# Patient Record
Sex: MALE | Race: WHITE | NOT HISPANIC OR LATINO | Employment: OTHER | URBAN - NONMETROPOLITAN AREA
[De-identification: names, ages, dates, MRNs, and addresses within clinical notes are randomized per-mention and may not be internally consistent; named-entity substitution may affect disease eponyms.]

---

## 2017-06-02 ENCOUNTER — TRANSCRIBE ORDERS (OUTPATIENT)
Dept: ADMINISTRATIVE | Facility: HOSPITAL | Age: 82
End: 2017-06-02

## 2017-06-02 DIAGNOSIS — R31.9 HEMATURIA: ICD-10-CM

## 2017-06-02 DIAGNOSIS — R10.84 GENERALIZED ABDOMINAL PAIN: Primary | ICD-10-CM

## 2017-06-05 ENCOUNTER — HOSPITAL ENCOUNTER (OUTPATIENT)
Dept: CT IMAGING | Facility: HOSPITAL | Age: 82
Discharge: HOME | End: 2017-06-05
Attending: INTERNAL MEDICINE | Admitting: INTERNAL MEDICINE

## 2017-06-05 DIAGNOSIS — R10.84 GENERALIZED ABDOMINAL PAIN: ICD-10-CM

## 2017-06-05 DIAGNOSIS — R31.9 HEMATURIA: ICD-10-CM

## 2017-06-05 PROCEDURE — 74176 CT ABD & PELVIS W/O CONTRAST: CPT | Performed by: RADIOLOGY

## 2017-06-05 PROCEDURE — 74176 CT ABD & PELVIS W/O CONTRAST: CPT

## 2017-06-15 ENCOUNTER — OFFICE VISIT (OUTPATIENT)
Dept: SURGERY | Facility: CLINIC | Age: 82
End: 2017-06-15

## 2017-06-15 VITALS — HEIGHT: 66 IN | WEIGHT: 120 LBS | BODY MASS INDEX: 19.29 KG/M2

## 2017-06-15 DIAGNOSIS — K80.20 GALLSTONES: Primary | ICD-10-CM

## 2017-06-15 PROCEDURE — 99203 OFFICE O/P NEW LOW 30 MIN: CPT | Performed by: SURGERY

## 2017-06-15 RX ORDER — ALBUTEROL SULFATE 90 UG/1
2 AEROSOL, METERED RESPIRATORY (INHALATION) EVERY 6 HOURS PRN
COMMUNITY
Start: 2017-05-09

## 2017-06-15 RX ORDER — TRAMADOL HYDROCHLORIDE 50 MG/1
TABLET ORAL
Status: ON HOLD | COMMUNITY
Start: 2017-06-09 | End: 2017-06-24

## 2017-06-15 RX ORDER — TAMSULOSIN HYDROCHLORIDE 0.4 MG/1
CAPSULE ORAL
Status: ON HOLD | COMMUNITY
Start: 2017-06-01 | End: 2017-06-24

## 2017-06-15 RX ORDER — OMEPRAZOLE 20 MG/1
20 CAPSULE, DELAYED RELEASE ORAL DAILY
COMMUNITY
Start: 2017-06-07 | End: 2018-08-29 | Stop reason: ALTCHOICE

## 2017-06-15 RX ORDER — LORAZEPAM 0.5 MG/1
0.5 TABLET ORAL DAILY
COMMUNITY
Start: 2017-06-09 | End: 2020-05-20

## 2017-06-15 RX ORDER — AZITHROMYCIN 250 MG/1
TABLET, FILM COATED ORAL
Status: ON HOLD | COMMUNITY
Start: 2017-06-06 | End: 2017-06-24

## 2017-06-15 NOTE — PROGRESS NOTES
Subjective   Sarbjit Martin is a 81 y.o. male.     History of Present Illness He has had some nausea and bloating after eating. This has been the last 2 weeks. His CT shows gallstones. He has had ulcers many years ago and had large gastric surgery. He also had been told he had kidney stones in the past but is not aware of any blockage from them. CT recently was negative for kidney stones.    The following portions of the patient's history were reviewed and updated as appropriate: allergies, current medications, past family history, past medical history, past social history, past surgical history and problem list.    Review of Systems   Constitutional: Negative for activity change, appetite change, chills, fever and unexpected weight change.   HENT: Negative for congestion, facial swelling and sore throat.    Eyes: Negative for photophobia and visual disturbance.   Respiratory: Negative for chest tightness, shortness of breath and wheezing.    Cardiovascular: Negative for chest pain, palpitations and leg swelling.   Gastrointestinal: Positive for abdominal pain and nausea. Negative for abdominal distention, anal bleeding, blood in stool, constipation, diarrhea, rectal pain and vomiting.   Endocrine: Negative for cold intolerance, heat intolerance, polydipsia and polyuria.   Genitourinary: Negative for difficulty urinating, dysuria, flank pain and urgency.   Musculoskeletal: Negative for back pain and myalgias.   Skin: Negative for rash and wound.   Allergic/Immunologic: Negative for immunocompromised state.   Neurological: Negative for dizziness, seizures, syncope, light-headedness, numbness and headaches.   Hematological: Negative for adenopathy. Does not bruise/bleed easily.   Psychiatric/Behavioral: Negative for behavioral problems and confusion. The patient is not nervous/anxious.        Objective   Physical Exam   Constitutional: He is oriented to person, place, and time. He appears well-developed and  well-nourished. He does not appear ill. No distress.       HENT:   Head: Normocephalic. Head is without laceration. Hair is normal.   Right Ear: Hearing and ear canal normal.   Left Ear: Hearing and ear canal normal.   Nose: Nose normal. No sinus tenderness. No epistaxis. Right sinus exhibits no maxillary sinus tenderness and no frontal sinus tenderness. Left sinus exhibits no maxillary sinus tenderness and no frontal sinus tenderness.   Eyes: Conjunctivae and lids are normal. Pupils are equal, round, and reactive to light.   Neck: Normal range of motion. No JVD present. No tracheal tenderness present. No tracheal deviation present. No thyroid mass and no thyromegaly present.   Cardiovascular: Normal rate and regular rhythm.  Exam reveals no gallop.    No murmur heard.  Pulmonary/Chest: Effort normal and breath sounds normal. No stridor. He has no wheezes. He exhibits no tenderness.   Abdominal: Soft. Bowel sounds are normal. He exhibits no distension, no ascites and no mass. There is no tenderness. There is no rebound and no guarding. No hernia.   Musculoskeletal: He exhibits no edema or deformity.   Lymphadenopathy:     He has no cervical adenopathy.     He has no axillary adenopathy.        Right: No inguinal and no supraclavicular adenopathy present.        Left: No inguinal and no supraclavicular adenopathy present.   Neurological: He is alert and oriented to person, place, and time. He exhibits normal muscle tone.   Skin: Skin is warm, dry and intact. No rash noted. No erythema. No pallor.   Psychiatric: He has a normal mood and affect. His behavior is normal. Thought content normal.   Vitals reviewed.      Assessment/Plan   Sarbjit was seen today for abdominal pain, nausea and back pain.    Diagnoses and all orders for this visit:    Gallstones    Try lap chan with possible open cholecystectomy.

## 2017-06-16 ENCOUNTER — ANESTHESIA EVENT (OUTPATIENT)
Dept: PERIOP | Facility: HOSPITAL | Age: 82
End: 2017-06-16

## 2017-06-16 ENCOUNTER — HOSPITAL ENCOUNTER (OUTPATIENT)
Facility: HOSPITAL | Age: 82
Setting detail: HOSPITAL OUTPATIENT SURGERY
Discharge: HOME | End: 2017-06-16
Attending: SURGERY | Admitting: SURGERY

## 2017-06-16 ENCOUNTER — ANESTHESIA (OUTPATIENT)
Dept: PERIOP | Facility: HOSPITAL | Age: 82
End: 2017-06-16

## 2017-06-16 VITALS
TEMPERATURE: 98.3 F | HEART RATE: 81 BPM | DIASTOLIC BLOOD PRESSURE: 69 MMHG | OXYGEN SATURATION: 98 % | RESPIRATION RATE: 18 BRPM | WEIGHT: 127 LBS | HEIGHT: 66 IN | BODY MASS INDEX: 20.41 KG/M2 | SYSTOLIC BLOOD PRESSURE: 146 MMHG

## 2017-06-16 DIAGNOSIS — K80.20 GALLSTONES: ICD-10-CM

## 2017-06-16 PROCEDURE — 94640 AIRWAY INHALATION TREATMENT: CPT

## 2017-06-16 PROCEDURE — 47562 LAPAROSCOPIC CHOLECYSTECTOMY: CPT | Performed by: SURGERY

## 2017-06-16 PROCEDURE — 88304 TISSUE EXAM BY PATHOLOGIST: CPT | Performed by: SURGERY

## 2017-06-16 PROCEDURE — 25010000002 DEXAMETHASONE PER 1 MG: Performed by: NURSE ANESTHETIST, CERTIFIED REGISTERED

## 2017-06-16 PROCEDURE — 25010000002 NEOSTIGMINE 10 MG/10ML SOLUTION: Performed by: NURSE ANESTHETIST, CERTIFIED REGISTERED

## 2017-06-16 PROCEDURE — 25010000002 ONDANSETRON PER 1 MG: Performed by: NURSE ANESTHETIST, CERTIFIED REGISTERED

## 2017-06-16 PROCEDURE — 25010000002 PROPOFOL 10 MG/ML EMULSION: Performed by: NURSE ANESTHETIST, CERTIFIED REGISTERED

## 2017-06-16 PROCEDURE — 25010000002 FENTANYL CITRATE (PF) 100 MCG/2ML SOLUTION: Performed by: NURSE ANESTHETIST, CERTIFIED REGISTERED

## 2017-06-16 RX ORDER — ONDANSETRON 2 MG/ML
4 INJECTION INTRAMUSCULAR; INTRAVENOUS ONCE AS NEEDED
Status: DISCONTINUED | OUTPATIENT
Start: 2017-06-16 | End: 2017-06-16 | Stop reason: HOSPADM

## 2017-06-16 RX ORDER — ROCURONIUM BROMIDE 10 MG/ML
INJECTION, SOLUTION INTRAVENOUS AS NEEDED
Status: DISCONTINUED | OUTPATIENT
Start: 2017-06-16 | End: 2017-06-16 | Stop reason: SURG

## 2017-06-16 RX ORDER — OXYCODONE HYDROCHLORIDE AND ACETAMINOPHEN 5; 325 MG/1; MG/1
1 TABLET ORAL ONCE AS NEEDED
Status: DISCONTINUED | OUTPATIENT
Start: 2017-06-16 | End: 2017-06-16 | Stop reason: HOSPADM

## 2017-06-16 RX ORDER — IPRATROPIUM BROMIDE AND ALBUTEROL SULFATE 2.5; .5 MG/3ML; MG/3ML
3 SOLUTION RESPIRATORY (INHALATION) ONCE AS NEEDED
Status: DISCONTINUED | OUTPATIENT
Start: 2017-06-16 | End: 2017-06-16 | Stop reason: HOSPADM

## 2017-06-16 RX ORDER — HYDROMORPHONE HYDROCHLORIDE 1 MG/ML
0.5 INJECTION, SOLUTION INTRAMUSCULAR; INTRAVENOUS; SUBCUTANEOUS
Status: DISCONTINUED | OUTPATIENT
Start: 2017-06-16 | End: 2017-06-16 | Stop reason: HOSPADM

## 2017-06-16 RX ORDER — IPRATROPIUM BROMIDE AND ALBUTEROL SULFATE 2.5; .5 MG/3ML; MG/3ML
3 SOLUTION RESPIRATORY (INHALATION) ONCE
Status: COMPLETED | OUTPATIENT
Start: 2017-06-16 | End: 2017-06-16

## 2017-06-16 RX ORDER — FENTANYL CITRATE 50 UG/ML
INJECTION, SOLUTION INTRAMUSCULAR; INTRAVENOUS AS NEEDED
Status: DISCONTINUED | OUTPATIENT
Start: 2017-06-16 | End: 2017-06-16 | Stop reason: SURG

## 2017-06-16 RX ORDER — MAGNESIUM HYDROXIDE 1200 MG/15ML
LIQUID ORAL AS NEEDED
Status: DISCONTINUED | OUTPATIENT
Start: 2017-06-16 | End: 2017-06-16 | Stop reason: HOSPADM

## 2017-06-16 RX ORDER — SODIUM CHLORIDE 9 MG/ML
INJECTION, SOLUTION INTRAVENOUS AS NEEDED
Status: DISCONTINUED | OUTPATIENT
Start: 2017-06-16 | End: 2017-06-16 | Stop reason: HOSPADM

## 2017-06-16 RX ORDER — ONDANSETRON 2 MG/ML
INJECTION INTRAMUSCULAR; INTRAVENOUS AS NEEDED
Status: DISCONTINUED | OUTPATIENT
Start: 2017-06-16 | End: 2017-06-16 | Stop reason: SURG

## 2017-06-16 RX ORDER — SODIUM CHLORIDE, SODIUM LACTATE, POTASSIUM CHLORIDE, CALCIUM CHLORIDE 600; 310; 30; 20 MG/100ML; MG/100ML; MG/100ML; MG/100ML
125 INJECTION, SOLUTION INTRAVENOUS CONTINUOUS
Status: DISCONTINUED | OUTPATIENT
Start: 2017-06-16 | End: 2017-06-16 | Stop reason: HOSPADM

## 2017-06-16 RX ORDER — PROPOFOL 10 MG/ML
VIAL (ML) INTRAVENOUS AS NEEDED
Status: DISCONTINUED | OUTPATIENT
Start: 2017-06-16 | End: 2017-06-16 | Stop reason: SURG

## 2017-06-16 RX ORDER — FENTANYL CITRATE 50 UG/ML
50 INJECTION, SOLUTION INTRAMUSCULAR; INTRAVENOUS
Status: DISCONTINUED | OUTPATIENT
Start: 2017-06-16 | End: 2017-06-16 | Stop reason: HOSPADM

## 2017-06-16 RX ORDER — HYDROCODONE BITARTRATE AND ACETAMINOPHEN 5; 325 MG/1; MG/1
1 TABLET ORAL EVERY 4 HOURS PRN
Status: DISCONTINUED | OUTPATIENT
Start: 2017-06-16 | End: 2017-06-16 | Stop reason: HOSPADM

## 2017-06-16 RX ORDER — GLYCOPYRROLATE 0.2 MG/ML
INJECTION INTRAMUSCULAR; INTRAVENOUS AS NEEDED
Status: DISCONTINUED | OUTPATIENT
Start: 2017-06-16 | End: 2017-06-16 | Stop reason: SURG

## 2017-06-16 RX ORDER — DEXAMETHASONE SODIUM PHOSPHATE 4 MG/ML
INJECTION, SOLUTION INTRA-ARTICULAR; INTRALESIONAL; INTRAMUSCULAR; INTRAVENOUS; SOFT TISSUE AS NEEDED
Status: DISCONTINUED | OUTPATIENT
Start: 2017-06-16 | End: 2017-06-16 | Stop reason: SURG

## 2017-06-16 RX ORDER — BUPIVACAINE HYDROCHLORIDE AND EPINEPHRINE 2.5; 5 MG/ML; UG/ML
INJECTION, SOLUTION EPIDURAL; INFILTRATION; INTRACAUDAL; PERINEURAL AS NEEDED
Status: DISCONTINUED | OUTPATIENT
Start: 2017-06-16 | End: 2017-06-16 | Stop reason: HOSPADM

## 2017-06-16 RX ORDER — HYDROCODONE BITARTRATE AND ACETAMINOPHEN 5; 325 MG/1; MG/1
1 TABLET ORAL EVERY 4 HOURS PRN
Qty: 30 TABLET | Refills: 0 | Status: SHIPPED | OUTPATIENT
Start: 2017-06-16 | End: 2017-06-28 | Stop reason: HOSPADM

## 2017-06-16 RX ORDER — SODIUM CHLORIDE 0.9 % (FLUSH) 0.9 %
1-10 SYRINGE (ML) INJECTION AS NEEDED
Status: DISCONTINUED | OUTPATIENT
Start: 2017-06-16 | End: 2017-06-16 | Stop reason: HOSPADM

## 2017-06-16 RX ORDER — LIDOCAINE HYDROCHLORIDE 20 MG/ML
INJECTION, SOLUTION INFILTRATION; PERINEURAL AS NEEDED
Status: DISCONTINUED | OUTPATIENT
Start: 2017-06-16 | End: 2017-06-16 | Stop reason: SURG

## 2017-06-16 RX ORDER — NEOSTIGMINE METHYLSULFATE 1 MG/ML
INJECTION, SOLUTION INTRAVENOUS AS NEEDED
Status: DISCONTINUED | OUTPATIENT
Start: 2017-06-16 | End: 2017-06-16 | Stop reason: SURG

## 2017-06-16 RX ORDER — MEPERIDINE HYDROCHLORIDE 25 MG/ML
12.5 INJECTION INTRAMUSCULAR; INTRAVENOUS; SUBCUTANEOUS
Status: DISCONTINUED | OUTPATIENT
Start: 2017-06-16 | End: 2017-06-16 | Stop reason: HOSPADM

## 2017-06-16 RX ORDER — ONDANSETRON 2 MG/ML
4 INJECTION INTRAMUSCULAR; INTRAVENOUS EVERY 6 HOURS PRN
Status: DISCONTINUED | OUTPATIENT
Start: 2017-06-16 | End: 2017-06-16 | Stop reason: HOSPADM

## 2017-06-16 RX ADMIN — FENTANYL CITRATE 50 MCG: 50 INJECTION INTRAMUSCULAR; INTRAVENOUS at 08:34

## 2017-06-16 RX ADMIN — FENTANYL CITRATE 100 MCG: 50 INJECTION INTRAMUSCULAR; INTRAVENOUS at 07:41

## 2017-06-16 RX ADMIN — GLYCOPYRROLATE 0.4 MG: 0.2 INJECTION, SOLUTION INTRAMUSCULAR; INTRAVENOUS at 08:09

## 2017-06-16 RX ADMIN — NEOSTIGMINE METHYLSULFATE 2 MG: 1 INJECTION, SOLUTION INTRAVENOUS at 08:10

## 2017-06-16 RX ADMIN — DEXAMETHASONE SODIUM PHOSPHATE 4 MG: 4 INJECTION, SOLUTION INTRAMUSCULAR; INTRAVENOUS at 07:41

## 2017-06-16 RX ADMIN — SODIUM CHLORIDE, POTASSIUM CHLORIDE, SODIUM LACTATE AND CALCIUM CHLORIDE: 600; 310; 30; 20 INJECTION, SOLUTION INTRAVENOUS at 07:36

## 2017-06-16 RX ADMIN — IPRATROPIUM BROMIDE AND ALBUTEROL SULFATE 3 ML: .5; 3 SOLUTION RESPIRATORY (INHALATION) at 07:10

## 2017-06-16 RX ADMIN — ROCURONIUM BROMIDE 25 MG: 10 INJECTION INTRAVENOUS at 07:42

## 2017-06-16 RX ADMIN — PROPOFOL 130 MG: 10 INJECTION, EMULSION INTRAVENOUS at 07:41

## 2017-06-16 RX ADMIN — FENTANYL CITRATE 100 MCG: 50 INJECTION INTRAMUSCULAR; INTRAVENOUS at 07:54

## 2017-06-16 RX ADMIN — EPHEDRINE SULFATE 12.5 MG: 50 INJECTION INTRAMUSCULAR; INTRAVENOUS; SUBCUTANEOUS at 07:48

## 2017-06-16 RX ADMIN — ONDANSETRON 4 MG: 2 INJECTION, SOLUTION INTRAMUSCULAR; INTRAVENOUS at 07:41

## 2017-06-16 RX ADMIN — GLYCOPYRROLATE 0.4 MG: 0.2 INJECTION, SOLUTION INTRAMUSCULAR; INTRAVENOUS at 08:10

## 2017-06-16 RX ADMIN — ROCURONIUM BROMIDE 5 MG: 10 INJECTION INTRAVENOUS at 07:41

## 2017-06-16 RX ADMIN — LIDOCAINE HYDROCHLORIDE 60 MG: 20 INJECTION, SOLUTION INFILTRATION; PERINEURAL at 07:41

## 2017-06-16 RX ADMIN — NEOSTIGMINE METHYLSULFATE 3 MG: 1 INJECTION, SOLUTION INTRAVENOUS at 08:09

## 2017-06-16 NOTE — ANESTHESIA PROCEDURE NOTES
Airway  Urgency: elective    Airway not difficult    General Information and Staff    Patient location during procedure: OR  CRNA: JESSICA LANDON    Indications and Patient Condition  Indications for airway management: airway protection    Preoxygenated: yes  MILS maintained throughout  Mask difficulty assessment: 1 - vent by mask    Final Airway Details  Final airway type: endotracheal airway      Successful airway: ETT  Cuffed: yes   Successful intubation technique: direct laryngoscopy  Facilitating devices/methods: intubating stylet  Endotracheal tube insertion site: oral  Blade: Lyle  Blade size: #4  ETT size: 7.5 mm  Cormack-Lehane Classification: grade I - full view of glottis  Placement verified by: chest auscultation and capnometry   Measured from: lips  ETT to lips (cm): 22  Number of attempts at approach: 1    Additional Comments  ETT inserted without difficulty.  Head and neck maintained midline.  Lips and teeth as per preop.

## 2017-06-16 NOTE — ANESTHESIA PREPROCEDURE EVALUATION
Anesthesia Evaluation     Patient summary reviewed and Nursing notes reviewed   no history of anesthetic complications:  NPO Solid Status: > 8 hours  NPO Liquid Status: > 8 hours     Airway   Mallampati: II  TM distance: >3 FB  Neck ROM: full  no difficulty expected  Dental    (+) edentulous, lower dentures and upper dentures    Pulmonary    (+) a smoker Former, COPD, decreased breath sounds,   (-) asthma  Cardiovascular - negative cardio ROS and normal exam  Exercise tolerance: good (4-7 METS)    NYHA Classification: II    (-) hypertension, angina, CHF, hyperlipidemia      Neuro/Psych  (+) psychiatric history Anxiety,    (-) seizures, CVA  GI/Hepatic/Renal/Endo    (+)  GERD,   (-) diabetes, hypothyroidism    Musculoskeletal     Abdominal  - normal exam    Bowel sounds: normal.   Substance History - negative use     OB/GYN negative ob/gyn ROS         Other   (+) arthritis                                   Anesthesia Plan    ASA 2     general     intravenous induction   Anesthetic plan and risks discussed with patient.  Use of blood products discussed with patient  Consented to blood products.

## 2017-06-16 NOTE — ANESTHESIA POSTPROCEDURE EVALUATION
Patient: Sarbjit Martin    Procedure Summary     Date Anesthesia Start Anesthesia Stop Room / Location    06/16/17 0736 0830  [facility] OR 01 / [facility]       Procedure Diagnosis Surgeon Provider    CHOLECYSTECTOMY LAPAROSCOPIC (N/A Abdomen) Gallstones  (Gallstones [K80.20]) MD Husam Bhagat MD          Anesthesia Type: general  Last vitals  /69 (06/16/17 0904)    Temp 98.3 °F (36.8 °C) (06/16/17 0904)    Pulse 81 (06/16/17 0904)   Resp 18 (06/16/17 0904)    SpO2 98 % (06/16/17 0904)      Post Anesthesia Care and Evaluation    Patient location during evaluation: PHASE II  Patient participation: complete - patient participated  Level of consciousness: awake and alert  Pain score: 1  Pain management: adequate  Airway patency: patent  Anesthetic complications: No anesthetic complications  PONV Status: controlled  Cardiovascular status: acceptable  Respiratory status: acceptable  Hydration status: acceptable

## 2017-06-19 LAB
LAB AP CASE REPORT: NORMAL
Lab: NORMAL
PATH REPORT.FINAL DX SPEC: NORMAL

## 2017-06-22 ENCOUNTER — OFFICE VISIT (OUTPATIENT)
Dept: SURGERY | Facility: CLINIC | Age: 82
End: 2017-06-22

## 2017-06-22 VITALS — HEIGHT: 66 IN | BODY MASS INDEX: 20.41 KG/M2 | WEIGHT: 127 LBS

## 2017-06-22 DIAGNOSIS — Z90.49 STATUS POST LAPAROSCOPIC CHOLECYSTECTOMY: Primary | ICD-10-CM

## 2017-06-22 PROCEDURE — 99024 POSTOP FOLLOW-UP VISIT: CPT | Performed by: SURGERY

## 2017-06-22 NOTE — PROGRESS NOTES
Subjective   Sarbjit Martin is a 81 y.o. male.     History of Present Illness He is eating ok and doing well. He was a little constipated.     The following portions of the patient's history were reviewed and updated as appropriate: allergies, current medications, past family history, past medical history, past social history, past surgical history and problem list.    Review of Systems    Objective   Physical Exam wounds ok    Assessment/Plan   Sarbjit was seen today for post-op follow-up.    Diagnoses and all orders for this visit:    Status post laparoscopic cholecystectomy    return prn

## 2017-06-24 ENCOUNTER — APPOINTMENT (OUTPATIENT)
Dept: GENERAL RADIOLOGY | Facility: HOSPITAL | Age: 82
End: 2017-06-24

## 2017-06-24 ENCOUNTER — APPOINTMENT (OUTPATIENT)
Dept: CT IMAGING | Facility: HOSPITAL | Age: 82
End: 2017-06-24

## 2017-06-24 ENCOUNTER — APPOINTMENT (OUTPATIENT)
Dept: ULTRASOUND IMAGING | Facility: HOSPITAL | Age: 82
End: 2017-06-24

## 2017-06-24 ENCOUNTER — HOSPITAL ENCOUNTER (INPATIENT)
Facility: HOSPITAL | Age: 82
LOS: 4 days | Discharge: HOME | End: 2017-06-28
Attending: EMERGENCY MEDICINE | Admitting: INTERNAL MEDICINE

## 2017-06-24 DIAGNOSIS — K52.9 COLITIS: Primary | ICD-10-CM

## 2017-06-24 DIAGNOSIS — Z90.49 STATUS POST LAPAROSCOPIC CHOLECYSTECTOMY: ICD-10-CM

## 2017-06-24 DIAGNOSIS — K80.50 CALCULUS OF BILE DUCT WITHOUT CHOLECYSTITIS AND WITHOUT OBSTRUCTION: ICD-10-CM

## 2017-06-24 LAB
ALBUMIN SERPL-MCNC: 4.2 G/DL (ref 3.4–4.8)
ALBUMIN/GLOB SERPL: 1.6 G/DL (ref 1.5–2.5)
ALP SERPL-CCNC: 294 U/L (ref 40–129)
ALT SERPL W P-5'-P-CCNC: 617 U/L (ref 10–44)
AMYLASE SERPL-CCNC: 33 U/L (ref 28–100)
ANION GAP SERPL CALCULATED.3IONS-SCNC: 7.8 MMOL/L (ref 3.6–11.2)
AST SERPL-CCNC: 832 U/L (ref 10–34)
BACTERIA UR QL AUTO: ABNORMAL /HPF
BASOPHILS # BLD AUTO: 0.01 10*3/MM3 (ref 0–0.3)
BASOPHILS NFR BLD AUTO: 0.1 % (ref 0–2)
BILIRUB SERPL-MCNC: 2.3 MG/DL (ref 0.2–1.8)
BILIRUB UR QL STRIP: ABNORMAL
BUN BLD-MCNC: 25 MG/DL (ref 7–21)
BUN/CREAT SERPL: 30.5 (ref 7–25)
CALCIUM SPEC-SCNC: 9.2 MG/DL (ref 7.7–10)
CHLORIDE SERPL-SCNC: 103 MMOL/L (ref 99–112)
CLARITY UR: CLEAR
CO2 SERPL-SCNC: 29.2 MMOL/L (ref 24.3–31.9)
COLOR UR: ABNORMAL
CREAT BLD-MCNC: 0.82 MG/DL (ref 0.43–1.29)
CRP SERPL-MCNC: 0.64 MG/DL (ref 0–0.99)
D-LACTATE SERPL-SCNC: 2 MMOL/L (ref 0.5–2)
DEPRECATED RDW RBC AUTO: 40.8 FL (ref 37–54)
EOSINOPHIL # BLD AUTO: 0.02 10*3/MM3 (ref 0–0.7)
EOSINOPHIL NFR BLD AUTO: 0.2 % (ref 0–7)
ERYTHROCYTE [DISTWIDTH] IN BLOOD BY AUTOMATED COUNT: 13.7 % (ref 11.5–14.5)
ERYTHROCYTE [SEDIMENTATION RATE] IN BLOOD: 8 MM/HR (ref 0–20)
GFR SERPL CREATININE-BSD FRML MDRD: 91 ML/MIN/1.73
GLOBULIN UR ELPH-MCNC: 2.7 GM/DL
GLUCOSE BLD-MCNC: 126 MG/DL (ref 70–110)
GLUCOSE UR STRIP-MCNC: NEGATIVE MG/DL
HCT VFR BLD AUTO: 44.1 % (ref 42–52)
HGB BLD-MCNC: 14.1 G/DL (ref 14–18)
HGB UR QL STRIP.AUTO: NEGATIVE
IMM GRANULOCYTES # BLD: 0.02 10*3/MM3 (ref 0–0.03)
IMM GRANULOCYTES NFR BLD: 0.2 % (ref 0–0.5)
KETONES UR QL STRIP: NEGATIVE
LEUKOCYTE ESTERASE UR QL STRIP.AUTO: ABNORMAL
LIPASE SERPL-CCNC: 29 U/L (ref 13–60)
LYMPHOCYTES # BLD AUTO: 0.23 10*3/MM3 (ref 1–3)
LYMPHOCYTES NFR BLD AUTO: 2.7 % (ref 16–46)
MCH RBC QN AUTO: 26.3 PG (ref 27–33)
MCHC RBC AUTO-ENTMCNC: 32 G/DL (ref 33–37)
MCV RBC AUTO: 82.1 FL (ref 80–94)
MONOCYTES # BLD AUTO: 0.54 10*3/MM3 (ref 0.1–0.9)
MONOCYTES NFR BLD AUTO: 6.3 % (ref 0–12)
NEUTROPHILS # BLD AUTO: 7.7 10*3/MM3 (ref 1.4–6.5)
NEUTROPHILS NFR BLD AUTO: 90.5 % (ref 40–75)
NITRITE UR QL STRIP: NEGATIVE
OSMOLALITY SERPL CALC.SUM OF ELEC: 285.3 MOSM/KG (ref 273–305)
PH UR STRIP.AUTO: 7.5 [PH] (ref 5–8)
PLATELET # BLD AUTO: 163 10*3/MM3 (ref 130–400)
PMV BLD AUTO: 11.8 FL (ref 6–10)
POTASSIUM BLD-SCNC: 3.7 MMOL/L (ref 3.5–5.3)
PROT SERPL-MCNC: 6.9 G/DL (ref 6–8)
PROT UR QL STRIP: NEGATIVE
RBC # BLD AUTO: 5.37 10*6/MM3 (ref 4.7–6.1)
RBC # UR: ABNORMAL /HPF
REF LAB TEST METHOD: ABNORMAL
SODIUM BLD-SCNC: 140 MMOL/L (ref 135–153)
SP GR UR STRIP: 1.02 (ref 1–1.03)
SQUAMOUS #/AREA URNS HPF: ABNORMAL /HPF
UROBILINOGEN UR QL STRIP: ABNORMAL
WBC NRBC COR # BLD: 8.52 10*3/MM3 (ref 4.5–12.5)
WBC UR QL AUTO: ABNORMAL /HPF

## 2017-06-24 PROCEDURE — 83690 ASSAY OF LIPASE: CPT | Performed by: EMERGENCY MEDICINE

## 2017-06-24 PROCEDURE — 81001 URINALYSIS AUTO W/SCOPE: CPT | Performed by: EMERGENCY MEDICINE

## 2017-06-24 PROCEDURE — 76705 ECHO EXAM OF ABDOMEN: CPT | Performed by: RADIOLOGY

## 2017-06-24 PROCEDURE — 36415 COLL VENOUS BLD VENIPUNCTURE: CPT

## 2017-06-24 PROCEDURE — 25010000002 CIPROFLOXACIN PER 200 MG: Performed by: EMERGENCY MEDICINE

## 2017-06-24 PROCEDURE — 86140 C-REACTIVE PROTEIN: CPT | Performed by: EMERGENCY MEDICINE

## 2017-06-24 PROCEDURE — 74177 CT ABD & PELVIS W/CONTRAST: CPT

## 2017-06-24 PROCEDURE — 25010000002 ONDANSETRON PER 1 MG: Performed by: EMERGENCY MEDICINE

## 2017-06-24 PROCEDURE — G0378 HOSPITAL OBSERVATION PER HR: HCPCS

## 2017-06-24 PROCEDURE — 76705 ECHO EXAM OF ABDOMEN: CPT

## 2017-06-24 PROCEDURE — 85652 RBC SED RATE AUTOMATED: CPT | Performed by: EMERGENCY MEDICINE

## 2017-06-24 PROCEDURE — 99284 EMERGENCY DEPT VISIT MOD MDM: CPT

## 2017-06-24 PROCEDURE — 96375 TX/PRO/DX INJ NEW DRUG ADDON: CPT

## 2017-06-24 PROCEDURE — 83605 ASSAY OF LACTIC ACID: CPT | Performed by: EMERGENCY MEDICINE

## 2017-06-24 PROCEDURE — 85025 COMPLETE CBC W/AUTO DIFF WBC: CPT | Performed by: EMERGENCY MEDICINE

## 2017-06-24 PROCEDURE — 87077 CULTURE AEROBIC IDENTIFY: CPT | Performed by: EMERGENCY MEDICINE

## 2017-06-24 PROCEDURE — 71010 XR CHEST 1 VW: CPT | Performed by: RADIOLOGY

## 2017-06-24 PROCEDURE — 71010 HC CHEST PA OR AP: CPT

## 2017-06-24 PROCEDURE — 93010 ELECTROCARDIOGRAM REPORT: CPT | Performed by: INTERNAL MEDICINE

## 2017-06-24 PROCEDURE — 0 IOPAMIDOL 61 % SOLUTION: Performed by: EMERGENCY MEDICINE

## 2017-06-24 PROCEDURE — 96361 HYDRATE IV INFUSION ADD-ON: CPT

## 2017-06-24 PROCEDURE — 74177 CT ABD & PELVIS W/CONTRAST: CPT | Performed by: RADIOLOGY

## 2017-06-24 PROCEDURE — 82150 ASSAY OF AMYLASE: CPT | Performed by: EMERGENCY MEDICINE

## 2017-06-24 PROCEDURE — 80053 COMPREHEN METABOLIC PANEL: CPT | Performed by: EMERGENCY MEDICINE

## 2017-06-24 PROCEDURE — 96365 THER/PROPH/DIAG IV INF INIT: CPT

## 2017-06-24 PROCEDURE — 93005 ELECTROCARDIOGRAM TRACING: CPT | Performed by: EMERGENCY MEDICINE

## 2017-06-24 PROCEDURE — 87186 SC STD MICRODIL/AGAR DIL: CPT | Performed by: EMERGENCY MEDICINE

## 2017-06-24 PROCEDURE — 87040 BLOOD CULTURE FOR BACTERIA: CPT | Performed by: EMERGENCY MEDICINE

## 2017-06-24 PROCEDURE — 94799 UNLISTED PULMONARY SVC/PX: CPT

## 2017-06-24 RX ORDER — ONDANSETRON 2 MG/ML
4 INJECTION INTRAMUSCULAR; INTRAVENOUS EVERY 6 HOURS PRN
Status: DISCONTINUED | OUTPATIENT
Start: 2017-06-24 | End: 2017-06-28 | Stop reason: HOSPADM

## 2017-06-24 RX ORDER — CIPROFLOXACIN 2 MG/ML
400 INJECTION, SOLUTION INTRAVENOUS EVERY 12 HOURS
Status: DISCONTINUED | OUTPATIENT
Start: 2017-06-25 | End: 2017-06-28

## 2017-06-24 RX ORDER — ALBUTEROL SULFATE 2.5 MG/3ML
2.5 SOLUTION RESPIRATORY (INHALATION) EVERY 6 HOURS PRN
Status: DISCONTINUED | OUTPATIENT
Start: 2017-06-24 | End: 2017-06-28 | Stop reason: HOSPADM

## 2017-06-24 RX ORDER — IPRATROPIUM BROMIDE AND ALBUTEROL SULFATE 2.5; .5 MG/3ML; MG/3ML
3 SOLUTION RESPIRATORY (INHALATION)
Status: DISCONTINUED | OUTPATIENT
Start: 2017-06-24 | End: 2017-06-24

## 2017-06-24 RX ORDER — CIPROFLOXACIN 2 MG/ML
400 INJECTION, SOLUTION INTRAVENOUS ONCE
Status: COMPLETED | OUTPATIENT
Start: 2017-06-24 | End: 2017-06-24

## 2017-06-24 RX ORDER — SODIUM CHLORIDE 0.9 % (FLUSH) 0.9 %
10 SYRINGE (ML) INJECTION AS NEEDED
Status: DISCONTINUED | OUTPATIENT
Start: 2017-06-24 | End: 2017-06-28 | Stop reason: HOSPADM

## 2017-06-24 RX ORDER — LORAZEPAM 0.5 MG/1
0.5 TABLET ORAL DAILY PRN
Status: DISCONTINUED | OUTPATIENT
Start: 2017-06-24 | End: 2017-06-28 | Stop reason: HOSPADM

## 2017-06-24 RX ORDER — ONDANSETRON 2 MG/ML
4 INJECTION INTRAMUSCULAR; INTRAVENOUS ONCE
Status: COMPLETED | OUTPATIENT
Start: 2017-06-24 | End: 2017-06-24

## 2017-06-24 RX ORDER — CIPROFLOXACIN 2 MG/ML
400 INJECTION, SOLUTION INTRAVENOUS EVERY 12 HOURS
Status: DISCONTINUED | OUTPATIENT
Start: 2017-06-24 | End: 2017-06-24

## 2017-06-24 RX ORDER — SODIUM CHLORIDE 9 MG/ML
75 INJECTION, SOLUTION INTRAVENOUS CONTINUOUS
Status: DISCONTINUED | OUTPATIENT
Start: 2017-06-24 | End: 2017-06-28

## 2017-06-24 RX ORDER — PANTOPRAZOLE SODIUM 40 MG/1
40 TABLET, DELAYED RELEASE ORAL EVERY MORNING
Status: DISCONTINUED | OUTPATIENT
Start: 2017-06-24 | End: 2017-06-28 | Stop reason: HOSPADM

## 2017-06-24 RX ORDER — IPRATROPIUM BROMIDE AND ALBUTEROL SULFATE 2.5; .5 MG/3ML; MG/3ML
3 SOLUTION RESPIRATORY (INHALATION) EVERY 6 HOURS PRN
Status: DISCONTINUED | OUTPATIENT
Start: 2017-06-24 | End: 2017-06-28 | Stop reason: HOSPADM

## 2017-06-24 RX ORDER — HYDROCODONE BITARTRATE AND ACETAMINOPHEN 5; 325 MG/1; MG/1
1 TABLET ORAL EVERY 4 HOURS PRN
Status: DISPENSED | OUTPATIENT
Start: 2017-06-24 | End: 2017-06-26

## 2017-06-24 RX ADMIN — PANTOPRAZOLE SODIUM 40 MG: 40 TABLET, DELAYED RELEASE ORAL at 17:12

## 2017-06-24 RX ADMIN — SODIUM CHLORIDE 500 ML: 9 INJECTION, SOLUTION INTRAVENOUS at 09:56

## 2017-06-24 RX ADMIN — METRONIDAZOLE 500 MG: 500 INJECTION, SOLUTION INTRAVENOUS at 23:42

## 2017-06-24 RX ADMIN — IOPAMIDOL 100 ML: 612 INJECTION, SOLUTION INTRAVENOUS at 12:05

## 2017-06-24 RX ADMIN — ONDANSETRON 4 MG: 2 INJECTION, SOLUTION INTRAMUSCULAR; INTRAVENOUS at 09:56

## 2017-06-24 RX ADMIN — METRONIDAZOLE 500 MG: 500 INJECTION, SOLUTION INTRAVENOUS at 16:37

## 2017-06-24 RX ADMIN — CIPROFLOXACIN 400 MG: 2 INJECTION, SOLUTION INTRAVENOUS at 13:13

## 2017-06-24 RX ADMIN — HYDROCODONE BITARTRATE AND ACETAMINOPHEN 1 TABLET: 5; 325 TABLET ORAL at 19:59

## 2017-06-24 RX ADMIN — SODIUM CHLORIDE 75 ML/HR: 900 INJECTION, SOLUTION INTRAVENOUS at 14:53

## 2017-06-25 PROBLEM — R10.9 PAIN IN THE ABDOMEN: Status: ACTIVE | Noted: 2017-06-25

## 2017-06-25 PROCEDURE — 25010000002 CIPROFLOXACIN PER 200 MG: Performed by: INTERNAL MEDICINE

## 2017-06-25 PROCEDURE — 94799 UNLISTED PULMONARY SVC/PX: CPT

## 2017-06-25 PROCEDURE — 25010000002 ENOXAPARIN PER 10 MG: Performed by: INTERNAL MEDICINE

## 2017-06-25 RX ADMIN — METRONIDAZOLE 500 MG: 500 INJECTION, SOLUTION INTRAVENOUS at 08:34

## 2017-06-25 RX ADMIN — ENOXAPARIN SODIUM 30 MG: 30 INJECTION SUBCUTANEOUS at 08:34

## 2017-06-25 RX ADMIN — HYDROCODONE BITARTRATE AND ACETAMINOPHEN 1 TABLET: 5; 325 TABLET ORAL at 12:14

## 2017-06-25 RX ADMIN — LORAZEPAM 0.5 MG: 0.5 TABLET ORAL at 20:39

## 2017-06-25 RX ADMIN — METRONIDAZOLE 500 MG: 500 INJECTION, SOLUTION INTRAVENOUS at 17:14

## 2017-06-25 RX ADMIN — SODIUM CHLORIDE 75 ML/HR: 900 INJECTION, SOLUTION INTRAVENOUS at 20:35

## 2017-06-25 RX ADMIN — HYDROCODONE BITARTRATE AND ACETAMINOPHEN 1 TABLET: 5; 325 TABLET ORAL at 23:12

## 2017-06-25 RX ADMIN — METRONIDAZOLE 500 MG: 500 INJECTION, SOLUTION INTRAVENOUS at 23:09

## 2017-06-25 RX ADMIN — PANTOPRAZOLE SODIUM 40 MG: 40 TABLET, DELAYED RELEASE ORAL at 05:31

## 2017-06-25 RX ADMIN — SODIUM CHLORIDE 75 ML/HR: 900 INJECTION, SOLUTION INTRAVENOUS at 04:00

## 2017-06-25 RX ADMIN — CIPROFLOXACIN 400 MG: 2 INJECTION, SOLUTION INTRAVENOUS at 12:15

## 2017-06-25 RX ADMIN — CIPROFLOXACIN 400 MG: 2 INJECTION, SOLUTION INTRAVENOUS at 00:43

## 2017-06-26 ENCOUNTER — APPOINTMENT (OUTPATIENT)
Dept: GENERAL RADIOLOGY | Facility: HOSPITAL | Age: 82
End: 2017-06-26

## 2017-06-26 ENCOUNTER — ANESTHESIA EVENT (OUTPATIENT)
Dept: PERIOP | Facility: HOSPITAL | Age: 82
End: 2017-06-26

## 2017-06-26 ENCOUNTER — ANESTHESIA (OUTPATIENT)
Dept: PERIOP | Facility: HOSPITAL | Age: 82
End: 2017-06-26

## 2017-06-26 LAB
ALBUMIN SERPL-MCNC: 3.1 G/DL (ref 3.4–4.8)
ALBUMIN/GLOB SERPL: 1.5 G/DL (ref 1.5–2.5)
ALP SERPL-CCNC: 206 U/L (ref 40–129)
ALT SERPL W P-5'-P-CCNC: 228 U/L (ref 10–44)
AMYLASE SERPL-CCNC: 33 U/L (ref 28–100)
ANION GAP SERPL CALCULATED.3IONS-SCNC: 3.4 MMOL/L (ref 3.6–11.2)
APTT PPP: 31.3 SECONDS (ref 24.4–31)
AST SERPL-CCNC: 66 U/L (ref 10–34)
BASOPHILS # BLD AUTO: 0.02 10*3/MM3 (ref 0–0.3)
BASOPHILS NFR BLD AUTO: 0.3 % (ref 0–2)
BILIRUB SERPL-MCNC: 0.7 MG/DL (ref 0.2–1.8)
BUN BLD-MCNC: 15 MG/DL (ref 7–21)
BUN/CREAT SERPL: 20.5 (ref 7–25)
CALCIUM SPEC-SCNC: 8.2 MG/DL (ref 7.7–10)
CHLORIDE SERPL-SCNC: 112 MMOL/L (ref 99–112)
CO2 SERPL-SCNC: 25.6 MMOL/L (ref 24.3–31.9)
CREAT BLD-MCNC: 0.73 MG/DL (ref 0.43–1.29)
DEPRECATED RDW RBC AUTO: 42.2 FL (ref 37–54)
EOSINOPHIL # BLD AUTO: 0.37 10*3/MM3 (ref 0–0.7)
EOSINOPHIL NFR BLD AUTO: 6.4 % (ref 0–7)
ERYTHROCYTE [DISTWIDTH] IN BLOOD BY AUTOMATED COUNT: 13.9 % (ref 11.5–14.5)
GFR SERPL CREATININE-BSD FRML MDRD: 104 ML/MIN/1.73
GLOBULIN UR ELPH-MCNC: 2.1 GM/DL
GLUCOSE BLD-MCNC: 88 MG/DL (ref 70–110)
HCT VFR BLD AUTO: 39 % (ref 42–52)
HGB BLD-MCNC: 12 G/DL (ref 14–18)
IMM GRANULOCYTES # BLD: 0.01 10*3/MM3 (ref 0–0.03)
IMM GRANULOCYTES NFR BLD: 0.2 % (ref 0–0.5)
INR PPP: 1.08 (ref 0.8–1.1)
LIPASE SERPL-CCNC: 18 U/L (ref 13–60)
LYMPHOCYTES # BLD AUTO: 1.14 10*3/MM3 (ref 1–3)
LYMPHOCYTES NFR BLD AUTO: 19.6 % (ref 16–46)
MCH RBC QN AUTO: 25.6 PG (ref 27–33)
MCHC RBC AUTO-ENTMCNC: 30.8 G/DL (ref 33–37)
MCV RBC AUTO: 83.3 FL (ref 80–94)
MONOCYTES # BLD AUTO: 0.74 10*3/MM3 (ref 0.1–0.9)
MONOCYTES NFR BLD AUTO: 12.7 % (ref 0–12)
NEUTROPHILS # BLD AUTO: 3.54 10*3/MM3 (ref 1.4–6.5)
NEUTROPHILS NFR BLD AUTO: 60.8 % (ref 40–75)
OSMOLALITY SERPL CALC.SUM OF ELEC: 281.5 MOSM/KG (ref 273–305)
PLATELET # BLD AUTO: 143 10*3/MM3 (ref 130–400)
PMV BLD AUTO: 12.3 FL (ref 6–10)
POTASSIUM BLD-SCNC: 3.6 MMOL/L (ref 3.5–5.3)
PROT SERPL-MCNC: 5.2 G/DL (ref 6–8)
PROTHROMBIN TIME: 12.1 SECONDS (ref 9.8–11.9)
RBC # BLD AUTO: 4.68 10*6/MM3 (ref 4.7–6.1)
SODIUM BLD-SCNC: 141 MMOL/L (ref 135–153)
WBC NRBC COR # BLD: 5.82 10*3/MM3 (ref 4.5–12.5)

## 2017-06-26 PROCEDURE — 0F798DZ DILATION OF COMMON BILE DUCT WITH INTRALUMINAL DEVICE, VIA NATURAL OR ARTIFICIAL OPENING ENDOSCOPIC: ICD-10-PCS | Performed by: INTERNAL MEDICINE

## 2017-06-26 PROCEDURE — 85610 PROTHROMBIN TIME: CPT | Performed by: INTERNAL MEDICINE

## 2017-06-26 PROCEDURE — 25010000002 ONDANSETRON PER 1 MG: Performed by: NURSE ANESTHETIST, CERTIFIED REGISTERED

## 2017-06-26 PROCEDURE — 25010000002 FENTANYL CITRATE (PF) 100 MCG/2ML SOLUTION: Performed by: NURSE ANESTHETIST, CERTIFIED REGISTERED

## 2017-06-26 PROCEDURE — C2625 STENT, NON-COR, TEM W/DEL SY: HCPCS | Performed by: INTERNAL MEDICINE

## 2017-06-26 PROCEDURE — C1769 GUIDE WIRE: HCPCS | Performed by: INTERNAL MEDICINE

## 2017-06-26 PROCEDURE — 85025 COMPLETE CBC W/AUTO DIFF WBC: CPT | Performed by: INTERNAL MEDICINE

## 2017-06-26 PROCEDURE — 0FC98ZZ EXTIRPATION OF MATTER FROM COMMON BILE DUCT, VIA NATURAL OR ARTIFICIAL OPENING ENDOSCOPIC: ICD-10-PCS | Performed by: INTERNAL MEDICINE

## 2017-06-26 PROCEDURE — 94799 UNLISTED PULMONARY SVC/PX: CPT

## 2017-06-26 PROCEDURE — 80053 COMPREHEN METABOLIC PANEL: CPT | Performed by: INTERNAL MEDICINE

## 2017-06-26 PROCEDURE — 82150 ASSAY OF AMYLASE: CPT | Performed by: INTERNAL MEDICINE

## 2017-06-26 PROCEDURE — 25010000002 CIPROFLOXACIN PER 200 MG: Performed by: INTERNAL MEDICINE

## 2017-06-26 PROCEDURE — 85730 THROMBOPLASTIN TIME PARTIAL: CPT | Performed by: INTERNAL MEDICINE

## 2017-06-26 PROCEDURE — 83690 ASSAY OF LIPASE: CPT | Performed by: INTERNAL MEDICINE

## 2017-06-26 PROCEDURE — 76000 FLUOROSCOPY <1 HR PHYS/QHP: CPT

## 2017-06-26 PROCEDURE — 74300 X-RAY BILE DUCTS/PANCREAS: CPT | Performed by: RADIOLOGY

## 2017-06-26 PROCEDURE — 25010000002 PROPOFOL 10 MG/ML EMULSION: Performed by: NURSE ANESTHETIST, CERTIFIED REGISTERED

## 2017-06-26 DEVICE — BILIARY STENT WITH NAVIFLEXTM RX DELIVERY SYSTEM
Type: IMPLANTABLE DEVICE | Status: FUNCTIONAL
Brand: ADVANIX™ BILIARY

## 2017-06-26 RX ORDER — LABETALOL HYDROCHLORIDE 5 MG/ML
5 INJECTION, SOLUTION INTRAVENOUS ONCE
Status: COMPLETED | OUTPATIENT
Start: 2017-06-26 | End: 2017-06-26

## 2017-06-26 RX ORDER — FAMOTIDINE 10 MG/ML
INJECTION, SOLUTION INTRAVENOUS AS NEEDED
Status: DISCONTINUED | OUTPATIENT
Start: 2017-06-26 | End: 2017-06-26 | Stop reason: SURG

## 2017-06-26 RX ORDER — URSODIOL 300 MG/1
300 CAPSULE ORAL 2 TIMES DAILY
Status: DISCONTINUED | OUTPATIENT
Start: 2017-06-26 | End: 2017-06-28 | Stop reason: HOSPADM

## 2017-06-26 RX ORDER — PROPOFOL 10 MG/ML
VIAL (ML) INTRAVENOUS AS NEEDED
Status: DISCONTINUED | OUTPATIENT
Start: 2017-06-26 | End: 2017-06-26 | Stop reason: SURG

## 2017-06-26 RX ORDER — FENTANYL CITRATE 50 UG/ML
INJECTION, SOLUTION INTRAMUSCULAR; INTRAVENOUS AS NEEDED
Status: DISCONTINUED | OUTPATIENT
Start: 2017-06-26 | End: 2017-06-26 | Stop reason: SURG

## 2017-06-26 RX ORDER — SODIUM CHLORIDE, SODIUM LACTATE, POTASSIUM CHLORIDE, CALCIUM CHLORIDE 600; 310; 30; 20 MG/100ML; MG/100ML; MG/100ML; MG/100ML
125 INJECTION, SOLUTION INTRAVENOUS CONTINUOUS
Status: DISCONTINUED | OUTPATIENT
Start: 2017-06-26 | End: 2017-06-27

## 2017-06-26 RX ORDER — ONDANSETRON 2 MG/ML
4 INJECTION INTRAMUSCULAR; INTRAVENOUS ONCE AS NEEDED
Status: DISCONTINUED | OUTPATIENT
Start: 2017-06-26 | End: 2017-06-26 | Stop reason: HOSPADM

## 2017-06-26 RX ORDER — LIDOCAINE HYDROCHLORIDE 20 MG/ML
INJECTION, SOLUTION INFILTRATION; PERINEURAL AS NEEDED
Status: DISCONTINUED | OUTPATIENT
Start: 2017-06-26 | End: 2017-06-26 | Stop reason: SURG

## 2017-06-26 RX ORDER — SODIUM CHLORIDE 9 MG/ML
INJECTION, SOLUTION INTRAVENOUS CONTINUOUS PRN
Status: DISCONTINUED | OUTPATIENT
Start: 2017-06-26 | End: 2017-06-26 | Stop reason: SURG

## 2017-06-26 RX ORDER — MEPERIDINE HYDROCHLORIDE 25 MG/ML
12.5 INJECTION INTRAMUSCULAR; INTRAVENOUS; SUBCUTANEOUS
Status: DISCONTINUED | OUTPATIENT
Start: 2017-06-26 | End: 2017-06-26 | Stop reason: HOSPADM

## 2017-06-26 RX ORDER — ROCURONIUM BROMIDE 10 MG/ML
INJECTION, SOLUTION INTRAVENOUS AS NEEDED
Status: DISCONTINUED | OUTPATIENT
Start: 2017-06-26 | End: 2017-06-26 | Stop reason: SURG

## 2017-06-26 RX ORDER — HYDROCODONE BITARTRATE AND ACETAMINOPHEN 5; 325 MG/1; MG/1
1 TABLET ORAL EVERY 4 HOURS PRN
Status: DISCONTINUED | OUTPATIENT
Start: 2017-06-26 | End: 2017-06-28 | Stop reason: HOSPADM

## 2017-06-26 RX ORDER — OXYCODONE HYDROCHLORIDE AND ACETAMINOPHEN 5; 325 MG/1; MG/1
1 TABLET ORAL ONCE AS NEEDED
Status: DISCONTINUED | OUTPATIENT
Start: 2017-06-26 | End: 2017-06-26 | Stop reason: HOSPADM

## 2017-06-26 RX ORDER — ONDANSETRON 2 MG/ML
INJECTION INTRAMUSCULAR; INTRAVENOUS AS NEEDED
Status: DISCONTINUED | OUTPATIENT
Start: 2017-06-26 | End: 2017-06-26 | Stop reason: SURG

## 2017-06-26 RX ORDER — SODIUM CHLORIDE 0.9 % (FLUSH) 0.9 %
1-10 SYRINGE (ML) INJECTION AS NEEDED
Status: DISCONTINUED | OUTPATIENT
Start: 2017-06-26 | End: 2017-06-26 | Stop reason: HOSPADM

## 2017-06-26 RX ORDER — FENTANYL CITRATE 50 UG/ML
50 INJECTION, SOLUTION INTRAMUSCULAR; INTRAVENOUS
Status: DISCONTINUED | OUTPATIENT
Start: 2017-06-26 | End: 2017-06-26 | Stop reason: HOSPADM

## 2017-06-26 RX ADMIN — LABETALOL HYDROCHLORIDE 5 MG: 5 INJECTION INTRAVENOUS at 15:31

## 2017-06-26 RX ADMIN — EPHEDRINE SULFATE 10 MG: 50 INJECTION INTRAMUSCULAR; INTRAVENOUS; SUBCUTANEOUS at 14:12

## 2017-06-26 RX ADMIN — ONDANSETRON 4 MG: 2 INJECTION, SOLUTION INTRAMUSCULAR; INTRAVENOUS at 13:38

## 2017-06-26 RX ADMIN — SODIUM CHLORIDE 75 ML/HR: 900 INJECTION, SOLUTION INTRAVENOUS at 17:21

## 2017-06-26 RX ADMIN — CIPROFLOXACIN 400 MG: 2 INJECTION, SOLUTION INTRAVENOUS at 13:35

## 2017-06-26 RX ADMIN — URSODIOL 300 MG: 300 CAPSULE ORAL at 18:26

## 2017-06-26 RX ADMIN — SODIUM CHLORIDE: 9 INJECTION, SOLUTION INTRAVENOUS at 13:35

## 2017-06-26 RX ADMIN — ROCURONIUM BROMIDE 30 MG: 10 INJECTION INTRAVENOUS at 13:38

## 2017-06-26 RX ADMIN — FAMOTIDINE 20 MG: 10 INJECTION, SOLUTION INTRAVENOUS at 13:36

## 2017-06-26 RX ADMIN — FENTANYL CITRATE 100 MCG: 50 INJECTION INTRAMUSCULAR; INTRAVENOUS at 13:36

## 2017-06-26 RX ADMIN — CIPROFLOXACIN 400 MG: 2 INJECTION, SOLUTION INTRAVENOUS at 00:29

## 2017-06-26 RX ADMIN — PROPOFOL 200 MG: 10 INJECTION, EMULSION INTRAVENOUS at 13:38

## 2017-06-26 RX ADMIN — EPHEDRINE SULFATE 10 MG: 50 INJECTION INTRAMUSCULAR; INTRAVENOUS; SUBCUTANEOUS at 14:15

## 2017-06-26 RX ADMIN — LIDOCAINE HYDROCHLORIDE 60 MG: 20 INJECTION, SOLUTION INFILTRATION; PERINEURAL at 13:38

## 2017-06-26 RX ADMIN — METRONIDAZOLE 500 MG: 500 INJECTION, SOLUTION INTRAVENOUS at 08:22

## 2017-06-26 RX ADMIN — SODIUM CHLORIDE 75 ML/HR: 900 INJECTION, SOLUTION INTRAVENOUS at 12:27

## 2017-06-26 RX ADMIN — EPHEDRINE SULFATE 10 MG: 50 INJECTION INTRAMUSCULAR; INTRAVENOUS; SUBCUTANEOUS at 13:44

## 2017-06-26 RX ADMIN — EPHEDRINE SULFATE 10 MG: 50 INJECTION INTRAMUSCULAR; INTRAVENOUS; SUBCUTANEOUS at 13:47

## 2017-06-26 RX ADMIN — EPHEDRINE SULFATE 10 MG: 50 INJECTION INTRAMUSCULAR; INTRAVENOUS; SUBCUTANEOUS at 13:50

## 2017-06-26 RX ADMIN — CIPROFLOXACIN 400 MG: 2 INJECTION, SOLUTION INTRAVENOUS at 12:24

## 2017-06-26 NOTE — ANESTHESIA PROCEDURE NOTES
Airway  Urgency: elective    Date/Time: 6/26/2017 1:39 PM  Airway not difficult    General Information and Staff    Patient location during procedure: OR  Anesthesiologist: GUNNER DAS  CRNA: CHADWICK HOGAN    Indications and Patient Condition  Indications for airway management: airway protection    Preoxygenated: yes  MILS maintained throughout  Mask difficulty assessment: 2 - vent by mask + OA or adjuvant +/- NMBA    Final Airway Details  Final airway type: endotracheal airway      Successful airway: ETT  Cuffed: yes   Successful intubation technique: direct laryngoscopy  Facilitating devices/methods: intubating stylet  Endotracheal tube insertion site: oral  Blade: Lyle  Blade size: #4  ETT size: 7.5 mm  Cormack-Lehane Classification: grade IIa - partial view of glottis  Placement verified by: chest auscultation, capnometry and palpation of cuff   Cuff volume (mL): 6  Measured from: lips  ETT to lips (cm): 22  Number of attempts at approach: 1    Additional Comments  Dentition as preop. No complications noted. Patient tolerated well.  ETT secured.

## 2017-06-26 NOTE — ANESTHESIA PREPROCEDURE EVALUATION
Anesthesia Evaluation     Patient summary reviewed and Nursing notes reviewed   no history of anesthetic complications:  NPO Solid Status: > 8 hours  NPO Liquid Status: > 8 hours     Airway   Mallampati: II  TM distance: >3 FB  Neck ROM: full  Dental    (+) edentulous, lower dentures and upper dentures    Pulmonary    (+) a smoker Former, COPD, decreased breath sounds,   (-) asthma  Cardiovascular - negative cardio ROS and normal exam  Exercise tolerance: good (4-7 METS)    NYHA Classification: II    (-) hypertension, past MI, dysrhythmias, angina, CHF, hyperlipidemia      Neuro/Psych- negative ROS  (-) seizures, CVA  GI/Hepatic/Renal/Endo    (+)  GERD well controlled,   (-) diabetes, hypothyroidism    Musculoskeletal     Abdominal  - normal exam    Bowel sounds: normal.   Substance History - negative use     OB/GYN negative ob/gyn ROS         Other   (+) arthritis                                   Anesthesia Plan    ASA 3     general     intravenous induction   Anesthetic plan and risks discussed with patient.  Use of blood products discussed with patient  Consented to blood products.

## 2017-06-26 NOTE — ANESTHESIA POSTPROCEDURE EVALUATION
Patient: Sarbjit Martin    Procedure Summary     Date Anesthesia Start Anesthesia Stop Room / Location    06/26/17 1335 1511  [facility] OR 07 / [facility]       Procedure Diagnosis Surgeon Provider    ENDOSCOPIC RETROGRADE CHOLANGIOPANCREATOGRAPHY (N/A ) Calculus of bile duct without cholecystitis and without obstruction  (Calculus of bile duct without cholecystitis and without obstruction [K80.50]) MD Husam Avelar MD          Anesthesia Type: general  Last vitals  /98 (06/26/17 1601)    Temp 98 °F (36.7 °C) (06/26/17 1531)    Pulse 66 (06/26/17 1601)   Resp 20 (06/26/17 1601)    SpO2 97 % (06/26/17 1601)      Post Anesthesia Care and Evaluation    Patient location during evaluation: PACU  Patient participation: complete - patient participated  Level of consciousness: awake and alert  Pain score: 1  Pain management: adequate  Airway patency: patent  Anesthetic complications: No anesthetic complications  PONV Status: controlled  Cardiovascular status: acceptable  Respiratory status: acceptable  Hydration status: acceptable

## 2017-06-27 LAB
ALBUMIN SERPL-MCNC: 3.1 G/DL (ref 3.4–4.8)
ALBUMIN/GLOB SERPL: 1.3 G/DL (ref 1.5–2.5)
ALP SERPL-CCNC: 177 U/L (ref 40–129)
ALT SERPL W P-5'-P-CCNC: 158 U/L (ref 10–44)
ANION GAP SERPL CALCULATED.3IONS-SCNC: 5.9 MMOL/L (ref 3.6–11.2)
AST SERPL-CCNC: 39 U/L (ref 10–34)
BACTERIA SPEC AEROBE CULT: ABNORMAL
BACTERIA SPEC AEROBE CULT: ABNORMAL
BILIRUB SERPL-MCNC: 0.6 MG/DL (ref 0.2–1.8)
BUN BLD-MCNC: 9 MG/DL (ref 7–21)
BUN/CREAT SERPL: 14.8 (ref 7–25)
CALCIUM SPEC-SCNC: 8.1 MG/DL (ref 7.7–10)
CHLORIDE SERPL-SCNC: 108 MMOL/L (ref 99–112)
CO2 SERPL-SCNC: 25.1 MMOL/L (ref 24.3–31.9)
CREAT BLD-MCNC: 0.61 MG/DL (ref 0.43–1.29)
CRP SERPL-MCNC: 2.42 MG/DL (ref 0–0.99)
DEPRECATED RDW RBC AUTO: 42 FL (ref 37–54)
ERYTHROCYTE [DISTWIDTH] IN BLOOD BY AUTOMATED COUNT: 13.9 % (ref 11.5–14.5)
GFR SERPL CREATININE-BSD FRML MDRD: 128 ML/MIN/1.73
GLOBULIN UR ELPH-MCNC: 2.3 GM/DL
GLUCOSE BLD-MCNC: 80 MG/DL (ref 70–110)
GRAM STN SPEC: ABNORMAL
GRAM STN SPEC: ABNORMAL
HCT VFR BLD AUTO: 40.2 % (ref 42–52)
HGB BLD-MCNC: 12.7 G/DL (ref 14–18)
INR PPP: 1.02 (ref 0.8–1.1)
ISOLATED FROM: ABNORMAL
ISOLATED FROM: ABNORMAL
MCH RBC QN AUTO: 26.5 PG (ref 27–33)
MCHC RBC AUTO-ENTMCNC: 31.6 G/DL (ref 33–37)
MCV RBC AUTO: 83.9 FL (ref 80–94)
OSMOLALITY SERPL CALC.SUM OF ELEC: 275.2 MOSM/KG (ref 273–305)
PLATELET # BLD AUTO: 154 10*3/MM3 (ref 130–400)
PMV BLD AUTO: 11.8 FL (ref 6–10)
POTASSIUM BLD-SCNC: 3.6 MMOL/L (ref 3.5–5.3)
PROT SERPL-MCNC: 5.4 G/DL (ref 6–8)
PROTHROMBIN TIME: 11.3 SECONDS (ref 9.8–11.9)
RBC # BLD AUTO: 4.79 10*6/MM3 (ref 4.7–6.1)
SODIUM BLD-SCNC: 139 MMOL/L (ref 135–153)
WBC NRBC COR # BLD: 8.27 10*3/MM3 (ref 4.5–12.5)

## 2017-06-27 PROCEDURE — 94799 UNLISTED PULMONARY SVC/PX: CPT

## 2017-06-27 PROCEDURE — 25010000002 ONDANSETRON PER 1 MG: Performed by: INTERNAL MEDICINE

## 2017-06-27 PROCEDURE — 85027 COMPLETE CBC AUTOMATED: CPT | Performed by: INTERNAL MEDICINE

## 2017-06-27 PROCEDURE — 25010000002 CIPROFLOXACIN PER 200 MG: Performed by: INTERNAL MEDICINE

## 2017-06-27 PROCEDURE — 86140 C-REACTIVE PROTEIN: CPT | Performed by: INTERNAL MEDICINE

## 2017-06-27 PROCEDURE — 25010000002 ENOXAPARIN PER 10 MG: Performed by: INTERNAL MEDICINE

## 2017-06-27 PROCEDURE — 85610 PROTHROMBIN TIME: CPT | Performed by: INTERNAL MEDICINE

## 2017-06-27 PROCEDURE — 80053 COMPREHEN METABOLIC PANEL: CPT | Performed by: INTERNAL MEDICINE

## 2017-06-27 RX ADMIN — SODIUM CHLORIDE 75 ML/HR: 900 INJECTION, SOLUTION INTRAVENOUS at 11:52

## 2017-06-27 RX ADMIN — ONDANSETRON 4 MG: 2 INJECTION, SOLUTION INTRAMUSCULAR; INTRAVENOUS at 12:45

## 2017-06-27 RX ADMIN — CIPROFLOXACIN 400 MG: 2 INJECTION, SOLUTION INTRAVENOUS at 12:21

## 2017-06-27 RX ADMIN — URSODIOL 300 MG: 300 CAPSULE ORAL at 08:59

## 2017-06-27 RX ADMIN — CIPROFLOXACIN 400 MG: 2 INJECTION, SOLUTION INTRAVENOUS at 01:23

## 2017-06-27 RX ADMIN — HYDROCODONE BITARTRATE AND ACETAMINOPHEN 1 TABLET: 5; 325 TABLET ORAL at 00:13

## 2017-06-27 RX ADMIN — METRONIDAZOLE 500 MG: 500 INJECTION, SOLUTION INTRAVENOUS at 23:48

## 2017-06-27 RX ADMIN — ONDANSETRON 4 MG: 2 INJECTION, SOLUTION INTRAMUSCULAR; INTRAVENOUS at 23:46

## 2017-06-27 RX ADMIN — URSODIOL 300 MG: 300 CAPSULE ORAL at 17:34

## 2017-06-27 RX ADMIN — HYDROCODONE BITARTRATE AND ACETAMINOPHEN 1 TABLET: 5; 325 TABLET ORAL at 15:39

## 2017-06-27 RX ADMIN — ENOXAPARIN SODIUM 30 MG: 30 INJECTION SUBCUTANEOUS at 08:59

## 2017-06-27 RX ADMIN — METRONIDAZOLE 500 MG: 500 INJECTION, SOLUTION INTRAVENOUS at 00:07

## 2017-06-27 RX ADMIN — HYDROCODONE BITARTRATE AND ACETAMINOPHEN 1 TABLET: 5; 325 TABLET ORAL at 23:46

## 2017-06-27 RX ADMIN — METRONIDAZOLE 500 MG: 500 INJECTION, SOLUTION INTRAVENOUS at 09:00

## 2017-06-27 RX ADMIN — METRONIDAZOLE 500 MG: 500 INJECTION, SOLUTION INTRAVENOUS at 15:39

## 2017-06-28 VITALS
HEIGHT: 66 IN | SYSTOLIC BLOOD PRESSURE: 132 MMHG | WEIGHT: 112 LBS | RESPIRATION RATE: 16 BRPM | HEART RATE: 59 BPM | OXYGEN SATURATION: 93 % | TEMPERATURE: 98.1 F | BODY MASS INDEX: 18 KG/M2 | DIASTOLIC BLOOD PRESSURE: 70 MMHG

## 2017-06-28 PROBLEM — R78.81 BACTEREMIA: Status: ACTIVE | Noted: 2017-06-28

## 2017-06-28 PROBLEM — J44.9 COPD (CHRONIC OBSTRUCTIVE PULMONARY DISEASE) (HCC): Status: ACTIVE | Noted: 2017-06-28

## 2017-06-28 LAB
ALBUMIN SERPL-MCNC: 2.6 G/DL (ref 3.4–4.8)
ALBUMIN/GLOB SERPL: 1 G/DL (ref 1.5–2.5)
ALP SERPL-CCNC: 138 U/L (ref 40–129)
ALT SERPL W P-5'-P-CCNC: 101 U/L (ref 10–44)
ANION GAP SERPL CALCULATED.3IONS-SCNC: 2.3 MMOL/L (ref 3.6–11.2)
AST SERPL-CCNC: 24 U/L (ref 10–34)
BILIRUB SERPL-MCNC: 0.3 MG/DL (ref 0.2–1.8)
BUN BLD-MCNC: 6 MG/DL (ref 7–21)
BUN/CREAT SERPL: 9 (ref 7–25)
CALCIUM SPEC-SCNC: 8.1 MG/DL (ref 7.7–10)
CHLORIDE SERPL-SCNC: 109 MMOL/L (ref 99–112)
CO2 SERPL-SCNC: 26.7 MMOL/L (ref 24.3–31.9)
CREAT BLD-MCNC: 0.67 MG/DL (ref 0.43–1.29)
DEPRECATED RDW RBC AUTO: 42.1 FL (ref 37–54)
ERYTHROCYTE [DISTWIDTH] IN BLOOD BY AUTOMATED COUNT: 14.1 % (ref 11.5–14.5)
GFR SERPL CREATININE-BSD FRML MDRD: 114 ML/MIN/1.73
GLOBULIN UR ELPH-MCNC: 2.5 GM/DL
GLUCOSE BLD-MCNC: 124 MG/DL (ref 70–110)
HCT VFR BLD AUTO: 38.6 % (ref 42–52)
HGB BLD-MCNC: 12 G/DL (ref 14–18)
INR PPP: 1.07 (ref 0.9–1.1)
MCH RBC QN AUTO: 26 PG (ref 27–33)
MCHC RBC AUTO-ENTMCNC: 31.1 G/DL (ref 33–37)
MCV RBC AUTO: 83.5 FL (ref 80–94)
OSMOLALITY SERPL CALC.SUM OF ELEC: 274.7 MOSM/KG (ref 273–305)
PLATELET # BLD AUTO: 183 10*3/MM3 (ref 130–400)
PMV BLD AUTO: 12 FL (ref 6–10)
POTASSIUM BLD-SCNC: 3.8 MMOL/L (ref 3.5–5.3)
PROT SERPL-MCNC: 5.1 G/DL (ref 6–8)
PROTHROMBIN TIME: 14.1 SECONDS (ref 11–15.4)
RBC # BLD AUTO: 4.62 10*6/MM3 (ref 4.7–6.1)
SODIUM BLD-SCNC: 138 MMOL/L (ref 135–153)
WBC NRBC COR # BLD: 6.68 10*3/MM3 (ref 4.5–12.5)

## 2017-06-28 PROCEDURE — 25010000002 CIPROFLOXACIN PER 200 MG: Performed by: INTERNAL MEDICINE

## 2017-06-28 PROCEDURE — 80053 COMPREHEN METABOLIC PANEL: CPT | Performed by: INTERNAL MEDICINE

## 2017-06-28 PROCEDURE — 85610 PROTHROMBIN TIME: CPT | Performed by: INTERNAL MEDICINE

## 2017-06-28 PROCEDURE — 94799 UNLISTED PULMONARY SVC/PX: CPT

## 2017-06-28 PROCEDURE — 85027 COMPLETE CBC AUTOMATED: CPT | Performed by: INTERNAL MEDICINE

## 2017-06-28 RX ORDER — URSODIOL 300 MG/1
300 CAPSULE ORAL 2 TIMES DAILY
Qty: 60 CAPSULE | Refills: 3 | Status: SHIPPED | OUTPATIENT
Start: 2017-06-28 | End: 2017-07-28

## 2017-06-28 RX ORDER — CIPROFLOXACIN 500 MG/1
500 TABLET, FILM COATED ORAL EVERY 12 HOURS SCHEDULED
Qty: 14 TABLET | Refills: 0 | Status: SHIPPED | OUTPATIENT
Start: 2017-06-28 | End: 2017-07-05

## 2017-06-28 RX ORDER — CIPROFLOXACIN 500 MG/1
500 TABLET, FILM COATED ORAL EVERY 12 HOURS SCHEDULED
Status: DISCONTINUED | OUTPATIENT
Start: 2017-06-28 | End: 2017-06-28 | Stop reason: HOSPADM

## 2017-06-28 RX ADMIN — CIPROFLOXACIN 500 MG: 500 TABLET, FILM COATED ORAL at 08:46

## 2017-06-28 RX ADMIN — CIPROFLOXACIN 400 MG: 2 INJECTION, SOLUTION INTRAVENOUS at 01:18

## 2017-07-05 NOTE — DISCHARGE SUMMARY
Date of Discharge: 6/28/2017    Discharge Diagnosis:   Active Problems:    Bile duct calculus    Pain in the abdomen    Bacteremia    COPD (chronic obstructive pulmonary disease)      Presenting Problem/History of Present Illness  See History and Physical for Presenting Problems / Illnesses.       Hospital Course  Patient is a 81 y.o. male presented with abdominal pain, nausea and vomiting following outpatient lap chan. CT scan suggested common duct stone. He was cultured and placed on IV antibiotics. Culture was + for E.Coli. He was seen by Dr. Francis for GI. He has ERCP with large impacted stone that could not be removed by papillotomy. Stent was placed and Actigall was started. LFTs returned to normal and patient was asymptomatic. He was discharged to have outpatient followup and repeat ERCP at later date.    Procedures Performed  Procedure(s):  ENDOSCOPIC RETROGRADE CHOLANGIOPANCREATOGRAPHY       Consults:   Consults     No orders found from 5/26/2017 to 6/25/2017.          Pertinent Test Results:    Imaging Results (last 72 hours)     Procedure Component Value Units Date/Time    FL Surgery Fluoro [[id number]] Collected:  06/26/17 1522     Updated:  06/26/17 1545    Narrative:       FL SURGERY FLUORO-      CLINICAL INDICATION: ERCP; K52.9-Noninfective gastroenteritis and  colitis, unspecified; Z90.49-Acquired absence of other specified parts  of digestive tract; K80.50-Calculus of bile duct without cholangitis or  cholecystitis without obstruction.       COMPARISON: None available.     Total fluoroscopy time 662 seconds.     Fluoroscopy was provided and 9 images were acquired as cholangiogram was  performed. Stent was placed.     Filling defect does appear to be present on the initial cholangiogram.     For a full procedural report, please see the report provided by the  performing physician.      This report was finalized on 6/26/2017 3:42 PM by Dr. Barrie Guzman MD.           Lab Results (last 72 hours)      Procedure Component Value Units Date/Time    CBC & Differential [[id number]] Collected:  06/26/17 0545    Specimen:  Blood Updated:  06/26/17 0622    Narrative:       The following orders were created for panel order CBC & Differential.  Procedure                               Abnormality         Status                     ---------                               -----------         ------                     CBC Auto Differential[[id number]]        Abnormal            Final result                 Please view results for these tests on the individual orders.    CBC Auto Differential [[id number]]  (Abnormal) Collected:  06/26/17 0545    Specimen:  Blood Updated:  06/26/17 0622     WBC 5.82 10*3/mm3      RBC 4.68 (L) 10*6/mm3      Hemoglobin 12.0 (L) g/dL      Hematocrit 39.0 (L) %      MCV 83.3 fL      MCH 25.6 (L) pg      MCHC 30.8 (L) g/dL      RDW 13.9 %      RDW-SD 42.2 fl      MPV 12.3 (H) fL      Platelets 143 10*3/mm3      Neutrophil % 60.8 %      Lymphocyte % 19.6 %      Monocyte % 12.7 (H) %      Eosinophil % 6.4 %      Basophil % 0.3 %      Immature Grans % 0.2 %      Neutrophils, Absolute 3.54 10*3/mm3      Lymphocytes, Absolute 1.14 10*3/mm3      Monocytes, Absolute 0.74 10*3/mm3      Eosinophils, Absolute 0.37 10*3/mm3      Basophils, Absolute 0.02 10*3/mm3      Immature Grans, Absolute 0.01 10*3/mm3     aPTT [[id number]]  (Abnormal) Collected:  06/26/17 0545    Specimen:  Blood Updated:  06/26/17 0631     PTT 31.3 (H) seconds     Narrative:       Heparin protocol:    Therapeutic range 56-80 seconds    Protime-INR [[id number]]  (Abnormal) Collected:  06/26/17 0545    Specimen:  Blood Updated:  06/26/17 0631     Protime 12.1 (H) Seconds      INR 1.08    Narrative:       Patients not on anticoagulant therapy:    INR 0.90-1.10     Suggested INR therapeutic range for stable oral anticoagulant therapy:             Routine therapy                      2.00-3.00           Recurrent MI                          2.50-3.50           Mechanical prosthetic valve          2.50-3.50    Lipase [[id number]]  (Normal) Collected:  06/26/17 0545    Specimen:  Blood Updated:  06/26/17 0708     Lipase 18 U/L     Amylase [[id number]]  (Normal) Collected:  06/26/17 0545    Specimen:  Blood Updated:  06/26/17 0708     Amylase 33 U/L     Comprehensive Metabolic Panel [[id number]]  (Abnormal) Collected:  06/26/17 0545    Specimen:  Blood Updated:  06/26/17 0711     Glucose 88 mg/dL      BUN 15 mg/dL      Creatinine 0.73 mg/dL      Sodium 141 mmol/L      Potassium 3.6 mmol/L      Chloride 112 mmol/L      CO2 25.6 mmol/L      Calcium 8.2 mg/dL      Total Protein 5.2 (L) g/dL      Albumin 3.10 (L) g/dL      ALT (SGPT) 228 (H) U/L      AST (SGOT) 66 (H) U/L      Alkaline Phosphatase 206 (H) U/L       Note New Reference Ranges        Total Bilirubin 0.7 mg/dL      eGFR Non African Amer 104 mL/min/1.73      Globulin 2.1 gm/dL      A/G Ratio 1.5 g/dL      BUN/Creatinine Ratio 20.5     Anion Gap 3.4 (L) mmol/L     Narrative:       The MDRD GFR formula is only valid for adults with stable renal function between ages 18 and 70.    Osmolality, Calculated [[id number]]  (Normal) Collected:  06/26/17 0545    Specimen:  Blood Updated:  06/26/17 0711     Osmolality Calc 281.5 mOsm/kg     CBC (No Diff) [[id number]]  (Abnormal) Collected:  06/27/17 0100    Specimen:  Blood Updated:  06/27/17 0214     WBC 8.27 10*3/mm3      RBC 4.79 10*6/mm3      Hemoglobin 12.7 (L) g/dL      Hematocrit 40.2 (L) %      MCV 83.9 fL      MCH 26.5 (L) pg      MCHC 31.6 (L) g/dL      RDW 13.9 %      RDW-SD 42.0 fl      MPV 11.8 (H) fL      Platelets 154 10*3/mm3     Protime-INR [[id number]]  (Normal) Collected:  06/27/17 0100    Specimen:  Blood Updated:  06/27/17 0215     Protime 11.3 Seconds      INR 1.02    Narrative:       Patients not on anticoagulant therapy:    INR 0.90-1.10     Suggested INR therapeutic range for stable oral anticoagulant therapy:             Routine therapy                       2.00-3.00           Recurrent MI                         2.50-3.50           Mechanical prosthetic valve          2.50-3.50    C-reactive Protein [[id number]]  (Abnormal) Collected:  06/27/17 0100    Specimen:  Blood Updated:  06/27/17 0227     C-Reactive Protein 2.42 (H) mg/dL     Comprehensive Metabolic Panel [[id number]]  (Abnormal) Collected:  06/27/17 0100    Specimen:  Blood Updated:  06/27/17 0228     Glucose 80 mg/dL      BUN 9 mg/dL      Creatinine 0.61 mg/dL      Sodium 139 mmol/L      Potassium 3.6 mmol/L      Chloride 108 mmol/L      CO2 25.1 mmol/L      Calcium 8.1 mg/dL      Total Protein 5.4 (L) g/dL      Albumin 3.10 (L) g/dL      ALT (SGPT) 158 (H) U/L      AST (SGOT) 39 (H) U/L      Alkaline Phosphatase 177 (H) U/L       Note New Reference Ranges        Total Bilirubin 0.6 mg/dL      eGFR Non African Amer 128 mL/min/1.73      Globulin 2.3 gm/dL      A/G Ratio 1.3 (L) g/dL      BUN/Creatinine Ratio 14.8     Anion Gap 5.9 mmol/L     Narrative:       The MDRD GFR formula is only valid for adults with stable renal function between ages 18 and 70.    Osmolality, Calculated [[id number]]  (Normal) Collected:  06/27/17 0100    Specimen:  Blood Updated:  06/27/17 0228     Osmolality Calc 275.2 mOsm/kg     Blood Culture [[id number]]  (Abnormal)  (Susceptibility) Collected:  06/24/17 0958    Specimen:  Blood from Arm, Right Updated:  06/27/17 0759     Blood Culture Escherichia coli (A)     Isolated from --     Gram Stain Result Gram negative bacilli    Susceptibility      Escherichia coli     REHANA     Amikacin <=16 ug/ml Susceptible     Amoxicillin + Clavulanate <=8/4 ug/ml Susceptible     Ampicillin <=8 ug/ml Susceptible     Ampicillin + Sulbactam <=8/4 ug/ml Susceptible     Aztreonam <=8 ug/ml Susceptible     Cefazolin <=8 ug/ml Susceptible     Ciprofloxacin <=1 ug/ml Susceptible     Doripenem <=0.5 ug/ml Susceptible     Ertapenem <=1 ug/ml Susceptible     Gentamicin <=4 ug/ml Susceptible      Imipenem <=1 ug/ml Susceptible     Levofloxacin <=2 ug/ml Susceptible     Piperacillin + Tazobactam <=16 ug/ml Susceptible     Tetracycline >8 ug/ml Resistant     Tobramycin <=4 ug/ml Susceptible     Trimethoprim + Sulfamethoxazole <=2/38 ug/ml Susceptible                    Blood Culture [[id number]]  (Abnormal)  (Susceptibility) Collected:  06/24/17 1034    Specimen:  Blood from Arm, Right Updated:  06/27/17 0714     Blood Culture Escherichia coli (A)     Isolated from --     Gram Stain Result Gram negative bacilli    Susceptibility      Escherichia coli     REHANA     Amikacin <=16 ug/ml Susceptible     Amoxicillin + Clavulanate <=8/4 ug/ml Susceptible     Ampicillin <=8 ug/ml Susceptible     Ampicillin + Sulbactam <=8/4 ug/ml Susceptible     Aztreonam <=8 ug/ml Susceptible     Cefazolin <=8 ug/ml Susceptible     Ciprofloxacin <=1 ug/ml Susceptible     Doripenem <=0.5 ug/ml Susceptible     Ertapenem <=1 ug/ml Susceptible     Gentamicin <=4 ug/ml Susceptible     Imipenem <=1 ug/ml Susceptible     Levofloxacin <=2 ug/ml Susceptible     Piperacillin + Tazobactam <=16 ug/ml Susceptible     Tetracycline >8 ug/ml Resistant     Tobramycin <=4 ug/ml Susceptible     Trimethoprim + Sulfamethoxazole <=2/38 ug/ml Susceptible                    CBC (No Diff) [[id number]]  (Abnormal) Collected:  06/28/17 0109    Specimen:  Blood Updated:  06/28/17 0221     WBC 6.68 10*3/mm3      RBC 4.62 (L) 10*6/mm3      Hemoglobin 12.0 (L) g/dL      Hematocrit 38.6 (L) %      MCV 83.5 fL      MCH 26.0 (L) pg      MCHC 31.1 (L) g/dL      RDW 14.1 %      RDW-SD 42.1 fl      MPV 12.0 (H) fL      Platelets 183 10*3/mm3     Protime-INR [[id number]]  (Normal) Collected:  06/28/17 0109    Specimen:  Blood Updated:  06/28/17 0225     Protime 14.1 Seconds      INR 1.07    Narrative:       Suggested INR therapeutic range for stable oral anticoagulant therapy:    Low Intensity therapy:   1.5-2.0  Moderate Intensity therapy:   2.0-3.0  High Intensity therapy:    2.5-4.0    Comprehensive Metabolic Panel [[id number]]  (Abnormal) Collected:  06/28/17 0109    Specimen:  Blood Updated:  06/28/17 0246     Glucose 124 (H) mg/dL      BUN 6 (L) mg/dL      Creatinine 0.67 mg/dL      Sodium 138 mmol/L      Potassium 3.8 mmol/L       1+ Hemolysis         Chloride 109 mmol/L      CO2 26.7 mmol/L      Calcium 8.1 mg/dL      Total Protein 5.1 (L) g/dL      Albumin 2.60 (L) g/dL      ALT (SGPT) 101 (H) U/L      AST (SGOT) 24 U/L      Alkaline Phosphatase 138 (H) U/L       Note New Reference Ranges        Total Bilirubin 0.3 mg/dL      eGFR Non African Amer 114 mL/min/1.73      Globulin 2.5 gm/dL      A/G Ratio 1.0 (L) g/dL      BUN/Creatinine Ratio 9.0     Anion Gap 2.3 (L) mmol/L     Narrative:       The MDRD GFR formula is only valid for adults with stable renal function between ages 18 and 70.    Osmolality, Calculated [[id number]]  (Normal) Collected:  06/28/17 0109    Specimen:  Blood Updated:  06/28/17 0246     Osmolality Calc 274.7 mOsm/kg           Condition on Discharge:   Fair  Vital Signs       Physical Exam:  GENERAL: Well-developed, well-nourished, thin, elderly white male, who is awake, alert and oriented and in moderate distress secondary to abdominal pain.  HEENT: Head, normocephalic and atraumatic. Eyes, PERRLA. Sclerae anicteric.  NECK: Supple. No carotid bruits, jugular venous distention, thyromegaly or lymphadenopathy.  CHEST: Clear to auscultation. No wheezes, rales or rhonchi.  HEART: Regular rate and rhythm. No murmurs, gallops or rubs. PMI is nondisplaced.  ABDOMEN: No organomegaly, masses. There is tenderness in the right upper quadrant. No rebound, guarding or rigidity. Bowel sounds are normal.  EXTREMITIES: No clubbing, cyanosis or edema. Pedal pulses are equal bilaterally       Discharge Disposition  Home or Self Care    Discharge Medications   Sarbjit Martin   Home Medication Instructions TRAY:[id number]    Printed on:07/05/17 0806   Medication Information                       ciprofloxacin (CIPRO) 500 MG tablet  Take 1 tablet by mouth Every 12 (Twelve) Hours for 7 days. For infection  Indications: Bacteria in the Blood             LORazepam (ATIVAN) 0.5 MG tablet  Take 0.5 mg by mouth Daily.             omeprazole (priLOSEC) 20 MG capsule  Take 20 mg by mouth Daily.             ursodiol (ACTIGALL) 300 MG capsule  Take 1 capsule by mouth 2 (Two) Times a Day for 60 doses.             VENTOLIN  (90 BASE) MCG/ACT inhaler  Inhale 2 puffs Every 6 (Six) Hours As Needed for Wheezing or Shortness of Air.                 Discharge Diet:   Diet Instructions     Diet: Regular; Thin Liquids, No Restrictions       Discharge Diet:  Regular   Fluid Consistency:  Thin Liquids, No Restrictions                 Activity at Discharge:   Activity Instructions     Activity as Tolerated                     Follow-up Appointments  No future appointments.  Additional Instructions for the Follow-ups that You Need to Schedule     Additional Follow-Up    As directed    Dr. Francis in 2 to 3 weeks       Follow-Up    As directed    CBC, CMP 1 day before office appt.   Follow Up Details:  office 1 week with Dr. Chan                 Test Results Pending at Discharge       Pillo Chan MD  07/05/17  8:06 AM    Time: Discharge > 30 min  Was spent in discharge planning,preparation, dictation,and instructions at the time of discharge.      Lab Results   Component Value Date    WBC 6.68 06/28/2017    HGB 12.0 (L) 06/28/2017    HCT 38.6 (L) 06/28/2017    MCV 83.5 06/28/2017     06/28/2017

## 2017-10-26 ENCOUNTER — APPOINTMENT (OUTPATIENT)
Dept: GENERAL RADIOLOGY | Facility: HOSPITAL | Age: 82
End: 2017-10-26

## 2017-10-26 ENCOUNTER — ANESTHESIA (OUTPATIENT)
Dept: PERIOP | Facility: HOSPITAL | Age: 82
End: 2017-10-26

## 2017-10-26 ENCOUNTER — ANESTHESIA EVENT (OUTPATIENT)
Dept: PERIOP | Facility: HOSPITAL | Age: 82
End: 2017-10-26

## 2017-10-26 ENCOUNTER — HOSPITAL ENCOUNTER (OUTPATIENT)
Facility: HOSPITAL | Age: 82
Setting detail: HOSPITAL OUTPATIENT SURGERY
Discharge: HOME | End: 2017-10-26
Attending: INTERNAL MEDICINE | Admitting: INTERNAL MEDICINE

## 2017-10-26 VITALS
DIASTOLIC BLOOD PRESSURE: 72 MMHG | HEART RATE: 63 BPM | HEIGHT: 66 IN | OXYGEN SATURATION: 94 % | TEMPERATURE: 97.1 F | RESPIRATION RATE: 20 BRPM | WEIGHT: 119 LBS | BODY MASS INDEX: 19.13 KG/M2 | SYSTOLIC BLOOD PRESSURE: 132 MMHG

## 2017-10-26 PROCEDURE — 74328 X-RAY BILE DUCT ENDOSCOPY: CPT | Performed by: RADIOLOGY

## 2017-10-26 PROCEDURE — 25010000002 PROPOFOL 10 MG/ML EMULSION: Performed by: NURSE ANESTHETIST, CERTIFIED REGISTERED

## 2017-10-26 PROCEDURE — 25010000002 GLUCAGON (HUMAN RECOMBINANT) 1 MG RECONSTITUTED SOLUTION: Performed by: NURSE ANESTHETIST, CERTIFIED REGISTERED

## 2017-10-26 PROCEDURE — 76000 FLUOROSCOPY <1 HR PHYS/QHP: CPT

## 2017-10-26 PROCEDURE — C1769 GUIDE WIRE: HCPCS | Performed by: INTERNAL MEDICINE

## 2017-10-26 PROCEDURE — 25010000002 PROPOFOL 1000 MG/ML EMULSION: Performed by: NURSE ANESTHETIST, CERTIFIED REGISTERED

## 2017-10-26 PROCEDURE — 25010000002 ONDANSETRON PER 1 MG: Performed by: NURSE ANESTHETIST, CERTIFIED REGISTERED

## 2017-10-26 PROCEDURE — 25010000002 FENTANYL CITRATE (PF) 100 MCG/2ML SOLUTION: Performed by: NURSE ANESTHETIST, CERTIFIED REGISTERED

## 2017-10-26 RX ORDER — PROPOFOL 10 MG/ML
VIAL (ML) INTRAVENOUS AS NEEDED
Status: DISCONTINUED | OUTPATIENT
Start: 2017-10-26 | End: 2017-10-26 | Stop reason: SURG

## 2017-10-26 RX ORDER — SODIUM CHLORIDE 0.9 % (FLUSH) 0.9 %
1-10 SYRINGE (ML) INJECTION AS NEEDED
Status: DISCONTINUED | OUTPATIENT
Start: 2017-10-26 | End: 2017-10-26 | Stop reason: HOSPADM

## 2017-10-26 RX ORDER — ONDANSETRON 2 MG/ML
INJECTION INTRAMUSCULAR; INTRAVENOUS AS NEEDED
Status: DISCONTINUED | OUTPATIENT
Start: 2017-10-26 | End: 2017-10-26 | Stop reason: SURG

## 2017-10-26 RX ORDER — FENTANYL CITRATE 50 UG/ML
INJECTION, SOLUTION INTRAMUSCULAR; INTRAVENOUS AS NEEDED
Status: DISCONTINUED | OUTPATIENT
Start: 2017-10-26 | End: 2017-10-26 | Stop reason: SURG

## 2017-10-26 RX ORDER — SODIUM CHLORIDE, SODIUM LACTATE, POTASSIUM CHLORIDE, CALCIUM CHLORIDE 600; 310; 30; 20 MG/100ML; MG/100ML; MG/100ML; MG/100ML
125 INJECTION, SOLUTION INTRAVENOUS CONTINUOUS
Status: DISCONTINUED | OUTPATIENT
Start: 2017-10-26 | End: 2017-10-26 | Stop reason: HOSPADM

## 2017-10-26 RX ORDER — FAMOTIDINE 10 MG/ML
INJECTION, SOLUTION INTRAVENOUS AS NEEDED
Status: DISCONTINUED | OUTPATIENT
Start: 2017-10-26 | End: 2017-10-26 | Stop reason: SURG

## 2017-10-26 RX ADMIN — SODIUM CHLORIDE, POTASSIUM CHLORIDE, SODIUM LACTATE AND CALCIUM CHLORIDE: 600; 310; 30; 20 INJECTION, SOLUTION INTRAVENOUS at 14:06

## 2017-10-26 RX ADMIN — ONDANSETRON 4 MG: 2 INJECTION, SOLUTION INTRAMUSCULAR; INTRAVENOUS at 14:32

## 2017-10-26 RX ADMIN — FENTANYL CITRATE 50 MCG: 50 INJECTION INTRAMUSCULAR; INTRAVENOUS at 14:06

## 2017-10-26 RX ADMIN — SODIUM CHLORIDE, POTASSIUM CHLORIDE, SODIUM LACTATE AND CALCIUM CHLORIDE: 600; 310; 30; 20 INJECTION, SOLUTION INTRAVENOUS at 15:08

## 2017-10-26 RX ADMIN — PROPOFOL 100 MCG/KG/MIN: 10 INJECTION, EMULSION INTRAVENOUS at 14:12

## 2017-10-26 RX ADMIN — PROPOFOL 80 MG: 10 INJECTION, EMULSION INTRAVENOUS at 14:12

## 2017-10-26 RX ADMIN — FENTANYL CITRATE 50 MCG: 50 INJECTION INTRAMUSCULAR; INTRAVENOUS at 14:12

## 2017-10-26 RX ADMIN — GLUCAGON HYDROCHLORIDE 1 MG: 1 INJECTION, POWDER, FOR SOLUTION INTRAMUSCULAR; INTRAVENOUS; SUBCUTANEOUS at 15:09

## 2017-10-26 RX ADMIN — FAMOTIDINE 20 MG: 10 INJECTION, SOLUTION INTRAVENOUS at 14:32

## 2017-10-26 NOTE — OP NOTE
"Endoscopic Retrograde Cholangiopancreatography Procedure Note    Procedure:  ERCP with stent removal, papillotomy, and balloon stone extraction    Pre-operative Diagnosis: Choledocholithiasis with biliary dilatation    Post-operative Diagnosis: Same    Indications: Choledocholithiasis with biliary dilatation    Sedation: General Endotracheal Anesthesia    Pre-Procedure Physical:  The patient's history was reviewed.    /79 (BP Location: Left arm, Patient Position: Sitting)  Pulse 66  Temp 97.9 °F (36.6 °C) (Tympanic)   Resp 20  Ht 66\" (167.6 cm)  Wt 119 lb (54 kg)  SpO2 94%  BMI 19.21 kg/m2    Heart:  normal S1 and S2  Lungs:  clear  Abdomen:  soft, nontender, normal bowel sounds  Mental Status:  awake and alert; oriented to person, place, and time        Procedure Details     Informed consent was obtained for the procedure, including sedation.  Risks of pancreatitis, infection, perforation, hemorrhage, adverse drug reaction and aspiration were discussed.  The patient was placed in the prone position.  Based on the pre-procedure assessment, including review of the patient's medical history, medications, allergies, and review of systems, he had been deemed to be an appropriate candidate for conscious sedation; he was therefore sedated with the medications listed below.   The patient was monitored continuously with ECG tracing, pulse oximetry, blood pressure monitoring, and direct observations.      The duodenoscope was inserted into the mouth and advanced to the third portion of the duodenum.  Advancement beyond the duodenal bulb was difficult due to anatomic distortion with a dilated bulb with narrowing and acute angulation at the junction of the bulb in the descending segment.  Eventually the scope was manipulated into position and brought en face with the ampulla and a protruding partially obstructed biliary stent.  There was a small periampullary diverticulum.  The stent was secured in a snare and " removed.  A short tapered cannula was used for assessing the biliary tree.  A guidewire was advanced into the intrahepatic ductal system.  There was marked biliary ductal dilatation with the common hepatic duct measuring 18 mm in greatest diameter.  The intrahepatic biliary radicals were dilated evenly into the secondary and tertiary branches.  There was a 1.0 cm stone which was freely mobile in the common bile duct and common hepatic duct.  There were also several smaller mobile intraductal filling defects.  There was some spontaneous drainage of sludge and other particulate material from the ampulla.  The papillotome was passed over the guidewire and the existing papillotomy was extended.  A 12-15 mm stone extraction balloon was passed over the guidewire and advanced to the hilum.  It was inflated and extrahepatic ductal system was swept.  There was not complete occlusion in the intrahepatic duct but the previously noted stone was trapped in the distal common bile duct and extracted.  There was a considerable amount of sludge and small stones extracted as well.  The balloon was swept a second and third time with minimal additional material obtained.  Was then withdrawn and the duodenum followed by the stomach was decompressed and the scope removed. The patient tolerated the procedure well, and there were no immediate complications. He was taken to the recovery area in stable condition.    Findings:   -Partially occluded existing biliary stent  -Actively dilated extrahepatic and modestly dilated intrahepatic biliary tree  -Choledocholithiasis with a 1.0 cm mobile stone and multiple smaller stones and sludge  -Dilated L bulb with narrowing at the junction of the bulb and descending segment    Specimens: None           Complications:  None; patient tolerated the procedure well.                   Condition: stable      Impression:   -Partially occluded existing biliary stent removed.   -Choledocholithiasis with a 1.0  cm mobile stone and multiple smaller stones and sludge status post papillotomy with balloon stone extraction.  -Dilated intra-and extra hepatic biliary tree with a common hepatic duct measuring 18 mm.  -Small periampullary diverticulum.  -Distorted duodenal anatomy with a dilated bulb and sharp angulation and narrowing at the junction of the bulb and the descending segment.    Recommendations:  -Discharge home following recovery.  -Office follow-up in 4 weeks with repeat liver enzymes at that time.  -Office follow-up will be made as necessary if any new issues arise between now and the schedule as follow-up appointment time.

## 2017-10-26 NOTE — ANESTHESIA PREPROCEDURE EVALUATION
Anesthesia Evaluation     Patient summary reviewed and Nursing notes reviewed   no history of anesthetic complications:  NPO Solid Status: > 8 hours  NPO Liquid Status: > 8 hours     Airway   Mallampati: II  TM distance: >3 FB  Neck ROM: full  Dental    (+) edentulous, lower dentures and upper dentures    Pulmonary    (+) a smoker Former, COPD, decreased breath sounds,   (-) asthma  Cardiovascular - negative cardio ROS and normal exam  Exercise tolerance: good (4-7 METS)    NYHA Classification: II    (-) hypertension, past MI, dysrhythmias, angina, CHF, hyperlipidemia      Neuro/Psych- negative ROS  (-) seizures, CVA  GI/Hepatic/Renal/Endo    (+)  GERD well controlled,   (-) diabetes, hypothyroidism    Musculoskeletal     Abdominal  - normal exam    Bowel sounds: normal.   Substance History - negative use     OB/GYN negative ob/gyn ROS         Other   (+) arthritis                                       Anesthesia Plan    ASA 3     general     intravenous induction   Anesthetic plan and risks discussed with patient.  Use of blood products discussed with patient  Consented to blood products.   Plan discussed with CRNA.

## 2017-10-26 NOTE — PLAN OF CARE
Problem: GI Endoscopy (Adult)  Goal: Signs and Symptoms of Listed Potential Problems Will be Absent or Manageable (GI Endoscopy)  Outcome: Ongoing (interventions implemented as appropriate)    10/26/17 1418   GI Endoscopy   Problems Assessed (GI Endoscopy) all   Problems Present (GI Endoscopy) none

## 2017-10-26 NOTE — ANESTHESIA POSTPROCEDURE EVALUATION
Patient: Sarbjit Martin    Procedure Summary     Date Anesthesia Start Anesthesia Stop Room / Location    10/26/17 1406 1528 [facility] / [facility]       Procedure Diagnosis Surgeon Provider    ENDOSCOPIC RETROGRADE CHOLANGIOPANCREATOGRAPHY (N/A ) (K80.51) MD Joshua Avelar MD          Anesthesia Type: general  Last vitals  BP   136/69 (10/26/17 1545)   Temp   97.1 °F (36.2 °C) (10/26/17 1530)   Pulse   62 (10/26/17 1545)   Resp   20 (10/26/17 1545)     SpO2   94 % (10/26/17 1545)     Post Anesthesia Care and Evaluation    Patient location during evaluation: PHASE II  Patient participation: complete - patient participated  Level of consciousness: awake and alert  Pain score: 1  Pain management: adequate  Airway patency: patent  Anesthetic complications: No anesthetic complications  PONV Status: controlled  Cardiovascular status: acceptable  Respiratory status: acceptable  Hydration status: acceptable

## 2017-10-26 NOTE — PLAN OF CARE
Problem: GI Endoscopy (Adult)  Goal: Signs and Symptoms of Listed Potential Problems Will be Absent or Manageable (GI Endoscopy)  Outcome: Ongoing (interventions implemented as appropriate)    10/26/17 1256   GI Endoscopy   Problems Assessed (GI Endoscopy) all   Problems Present (GI Endoscopy) none

## 2018-07-25 ENCOUNTER — OFFICE VISIT (OUTPATIENT)
Dept: PULMONOLOGY | Facility: CLINIC | Age: 83
End: 2018-07-25

## 2018-07-25 VITALS
HEIGHT: 66 IN | OXYGEN SATURATION: 92 % | WEIGHT: 120 LBS | SYSTOLIC BLOOD PRESSURE: 149 MMHG | DIASTOLIC BLOOD PRESSURE: 82 MMHG | BODY MASS INDEX: 19.29 KG/M2 | HEART RATE: 58 BPM

## 2018-07-25 DIAGNOSIS — R06.02 SOB (SHORTNESS OF BREATH): Primary | ICD-10-CM

## 2018-07-25 DIAGNOSIS — B99.9 RECURRENT INFECTIONS: ICD-10-CM

## 2018-07-25 DIAGNOSIS — J41.1 MUCOPURULENT CHRONIC BRONCHITIS (HCC): ICD-10-CM

## 2018-07-25 PROCEDURE — 94664 DEMO&/EVAL PT USE INHALER: CPT | Performed by: INTERNAL MEDICINE

## 2018-07-25 PROCEDURE — 99204 OFFICE O/P NEW MOD 45 MIN: CPT | Performed by: INTERNAL MEDICINE

## 2018-07-25 RX ORDER — ASPIRIN 325 MG
325 TABLET ORAL DAILY
COMMUNITY
End: 2018-12-13 | Stop reason: DRUGHIGH

## 2018-07-25 RX ORDER — IPRATROPIUM BROMIDE AND ALBUTEROL SULFATE 2.5; .5 MG/3ML; MG/3ML
3 SOLUTION RESPIRATORY (INHALATION)
Status: SHIPPED | OUTPATIENT
Start: 2018-07-25 | End: 2019-07-25

## 2018-07-25 NOTE — PROGRESS NOTES
Have you had the Influenza Vaccine? yes    Would you like to receive this Vaccine today? no    Have you had the Pneumonia Vaccine?  yes   Would you like to receive this Vaccine today? no      Subjective    Sarbjit Martin presents for the following Shortness of Breath and Cough      History of Present Illness   Patient is here  for a new patient visit for evaluation of his shortness of breath.  Patient shortness of breath is chronic and has been progressively getting worse.  Patient has smoked for 30+ years more then one pack a day.  Denies any recent changes of weight, denies any cough and appetite changes.  No night sweats.  Patient was not born premature never had any childhood infections or any breathing issues.  Denies any allergies.  Started smoking when he was 18 years old.  Had a chest x-ray 1 year back which showed hyperinflation-changes related to her COPD.  Did not show any masses.      Initial Questionnaire which was reviewed by me in great detail-    Were you born premature?  no    Any Childhood infections? no      Breathing problems when you were a child? no    Any childhood allergies?    no             At what age did you begin smoking? 18    Smoking marijuana? no    Any IV drugs? no    How many packs per day? Former smoker    Lung Function Test? yes  Chest X-Ray? yes    CT Chest? no Allergy Test? no    Family hx of Lung disease or Lung Cancer?no    If FHx is posivitive for lung cancer, what is the relationship of the family member? None    Any hospitalization in the last year? no    How far can you walk without getting short of breath? 200 feet    Any coughing? no    Any wheezing? yes    Acid Reflux? yes    Do you snore? Unknown    Daytime Fatigue? yes    Any pets? yes   Any pet allergies? no    Occupation? Retired, [employer]    Have you been exposed to any chemicals at your job? yes    What inhalers are you currently using? Ventolin    Review of Systems   Constitutional: Negative for activity  change, appetite change, chills, fatigue and unexpected weight change.   HENT: Negative for congestion, postnasal drip and rhinorrhea.    Respiratory: Positive for cough, shortness of breath and wheezing (Occasional when winded.). Negative for apnea and chest tightness.    Cardiovascular: Negative for chest pain, palpitations and leg swelling.   Gastrointestinal: Negative for nausea.   Musculoskeletal: Negative for gait problem.   Skin: Negative for pallor.   Allergic/Immunologic: Negative for environmental allergies.   Neurological: Negative for syncope.   Psychiatric/Behavioral: Negative for confusion. The patient is not nervous/anxious.        Active Problems:  Problem List Items Addressed This Visit        Respiratory    COPD (chronic obstructive pulmonary disease) (CMS/[facility])    SOB (shortness of breath) - Primary    Relevant Medications    ipratropium-albuterol (DUO-NEB) nebulizer solution 3 mL (Start on 7/25/2018  4:30 PM)    Other Relevant Orders    Pulmonary Function Test    XR Chest PA & Lateral    Miscellaneous DME       Other    Recurrent infections    Relevant Orders    IgG    IgM    IgA          Past Medical History:  Past Medical History:   Diagnosis Date   • Arthritis    • COPD (chronic obstructive pulmonary disease) (CMS/[facility])        Family History:  History reviewed. No pertinent family history.    Social History:  Social History   Substance Use Topics   • Smoking status: Former Smoker     Years: 10.00     Quit date: 1992   • Smokeless tobacco: Former User     Quit date: 6/16/1995   • Alcohol use No       Current Medications:  Current Outpatient Prescriptions   Medication Sig Dispense Refill   • aspirin 325 MG tablet Take 325 mg by mouth Daily.     • VENTOLIN  (90 BASE) MCG/ACT inhaler Inhale 2 puffs Every 6 (Six) Hours As Needed for Wheezing or Shortness of Air.     • LORazepam (ATIVAN) 0.5 MG tablet Take 0.5 mg by mouth Daily.     • omeprazole (priLOSEC) 20 MG capsule Take 20 mg by mouth  "Daily.     • ursodiol (ACTIGALL) 500 MG tablet Take 1 tablet by mouth 2 (Two) Times a Day. 60 tablet 3     Current Facility-Administered Medications   Medication Dose Route Frequency Provider Last Rate Last Dose   • ipratropium-albuterol (DUO-NEB) nebulizer solution 3 mL  3 mL Nebulization 4x Daily - RT Saravanan Méndez MD           Allergies:  Allergies   Allergen Reactions   • Penicillins Anaphylaxis       Vitals:  /82   Pulse 58   Ht 167.6 cm (66\")   Wt 54.4 kg (120 lb)   SpO2 92% Comment: on room air at rest  BMI 19.37 kg/m²     Imaging:    Imaging Results (most recent)     None          Pulmonary Functions Testing Results:    No results found for: FEV1, FVC, HTM7ACK, TLC, DLCO    Results for orders placed or performed during the hospital encounter of 06/24/17   Blood Culture   Result Value Ref Range    Blood Culture Escherichia coli (A)     Isolated from      Gram Stain Result Gram negative bacilli        Susceptibility    Escherichia coli - REHANA     Amikacin <=16 Susceptible ug/ml     Amoxicillin + Clavulanate <=8/4 Susceptible ug/ml     Ampicillin <=8 Susceptible ug/ml     Ampicillin + Sulbactam <=8/4 Susceptible ug/ml     Aztreonam <=8 Susceptible ug/ml     Cefazolin <=8 Susceptible ug/ml     Ciprofloxacin <=1 Susceptible ug/ml     Doripenem <=0.5 Susceptible ug/ml     Ertapenem <=1 Susceptible ug/ml     Gentamicin <=4 Susceptible ug/ml     Imipenem <=1 Susceptible ug/ml     Levofloxacin <=2 Susceptible ug/ml     Piperacillin + Tazobactam <=16 Susceptible ug/ml     Tetracycline >8 Resistant ug/ml     Tobramycin <=4 Susceptible ug/ml     Trimethoprim + Sulfamethoxazole <=2/38 Susceptible ug/ml   Blood Culture   Result Value Ref Range    Blood Culture Escherichia coli (A)     Isolated from      Gram Stain Result Gram negative bacilli        Susceptibility    Escherichia coli - REHANA     Amikacin <=16 Susceptible ug/ml     Amoxicillin + Clavulanate <=8/4 Susceptible ug/ml     Ampicillin <=8 Susceptible " ug/ml     Ampicillin + Sulbactam <=8/4 Susceptible ug/ml     Aztreonam <=8 Susceptible ug/ml     Cefazolin <=8 Susceptible ug/ml     Ciprofloxacin <=1 Susceptible ug/ml     Doripenem <=0.5 Susceptible ug/ml     Ertapenem <=1 Susceptible ug/ml     Gentamicin <=4 Susceptible ug/ml     Imipenem <=1 Susceptible ug/ml     Levofloxacin <=2 Susceptible ug/ml     Piperacillin + Tazobactam <=16 Susceptible ug/ml     Tetracycline >8 Resistant ug/ml     Tobramycin <=4 Susceptible ug/ml     Trimethoprim + Sulfamethoxazole <=2/38 Susceptible ug/ml   Comprehensive Metabolic Panel   Result Value Ref Range    Glucose 126 (H) 70 - 110 mg/dL    BUN 25 (H) 7 - 21 mg/dL    Creatinine 0.82 0.43 - 1.29 mg/dL    Sodium 140 135 - 153 mmol/L    Potassium 3.7 3.5 - 5.3 mmol/L    Chloride 103 99 - 112 mmol/L    CO2 29.2 24.3 - 31.9 mmol/L    Calcium 9.2 7.7 - 10.0 mg/dL    Total Protein 6.9 6.0 - 8.0 g/dL    Albumin 4.20 3.40 - 4.80 g/dL    ALT (SGPT) 617 (H) 10 - 44 U/L    AST (SGOT) 832 (H) 10 - 34 U/L    Alkaline Phosphatase 294 (H) 40 - 129 U/L    Total Bilirubin 2.3 (H) 0.2 - 1.8 mg/dL    eGFR Non African Amer 91 >60 mL/min/1.73    Globulin 2.7 gm/dL    A/G Ratio 1.6 1.5 - 2.5 g/dL    BUN/Creatinine Ratio 30.5 (H) 7.0 - 25.0    Anion Gap 7.8 3.6 - 11.2 mmol/L   Amylase   Result Value Ref Range    Amylase 33 28 - 100 U/L   Lipase   Result Value Ref Range    Lipase 29 13 - 60 U/L   Urinalysis With / Culture If Indicated   Result Value Ref Range    Color, UA Dark Yellow (A) Yellow, Straw    Appearance, UA Clear Clear    pH, UA 7.5 5.0 - 8.0    Specific Gravity, UA 1.021 1.005 - 1.030    Glucose, UA Negative Negative    Ketones, UA Negative Negative    Bilirubin, UA Moderate (2+) (A) Negative    Blood, UA Negative Negative    Protein, UA Negative Negative    Leuk Esterase, UA Trace (A) Negative    Nitrite, UA Negative Negative    Urobilinogen, UA 4.0 E.U./dL (A) 0.2 - 1.0 E.U./dL   Lactic Acid, Plasma   Result Value Ref Range    Lactate 2.0  0.5 - 2.0 mmol/L   Sedimentation Rate   Result Value Ref Range    Sed Rate 8 0 - 20 mm/hr   C-reactive Protein   Result Value Ref Range    C-Reactive Protein 0.64 0.00 - 0.99 mg/dL   CBC Auto Differential   Result Value Ref Range    WBC 8.52 4.50 - 12.50 10*3/mm3    RBC 5.37 4.70 - 6.10 10*6/mm3    Hemoglobin 14.1 14.0 - 18.0 g/dL    Hematocrit 44.1 42.0 - 52.0 %    MCV 82.1 80.0 - 94.0 fL    MCH 26.3 (L) 27.0 - 33.0 pg    MCHC 32.0 (L) 33.0 - 37.0 g/dL    RDW 13.7 11.5 - 14.5 %    RDW-SD 40.8 37.0 - 54.0 fl    MPV 11.8 (H) 6.0 - 10.0 fL    Platelets 163 130 - 400 10*3/mm3    Neutrophil % 90.5 (H) 40.0 - 75.0 %    Lymphocyte % 2.7 (L) 16.0 - 46.0 %    Monocyte % 6.3 0.0 - 12.0 %    Eosinophil % 0.2 0.0 - 7.0 %    Basophil % 0.1 0.0 - 2.0 %    Immature Grans % 0.2 0.0 - 0.5 %    Neutrophils, Absolute 7.70 (H) 1.40 - 6.50 10*3/mm3    Lymphocytes, Absolute 0.23 (L) 1.00 - 3.00 10*3/mm3    Monocytes, Absolute 0.54 0.10 - 0.90 10*3/mm3    Eosinophils, Absolute 0.02 0.00 - 0.70 10*3/mm3    Basophils, Absolute 0.01 0.00 - 0.30 10*3/mm3    Immature Grans, Absolute 0.02 0.00 - 0.03 10*3/mm3   Osmolality, Calculated   Result Value Ref Range    Osmolality Calc 285.3 273.0 - 305.0 mOsm/kg   Urinalysis, Microscopic Only   Result Value Ref Range    RBC, UA 0-2 (A) None Seen /HPF    WBC, UA 0-2 (A) None Seen /HPF    Bacteria, UA Trace (A) None Seen /HPF    Squamous Epithelial Cells, UA 0-2 None Seen, 0-2 /HPF    Methodology Automated Microscopy    Lipase   Result Value Ref Range    Lipase 18 13 - 60 U/L   Comprehensive Metabolic Panel   Result Value Ref Range    Glucose 88 70 - 110 mg/dL    BUN 15 7 - 21 mg/dL    Creatinine 0.73 0.43 - 1.29 mg/dL    Sodium 141 135 - 153 mmol/L    Potassium 3.6 3.5 - 5.3 mmol/L    Chloride 112 99 - 112 mmol/L    CO2 25.6 24.3 - 31.9 mmol/L    Calcium 8.2 7.7 - 10.0 mg/dL    Total Protein 5.2 (L) 6.0 - 8.0 g/dL    Albumin 3.10 (L) 3.40 - 4.80 g/dL    ALT (SGPT) 228 (H) 10 - 44 U/L    AST (SGOT) 66  (H) 10 - 34 U/L    Alkaline Phosphatase 206 (H) 40 - 129 U/L    Total Bilirubin 0.7 0.2 - 1.8 mg/dL    eGFR Non African Amer 104 >60 mL/min/1.73    Globulin 2.1 gm/dL    A/G Ratio 1.5 1.5 - 2.5 g/dL    BUN/Creatinine Ratio 20.5 7.0 - 25.0    Anion Gap 3.4 (L) 3.6 - 11.2 mmol/L   Amylase   Result Value Ref Range    Amylase 33 28 - 100 U/L   aPTT   Result Value Ref Range    PTT 31.3 (H) 24.4 - 31.0 seconds   Protime-INR   Result Value Ref Range    Protime 12.1 (H) 9.8 - 11.9 Seconds    INR 1.08 0.80 - 1.10   CBC Auto Differential   Result Value Ref Range    WBC 5.82 4.50 - 12.50 10*3/mm3    RBC 4.68 (L) 4.70 - 6.10 10*6/mm3    Hemoglobin 12.0 (L) 14.0 - 18.0 g/dL    Hematocrit 39.0 (L) 42.0 - 52.0 %    MCV 83.3 80.0 - 94.0 fL    MCH 25.6 (L) 27.0 - 33.0 pg    MCHC 30.8 (L) 33.0 - 37.0 g/dL    RDW 13.9 11.5 - 14.5 %    RDW-SD 42.2 37.0 - 54.0 fl    MPV 12.3 (H) 6.0 - 10.0 fL    Platelets 143 130 - 400 10*3/mm3    Neutrophil % 60.8 40.0 - 75.0 %    Lymphocyte % 19.6 16.0 - 46.0 %    Monocyte % 12.7 (H) 0.0 - 12.0 %    Eosinophil % 6.4 0.0 - 7.0 %    Basophil % 0.3 0.0 - 2.0 %    Immature Grans % 0.2 0.0 - 0.5 %    Neutrophils, Absolute 3.54 1.40 - 6.50 10*3/mm3    Lymphocytes, Absolute 1.14 1.00 - 3.00 10*3/mm3    Monocytes, Absolute 0.74 0.10 - 0.90 10*3/mm3    Eosinophils, Absolute 0.37 0.00 - 0.70 10*3/mm3    Basophils, Absolute 0.02 0.00 - 0.30 10*3/mm3    Immature Grans, Absolute 0.01 0.00 - 0.03 10*3/mm3   Osmolality, Calculated   Result Value Ref Range    Osmolality Calc 281.5 273.0 - 305.0 mOsm/kg   Protime-INR   Result Value Ref Range    Protime 11.3 9.8 - 11.9 Seconds    INR 1.02 0.80 - 1.10   CBC (No Diff)   Result Value Ref Range    WBC 8.27 4.50 - 12.50 10*3/mm3    RBC 4.79 4.70 - 6.10 10*6/mm3    Hemoglobin 12.7 (L) 14.0 - 18.0 g/dL    Hematocrit 40.2 (L) 42.0 - 52.0 %    MCV 83.9 80.0 - 94.0 fL    MCH 26.5 (L) 27.0 - 33.0 pg    MCHC 31.6 (L) 33.0 - 37.0 g/dL    RDW 13.9 11.5 - 14.5 %    RDW-SD 42.0 37.0 -  54.0 fl    MPV 11.8 (H) 6.0 - 10.0 fL    Platelets 154 130 - 400 10*3/mm3   Comprehensive Metabolic Panel   Result Value Ref Range    Glucose 80 70 - 110 mg/dL    BUN 9 7 - 21 mg/dL    Creatinine 0.61 0.43 - 1.29 mg/dL    Sodium 139 135 - 153 mmol/L    Potassium 3.6 3.5 - 5.3 mmol/L    Chloride 108 99 - 112 mmol/L    CO2 25.1 24.3 - 31.9 mmol/L    Calcium 8.1 7.7 - 10.0 mg/dL    Total Protein 5.4 (L) 6.0 - 8.0 g/dL    Albumin 3.10 (L) 3.40 - 4.80 g/dL    ALT (SGPT) 158 (H) 10 - 44 U/L    AST (SGOT) 39 (H) 10 - 34 U/L    Alkaline Phosphatase 177 (H) 40 - 129 U/L    Total Bilirubin 0.6 0.2 - 1.8 mg/dL    eGFR Non African Amer 128 >60 mL/min/1.73    Globulin 2.3 gm/dL    A/G Ratio 1.3 (L) 1.5 - 2.5 g/dL    BUN/Creatinine Ratio 14.8 7.0 - 25.0    Anion Gap 5.9 3.6 - 11.2 mmol/L   C-reactive Protein   Result Value Ref Range    C-Reactive Protein 2.42 (H) 0.00 - 0.99 mg/dL   Osmolality, Calculated   Result Value Ref Range    Osmolality Calc 275.2 273.0 - 305.0 mOsm/kg   Comprehensive Metabolic Panel   Result Value Ref Range    Glucose 124 (H) 70 - 110 mg/dL    BUN 6 (L) 7 - 21 mg/dL    Creatinine 0.67 0.43 - 1.29 mg/dL    Sodium 138 135 - 153 mmol/L    Potassium 3.8 3.5 - 5.3 mmol/L    Chloride 109 99 - 112 mmol/L    CO2 26.7 24.3 - 31.9 mmol/L    Calcium 8.1 7.7 - 10.0 mg/dL    Total Protein 5.1 (L) 6.0 - 8.0 g/dL    Albumin 2.60 (L) 3.40 - 4.80 g/dL    ALT (SGPT) 101 (H) 10 - 44 U/L    AST (SGOT) 24 10 - 34 U/L    Alkaline Phosphatase 138 (H) 40 - 129 U/L    Total Bilirubin 0.3 0.2 - 1.8 mg/dL    eGFR Non African Amer 114 >60 mL/min/1.73    Globulin 2.5 gm/dL    A/G Ratio 1.0 (L) 1.5 - 2.5 g/dL    BUN/Creatinine Ratio 9.0 7.0 - 25.0    Anion Gap 2.3 (L) 3.6 - 11.2 mmol/L   Protime-INR   Result Value Ref Range    Protime 14.1 11.0 - 15.4 Seconds    INR 1.07 0.90 - 1.10   CBC (No Diff)   Result Value Ref Range    WBC 6.68 4.50 - 12.50 10*3/mm3    RBC 4.62 (L) 4.70 - 6.10 10*6/mm3    Hemoglobin 12.0 (L) 14.0 - 18.0 g/dL     Hematocrit 38.6 (L) 42.0 - 52.0 %    MCV 83.5 80.0 - 94.0 fL    MCH 26.0 (L) 27.0 - 33.0 pg    MCHC 31.1 (L) 33.0 - 37.0 g/dL    RDW 14.1 11.5 - 14.5 %    RDW-SD 42.1 37.0 - 54.0 fl    MPV 12.0 (H) 6.0 - 10.0 fL    Platelets 183 130 - 400 10*3/mm3   Osmolality, Calculated   Result Value Ref Range    Osmolality Calc 274.7 273.0 - 305.0 mOsm/kg       Objective   Physical Exam  General- normal in appearance, not in any acute distress    HEENT- pupils equally reactive to light, normal in size, no scleral icterus    Neck-supple    Respiratory-respirations normal-on auscultation no wheezing no crackles,     Cardiovascular-  Normal S1 and S2. No S3, S4 or murmurs. No JVD, no carotid bruit and no edema, pulses normal bilaterally     GI-nontender nondistended bowel sounds positive    CNS-nonfocal    Musculoskeletal -no edema    Psychiatric-mood good, good eye contact, alert awake oriented      Assessment/Plan    Shortness of breath-likely related to patient's underlying lung disease and physical deconditioning.  We will get complete pulmonary function tests and chest x-ray.  Pulmonary function test to look for any obstructive lung disease and accordingly restart inhalers.  We will start the patient on bevespi inhaler.  Inhalational technique was checked and reviewed in the office today and he was educated about correct inhalational technique.    COPD-based upon patient's history sign and symptoms patient likely has COPD.  Will get complete pulmonary function test and treat accordingly    The patients shortness of breath continues will get echo to look into the cardiac etiology.    Deconditioning-patient was educated about physical deconditioning and I motivated him to start some exercise.    Lung cancer screening-patient has passed the age of lung cancers screening and has no smoked in last 20 years.    Up to date on vaccination.    Recurrent infections-will check immunoglobulin levels to look for any immunodeficiency state  and will treat accordingly.    Patient's Body mass index is 19.37 kg/m². BMI is within normal parameters. No follow-up required.      ICD-10-CM ICD-9-CM   1. SOB (shortness of breath) R06.02 786.05   2. Recurrent infections B99.9 136.9   3. Mucopurulent chronic bronchitis (CMS/HCC) J41.1 491.1       Return in about 6 months (around 1/25/2019).

## 2018-08-01 RX ORDER — IPRATROPIUM BROMIDE AND ALBUTEROL SULFATE 2.5; .5 MG/3ML; MG/3ML
3 SOLUTION RESPIRATORY (INHALATION) 4 TIMES DAILY PRN
Qty: 120 ML | Refills: 11 | Status: SHIPPED | OUTPATIENT
Start: 2018-08-01 | End: 2018-08-03 | Stop reason: SDUPTHER

## 2018-08-01 RX ORDER — IPRATROPIUM BROMIDE AND ALBUTEROL SULFATE 2.5; .5 MG/3ML; MG/3ML
3 SOLUTION RESPIRATORY (INHALATION) 4 TIMES DAILY PRN
Qty: 120 ML | Refills: 11 | Status: SHIPPED | OUTPATIENT
Start: 2018-08-01 | End: 2018-08-01 | Stop reason: SDUPTHER

## 2018-08-01 NOTE — TELEPHONE ENCOUNTER
Pharmacy called. They did not receive the prescription for the neb solution. I have re-ordered and sending for approval.

## 2018-08-03 RX ORDER — IPRATROPIUM BROMIDE AND ALBUTEROL SULFATE 2.5; .5 MG/3ML; MG/3ML
3 SOLUTION RESPIRATORY (INHALATION) 4 TIMES DAILY PRN
Qty: 360 ML | Refills: 3 | Status: SHIPPED | OUTPATIENT
Start: 2018-08-03 | End: 2019-11-10 | Stop reason: SDUPTHER

## 2018-08-14 ENCOUNTER — HOSPITAL ENCOUNTER (OUTPATIENT)
Dept: GENERAL RADIOLOGY | Facility: HOSPITAL | Age: 83
Discharge: HOME | End: 2018-08-14
Attending: INTERNAL MEDICINE

## 2018-08-14 ENCOUNTER — LAB (OUTPATIENT)
Dept: LAB | Facility: HOSPITAL | Age: 83
End: 2018-08-14
Attending: INTERNAL MEDICINE

## 2018-08-14 ENCOUNTER — HOSPITAL ENCOUNTER (OUTPATIENT)
Dept: RESPIRATORY THERAPY | Facility: HOSPITAL | Age: 83
Discharge: HOME | End: 2018-08-14
Attending: INTERNAL MEDICINE | Admitting: INTERNAL MEDICINE

## 2018-08-14 DIAGNOSIS — B99.9 RECURRENT INFECTIONS: ICD-10-CM

## 2018-08-14 DIAGNOSIS — R93.89 ABNORMAL CHEST X-RAY: Primary | ICD-10-CM

## 2018-08-14 DIAGNOSIS — R06.02 SOB (SHORTNESS OF BREATH): ICD-10-CM

## 2018-08-14 PROCEDURE — 71046 X-RAY EXAM CHEST 2 VIEWS: CPT

## 2018-08-14 PROCEDURE — 71046 X-RAY EXAM CHEST 2 VIEWS: CPT | Performed by: RADIOLOGY

## 2018-08-14 PROCEDURE — 94729 DIFFUSING CAPACITY: CPT

## 2018-08-14 PROCEDURE — 82784 ASSAY IGA/IGD/IGG/IGM EACH: CPT

## 2018-08-14 PROCEDURE — 94727 GAS DIL/WSHOT DETER LNG VOL: CPT

## 2018-08-14 PROCEDURE — 94729 DIFFUSING CAPACITY: CPT | Performed by: INTERNAL MEDICINE

## 2018-08-14 PROCEDURE — 94727 GAS DIL/WSHOT DETER LNG VOL: CPT | Performed by: INTERNAL MEDICINE

## 2018-08-14 PROCEDURE — 94010 BREATHING CAPACITY TEST: CPT | Performed by: INTERNAL MEDICINE

## 2018-08-14 PROCEDURE — 94010 BREATHING CAPACITY TEST: CPT

## 2018-08-14 PROCEDURE — 36415 COLL VENOUS BLD VENIPUNCTURE: CPT

## 2018-08-15 LAB — IGG SERPL-MCNC: 1003 MG/DL (ref 700–1600)

## 2018-08-16 ENCOUNTER — HOSPITAL ENCOUNTER (OUTPATIENT)
Dept: CT IMAGING | Facility: HOSPITAL | Age: 83
Discharge: HOME | End: 2018-08-16
Attending: INTERNAL MEDICINE | Admitting: INTERNAL MEDICINE

## 2018-08-16 DIAGNOSIS — R93.89 ABNORMAL CHEST X-RAY: ICD-10-CM

## 2018-08-16 PROCEDURE — 71250 CT THORAX DX C-: CPT | Performed by: RADIOLOGY

## 2018-08-16 PROCEDURE — 71250 CT THORAX DX C-: CPT

## 2018-08-20 DIAGNOSIS — R93.89 ABNORMAL CT OF THE CHEST: Primary | ICD-10-CM

## 2018-08-22 ENCOUNTER — APPOINTMENT (OUTPATIENT)
Dept: CT IMAGING | Facility: HOSPITAL | Age: 83
End: 2018-08-22
Attending: INTERNAL MEDICINE

## 2018-08-23 ENCOUNTER — HOSPITAL ENCOUNTER (OUTPATIENT)
Facility: HOSPITAL | Age: 83
Setting detail: HOSPITAL OUTPATIENT SURGERY
Discharge: HOME | End: 2018-08-23
Attending: INTERNAL MEDICINE | Admitting: INTERNAL MEDICINE

## 2018-08-23 ENCOUNTER — ANESTHESIA (OUTPATIENT)
Dept: PERIOP | Facility: HOSPITAL | Age: 83
End: 2018-08-23

## 2018-08-23 ENCOUNTER — ANESTHESIA EVENT (OUTPATIENT)
Dept: PERIOP | Facility: HOSPITAL | Age: 83
End: 2018-08-23

## 2018-08-23 VITALS
RESPIRATION RATE: 20 BRPM | HEART RATE: 57 BPM | SYSTOLIC BLOOD PRESSURE: 105 MMHG | TEMPERATURE: 97 F | DIASTOLIC BLOOD PRESSURE: 78 MMHG | OXYGEN SATURATION: 98 %

## 2018-08-23 DIAGNOSIS — R93.89 ABNORMAL CT OF THE CHEST: ICD-10-CM

## 2018-08-23 LAB
BASOPHILS # BLD AUTO: 0.02 10*3/MM3 (ref 0–0.3)
BASOPHILS NFR BLD AUTO: 0.3 % (ref 0–2)
DEPRECATED RDW RBC AUTO: 40.8 FL (ref 37–54)
EOSINOPHIL # BLD AUTO: 0.28 10*3/MM3 (ref 0–0.7)
EOSINOPHIL NFR BLD AUTO: 4.2 % (ref 0–7)
ERYTHROCYTE [DISTWIDTH] IN BLOOD BY AUTOMATED COUNT: 13.4 % (ref 11.5–14.5)
HCT VFR BLD AUTO: 47.6 % (ref 42–52)
HGB BLD-MCNC: 15.3 G/DL (ref 14–18)
IMM GRANULOCYTES # BLD: 0.01 10*3/MM3 (ref 0–0.03)
IMM GRANULOCYTES NFR BLD: 0.2 % (ref 0–0.5)
INR PPP: 1.04 (ref 0.9–1.1)
LYMPHOCYTES # BLD AUTO: 1.72 10*3/MM3 (ref 1–3)
LYMPHOCYTES NFR BLD AUTO: 26 % (ref 16–46)
MCH RBC QN AUTO: 26.9 PG (ref 27–33)
MCHC RBC AUTO-ENTMCNC: 32.1 G/DL (ref 33–37)
MCV RBC AUTO: 83.7 FL (ref 80–94)
MONOCYTES # BLD AUTO: 0.87 10*3/MM3 (ref 0.1–0.9)
MONOCYTES NFR BLD AUTO: 13.1 % (ref 0–12)
NEUTROPHILS # BLD AUTO: 3.72 10*3/MM3 (ref 1.4–6.5)
NEUTROPHILS NFR BLD AUTO: 56.2 % (ref 40–75)
PLATELET # BLD AUTO: 168 10*3/MM3 (ref 130–400)
PMV BLD AUTO: 11.5 FL (ref 6–10)
PROTHROMBIN TIME: 13.8 SECONDS (ref 11–15.4)
RBC # BLD AUTO: 5.69 10*6/MM3 (ref 4.7–6.1)
WBC NRBC COR # BLD: 6.62 10*3/MM3 (ref 4.5–12.5)

## 2018-08-23 PROCEDURE — 94640 AIRWAY INHALATION TREATMENT: CPT

## 2018-08-23 PROCEDURE — 87205 SMEAR GRAM STAIN: CPT | Performed by: INTERNAL MEDICINE

## 2018-08-23 PROCEDURE — 85025 COMPLETE CBC W/AUTO DIFF WBC: CPT | Performed by: INTERNAL MEDICINE

## 2018-08-23 PROCEDURE — S0260 H&P FOR SURGERY: HCPCS | Performed by: INTERNAL MEDICINE

## 2018-08-23 PROCEDURE — 31652 BRONCH EBUS SAMPLNG 1/2 NODE: CPT | Performed by: INTERNAL MEDICINE

## 2018-08-23 PROCEDURE — 87077 CULTURE AEROBIC IDENTIFY: CPT | Performed by: INTERNAL MEDICINE

## 2018-08-23 PROCEDURE — 88173 CYTOPATH EVAL FNA REPORT: CPT | Performed by: INTERNAL MEDICINE

## 2018-08-23 PROCEDURE — 31623 DX BRONCHOSCOPE/BRUSH: CPT | Performed by: INTERNAL MEDICINE

## 2018-08-23 PROCEDURE — 87186 SC STD MICRODIL/AGAR DIL: CPT | Performed by: INTERNAL MEDICINE

## 2018-08-23 PROCEDURE — 25010000002 ONDANSETRON PER 1 MG: Performed by: NURSE ANESTHETIST, CERTIFIED REGISTERED

## 2018-08-23 PROCEDURE — 87116 MYCOBACTERIA CULTURE: CPT | Performed by: INTERNAL MEDICINE

## 2018-08-23 PROCEDURE — 85610 PROTHROMBIN TIME: CPT | Performed by: INTERNAL MEDICINE

## 2018-08-23 PROCEDURE — 88305 TISSUE EXAM BY PATHOLOGIST: CPT | Performed by: INTERNAL MEDICINE

## 2018-08-23 PROCEDURE — 31624 DX BRONCHOSCOPE/LAVAGE: CPT | Performed by: INTERNAL MEDICINE

## 2018-08-23 PROCEDURE — 94799 UNLISTED PULMONARY SVC/PX: CPT

## 2018-08-23 PROCEDURE — 87070 CULTURE OTHR SPECIMN AEROBIC: CPT | Performed by: INTERNAL MEDICINE

## 2018-08-23 PROCEDURE — 87102 FUNGUS ISOLATION CULTURE: CPT | Performed by: INTERNAL MEDICINE

## 2018-08-23 PROCEDURE — 88112 CYTOPATH CELL ENHANCE TECH: CPT | Performed by: INTERNAL MEDICINE

## 2018-08-23 PROCEDURE — 25010000002 PROPOFOL 10 MG/ML EMULSION: Performed by: NURSE ANESTHETIST, CERTIFIED REGISTERED

## 2018-08-23 PROCEDURE — 87206 SMEAR FLUORESCENT/ACID STAI: CPT | Performed by: INTERNAL MEDICINE

## 2018-08-23 RX ORDER — ONDANSETRON 2 MG/ML
INJECTION INTRAMUSCULAR; INTRAVENOUS AS NEEDED
Status: DISCONTINUED | OUTPATIENT
Start: 2018-08-23 | End: 2018-08-23 | Stop reason: SURG

## 2018-08-23 RX ORDER — IPRATROPIUM BROMIDE AND ALBUTEROL SULFATE 2.5; .5 MG/3ML; MG/3ML
3 SOLUTION RESPIRATORY (INHALATION) ONCE AS NEEDED
Status: DISCONTINUED | OUTPATIENT
Start: 2018-08-23 | End: 2018-08-23 | Stop reason: HOSPADM

## 2018-08-23 RX ORDER — MEPERIDINE HYDROCHLORIDE 50 MG/ML
12.5 INJECTION INTRAMUSCULAR; INTRAVENOUS; SUBCUTANEOUS
Status: DISCONTINUED | OUTPATIENT
Start: 2018-08-23 | End: 2018-08-23 | Stop reason: HOSPADM

## 2018-08-23 RX ORDER — LIDOCAINE HYDROCHLORIDE 10 MG/ML
INJECTION, SOLUTION EPIDURAL; INFILTRATION; INTRACAUDAL; PERINEURAL AS NEEDED
Status: DISCONTINUED | OUTPATIENT
Start: 2018-08-23 | End: 2018-08-23 | Stop reason: HOSPADM

## 2018-08-23 RX ORDER — SODIUM CHLORIDE, SODIUM LACTATE, POTASSIUM CHLORIDE, CALCIUM CHLORIDE 600; 310; 30; 20 MG/100ML; MG/100ML; MG/100ML; MG/100ML
125 INJECTION, SOLUTION INTRAVENOUS CONTINUOUS
Status: DISCONTINUED | OUTPATIENT
Start: 2018-08-23 | End: 2018-08-23 | Stop reason: HOSPADM

## 2018-08-23 RX ORDER — SODIUM CHLORIDE 0.9 % (FLUSH) 0.9 %
1-10 SYRINGE (ML) INJECTION AS NEEDED
Status: DISCONTINUED | OUTPATIENT
Start: 2018-08-23 | End: 2018-08-23 | Stop reason: HOSPADM

## 2018-08-23 RX ORDER — PROPOFOL 10 MG/ML
VIAL (ML) INTRAVENOUS AS NEEDED
Status: DISCONTINUED | OUTPATIENT
Start: 2018-08-23 | End: 2018-08-23 | Stop reason: SURG

## 2018-08-23 RX ORDER — ALBUTEROL SULFATE 2.5 MG/3ML
2.5 SOLUTION RESPIRATORY (INHALATION) ONCE
Status: COMPLETED | OUTPATIENT
Start: 2018-08-23 | End: 2018-08-23

## 2018-08-23 RX ORDER — FAMOTIDINE 10 MG/ML
INJECTION, SOLUTION INTRAVENOUS AS NEEDED
Status: DISCONTINUED | OUTPATIENT
Start: 2018-08-23 | End: 2018-08-23 | Stop reason: SURG

## 2018-08-23 RX ORDER — LIDOCAINE HYDROCHLORIDE AND EPINEPHRINE 10; 10 MG/ML; UG/ML
INJECTION, SOLUTION INFILTRATION; PERINEURAL AS NEEDED
Status: DISCONTINUED | OUTPATIENT
Start: 2018-08-23 | End: 2018-08-23 | Stop reason: HOSPADM

## 2018-08-23 RX ORDER — ONDANSETRON 2 MG/ML
4 INJECTION INTRAMUSCULAR; INTRAVENOUS ONCE AS NEEDED
Status: DISCONTINUED | OUTPATIENT
Start: 2018-08-23 | End: 2018-08-23 | Stop reason: HOSPADM

## 2018-08-23 RX ORDER — LIDOCAINE HYDROCHLORIDE 20 MG/ML
INJECTION, SOLUTION INFILTRATION; PERINEURAL AS NEEDED
Status: DISCONTINUED | OUTPATIENT
Start: 2018-08-23 | End: 2018-08-23 | Stop reason: SURG

## 2018-08-23 RX ORDER — LIDOCAINE HYDROCHLORIDE 40 MG/ML
3 INJECTION, SOLUTION RETROBULBAR; TOPICAL ONCE
Status: COMPLETED | OUTPATIENT
Start: 2018-08-23 | End: 2018-08-23

## 2018-08-23 RX ORDER — OXYCODONE HYDROCHLORIDE AND ACETAMINOPHEN 5; 325 MG/1; MG/1
1 TABLET ORAL ONCE AS NEEDED
Status: DISCONTINUED | OUTPATIENT
Start: 2018-08-23 | End: 2018-08-23 | Stop reason: HOSPADM

## 2018-08-23 RX ORDER — FENTANYL CITRATE 50 UG/ML
50 INJECTION, SOLUTION INTRAMUSCULAR; INTRAVENOUS
Status: DISCONTINUED | OUTPATIENT
Start: 2018-08-23 | End: 2018-08-23 | Stop reason: HOSPADM

## 2018-08-23 RX ADMIN — LIDOCAINE HYDROCHLORIDE 3 ML: 40 INJECTION, SOLUTION RETROBULBAR; TOPICAL at 09:29

## 2018-08-23 RX ADMIN — PROPOFOL 120 MG: 10 INJECTION, EMULSION INTRAVENOUS at 10:25

## 2018-08-23 RX ADMIN — LIDOCAINE HYDROCHLORIDE 40 MG: 20 INJECTION, SOLUTION INFILTRATION; PERINEURAL at 10:25

## 2018-08-23 RX ADMIN — ONDANSETRON 4 MG: 2 INJECTION, SOLUTION INTRAMUSCULAR; INTRAVENOUS at 10:26

## 2018-08-23 RX ADMIN — SODIUM CHLORIDE, POTASSIUM CHLORIDE, SODIUM LACTATE AND CALCIUM CHLORIDE: 600; 310; 30; 20 INJECTION, SOLUTION INTRAVENOUS at 10:20

## 2018-08-23 RX ADMIN — ALBUTEROL SULFATE 2.5 MG: 2.5 SOLUTION RESPIRATORY (INHALATION) at 09:23

## 2018-08-23 RX ADMIN — FAMOTIDINE 20 MG: 10 INJECTION, SOLUTION INTRAVENOUS at 10:26

## 2018-08-23 NOTE — ANESTHESIA POSTPROCEDURE EVALUATION
Patient: Sarbjit Martin    Procedure Summary     Date:  08/23/18 Room / Location:   [facility] 09 /  [facility]    Anesthesia Start:  1020 Anesthesia Stop:  1050    Procedure:  BRONCHOSCOPY WITH ENDOBRONCHIAL ULTRASOUND (N/A Bronchus) Diagnosis:       Abnormal CT of the chest      (Abnormal CT of the chest [R93.8])    Surgeon:  Saravanan Méndez MD Provider:  Glenn Seals MD    Anesthesia Type:  general ASA Status:  3          Anesthesia Type: general  Last vitals  BP   105/78 (08/23/18 1120)   Temp   97 °F (36.1 °C) (08/23/18 1050)   Pulse   57 (08/23/18 1120)   Resp   20 (08/23/18 1120)     SpO2   98 % (08/23/18 1120)     Post Anesthesia Care and Evaluation    Patient location during evaluation: PHASE II  Patient participation: complete - patient participated  Level of consciousness: awake and alert  Pain score: 1  Pain management: adequate  Airway patency: patent  Anesthetic complications: No anesthetic complications  PONV Status: controlled  Cardiovascular status: acceptable  Respiratory status: acceptable  Hydration status: acceptable

## 2018-08-23 NOTE — ANESTHESIA PROCEDURE NOTES
Airway  Urgency: elective    Airway not difficult    General Information and Staff    Patient location during procedure: OR  CRNA: JESSICA LANDON    Indications and Patient Condition  Indications for airway management: airway protection    Preoxygenated: yes  MILS maintained throughout  Mask difficulty assessment: 1 - vent by mask    Final Airway Details  Final airway type: supraglottic airway      Successful airway: unique  Size 4    Number of attempts at approach: 1

## 2018-08-23 NOTE — H&P
Chief Complaint:  Here for a bronchoscopy for abnormal imaging - ct chest    Ct chest reviewed     Subjective     Interval History: no changes since the last time since was seen in office        Review of Systems:    Review of system system is negative for shortness of breath chest pain lightheadedness dizziness any numbness in any area of the body belly pain nausea vomiting diarrhea shortness of breath cough          Vital Signs  Temp:  [97.5 °F (36.4 °C)] 97.5 °F (36.4 °C)  Heart Rate:  [50-56] 51  Resp:  [20] 20  BP: (152)/(70) 152/70  There is no height or weight on file to calculate BMI.  No intake or output data in the 24 hours ending 08/23/18 1011  No intake/output data recorded.    Physical Exam:   General- normal in appearance, not in any acute distress    HEENT- pupils equally reactive to light, normal in size, no scleral icterus    Neck-supple    Chest-respirations normal-on auscultation no wheezing no crackles    S1 and S2 normal    Abdomen-nontender nondistended bowel sounds positive    CNS-nonfocal    Extremities-no edema    Psychiatric-mood good, good eye contact, alert awake oriented     Results Review:     I reviewed the patient's new clinical results.    Results from last 7 days  Lab Units 08/23/18  0821   WBC 10*3/mm3 6.62   HEMOGLOBIN g/dL 15.3   PLATELETS 10*3/mm3 168         Lab Results   Component Value Date    INR 1.04 08/23/2018    INR 1.07 06/28/2017    INR 1.02 06/27/2017    PROTIME 13.8 08/23/2018    PROTIME 14.1 06/28/2017    PROTIME 11.3 06/27/2017           Invalid input(s): PROT, LABALBU      Imaging Results (last 24 hours)     ** No results found for the last 24 hours. **                    lactated ringers 125 mL/hr       Medication Review:     Assessment/Plan     Abnormal ct chest- all the medications history physical labs are reviewed.  We will do a bronchoscopy with bronchoalveolar lavage his bronchial brushings.  We'll send it for cytology and microbiology.    Informed  consent taken.    Patient Active Problem List   Diagnosis Code   • Status post laparoscopic cholecystectomy Z90.49   • Bile duct calculus K80.50   • Pain in the abdomen R10.9   • Bacteremia R78.81   • COPD (chronic obstructive pulmonary disease) (CMS/HCC) J44.9   • Recurrent infections B99.9   • SOB (shortness of breath) R06.02   • Abnormal CT of the chest R93.8               Saravanan Méndez MD  08/23/18  10:11 AM

## 2018-08-24 LAB
LAB AP CASE REPORT: NORMAL
LAB AP CASE REPORT: NORMAL
PATH REPORT.FINAL DX SPEC: NORMAL

## 2018-08-25 LAB
BACTERIA SPEC RESP CULT: NORMAL
BACTERIA SPEC RESP CULT: NORMAL
GRAM STN SPEC: NORMAL

## 2018-08-27 ENCOUNTER — TRANSCRIBE ORDERS (OUTPATIENT)
Dept: PULMONOLOGY | Facility: CLINIC | Age: 83
End: 2018-08-27

## 2018-08-27 DIAGNOSIS — R93.89 ABNORMAL CT OF THE CHEST: Primary | ICD-10-CM

## 2018-08-29 ENCOUNTER — OFFICE VISIT (OUTPATIENT)
Dept: PULMONOLOGY | Facility: CLINIC | Age: 83
End: 2018-08-29

## 2018-08-29 VITALS
OXYGEN SATURATION: 94 % | HEART RATE: 65 BPM | TEMPERATURE: 98 F | BODY MASS INDEX: 19.61 KG/M2 | WEIGHT: 122 LBS | DIASTOLIC BLOOD PRESSURE: 74 MMHG | HEIGHT: 66 IN | SYSTOLIC BLOOD PRESSURE: 156 MMHG

## 2018-08-29 DIAGNOSIS — R06.02 SOB (SHORTNESS OF BREATH): ICD-10-CM

## 2018-08-29 DIAGNOSIS — R93.89 ABNORMAL CT OF THE CHEST: ICD-10-CM

## 2018-08-29 DIAGNOSIS — J44.9 CHRONIC OBSTRUCTIVE PULMONARY DISEASE, UNSPECIFIED COPD TYPE (HCC): Primary | ICD-10-CM

## 2018-08-29 LAB
BACTERIA SPEC RESP CULT: ABNORMAL
GRAM STN SPEC: ABNORMAL

## 2018-08-29 PROCEDURE — 99214 OFFICE O/P EST MOD 30 MIN: CPT | Performed by: INTERNAL MEDICINE

## 2018-08-29 RX ORDER — AZITHROMYCIN 250 MG/1
TABLET, FILM COATED ORAL
Qty: 6 TABLET | Refills: 0 | Status: SHIPPED | OUTPATIENT
Start: 2018-08-29 | End: 2018-08-29 | Stop reason: SDUPTHER

## 2018-08-29 RX ORDER — AZITHROMYCIN 250 MG/1
TABLET, FILM COATED ORAL
Qty: 6 TABLET | Refills: 0 | OUTPATIENT
Start: 2018-08-29 | End: 2019-02-02

## 2018-08-29 RX ORDER — PANTOPRAZOLE SODIUM 40 MG/1
40 TABLET, DELAYED RELEASE ORAL DAILY
Status: ON HOLD | COMMUNITY
Start: 2018-08-14 | End: 2022-11-01

## 2018-08-29 NOTE — PROGRESS NOTES
Subjective    Sarbjit Martin presents for the following Shortness of Breath and Mass (Patient is here to go over test results and treatment plan.)  .  Interval history since last visit:Patient stated that he has been feeling okay.     Recent hospitalizations: None  Investigations (imaging, PFT's, labs, sleep study, record requests, etc.) Bronch    History of Present Illness     Patient appears well and denies shortness of breath or any complaints at this time. He wants to discuss the need for surgery consult.     Review of Systems   Constitutional: Negative for activity change, fatigue and unexpected weight change.   HENT: Negative for congestion, postnasal drip and rhinorrhea.    Respiratory: Positive for cough, shortness of breath and wheezing. Negative for apnea and chest tightness.    Cardiovascular: Negative for chest pain and palpitations.   Gastrointestinal: Negative for nausea.   Allergic/Immunologic: Negative for environmental allergies.   Psychiatric/Behavioral: Negative for agitation and confusion.       Active Problems:  Problem List Items Addressed This Visit     COPD (chronic obstructive pulmonary disease) (CMS/[facility]) - Primary    SOB (shortness of breath)    Abnormal CT of the chest    Overview     Added automatically from request for surgery [id number]               Past Medical History:  Past Medical History:   Diagnosis Date   • Arthritis    • COPD (chronic obstructive pulmonary disease) (CMS/HCC)        Family History:  Family History   Problem Relation Age of Onset   • No Known Problems Mother    • Diabetes Father    • Hypertension Father    • Stroke Father    • Diabetes Sister        Social History:  Social History   Substance Use Topics   • Smoking status: Former Smoker     Years: 10.00     Quit date: 1992   • Smokeless tobacco: Former User     Quit date: 6/16/1995   • Alcohol use No       Current Medications:  Current Outpatient Prescriptions   Medication Sig Dispense Refill   • aspirin 325 MG tablet  "Take 325 mg by mouth Daily.     • ipratropium-albuterol (DUO-NEB) 0.5-2.5 mg/3 ml nebulizer Take 3 mL by nebulization 4 (Four) Times a Day As Needed for Wheezing. Dx: R06.02, J41.1Resending to include IC-D 10 360 mL 3   • LORazepam (ATIVAN) 0.5 MG tablet Take 0.5 mg by mouth Daily.     • VENTOLIN  (90 BASE) MCG/ACT inhaler Inhale 2 puffs Every 6 (Six) Hours As Needed for Wheezing or Shortness of Air.     • azithromycin (ZITHROMAX) 250 MG tablet Take 2 by mouth today then 1 daily for 4 days 6 tablet 0   • pantoprazole (PROTONIX) 40 MG EC tablet Take 40 mg by mouth Daily.       Current Facility-Administered Medications   Medication Dose Route Frequency Provider Last Rate Last Dose   • ipratropium-albuterol (DUO-NEB) nebulizer solution 3 mL  3 mL Nebulization 4x Daily - RT Saravanan Méndez MD           Allergies:  Allergies   Allergen Reactions   • Penicillins Anaphylaxis       Vitals:  /74 (BP Location: Left arm, Patient Position: Sitting, Cuff Size: Adult)   Pulse 65   Temp 98 °F (36.7 °C) (Oral)   Ht 167.6 cm (66\")   Wt 55.3 kg (122 lb)   SpO2 94%   BMI 19.69 kg/m²     Imaging:    Imaging Results (most recent)     None          Pulmonary Functions Testing Results:    No results found for: FEV1, FVC, YGP6LOM, TLC, DLCO    Results for orders placed or performed during the hospital encounter of 08/23/18   Fungus Culture - Lavage, Lung, Left Upper Lobe   Result Value Ref Range    Fungus Stain Final report     KOH/Calcofluor preparation Comment    AFB Culture - Lavage, Lung, Left Upper Lobe   Result Value Ref Range    AFB Specimen Processing Concentration     Acid Fast Smear Negative    Respiratory Culture - Lavage, Lung, Left Upper Lobe   Result Value Ref Range    Respiratory Culture Scant growth (1+) Normal Respiratory Vibha     Gram Stain Result No WBCs or organisms seen    Fungus Culture - Lavage, Lung, Left Lower Lobe   Result Value Ref Range    Fungus Stain Final report     KOH/Calcofluor " preparation Comment    AFB Culture - Lavage, Lung, Left Lower Lobe   Result Value Ref Range    AFB Specimen Processing Concentration     Acid Fast Smear Negative    Respiratory Culture - Lavage, Lung, Left Lower Lobe   Result Value Ref Range    Respiratory Culture Light growth (2+) Normal Respiratory Vibha     Gram Stain Result Moderate (3+) WBCs seen     Gram Stain Result       Rare (1+) Gram positive cocci in pairs and clusters   Fungus Culture - Wash, Bronchus   Result Value Ref Range    Fungus Stain Final report     KOH/Calcofluor preparation Comment    AFB Culture - Wash, Bronchus   Result Value Ref Range    AFB Specimen Processing Concentration     Acid Fast Smear Negative    Respiratory Culture - Wash, Bronchus   Result Value Ref Range    Respiratory Culture Light growth (2+) Pseudomonas mendocina (A)     Gram Stain Result Moderate (3+) WBCs seen     Gram Stain Result Rare (1+) Gram positive bacilli     Gram Stain Result Rare (1+) Gram positive cocci in pairs and chains        Susceptibility    Pseudomonas mendocina - REHANA     Amikacin <=16 Susceptible ug/ml     Aztreonam 16 Intermediate ug/ml     Cefepime <=8 Susceptible ug/ml     Cefotaxime <=2 Susceptible ug/ml     Ceftazidime <=1 Susceptible ug/ml     Ceftriaxone <=8 Susceptible ug/ml     Ciprofloxacin <=1 Susceptible ug/ml     Gentamicin <=4 Susceptible ug/ml     Imipenem <=1 Susceptible ug/ml     Levofloxacin <=2 Susceptible ug/ml     Piperacillin + Tazobactam <=16 Susceptible ug/ml     Tetracycline <=4 Susceptible ug/ml     Tobramycin <=4 Susceptible ug/ml     Trimethoprim + Sulfamethoxazole <=2/38 Susceptible ug/ml   Protime-INR   Result Value Ref Range    Protime 13.8 11.0 - 15.4 Seconds    INR 1.04 0.90 - 1.10   CBC Auto Differential   Result Value Ref Range    WBC 6.62 4.50 - 12.50 10*3/mm3    RBC 5.69 4.70 - 6.10 10*6/mm3    Hemoglobin 15.3 14.0 - 18.0 g/dL    Hematocrit 47.6 42.0 - 52.0 %    MCV 83.7 80.0 - 94.0 fL    MCH 26.9 (L) 27.0 - 33.0 pg     MCHC 32.1 (L) 33.0 - 37.0 g/dL    RDW 13.4 11.5 - 14.5 %    RDW-SD 40.8 37.0 - 54.0 fl    MPV 11.5 (H) 6.0 - 10.0 fL    Platelets 168 130 - 400 10*3/mm3    Neutrophil % 56.2 40.0 - 75.0 %    Lymphocyte % 26.0 16.0 - 46.0 %    Monocyte % 13.1 (H) 0.0 - 12.0 %    Eosinophil % 4.2 0.0 - 7.0 %    Basophil % 0.3 0.0 - 2.0 %    Immature Grans % 0.2 0.0 - 0.5 %    Neutrophils, Absolute 3.72 1.40 - 6.50 10*3/mm3    Lymphocytes, Absolute 1.72 1.00 - 3.00 10*3/mm3    Monocytes, Absolute 0.87 0.10 - 0.90 10*3/mm3    Eosinophils, Absolute 0.28 0.00 - 0.70 10*3/mm3    Basophils, Absolute 0.02 0.00 - 0.30 10*3/mm3    Immature Grans, Absolute 0.01 0.00 - 0.03 10*3/mm3   Non-gynecologic Cytology   Result Value Ref Range    Case Report       [facility] Non-Gyn Cytology                           Case: [id number]                                  Authorizing Provider:  Saravanan Méndez MD        Collected:           08/23/2018 10:17 AM          Ordering Location:     [facility]      Received:            08/23/2018 12:48 PM                                 OPERATING ROOM DEPARTMENT                                                    Pathologist:           Luis A Khan MD                                                     Specimens:   1) - Lung, Left Upper Lobe, HOWIE BRUSHINGS                                                           2) - Lung, Left Lower Lobe, LLL BAL                                                                 3) - Lung, Left Upper Lobe, HOWIE BAL                                                                 4) - Bronchus, BRONCH WASHINGS                                                            Fine Needle Aspiration   Result Value Ref Range    Case Report       [facility] Fine Needle Aspirate                       Case: [id number]                                 Authorizing Provider:  Saravanan Méndez MD        Collected:           08/23/2018 10:40 AM          Ordering Location:     [facility]  [place]      Received:            08/23/2018 12:51 PM                                 OPERATING ROOM DEPARTMENT                                                    Pathologist:           Luis A Khan MD                                                     Specimen:    Endobronchial,  left lower paratracheal                                                    Final Diagnosis       See Scanned Report           Objective   Physical Exam     General - Thin limbed but otherwise normal appearance. No signs of acute distress.    HEENT - pupils equally reactive to light, normal in size, no scleral icterus    Neck-supple    Respiratory - Audible wheezing. Wheezing bilaterally on auscultation.     Cardiovascular - Normal S1 and S2. No S3, S4 or murmurs. No JVD, no carotid bruit and no edema, pulses normal bilaterally.     GI - nontender nondistended. Active bowel sounds.    CNS - nonfocal    Musculoskeletal - no edema.    Psychiatric - mood good, good eye contact. Alert, awake, and oriented.        Slight audible wheezing.     Assessment/Plan      Shortness of breath-likely related to patient's underlying lung disease and physical deconditioning.      COPD - PFT was reviewed.  Patient denies shortness of breath at this time.  Will continue to monitor symptoms..   Patient does not request inhaler refills at this time.     Recurrent infections - IgG level was within normal limits.   Respiratory culture during bronchoscopy revealed light growth of pseudomonas.   Explained to patient that if his respiratory symptoms progress, as he states he commonly gets pneumonia during the winter, to go to PCP or ED if severe.    Azithromycin was sent in for patient incase exacerbation occurs, as this will treat the most likely pathogen from his recent respiratory culture results.     Discussed options of getting a scan and possibly having surgery, or waiting and monitoring symptoms. Patient would prefer to wait 6 months to follow up and  not get a CT scan at this time.   He also does not prefer to have surgery at his time.  And also refuses CT chest.  Patient is not interested in following up this pulmonary nodule.  He was warned that it could be a cancer and he potentially die from it.  Will follow up with the patient 6 months or as needed if symptoms worsen.       Up to date on vaccination.      Patient's Body mass index is 19.69 kg/m². BMI is within normal parameters. No follow-up required            Return in about 6 months (around 2/28/2019), or if symptoms worsen or fail to improve.          Scribed for Dr. Méndez by MARQUIS Jones, Saravanan Méndez M.D. attest that the above note accurately reflects the work and decisions made  by me.  Patient was seen and evaluated by Dr. Méndez, including history of present illness, physical exam, assessment, and treatment plan.  The above note was reviewed and edited by Dr. Méndez.

## 2018-09-22 LAB
BACTERIA SPEC AEROBE CULT: NORMAL
FUNGUS SPEC CULT: NORMAL
FUNGUS SPEC CULT: NORMAL
FUNGUS SPEC FUNGUS STN: NORMAL

## 2018-09-30 LAB
BACTERIA SPEC AEROBE CULT: NORMAL
FUNGUS SPEC CULT ORG #2: NORMAL
FUNGUS SPEC CULT: NORMAL
FUNGUS SPEC CULT: NORMAL
FUNGUS SPEC FUNGUS STN: NORMAL

## 2018-10-04 LAB
ACID FAST STN SPEC: NEGATIVE
BACTERIA SPEC AEROBE CULT: NEGATIVE
SPECIMEN PREPARATION: NORMAL

## 2018-11-05 NOTE — OP NOTE
DATE OF SURGERY: 06/16/2017    PREOPERATIVE DIAGNOSES:   1.  Cholelithiasis.  2.  Cholecystitis.     POSTOPERATIVE DIAGNOSES:  1.  Cholelithiasis.   2.  Cholecystitis.     PROCEDURE PERFORMED: Laparoscopic cholecystectomy.     SURGEON:  Jose Velazquez MD    ANESTHESIA: General.     ESTIMATED BLOOD LOSS:  Less than 30 mL.    DRAINS: None.     DESCRIPTION OF PROCEDURE: The patient was placed in supine position under general anesthesia, prepped with ChloraPrep, and draped in sterile fashion.   A small incision was made in the umbilicus. A 5 mm port was placed. No bleeding or injury noted.   I made a large chevron incision in the upper abdomen. There were extensive adhesions on possibly both sides. I put in a 5 mm port lateral to this in the right upper quadrant and used the cautery to take down the adhesions to the midline and put in a 11 port there in a subxiphoid position. The gallbladder adhesions were then peeled away fairly easily. The cystic duct and artery were cleared off and identified. They were then clipped and divided with scissors. The gallbladder was then dissected off the liver bed with cautery. Once freed, it was brought up to the subxiphoid port.  It contained multiple stones and to be removed before the gallbladder was finally able to be removed.  Trocar reinserted. The area was irrigated with more cautery used on liver bed. Once things were dried up and suctioned out, the gas was allowed to escape and the trocars were removed. Fascia at the subxiphoid port was closed with 2-0 Vicryl followed by 4-0 Vicryl subcuticular stitches on the skin. Marcaine injected around the wounds. The patient awakened and sent to recovery.       D: DL 06/16/2017 08:16:31 ET  T: dwight / 06/16/2017 09:02:53 ET  Revision Count: 0  Job ID: [id number]  [id number]  cc:        Dictator Signature:___________________________     Jose Velazquez MD                         yes

## 2018-11-09 ENCOUNTER — APPOINTMENT (OUTPATIENT)
Dept: GENERAL RADIOLOGY | Facility: HOSPITAL | Age: 83
End: 2018-11-09

## 2018-11-09 ENCOUNTER — HOSPITAL ENCOUNTER (EMERGENCY)
Facility: HOSPITAL | Age: 83
Discharge: HOME | End: 2018-11-09
Attending: EMERGENCY MEDICINE | Admitting: EMERGENCY MEDICINE

## 2018-11-09 VITALS
BODY MASS INDEX: 19.29 KG/M2 | TEMPERATURE: 97.8 F | HEIGHT: 66 IN | SYSTOLIC BLOOD PRESSURE: 131 MMHG | DIASTOLIC BLOOD PRESSURE: 77 MMHG | RESPIRATION RATE: 20 BRPM | HEART RATE: 60 BPM | WEIGHT: 120 LBS | OXYGEN SATURATION: 96 %

## 2018-11-09 DIAGNOSIS — R07.9 CHEST PAIN, UNSPECIFIED TYPE: Primary | ICD-10-CM

## 2018-11-09 LAB
ALBUMIN SERPL-MCNC: 4.2 G/DL (ref 3.4–4.8)
ALBUMIN/GLOB SERPL: 1.7 G/DL (ref 1.5–2.5)
ALP SERPL-CCNC: 58 U/L (ref 40–129)
ALT SERPL W P-5'-P-CCNC: 10 U/L (ref 10–44)
AMYLASE SERPL-CCNC: 47 U/L (ref 28–100)
ANION GAP SERPL CALCULATED.3IONS-SCNC: 4.7 MMOL/L (ref 3.6–11.2)
APTT PPP: 31.9 SECONDS (ref 23.8–36.1)
AST SERPL-CCNC: 16 U/L (ref 10–34)
BACTERIA UR QL AUTO: NORMAL /HPF
BASOPHILS # BLD AUTO: 0.04 10*3/MM3 (ref 0–0.3)
BASOPHILS NFR BLD AUTO: 0.6 % (ref 0–2)
BILIRUB SERPL-MCNC: 0.5 MG/DL (ref 0.2–1.8)
BILIRUB UR QL STRIP: NEGATIVE
BNP SERPL-MCNC: 18 PG/ML (ref 0–100)
BUN BLD-MCNC: 19 MG/DL (ref 7–21)
BUN/CREAT SERPL: 19.6 (ref 7–25)
CALCIUM SPEC-SCNC: 9 MG/DL (ref 7.7–10)
CHLORIDE SERPL-SCNC: 103 MMOL/L (ref 99–112)
CLARITY UR: ABNORMAL
CO2 SERPL-SCNC: 29.3 MMOL/L (ref 24.3–31.9)
COLOR UR: YELLOW
CREAT BLD-MCNC: 0.97 MG/DL (ref 0.43–1.29)
D DIMER PPP FEU-MCNC: <0.27 MCGFEU/ML (ref 0–0.5)
DEPRECATED RDW RBC AUTO: 41.2 FL (ref 37–54)
EOSINOPHIL # BLD AUTO: 0.18 10*3/MM3 (ref 0–0.7)
EOSINOPHIL NFR BLD AUTO: 2.9 % (ref 0–7)
ERYTHROCYTE [DISTWIDTH] IN BLOOD BY AUTOMATED COUNT: 13.6 % (ref 11.5–14.5)
GFR SERPL CREATININE-BSD FRML MDRD: 74 ML/MIN/1.73
GLOBULIN UR ELPH-MCNC: 2.5 GM/DL
GLUCOSE BLD-MCNC: 93 MG/DL (ref 70–110)
GLUCOSE UR STRIP-MCNC: NEGATIVE MG/DL
HCT VFR BLD AUTO: 46.5 % (ref 42–52)
HGB BLD-MCNC: 14.8 G/DL (ref 14–18)
HGB UR QL STRIP.AUTO: NEGATIVE
HYALINE CASTS UR QL AUTO: NORMAL /LPF
IMM GRANULOCYTES # BLD: 0.01 10*3/MM3 (ref 0–0.03)
IMM GRANULOCYTES NFR BLD: 0.2 % (ref 0–0.5)
INR PPP: 1.07 (ref 0.9–1.1)
KETONES UR QL STRIP: NEGATIVE
LEUKOCYTE ESTERASE UR QL STRIP.AUTO: ABNORMAL
LIPASE SERPL-CCNC: 23 U/L (ref 13–60)
LYMPHOCYTES # BLD AUTO: 1.9 10*3/MM3 (ref 1–3)
LYMPHOCYTES NFR BLD AUTO: 30.6 % (ref 16–46)
MCH RBC QN AUTO: 26.7 PG (ref 27–33)
MCHC RBC AUTO-ENTMCNC: 31.8 G/DL (ref 33–37)
MCV RBC AUTO: 83.8 FL (ref 80–94)
MONOCYTES # BLD AUTO: 0.49 10*3/MM3 (ref 0.1–0.9)
MONOCYTES NFR BLD AUTO: 7.9 % (ref 0–12)
NEUTROPHILS # BLD AUTO: 3.59 10*3/MM3 (ref 1.4–6.5)
NEUTROPHILS NFR BLD AUTO: 57.8 % (ref 40–75)
NITRITE UR QL STRIP: NEGATIVE
OSMOLALITY SERPL CALC.SUM OF ELEC: 275.8 MOSM/KG (ref 273–305)
PH UR STRIP.AUTO: 7.5 [PH] (ref 5–8)
PLATELET # BLD AUTO: 195 10*3/MM3 (ref 130–400)
PMV BLD AUTO: 11.9 FL (ref 6–10)
POTASSIUM BLD-SCNC: 3.9 MMOL/L (ref 3.5–5.3)
PROT SERPL-MCNC: 6.7 G/DL (ref 6–8)
PROT UR QL STRIP: NEGATIVE
PROTHROMBIN TIME: 14.2 SECONDS (ref 11–15.4)
RBC # BLD AUTO: 5.55 10*6/MM3 (ref 4.7–6.1)
RBC # UR: NORMAL /HPF
REF LAB TEST METHOD: NORMAL
SODIUM BLD-SCNC: 137 MMOL/L (ref 135–153)
SP GR UR STRIP: 1.01 (ref 1–1.03)
SQUAMOUS #/AREA URNS HPF: NORMAL /HPF
TROPONIN I SERPL-MCNC: <0.006 NG/ML
TROPONIN I SERPL-MCNC: <0.006 NG/ML
UROBILINOGEN UR QL STRIP: ABNORMAL
WBC NRBC COR # BLD: 6.21 10*3/MM3 (ref 4.5–12.5)
WBC UR QL AUTO: NORMAL /HPF

## 2018-11-09 PROCEDURE — 85730 THROMBOPLASTIN TIME PARTIAL: CPT | Performed by: PHYSICIAN ASSISTANT

## 2018-11-09 PROCEDURE — 83880 ASSAY OF NATRIURETIC PEPTIDE: CPT | Performed by: PHYSICIAN ASSISTANT

## 2018-11-09 PROCEDURE — 93005 ELECTROCARDIOGRAM TRACING: CPT | Performed by: PHYSICIAN ASSISTANT

## 2018-11-09 PROCEDURE — 71045 X-RAY EXAM CHEST 1 VIEW: CPT

## 2018-11-09 PROCEDURE — 84484 ASSAY OF TROPONIN QUANT: CPT | Performed by: PHYSICIAN ASSISTANT

## 2018-11-09 PROCEDURE — 99284 EMERGENCY DEPT VISIT MOD MDM: CPT

## 2018-11-09 PROCEDURE — 71045 X-RAY EXAM CHEST 1 VIEW: CPT | Performed by: RADIOLOGY

## 2018-11-09 PROCEDURE — 80053 COMPREHEN METABOLIC PANEL: CPT | Performed by: PHYSICIAN ASSISTANT

## 2018-11-09 PROCEDURE — 96360 HYDRATION IV INFUSION INIT: CPT

## 2018-11-09 PROCEDURE — 81001 URINALYSIS AUTO W/SCOPE: CPT | Performed by: PHYSICIAN ASSISTANT

## 2018-11-09 PROCEDURE — 85025 COMPLETE CBC W/AUTO DIFF WBC: CPT | Performed by: PHYSICIAN ASSISTANT

## 2018-11-09 PROCEDURE — 36415 COLL VENOUS BLD VENIPUNCTURE: CPT

## 2018-11-09 PROCEDURE — 85610 PROTHROMBIN TIME: CPT | Performed by: PHYSICIAN ASSISTANT

## 2018-11-09 PROCEDURE — 93010 ELECTROCARDIOGRAM REPORT: CPT | Performed by: INTERNAL MEDICINE

## 2018-11-09 PROCEDURE — 82150 ASSAY OF AMYLASE: CPT | Performed by: PHYSICIAN ASSISTANT

## 2018-11-09 PROCEDURE — 83690 ASSAY OF LIPASE: CPT | Performed by: PHYSICIAN ASSISTANT

## 2018-11-09 PROCEDURE — 85379 FIBRIN DEGRADATION QUANT: CPT | Performed by: PHYSICIAN ASSISTANT

## 2018-11-09 RX ORDER — SODIUM CHLORIDE 9 MG/ML
75 INJECTION, SOLUTION INTRAVENOUS CONTINUOUS
Status: DISCONTINUED | OUTPATIENT
Start: 2018-11-09 | End: 2018-11-09 | Stop reason: HOSPADM

## 2018-11-09 RX ORDER — SODIUM CHLORIDE 0.9 % (FLUSH) 0.9 %
10 SYRINGE (ML) INJECTION AS NEEDED
Status: DISCONTINUED | OUTPATIENT
Start: 2018-11-09 | End: 2018-11-09 | Stop reason: HOSPADM

## 2018-11-09 RX ORDER — ASPIRIN 81 MG/1
324 TABLET, CHEWABLE ORAL ONCE
Status: DISCONTINUED | OUTPATIENT
Start: 2018-11-09 | End: 2018-11-09

## 2018-11-09 RX ADMIN — NITROGLYCERIN 1 INCH: 20 OINTMENT TOPICAL at 13:37

## 2018-11-09 RX ADMIN — SODIUM CHLORIDE 75 ML/HR: 9 INJECTION, SOLUTION INTRAVENOUS at 12:41

## 2018-11-09 NOTE — ED PROVIDER NOTES
Subjective   83-year-old male presents the ED today for chest pain.  He states he started to have left sided chest pain around 4 AM this morning.  He states it woke him up from sleep.  He states it has been intermittent.  He describes as a sharp pain.  He states it does not radiate.  He points to his left lower anterior chest wall.  He states he feels short of breath but he states he always feels short of breath due to COPD.  He states it is no different than normal.  States he has had nausea but no vomiting.  He denies any diaphoresis.  He denies any lower extremity edema.  He states he took an Sarita-[place] prior to arrival and it did help some.  He states he also took 325 mg of aspirin this morning.  He states he has had a stress test in the past but it has been a long time ago.  He denies any past history of a heart catheterization.        History provided by:  Patient  Chest Pain   Pain location:  L chest  Pain quality: sharp    Pain radiates to:  Does not radiate  Pain severity:  Moderate  Onset quality:  Sudden  Duration:  8 hours  Timing:  Intermittent  Progression:  Unchanged  Chronicity:  New  Relieved by:  Antacids  Worsened by:  Nothing  Ineffective treatments:  None tried  Associated symptoms: nausea and shortness of breath (chronic)    Associated symptoms: no abdominal pain, no altered mental status, no anorexia, no anxiety, no back pain, no claudication, no cough, no diaphoresis, no dizziness, no dysphagia, no fatigue, no fever, no headache, no heartburn, no lower extremity edema, no near-syncope, no numbness, no orthopnea, no palpitations, no PND, no syncope, no vomiting and no weakness    Risk factors: male sex    Risk factors: no coronary artery disease, no diabetes mellitus, no high cholesterol, no hypertension, not obese and no smoking        Review of Systems   Constitutional: Negative for diaphoresis, fatigue and fever.   HENT: Negative for trouble swallowing.    Eyes: Negative.    Respiratory:  Positive for shortness of breath (chronic). Negative for cough, chest tightness and wheezing.    Cardiovascular: Positive for chest pain. Negative for palpitations, orthopnea, claudication, syncope, PND and near-syncope.   Gastrointestinal: Positive for nausea. Negative for abdominal pain, anorexia, heartburn and vomiting.   Genitourinary: Negative.    Musculoskeletal: Negative for back pain.   Skin: Negative.    Neurological: Negative for dizziness, weakness, numbness and headaches.   Psychiatric/Behavioral: Negative.    All other systems reviewed and are negative.      Past Medical History:   Diagnosis Date   • Arthritis    • COPD (chronic obstructive pulmonary disease) (CMS/[facility])        Allergies   Allergen Reactions   • Penicillins Anaphylaxis       Past Surgical History:   Procedure Laterality Date   • BRONCHOSCOPY N/A 8/23/2018    Procedure: BRONCHOSCOPY WITH ENDOBRONCHIAL ULTRASOUND;  Surgeon: Saravanan Méndez MD;  Location: [facility];  Service: Pulmonary   • CHOLECYSTECTOMY N/A 6/16/2017    Procedure: CHOLECYSTECTOMY LAPAROSCOPIC;  Surgeon: Jose Velazquez MD;  Location: [facility];  Service:    • EYE SURGERY Bilateral 2013   • INGUINAL HERNIA REPAIR Left 2013    left   • AR ERCP DX COLLECTION SPECIMEN BRUSHING/WASHING N/A 6/26/2017    Procedure: ENDOSCOPIC RETROGRADE CHOLANGIOPANCREATOGRAPHY;  Surgeon: Rod Francis MD;  Location: [facility];  Service: Gastroenterology   • AR ERCP DX COLLECTION SPECIMEN BRUSHING/WASHING N/A 10/26/2017    Procedure: ENDOSCOPIC RETROGRADE CHOLANGIOPANCREATOGRAPHY;  Surgeon: Rod Francis MD;  Location: [facility];  Service: Gastroenterology       Family History   Problem Relation Age of Onset   • No Known Problems Mother    • Diabetes Father    • Hypertension Father    • Stroke Father    • Diabetes Sister        Social History     Social History   • Marital status:      Social History Main Topics   • Smoking status: Former Smoker     Years: 10.00     Quit  date: 1992   • Smokeless tobacco: Former User     Quit date: 6/16/1995   • Alcohol use No   • Drug use: No   • Sexual activity: Defer     Other Topics Concern   • Not on file           Objective   Physical Exam   Constitutional: He is oriented to person, place, and time. He appears well-developed and well-nourished. No distress.   HENT:   Head: Normocephalic and atraumatic.   Right Ear: External ear normal.   Left Ear: External ear normal.   Nose: Nose normal.   Mouth/Throat: Oropharynx is clear and moist.   Eyes: Pupils are equal, round, and reactive to light. Conjunctivae and EOM are normal.   Neck: Normal range of motion. Neck supple.   Cardiovascular: Normal rate, regular rhythm, normal heart sounds and intact distal pulses.    Pulmonary/Chest: Effort normal and breath sounds normal. No respiratory distress. He has no wheezes. He exhibits no tenderness.   Abdominal: Soft. Bowel sounds are normal. There is no tenderness.   Musculoskeletal: Normal range of motion. He exhibits no edema.   Neurological: He is alert and oriented to person, place, and time.   Skin: Skin is warm and dry. Capillary refill takes less than 2 seconds.   Psychiatric: He has a normal mood and affect. His behavior is normal. Judgment and thought content normal.   Nursing note and vitals reviewed.      Procedures           ED Course  ED Course as of Nov 09 1547 Fri Nov 09, 2018   1217 12:06 - sinus rhythm, rate of 64, no acute ischemia, reviewed by Dr. San ECG 12 Lead [AH]   1400 I discussed with patient about being admitted for observation for his chest pain.  He states he is feeling much better and prefers to be discharged.  Will get second troponin when due and then set him up for an outpatient stress test.  Patient is agreeable with this plan.  [AH]      ED Course User Index  [AH] Vicki Watson, PA                HEART Score (for prediction of 6-week risk of major adverse cardiac event) reviewed and/or performed as part of the  patient evaluation and treatment planning process.  The result associated with this review/performance is: 4       MDM  Number of Diagnoses or Management Options  Chest pain, unspecified type:      Amount and/or Complexity of Data Reviewed  Clinical lab tests: reviewed  Tests in the radiology section of CPT®: reviewed  Tests in the medicine section of CPT®: reviewed    Patient Progress  Patient progress: improved        Final diagnoses:   Chest pain, unspecified type            Vicki Watson PA  11/09/18 8501

## 2018-11-13 ENCOUNTER — EPISODE CHANGES (OUTPATIENT)
Dept: SOCIAL WORK | Facility: HOSPITAL | Age: 83
End: 2018-11-13

## 2018-11-13 ENCOUNTER — OFFICE VISIT (OUTPATIENT)
Dept: CARDIOLOGY | Facility: CLINIC | Age: 83
End: 2018-11-13

## 2018-11-13 ENCOUNTER — TRANSCRIBE ORDERS (OUTPATIENT)
Dept: ADMINISTRATIVE | Facility: HOSPITAL | Age: 83
End: 2018-11-13

## 2018-11-13 VITALS
HEIGHT: 66 IN | BODY MASS INDEX: 19.73 KG/M2 | HEART RATE: 61 BPM | OXYGEN SATURATION: 97 % | WEIGHT: 122.8 LBS | SYSTOLIC BLOOD PRESSURE: 148 MMHG | DIASTOLIC BLOOD PRESSURE: 81 MMHG

## 2018-11-13 DIAGNOSIS — R07.9 CHEST PAIN, UNSPECIFIED TYPE: Primary | ICD-10-CM

## 2018-11-13 PROCEDURE — 93000 ELECTROCARDIOGRAM COMPLETE: CPT | Performed by: NURSE PRACTITIONER

## 2018-11-13 PROCEDURE — 99203 OFFICE O/P NEW LOW 30 MIN: CPT | Performed by: NURSE PRACTITIONER

## 2018-11-13 NOTE — PROGRESS NOTES
"Subjective     Chief Complaint: Chest Pain    History of Present Illness     Sarbjit Martin is a 83 y.o. male who presents to clinic to significant for COPD and history of tobacco use.  He was seen in the ED on 11/9/2018 with complaints of chest pain.  Workup was essentially negative and he was discharged with instructions to follow-up with the cardiologist office.  He is here today for that follow-up.    Mr. Maritn tells me that for the last several years he has had intermittent chest pain.  Chest pain is now increasing in frequency and duration.  He describes the pain as a sharp pain at times and a dull squeeze at other times.  He further tells me that he is now having this discomfort just about every day.  The pain can last for several hours or just for a few moments.  He denies any associated increase in shortness of air.  He states that on the day that he went to the ED he had radiation of pain to his left shoulder and some nausea as well.  Pain occurs while he is doing his regular activities.  Sometimes the pain is relieved with Sarita-[place] or Gas-X.    He states that he had a cardiac workup a \"long time ago\" but denies any heart attack history.  He denies any congenital cardiac anomaly, denies rheumatic fever.    Risk factors for cardiovascular disease include age, sex, previous tobacco use.        Current Outpatient Medications:   •  aspirin 325 MG tablet, Take 325 mg by mouth Daily., Disp: , Rfl:   •  ipratropium-albuterol (DUO-NEB) 0.5-2.5 mg/3 ml nebulizer, Take 3 mL by nebulization 4 (Four) Times a Day As Needed for Wheezing. Dx: R06.02, J41.1Resending to include IC-D 10, Disp: 360 mL, Rfl: 3  •  LORazepam (ATIVAN) 0.5 MG tablet, Take 0.5 mg by mouth Daily., Disp: , Rfl:   •  pantoprazole (PROTONIX) 40 MG EC tablet, Take 40 mg by mouth Daily., Disp: , Rfl:   •  VENTOLIN  (90 BASE) MCG/ACT inhaler, Inhale 2 puffs Every 6 (Six) Hours As Needed for Wheezing or Shortness of Air., Disp: , Rfl:   •  " "azithromycin (ZITHROMAX) 250 MG tablet, Take 2 by mouth today then 1 daily for 4 days, Disp: 6 tablet, Rfl: 0    Current Facility-Administered Medications:   •  ipratropium-albuterol (DUO-NEB) nebulizer solution 3 mL, 3 mL, Nebulization, 4x Daily - RT, Saravanan Méndez MD     The following portions of the patient's history were reviewed and updated as appropriate: allergies, current medications, past family history, past medical history, past social history, past surgical history and problem list.    Review of Systems   Constitution: Negative for weakness.   Cardiovascular: Positive for chest pain, dyspnea on exertion, palpitations and paroxysmal nocturnal dyspnea. Negative for irregular heartbeat, near-syncope and syncope.   Respiratory: Positive for shortness of breath.          Objective     /81 (BP Location: Left arm, Patient Position: Sitting)   Pulse 61   Ht 167.6 cm (66\")   Wt 55.7 kg (122 lb 12.8 oz)   SpO2 97%   BMI 19.82 kg/m²     Physical Exam   Constitutional: He appears well-developed and well-nourished.   HENT:   Head: Normocephalic and atraumatic.   Eyes: Pupils are equal, round, and reactive to light.   Neck: No JVD present.   Cardiovascular: Regular rhythm and intact distal pulses. Bradycardia present. Exam reveals no gallop and no friction rub.   No murmur heard.  No lower extremity edema   Pulmonary/Chest: Effort normal and breath sounds normal. No respiratory distress. He has no wheezes. He has no rales.   Abdominal: Soft. He exhibits no mass. There is no tenderness. No hernia.   Skin: Skin is warm and dry.   Psychiatric: He has a normal mood and affect.   Vitals reviewed.        ECG 12 Lead  Date/Time: 11/13/2018 8:50 AM  Performed by: Rosina Reed APRN  Authorized by: Rosina Reed APRN   Rhythm: sinus bradycardia  Rate: bradycardic  BPM: 56  Comments: QTc 427              Assessment/Plan       Sarbjit was seen today for chest pain.    Diagnoses and all orders for this " visit:    Chest pain, unspecified type  -     ECG 12 Lead  -     Adult Transthoracic Echo Complete W/ Cont if Necessary Per Protocol; Future  -     Stress Test With Myocardial Perfusion One Day; Future          Assessment:  1. Chest pain, typical and atypical features.  Normal EKG today.  2. COPD      Plan:  1. Nuclear stress test and echocardiogram have been ordered to evaluate patient's report of chest pain.    2. Return to clinic in 2-3 weeks, sooner for any worsening or concerning symptoms.      Return in about 2 weeks (around 11/27/2018).    Rosina Reed, APRN

## 2018-11-14 ENCOUNTER — EPISODE CHANGES (OUTPATIENT)
Dept: CASE MANAGEMENT | Facility: OTHER | Age: 83
End: 2018-11-14

## 2018-11-14 ENCOUNTER — PATIENT OUTREACH (OUTPATIENT)
Dept: CASE MANAGEMENT | Facility: OTHER | Age: 83
End: 2018-11-14

## 2018-11-14 NOTE — OUTREACH NOTE
Care Management Plan 11/14/2018   Lifestyle Goals Avoid respiratory irritants;Eat a healthy diet;Fewer ER/urgent care visits;Fewer exacerbations;Less shortness of air;Medication management;Routine follow-up with doctor(s)   Barriers Disease education;Financial   Self Management Breathing techniques;Medication Adherence;Use oxygen   Annual Wellness Visit:  Care Coordinator Will Schedule   Care Gaps Addressed Other (See Comment)   Care Gaps Addressed shingles vac   Specific Disease Process Teaching COPD   Does patient have depression diagnosis? No   Advanced Directives: Send Information   Ed Visits past 12 months: 1   Hospitalizations past 12 months None   Medication Adherence Medications understood   Health Literacy Moderate     The main concerns and/or symptoms the patient would like to address are: COPD and CP.    Education/instruction provided by Care Coordinator: Patient had a recent ED visit for chest pain and has since followed up with his cardiologist's office and had a normal EKG. Patient states he has had ongoing chest pain for over a week that comes and goes. CC asked patient about using an OTC gas relief medication, pt states he has been using them and they help sometimes but that he has some Prilosec now and he plans to take it today to see if it helps. Patient also states that he feels like his lungs might be making his chest hurt; pt states when he gets up to do dishes it seems like his chest hurts some and he has to go sit down for it to ease off. Patient states he uses supplemental oxygen as needed. CC advised patient to try performing deep breathing exercises while wearing his oxygen to see if that helps, pt v/u. CC advised pt that sometimes it is easy to forget to breathe through your nose when wearing the nasal cannula and if you don't you aren't getting the benefit of the oxygen, pt v/u.   Patient expressed concern with the cost of his inhalers after the first of the year. Patient states that he  will have to pay around $400 before his supplemental insurance will start covering his inhalers. Patient states that he has thought about changing insurance companies but is afraid he won't be able to find anything because he has COPD. CC advised pt that since the initiation of the Affordable Care Act, insurance companies are not supposed to be able to turn down a patient for coverage based on a pre-existing condition. Pt stated that he is pretty sure [employer] turned him down since the MINDY, but that he is going to call them and ask about it. CC instructed pt to let her know if he is unable to get alternate coverage as there may be options available for assistance with  Pharmacy. CC asked pt if he would be willing to switch his meds to  pharmacy if they were able to help him and he stated yes.  CC addressed care gaps, pt states he got his flu shot at [facility] and was going to get the shingles vaccination but the cost was prohibitive.     Follow Up Outreach Due: 2-3 weeks    Tanya He RN

## 2018-12-05 ENCOUNTER — EPISODE CHANGES (OUTPATIENT)
Dept: CASE MANAGEMENT | Facility: OTHER | Age: 83
End: 2018-12-05

## 2018-12-12 ENCOUNTER — HOSPITAL ENCOUNTER (OUTPATIENT)
Dept: NUCLEAR MEDICINE | Facility: HOSPITAL | Age: 83
Discharge: HOME | End: 2018-12-12

## 2018-12-12 ENCOUNTER — HOSPITAL ENCOUNTER (OUTPATIENT)
Dept: CARDIOLOGY | Facility: HOSPITAL | Age: 83
Discharge: HOME | End: 2018-12-12

## 2018-12-12 DIAGNOSIS — R07.9 CHEST PAIN, UNSPECIFIED TYPE: ICD-10-CM

## 2018-12-12 LAB
BH CV ECHO MEAS - ACS: 2.1 CM
BH CV ECHO MEAS - AO ROOT AREA (BSA CORRECTED): 2.5
BH CV ECHO MEAS - AO ROOT AREA: 13.3 CM^2
BH CV ECHO MEAS - AO ROOT DIAM: 4.1 CM
BH CV ECHO MEAS - BSA(HAYCOCK): 1.6 M^2
BH CV ECHO MEAS - BSA: 1.6 M^2
BH CV ECHO MEAS - BZI_BMI: 19.7 KILOGRAMS/M^2
BH CV ECHO MEAS - BZI_METRIC_HEIGHT: 167.6 CM
BH CV ECHO MEAS - BZI_METRIC_WEIGHT: 55.3 KG
BH CV ECHO MEAS - EDV(CUBED): 30.8 ML
BH CV ECHO MEAS - EDV(MOD-SP4): 34 ML
BH CV ECHO MEAS - EDV(TEICH): 39 ML
BH CV ECHO MEAS - EF(CUBED): 77.6 %
BH CV ECHO MEAS - EF(MOD-SP4): 67.6 %
BH CV ECHO MEAS - EF(TEICH): 71.1 %
BH CV ECHO MEAS - ESV(CUBED): 6.9 ML
BH CV ECHO MEAS - ESV(MOD-SP4): 11 ML
BH CV ECHO MEAS - ESV(TEICH): 11.2 ML
BH CV ECHO MEAS - FS: 39.2 %
BH CV ECHO MEAS - IVS/LVPW: 1.3
BH CV ECHO MEAS - IVSD: 1.4 CM
BH CV ECHO MEAS - LA DIMENSION: 2.7 CM
BH CV ECHO MEAS - LA/AO: 0.66
BH CV ECHO MEAS - LV DIASTOLIC VOL/BSA (35-75): 21 ML/M^2
BH CV ECHO MEAS - LV MASS(C)D: 119.7 GRAMS
BH CV ECHO MEAS - LV MASS(C)DI: 73.9 GRAMS/M^2
BH CV ECHO MEAS - LV SYSTOLIC VOL/BSA (12-30): 6.8 ML/M^2
BH CV ECHO MEAS - LVIDD: 3.1 CM
BH CV ECHO MEAS - LVIDS: 1.9 CM
BH CV ECHO MEAS - LVLD AP4: 7.8 CM
BH CV ECHO MEAS - LVLS AP4: 5.9 CM
BH CV ECHO MEAS - LVOT AREA (M): 3.8 CM^2
BH CV ECHO MEAS - LVOT AREA: 3.8 CM^2
BH CV ECHO MEAS - LVOT DIAM: 2.2 CM
BH CV ECHO MEAS - LVPWD: 1.1 CM
BH CV ECHO MEAS - MV A MAX VEL: 99.2 CM/SEC
BH CV ECHO MEAS - MV E MAX VEL: 78 CM/SEC
BH CV ECHO MEAS - MV E/A: 0.79
BH CV ECHO MEAS - PA ACC SLOPE: 702.2 CM/SEC^2
BH CV ECHO MEAS - PA ACC TIME: 0.09 SEC
BH CV ECHO MEAS - PA PR(ACCEL): 37.8 MMHG
BH CV ECHO MEAS - RAP SYSTOLE: 10 MMHG
BH CV ECHO MEAS - RVDD: 2.9 CM
BH CV ECHO MEAS - RVSP: 52.8 MMHG
BH CV ECHO MEAS - SI(CUBED): 14.7 ML/M^2
BH CV ECHO MEAS - SI(MOD-SP4): 14.2 ML/M^2
BH CV ECHO MEAS - SI(TEICH): 17.1 ML/M^2
BH CV ECHO MEAS - SV(CUBED): 23.9 ML
BH CV ECHO MEAS - SV(MOD-SP4): 23 ML
BH CV ECHO MEAS - SV(TEICH): 27.7 ML
BH CV ECHO MEAS - TR MAX VEL: 327.1 CM/SEC
BH CV NUCLEAR PRIOR STUDY: 3
BH CV STRESS BP STAGE 1: NORMAL
BH CV STRESS BP STAGE 2: NORMAL
BH CV STRESS COMMENTS STAGE 1: NORMAL
BH CV STRESS COMMENTS STAGE 2: NORMAL
BH CV STRESS DOSE REGADENOSON STAGE 1: 0.4
BH CV STRESS DURATION MIN STAGE 1: 0
BH CV STRESS DURATION MIN STAGE 2: 4
BH CV STRESS DURATION SEC STAGE 1: 10
BH CV STRESS DURATION SEC STAGE 2: 0
BH CV STRESS HR STAGE 1: 83
BH CV STRESS HR STAGE 2: 67
BH CV STRESS PROTOCOL 1: NORMAL
BH CV STRESS RECOVERY BP: NORMAL MMHG
BH CV STRESS RECOVERY HR: 67 BPM
BH CV STRESS STAGE 1: 1
BH CV STRESS STAGE 2: 2
MAXIMAL PREDICTED HEART RATE: 139 BPM
MAXIMAL PREDICTED HEART RATE: 139 BPM
PERCENT MAX PREDICTED HR: 59.71 %
STRESS BASELINE BP: NORMAL MMHG
STRESS BASELINE HR: 61 BPM
STRESS PERCENT HR: 70 %
STRESS POST PEAK BP: NORMAL MMHG
STRESS POST PEAK HR: 83 BPM
STRESS TARGET HR: 118 BPM
STRESS TARGET HR: 118 BPM

## 2018-12-12 PROCEDURE — 93306 TTE W/DOPPLER COMPLETE: CPT

## 2018-12-12 PROCEDURE — 93018 CV STRESS TEST I&R ONLY: CPT | Performed by: INTERNAL MEDICINE

## 2018-12-12 PROCEDURE — 93306 TTE W/DOPPLER COMPLETE: CPT | Performed by: INTERNAL MEDICINE

## 2018-12-12 PROCEDURE — 0 TECHNETIUM SESTAMIBI: Performed by: INTERNAL MEDICINE

## 2018-12-12 PROCEDURE — 25010000002 REGADENOSON 0.4 MG/5ML SOLUTION: Performed by: NURSE PRACTITIONER

## 2018-12-12 PROCEDURE — A9500 TC99M SESTAMIBI: HCPCS | Performed by: INTERNAL MEDICINE

## 2018-12-12 PROCEDURE — 78452 HT MUSCLE IMAGE SPECT MULT: CPT

## 2018-12-12 PROCEDURE — 0 TECHNETIUM SESTAMIBI: Performed by: NURSE PRACTITIONER

## 2018-12-12 PROCEDURE — 78452 HT MUSCLE IMAGE SPECT MULT: CPT | Performed by: INTERNAL MEDICINE

## 2018-12-12 PROCEDURE — 93017 CV STRESS TEST TRACING ONLY: CPT

## 2018-12-12 PROCEDURE — A9500 TC99M SESTAMIBI: HCPCS | Performed by: NURSE PRACTITIONER

## 2018-12-12 RX ADMIN — TECHNETIUM TC 99M SESTAMIBI 1 DOSE: 1 INJECTION INTRAVENOUS at 09:38

## 2018-12-12 RX ADMIN — REGADENOSON 0.4 MG: 0.08 INJECTION, SOLUTION INTRAVENOUS at 09:38

## 2018-12-12 RX ADMIN — TECHNETIUM TC 99M SESTAMIBI 1 DOSE: 1 INJECTION INTRAVENOUS at 08:00

## 2018-12-13 ENCOUNTER — OFFICE VISIT (OUTPATIENT)
Dept: CARDIOLOGY | Facility: CLINIC | Age: 83
End: 2018-12-13

## 2018-12-13 VITALS
OXYGEN SATURATION: 95 % | HEART RATE: 64 BPM | SYSTOLIC BLOOD PRESSURE: 145 MMHG | BODY MASS INDEX: 19.73 KG/M2 | HEIGHT: 66 IN | DIASTOLIC BLOOD PRESSURE: 74 MMHG | WEIGHT: 122.8 LBS

## 2018-12-13 DIAGNOSIS — R07.89 OTHER CHEST PAIN: Primary | ICD-10-CM

## 2018-12-13 PROCEDURE — 99213 OFFICE O/P EST LOW 20 MIN: CPT | Performed by: NURSE PRACTITIONER

## 2018-12-13 RX ORDER — ASPIRIN 325 MG
325 TABLET ORAL DAILY
COMMUNITY
End: 2022-12-13

## 2018-12-13 NOTE — PROGRESS NOTES
Subjective     Chief Complaint: Follow-up (Stress/Echo )    History of Present Illness   Sarbjit Martin is a 83 y.o. male who presents with a past medical history knee for 4 COPD.  He initially presented to our office as a follow-up for an emergency room visit for chest pain.  Nuclear stress test and echocardiogram were ordered.  He is here today for those results.    He denies chest pain since his ER visit.  He states that he took some Gas-X and after experiencing nearly 2 weeks of chest discomfort the pain resolved.  He has not had any problems with chest pain since that time.      Current Outpatient Medications:   •  aspirin 325 MG tablet, Take 325 mg by mouth Daily., Disp: , Rfl:   •  ipratropium-albuterol (DUO-NEB) 0.5-2.5 mg/3 ml nebulizer, Take 3 mL by nebulization 4 (Four) Times a Day As Needed for Wheezing. Dx: R06.02, J41.1Resending to include IC-D 10, Disp: 360 mL, Rfl: 3  •  LORazepam (ATIVAN) 0.5 MG tablet, Take 0.5 mg by mouth Daily., Disp: , Rfl:   •  pantoprazole (PROTONIX) 40 MG EC tablet, Take 40 mg by mouth Daily., Disp: , Rfl:   •  VENTOLIN  (90 BASE) MCG/ACT inhaler, Inhale 2 puffs Every 6 (Six) Hours As Needed for Wheezing or Shortness of Air., Disp: , Rfl:   •  azithromycin (ZITHROMAX) 250 MG tablet, Take 2 by mouth today then 1 daily for 4 days, Disp: 6 tablet, Rfl: 0    Current Facility-Administered Medications:   •  ipratropium-albuterol (DUO-NEB) nebulizer solution 3 mL, 3 mL, Nebulization, 4x Daily - RT, Saravanan Méndez MD     The following portions of the patient's history were reviewed and updated as appropriate: allergies, current medications, past family history, past medical history, past social history, past surgical history and problem list.    Review of Systems   Constitution: Negative for weakness and malaise/fatigue.   Cardiovascular: Negative for chest pain, dyspnea on exertion, irregular heartbeat, leg swelling, near-syncope, orthopnea, palpitations, paroxysmal nocturnal  "dyspnea and syncope.   Respiratory: Positive for shortness of breath (COPD ).    Hematologic/Lymphatic: Does not bruise/bleed easily.   Neurological: Negative for dizziness and light-headedness.         Objective     /74 (BP Location: Left arm, Patient Position: Sitting)   Pulse 64   Ht 167.6 cm (66\")   Wt 55.7 kg (122 lb 12.8 oz)   SpO2 95%   BMI 19.82 kg/m²     Physical Exam   Constitutional: He is oriented to person, place, and time. He appears well-developed and well-nourished.   HENT:   Head: Normocephalic and atraumatic.   Eyes: Pupils are equal, round, and reactive to light.   Neck: No JVD present.   Cardiovascular: Normal rate, regular rhythm and intact distal pulses. Exam reveals no gallop and no friction rub.   No murmur heard.  Pulmonary/Chest: Effort normal and breath sounds normal. No respiratory distress. He has no wheezes. He has no rales.   Neurological: He is alert and oriented to person, place, and time.   Skin: Skin is warm and dry.   Psychiatric: He has a normal mood and affect.       Procedures    12/12/2018  Nuclear Stress Test    Interpretation Summary     · Myocardial perfusion imaging indicates a normal myocardial perfusion study with no evidence of ischemia.  · Left ventricular ejection fraction is hyperdynamic (Calculated EF > 70%).  · Diaphragmatic attenuation artifact is present.  · Findings consistent with a normal ECG stress test.     12/12/2018  Transthoracic Echocardiogram    Interpretation Summary     · Left ventricular systolic function is normal. Estimated EF appears to be in the range of 61 - 65%  · Mild tricuspid valve regurgitation is present.  · Left ventricular diastolic dysfunction (grade I) consistent with impaired relaxation.  · Estimated right ventricular systolic pressure from tricuspid regurgitation is moderately elevated (45-55 mmHg). Moderate pulmonary hypertension is present.  · Mild dilation of the aortic root is present. Mild dilation of the sinuses of " "Valsalva is present both measuring about 4.1 cm  · There is no evidence of pericardial effusion         Assessment/Plan       Sarbjit was seen today for follow-up.    Diagnoses and all orders for this visit:    Assessment:  1. Chest pain, likely GI source.  2. COPD      Plan:  1. Both the nuclear stress test and the transthoracic echocardiogram were reviewed with the patient today.  I did  him that the dilatation of the aortic root would require monitoring on a yearly basis.  2. I did suggest to him that he could decrease his aspirin to 81 mg daily.  He told me that he takes 325 mg for neck pain and what he describes as \"blockages\" in the back of his neck.  81 mg did not help with the neck pain in the past.  3. Mr. Martin can return to the clinic and an as-needed basis.    Return if symptoms worsen or fail to improve.      Rosina Reed, DAVID  "

## 2019-01-11 ENCOUNTER — PATIENT OUTREACH (OUTPATIENT)
Dept: CASE MANAGEMENT | Facility: OTHER | Age: 84
End: 2019-01-11

## 2019-01-29 ENCOUNTER — OFFICE VISIT (OUTPATIENT)
Dept: PULMONOLOGY | Facility: CLINIC | Age: 84
End: 2019-01-29

## 2019-01-29 VITALS
OXYGEN SATURATION: 94 % | WEIGHT: 122.8 LBS | HEART RATE: 66 BPM | DIASTOLIC BLOOD PRESSURE: 73 MMHG | HEIGHT: 66 IN | BODY MASS INDEX: 19.73 KG/M2 | SYSTOLIC BLOOD PRESSURE: 161 MMHG

## 2019-01-29 DIAGNOSIS — J43.2 CENTRILOBULAR EMPHYSEMA (HCC): Primary | ICD-10-CM

## 2019-01-29 DIAGNOSIS — R91.1 PULMONARY NODULE: ICD-10-CM

## 2019-01-29 PROCEDURE — 99214 OFFICE O/P EST MOD 30 MIN: CPT | Performed by: NURSE PRACTITIONER

## 2019-01-29 PROCEDURE — 94664 DEMO&/EVAL PT USE INHALER: CPT | Performed by: NURSE PRACTITIONER

## 2019-01-29 NOTE — PROGRESS NOTES
Interval history since last visit: None    Recent hospitalizations: None    Investigations (imaging, PFT's, labs, sleep study, record requests, etc.) None    Have you had the Influenza Vaccine? yes    Would you like to receive this Vaccine today? no    Have you had the Pneumonia Vaccine?  yes   Would you like to receive this Vaccine today? no    Subjective    Sarbjit Martin presents for the following COPD; Shortness of Breath; and Back Pain      History of Present Illness     Mr. Martin is here today for follow up on COPD.  he states that his breathing has been at baseline since his last visit.  He is currently only using an albuterol inhaler on an as-needed basis.  He tells me that he uses this inhaler several times a day sometimes up to 6-8 times in a day.  PFT was reviewed with patient in detail.  He states his influenza and pneumonia vaccines are up-to-date.  He is currently not using any oxygen.  Last CT scan also revealed a 15 mm left upper lobe spiculated nodule.  Patient does not wish to follow-up on this nodule at this time.      Review of Systems   Constitutional: Negative for activity change, fatigue and unexpected weight change.   HENT: Negative for congestion, postnasal drip and rhinorrhea.    Respiratory: Positive for cough, shortness of breath and wheezing. Negative for apnea and chest tightness.    Cardiovascular: Negative for chest pain and palpitations.   Gastrointestinal: Negative for nausea.   Allergic/Immunologic: Negative for environmental allergies.   Psychiatric/Behavioral: Negative for agitation and confusion.       Active Problems:  Problem List Items Addressed This Visit        Respiratory    COPD (chronic obstructive pulmonary disease) (CMS/[facility]) - Primary    Relevant Medications    tiotropium bromide monohydrate (SPIRIVA RESPIMAT) 2.5 MCG/ACT aerosol solution inhaler    Other Relevant Orders    Overnight Sleep Oximetry Study    Pulmonary nodule          Past Medical History:  Past Medical  "History:   Diagnosis Date   • Arthritis    • COPD (chronic obstructive pulmonary disease) (CMS/[facility])        Family History:  Family History   Problem Relation Age of Onset   • No Known Problems Mother    • Diabetes Father    • Hypertension Father    • Stroke Father    • Diabetes Sister        Social History:  Social History     Tobacco Use   • Smoking status: Former Smoker     Years: 10.00     Last attempt to quit:      Years since quittin.0   • Smokeless tobacco: Former User     Quit date: 1995   Substance Use Topics   • Alcohol use: No       Current Medications:  Current Outpatient Medications   Medication Sig Dispense Refill   • aspirin 325 MG tablet Take 325 mg by mouth Daily.     • azithromycin (ZITHROMAX) 250 MG tablet Take 2 by mouth today then 1 daily for 4 days 6 tablet 0   • ipratropium-albuterol (DUO-NEB) 0.5-2.5 mg/3 ml nebulizer Take 3 mL by nebulization 4 (Four) Times a Day As Needed for Wheezing. Dx: R06.02, J41.1Resending to include IC-D 10 360 mL 3   • LORazepam (ATIVAN) 0.5 MG tablet Take 0.5 mg by mouth Daily.     • pantoprazole (PROTONIX) 40 MG EC tablet Take 40 mg by mouth Daily.     • VENTOLIN  (90 BASE) MCG/ACT inhaler Inhale 2 puffs Every 6 (Six) Hours As Needed for Wheezing or Shortness of Air.     • tiotropium bromide monohydrate (SPIRIVA RESPIMAT) 2.5 MCG/ACT aerosol solution inhaler Inhale 2 puffs Daily. 4 g 6     Current Facility-Administered Medications   Medication Dose Route Frequency Provider Last Rate Last Dose   • ipratropium-albuterol (DUO-NEB) nebulizer solution 3 mL  3 mL Nebulization 4x Daily - RT Saravanan Méndez MD           Allergies:  Allergies   Allergen Reactions   • Penicillins Anaphylaxis       Vitals:  /73   Pulse 66   Ht 167.6 cm (66\")   Wt 55.7 kg (122 lb 12.8 oz)   SpO2 94%   BMI 19.82 kg/m²     Imaging:    Imaging Results (most recent)     None          Pulmonary Functions Testing Results:    No results found for: FEV1, FVC, YBD4MZA, " TLC, DLCO    Results for orders placed or performed during the hospital encounter of 12/12/18   Stress Test With Myocardial Perfusion One Day   Result Value Ref Range    Nuclear Prior Study 3     Target HR (85%) 118 bpm    Max. Pred. HR (100%) 139 bpm    BH CV STRESS PROTOCOL 1 Pharmacologic     Stage 1 1     HR Stage 1 83     BP Stage 1 162/80     Duration Min Stage 1 0     Duration Sec Stage 1 10     Stress Dose Regadenoson Stage 1 0.4     Stress Comments Stage 1 10 sec bolus injection     Stage 2 2     HR Stage 2 67     BP Stage 2 137/67     Duration Min Stage 2 4     Duration Sec Stage 2 0     Stress Comments Stage 2 recovery     Baseline HR 61 bpm    Baseline /76 mmHg    Peak HR 83 bpm    Percent Max Pred HR 59.71 %    Percent Target HR 70 %    Peak /67 mmHg    Recovery HR 67 bpm    Recovery /67 mmHg       Objective   Physical Exam     GENERAL APPEARANCE: Well developed, well nourished, alert and cooperative, and appears to be in no acute distress.    HEAD: normocephalic.    EYES: PERRL, EOMI. Fundi normal, vision is grossly intact.    THROAT: Oral cavity and pharynx normal. No inflammation, swelling, exudate, or lesions.     NECK: Neck supple.     CARDIAC: Normal S1 and S2. No S3, S4 or murmurs. Rhythm is regular. There is no peripheral edema, cyanosis or pallor. Extremities are warm and well perfused. Capillary refill is less than 2 seconds. No carotid bruits.    RESPIRATORY: diminished breath sounds bilaterally.  No wheezing, crackles or rhonchi noted.    GI: Positive bowel sounds. Soft, nondistended, nontender.     MUSCULOSKELETAL: No significant deformity or joint abnormality. No edema. Peripheral pulses intact. No varicosities.    NEUROLOGICAL: Strength and sensation symmetric and intact throughout.     PSYCHIATRIC: The mental examination revealed the patient was oriented to person, place, and time.       Assessment/Plan      COPD:  Social History     Tobacco Use   Smoking Status Former  Smoker   • Years: 10.00   • Last attempt to quit:    • Years since quittin.0   Smokeless Tobacco Former User   • Quit date: 1995       Lab Results   Component Value Date    PHART 7.445 05/10/2015    LYE0CFP 50.7 (C) 05/10/2015    DJG7LKQ 34.1 (C) 05/10/2015    F4NBGIOJ 94.5 05/10/2015     I have personally reviewed the chest x-ray image.  As per my read, chest x-ray showed flat diaphragms, hyperinflation with interstitial markings.  Results for orders placed during the hospital encounter of 18   Adult Transthoracic Echo Complete W/ Cont if Necessary Per Protocol    Narrative · Left ventricular systolic function is normal. Estimated EF appears to be   in the range of 61 - 65%  · Mild tricuspid valve regurgitation is present.  · Left ventricular diastolic dysfunction (grade I) consistent with   impaired relaxation.  · Estimated right ventricular systolic pressure from tricuspid   regurgitation is moderately elevated (45-55 mmHg). Moderate pulmonary   hypertension is present.  · Mild dilation of the aortic root is present. Mild dilation of the   sinuses of Valsalva is present both measuring about 4.1 cm  · There is no evidence of pericardial effusion            Plan:  - Continue albuterol inhaler on an as needed basis.  Instructed him to only use this inhaler every 4 hours as needed.  -PFT shows severe obstructive lung disease.  - Started patient on Spiriva inhaler 1 puff daily  - Echo of the heart: Reviewed  - Will complete a 6 minute walk test at next visit.  - Flu and pneumonia vaccination status: Up to date  - Patient's Body mass index is 19.82 kg/m². BMI is below normal parameters. Recommendations include: treating the underlying disease process and encouraged dietry supplements.  -ordered overnight oximetry study.      - Inhaler technique demonstration/discussion:  I have extensively discussed the steps.  In summary, the steps were discussed in the following order.Patient was advised to rinse  the mouth after steroid inhalation to prevent fungal mucositis.  · Remove the cap from the inhaler and shake well.    · Breathe out all the way.    · Place the mouthpiece of the inhaler between your teeth and seal your lips tightly around it.    · As you start to blow in slowly, press down on the canister one time.   · Keep breathing in as slowly and deeply as you can.    · It should take about 5 seconds for you to completely breathe in.    · Hold your breath for 10 seconds(count to 10 slowly) to allow the medication to reach the airways of the lung.    · Repeat the above steps for each puff.    · Wait about 1 minute between the puffs.    · Replaced the cap on the inhaler when finished.          Pulmonary nodule on CT scan: 15mm left upper lobe spiculated nodule.  Patient still does not want to follow-up on pulmonary nodule.  He requests not to have a CT scan done at this time.  Educated the patient that this could be cancer, he verbalized understanding.        ICD-10-CM ICD-9-CM   1. Centrilobular emphysema (CMS/HCC) J43.2 492.8   2. Pulmonary nodule R91.1 793.11       Return in about 6 months (around 7/29/2019).

## 2019-01-30 PROBLEM — R91.1 PULMONARY NODULE: Status: ACTIVE | Noted: 2019-01-30

## 2019-02-02 ENCOUNTER — APPOINTMENT (OUTPATIENT)
Dept: CT IMAGING | Facility: HOSPITAL | Age: 84
End: 2019-02-02

## 2019-02-02 ENCOUNTER — HOSPITAL ENCOUNTER (EMERGENCY)
Facility: HOSPITAL | Age: 84
Discharge: HOME | End: 2019-02-02
Attending: FAMILY MEDICINE | Admitting: FAMILY MEDICINE

## 2019-02-02 VITALS
RESPIRATION RATE: 17 BRPM | HEIGHT: 66 IN | OXYGEN SATURATION: 97 % | WEIGHT: 122 LBS | TEMPERATURE: 98 F | BODY MASS INDEX: 19.61 KG/M2 | DIASTOLIC BLOOD PRESSURE: 59 MMHG | HEART RATE: 68 BPM | SYSTOLIC BLOOD PRESSURE: 148 MMHG

## 2019-02-02 DIAGNOSIS — M54.41 ACUTE RIGHT-SIDED LOW BACK PAIN WITH RIGHT-SIDED SCIATICA: Primary | ICD-10-CM

## 2019-02-02 DIAGNOSIS — D49.4 BLADDER TUMOR: ICD-10-CM

## 2019-02-02 LAB
ALBUMIN SERPL-MCNC: 4.1 G/DL (ref 3.4–4.8)
ALBUMIN/GLOB SERPL: 1.5 G/DL (ref 1.5–2.5)
ALP SERPL-CCNC: 57 U/L (ref 40–129)
ALT SERPL W P-5'-P-CCNC: 14 U/L (ref 10–44)
ANION GAP SERPL CALCULATED.3IONS-SCNC: 3.5 MMOL/L (ref 3.6–11.2)
AST SERPL-CCNC: 18 U/L (ref 10–34)
BASOPHILS # BLD AUTO: 0.03 10*3/MM3 (ref 0–0.3)
BASOPHILS NFR BLD AUTO: 0.4 % (ref 0–2)
BILIRUB SERPL-MCNC: 0.5 MG/DL (ref 0.2–1.8)
BILIRUB UR QL STRIP: NEGATIVE
BUN BLD-MCNC: 26 MG/DL (ref 7–21)
BUN/CREAT SERPL: 25.5 (ref 7–25)
CALCIUM SPEC-SCNC: 9.2 MG/DL (ref 7.7–10)
CHLORIDE SERPL-SCNC: 104 MMOL/L (ref 99–112)
CLARITY UR: CLEAR
CO2 SERPL-SCNC: 30.5 MMOL/L (ref 24.3–31.9)
COLOR UR: YELLOW
CREAT BLD-MCNC: 1.02 MG/DL (ref 0.43–1.29)
DEPRECATED RDW RBC AUTO: 42 FL (ref 37–54)
EOSINOPHIL # BLD AUTO: 0.32 10*3/MM3 (ref 0–0.7)
EOSINOPHIL NFR BLD AUTO: 4.1 % (ref 0–7)
ERYTHROCYTE [DISTWIDTH] IN BLOOD BY AUTOMATED COUNT: 13.6 % (ref 11.5–14.5)
GFR SERPL CREATININE-BSD FRML MDRD: 70 ML/MIN/1.73
GLOBULIN UR ELPH-MCNC: 2.7 GM/DL
GLUCOSE BLD-MCNC: 99 MG/DL (ref 70–110)
GLUCOSE UR STRIP-MCNC: NEGATIVE MG/DL
HCT VFR BLD AUTO: 50.3 % (ref 42–52)
HGB BLD-MCNC: 16.4 G/DL (ref 14–18)
HGB UR QL STRIP.AUTO: NEGATIVE
IMM GRANULOCYTES # BLD AUTO: 0.01 10*3/MM3 (ref 0–0.03)
IMM GRANULOCYTES NFR BLD AUTO: 0.1 % (ref 0–0.5)
KETONES UR QL STRIP: NEGATIVE
LEUKOCYTE ESTERASE UR QL STRIP.AUTO: NEGATIVE
LYMPHOCYTES # BLD AUTO: 1.91 10*3/MM3 (ref 1–3)
LYMPHOCYTES NFR BLD AUTO: 24.5 % (ref 16–46)
MCH RBC QN AUTO: 27.7 PG (ref 27–33)
MCHC RBC AUTO-ENTMCNC: 32.6 G/DL (ref 33–37)
MCV RBC AUTO: 84.8 FL (ref 80–94)
MONOCYTES # BLD AUTO: 0.59 10*3/MM3 (ref 0.1–0.9)
MONOCYTES NFR BLD AUTO: 7.6 % (ref 0–12)
NEUTROPHILS # BLD AUTO: 4.95 10*3/MM3 (ref 1.4–6.5)
NEUTROPHILS NFR BLD AUTO: 63.3 % (ref 40–75)
NITRITE UR QL STRIP: NEGATIVE
OSMOLALITY SERPL CALC.SUM OF ELEC: 280.5 MOSM/KG (ref 273–305)
PH UR STRIP.AUTO: 5.5 [PH] (ref 5–8)
PLATELET # BLD AUTO: 208 10*3/MM3 (ref 130–400)
PMV BLD AUTO: 11.1 FL (ref 6–10)
POTASSIUM BLD-SCNC: 4.7 MMOL/L (ref 3.5–5.3)
PROT SERPL-MCNC: 6.8 G/DL (ref 6–8)
PROT UR QL STRIP: NEGATIVE
RBC # BLD AUTO: 5.93 10*6/MM3 (ref 4.7–6.1)
SODIUM BLD-SCNC: 138 MMOL/L (ref 135–153)
SP GR UR STRIP: 1.01 (ref 1–1.03)
UROBILINOGEN UR QL STRIP: NORMAL
WBC NRBC COR # BLD: 7.81 10*3/MM3 (ref 4.5–12.5)

## 2019-02-02 PROCEDURE — 81003 URINALYSIS AUTO W/O SCOPE: CPT | Performed by: FAMILY MEDICINE

## 2019-02-02 PROCEDURE — 80053 COMPREHEN METABOLIC PANEL: CPT | Performed by: FAMILY MEDICINE

## 2019-02-02 PROCEDURE — 74177 CT ABD & PELVIS W/CONTRAST: CPT

## 2019-02-02 PROCEDURE — 74177 CT ABD & PELVIS W/CONTRAST: CPT | Performed by: RADIOLOGY

## 2019-02-02 PROCEDURE — 99283 EMERGENCY DEPT VISIT LOW MDM: CPT

## 2019-02-02 PROCEDURE — 51798 US URINE CAPACITY MEASURE: CPT

## 2019-02-02 PROCEDURE — 85025 COMPLETE CBC W/AUTO DIFF WBC: CPT | Performed by: FAMILY MEDICINE

## 2019-02-02 PROCEDURE — 72131 CT LUMBAR SPINE W/O DYE: CPT | Performed by: RADIOLOGY

## 2019-02-02 PROCEDURE — 25010000002 IOPAMIDOL 61 % SOLUTION: Performed by: FAMILY MEDICINE

## 2019-02-02 PROCEDURE — 72131 CT LUMBAR SPINE W/O DYE: CPT

## 2019-02-02 RX ORDER — ACETAMINOPHEN AND CODEINE PHOSPHATE 300; 30 MG/1; MG/1
1 TABLET ORAL ONCE
Status: COMPLETED | OUTPATIENT
Start: 2019-02-02 | End: 2019-02-02

## 2019-02-02 RX ORDER — ACETAMINOPHEN AND CODEINE PHOSPHATE 300; 30 MG/1; MG/1
1 TABLET ORAL EVERY 6 HOURS PRN
Qty: 10 TABLET | Refills: 0 | Status: ON HOLD | OUTPATIENT
Start: 2019-02-02 | End: 2022-11-01

## 2019-02-02 RX ORDER — SODIUM CHLORIDE 0.9 % (FLUSH) 0.9 %
10 SYRINGE (ML) INJECTION AS NEEDED
Status: DISCONTINUED | OUTPATIENT
Start: 2019-02-02 | End: 2019-02-02 | Stop reason: HOSPADM

## 2019-02-02 RX ORDER — TAMSULOSIN HYDROCHLORIDE 0.4 MG/1
1 CAPSULE ORAL DAILY
Qty: 30 CAPSULE | Refills: 0 | Status: SHIPPED | OUTPATIENT
Start: 2019-02-02 | End: 2020-05-20 | Stop reason: SDUPTHER

## 2019-02-02 RX ADMIN — IOPAMIDOL 80 ML: 612 INJECTION, SOLUTION INTRAVENOUS at 11:44

## 2019-02-02 RX ADMIN — ACETAMINOPHEN AND CODEINE PHOSPHATE 1 TABLET: 300; 30 TABLET ORAL at 09:36

## 2019-02-02 NOTE — ED PROVIDER NOTES
Subjective   83-year-old white male presents secondary to right lower back pain.  He states this radiates into his right anterior thigh.  He denies abdominal pain though states that he's had decreased urinary output.  He's been drinking fluids and eating like normal.  He denies any increased shortness of breath above his baseline of chest pain.  No burning with urination.  Bowel movements are normal.  No injury.            Review of Systems   Constitutional: Negative.  Negative for fever.   HENT: Negative.    Respiratory: Negative.    Cardiovascular: Negative.  Negative for chest pain.   Gastrointestinal: Negative.  Negative for abdominal pain.   Endocrine: Negative.    Genitourinary: Negative.  Negative for dysuria.   Musculoskeletal: Positive for back pain.   Skin: Negative.    Neurological: Negative.    Psychiatric/Behavioral: Negative.    All other systems reviewed and are negative.      Past Medical History:   Diagnosis Date   • Arthritis    • COPD (chronic obstructive pulmonary disease) (CMS/[facility])        Allergies   Allergen Reactions   • Penicillins Anaphylaxis       Past Surgical History:   Procedure Laterality Date   • BRONCHOSCOPY N/A 8/23/2018    Procedure: BRONCHOSCOPY WITH ENDOBRONCHIAL ULTRASOUND;  Surgeon: Saravanan Méndez MD;  Location: [facility] OR;  Service: Pulmonary   • CHOLECYSTECTOMY N/A 6/16/2017    Procedure: CHOLECYSTECTOMY LAPAROSCOPIC;  Surgeon: Jose Velazquez MD;  Location: [facility];  Service:    • EYE SURGERY Bilateral 2013   • INGUINAL HERNIA REPAIR Left 2013    left   • [place] ERCP DX COLLECTION SPECIMEN BRUSHING/WASHING N/A 6/26/2017    Procedure: ENDOSCOPIC RETROGRADE CHOLANGIOPANCREATOGRAPHY;  Surgeon: Rod Francis MD;  Location: [facility] OR;  Service: Gastroenterology   • [place] ERCP DX COLLECTION SPECIMEN BRUSHING/WASHING N/A 10/26/2017    Procedure: ENDOSCOPIC RETROGRADE CHOLANGIOPANCREATOGRAPHY;  Surgeon: Rod Francis MD;  Location: [facility] OR;  Service: Gastroenterology        Family History   Problem Relation Age of Onset   • No Known Problems Mother    • Diabetes Father    • Hypertension Father    • Stroke Father    • Diabetes Sister        Social History     Socioeconomic History   • Marital status:      Spouse name: Not on file   • Number of children: Not on file   • Years of education: Not on file   • Highest education level: Not on file   Tobacco Use   • Smoking status: Former Smoker     Years: 10.00     Last attempt to quit:      Years since quittin.1   • Smokeless tobacco: Former User     Quit date: 1995   Substance and Sexual Activity   • Alcohol use: No   • Drug use: No   • Sexual activity: Defer           Objective   Physical Exam   Constitutional: He is oriented to person, place, and time. He appears well-developed and well-nourished. No distress.   HENT:   Head: Normocephalic and atraumatic.   Right Ear: External ear normal.   Left Ear: External ear normal.   Nose: Nose normal.   Eyes: Conjunctivae and EOM are normal. Pupils are equal, round, and reactive to light.   Neck: Normal range of motion. Neck supple. No JVD present. No tracheal deviation present.   Cardiovascular: Normal rate, regular rhythm and normal heart sounds.   No murmur heard.  Pulmonary/Chest: Effort normal and breath sounds normal. No respiratory distress. He has no wheezes.   Abdominal: Soft. Bowel sounds are normal. There is no tenderness.   Musculoskeletal: Normal range of motion. He exhibits tenderness (right lower back.  No rash.  He has normal pedal pulses and is neurovascularly intact.). He exhibits no edema or deformity.   Neurological: He is alert and oriented to person, place, and time. No cranial nerve deficit.   Skin: Skin is warm and dry. No rash noted. He is not diaphoretic. No erythema. No pallor.   Psychiatric: He has a normal mood and affect. His behavior is normal. Thought content normal.   Nursing note and vitals reviewed.      Procedures           ED Course                   MDM  Number of Diagnoses or Management Options  Acute right-sided low back pain with right-sided sciatica: new and requires workup  Bladder tumor: new and requires workup     Amount and/or Complexity of Data Reviewed  Clinical lab tests: ordered and reviewed  Tests in the radiology section of CPT®: ordered and reviewed  Discuss the patient with other providers: yes    Risk of Complications, Morbidity, and/or Mortality  Presenting problems: moderate          Final diagnoses:   Acute right-sided low back pain with right-sided sciatica   Bladder tumor            Cesar Bran PA  02/02/19 [id number]

## 2019-02-02 NOTE — ED NOTES
RIGHT SIDE LOWER BACK PAIN AND TENDERNESS RADIATING DOWN RIGHT LEG     Mohsen Barr RN  02/02/19 [id number]

## 2019-02-04 ENCOUNTER — PATIENT OUTREACH (OUTPATIENT)
Dept: CASE MANAGEMENT | Facility: OTHER | Age: 84
End: 2019-02-04

## 2019-02-04 NOTE — OUTREACH NOTE
RN CC called patient back and let him know that she'd scheduled an appt for him to see DAVID Sauceda, at his PCP's office tomorrow morning at 9 am, pt voiced understanding and stated that would be fine. CC will follow up with patient next week; patient has CC contact information for questions/concerns prior to next outreach.

## 2019-02-04 NOTE — OUTREACH NOTE
Care Management Plan 2/4/2019   Lifestyle Goals Avoid respiratory irritants;Eat a healthy diet;Fewer ER/urgent care visits;Fewer exacerbations;Less pain;Routine follow-up with doctor(s)   Barriers Disease education;Pain   Self Management Breathing techniques;Use oxygen;Medication Adherence   Annual Wellness Visit:  Care Coordinator Will Schedule   Care Gaps Addressed Other (See Comment)   Care Gaps Addressed AWV   Specific Disease Process Teaching COPD   Does patient have depression diagnosis? No   Advanced Directives: Send Information   Ed Visits past 12 months: 2 or 3   Hospitalizations past 12 months None   Medication Adherence Medications understood   Health Literacy Moderate     The main concerns and/or symptoms the patient would like to address are: back pain.    Education/instruction provided by Care Coordinator: Patient was seen at the ED 2/2/19 for right sided back pain and decreased urinary output. Patient states he is still having a great deal of back pain and that he isn't scheduled to see his PCP until March. CC advised pt that he may need to see his PCP prior to March if his pain level is intolerable. Pt stated he agreed but that he seems to always have problems reaching someone to make an appointment. CC offered to schedule an appt for the pt, pt agreed, stating that Wednesday is the only day he cannot go as he has an appointment to see Dr. Rolon.     Follow Up Outreach Due: after scheduling an appt with [employer] for patient    Tanya He RN

## 2019-02-04 NOTE — OUTREACH NOTE
RN ANA PAULA contacted the  at [facility]ie Steward's office. The earliest appt available was for tomorrow at 9am with DAVID Sauceda at 9am. CC accepted this appt and will notify the patient.

## 2019-02-06 ENCOUNTER — OFFICE VISIT (OUTPATIENT)
Dept: UROLOGY | Facility: CLINIC | Age: 84
End: 2019-02-06

## 2019-02-06 VITALS — BODY MASS INDEX: 19.61 KG/M2 | HEIGHT: 66 IN | WEIGHT: 122 LBS

## 2019-02-06 DIAGNOSIS — N40.1 BENIGN PROSTATIC HYPERPLASIA WITH URINARY FREQUENCY: ICD-10-CM

## 2019-02-06 DIAGNOSIS — R35.0 BENIGN PROSTATIC HYPERPLASIA WITH URINARY FREQUENCY: ICD-10-CM

## 2019-02-06 DIAGNOSIS — N32.89 MASS OF BLADDER: Primary | ICD-10-CM

## 2019-02-06 LAB — PSA SERPL-MCNC: 0.5 NG/ML (ref 0–4)

## 2019-02-06 PROCEDURE — 84153 ASSAY OF PSA TOTAL: CPT | Performed by: UROLOGY

## 2019-02-06 PROCEDURE — 99203 OFFICE O/P NEW LOW 30 MIN: CPT | Performed by: UROLOGY

## 2019-02-06 RX ORDER — FINASTERIDE 5 MG/1
5 TABLET, FILM COATED ORAL DAILY
Qty: 90 TABLET | Refills: 3 | Status: SHIPPED | OUTPATIENT
Start: 2019-02-06 | End: 2020-05-20 | Stop reason: SDUPTHER

## 2019-02-06 RX ORDER — SILDENAFIL 100 MG/1
1 TABLET, FILM COATED ORAL
COMMUNITY
Start: 2019-02-05 | End: 2019-02-06

## 2019-02-06 RX ORDER — AMITRIPTYLINE HYDROCHLORIDE 10 MG/1
TABLET, FILM COATED ORAL
COMMUNITY
Start: 2019-02-05 | End: 2019-02-06

## 2019-02-06 RX ORDER — TAMSULOSIN HYDROCHLORIDE 0.4 MG/1
1 CAPSULE ORAL NIGHTLY
Qty: 90 CAPSULE | Refills: 3 | Status: SHIPPED | OUTPATIENT
Start: 2019-02-06 | End: 2019-12-02 | Stop reason: SDUPTHER

## 2019-02-06 RX ORDER — CYCLOBENZAPRINE HCL 5 MG
TABLET ORAL
COMMUNITY
Start: 2019-02-05 | End: 2019-02-06

## 2019-02-06 NOTE — PROGRESS NOTES
Chief Complaint:          BPH and median lobe of prostate vs bladder tumor on ct scan.    HPI:   83 y.o. male.  Pt went to ER with back pain.with radiation down the back and at times down the front.  Pt had a ct scan that showed a midline bladder mass consistent with a medium lobe of his prostate or bladder tumor.  Pt states he has difficulty voiding that has improved with flomax.  HPI      Past Medical History:        Past Medical History:   Diagnosis Date   • Arthritis    • COPD (chronic obstructive pulmonary disease) (CMS/[facility])          Current Meds:     Current Outpatient Medications   Medication Sig Dispense Refill   • acetaminophen-codeine (TYLENOL #3) 300-30 MG per tablet Take 1 tablet by mouth Every 6 (Six) Hours As Needed for Moderate Pain . 10 tablet 0   • aspirin 325 MG tablet Take 325 mg by mouth Daily.     • ipratropium-albuterol (DUO-NEB) 0.5-2.5 mg/3 ml nebulizer Take 3 mL by nebulization 4 (Four) Times a Day As Needed for Wheezing. Dx: R06.02, J41.1Resending to include IC-D 10 360 mL 3   • LORazepam (ATIVAN) 0.5 MG tablet Take 0.5 mg by mouth Daily.     • pantoprazole (PROTONIX) 40 MG EC tablet Take 40 mg by mouth Daily.     • tamsulosin (FLOMAX) 0.4 MG capsule 24 hr capsule Take 1 capsule by mouth Daily. 30 capsule 0   • tiotropium bromide monohydrate (SPIRIVA RESPIMAT) 2.5 MCG/ACT aerosol solution inhaler Inhale 2 puffs Daily. 4 g 6   • VENTOLIN  (90 BASE) MCG/ACT inhaler Inhale 2 puffs Every 6 (Six) Hours As Needed for Wheezing or Shortness of Air.     • finasteride (PROSCAR) 5 MG tablet Take 1 tablet by mouth Daily. 90 tablet 3   • tamsulosin (FLOMAX) 0.4 MG capsule 24 hr capsule Take 1 capsule by mouth Every Night. 90 capsule 3     Current Facility-Administered Medications   Medication Dose Route Frequency Provider Last Rate Last Dose   • ipratropium-albuterol (DUO-NEB) nebulizer solution 3 mL  3 mL Nebulization 4x Daily - RT Saravanan Méndez MD            Allergies:      Allergies    Allergen Reactions   • Penicillins Anaphylaxis        Past Surgical History:     Past Surgical History:   Procedure Laterality Date   • BRONCHOSCOPY N/A 2018    Procedure: BRONCHOSCOPY WITH ENDOBRONCHIAL ULTRASOUND;  Surgeon: Saravanan Méndez MD;  Location: [facility];  Service: Pulmonary   • CHOLECYSTECTOMY N/A 2017    Procedure: CHOLECYSTECTOMY LAPAROSCOPIC;  Surgeon: Jose Velazquez MD;  Location: [facility];  Service:    • EYE SURGERY Bilateral 2013   • INGUINAL HERNIA REPAIR Left     left   • [place] ERCP DX COLLECTION SPECIMEN BRUSHING/WASHING N/A 2017    Procedure: ENDOSCOPIC RETROGRADE CHOLANGIOPANCREATOGRAPHY;  Surgeon: Rod Francis MD;  Location: [facility];  Service: Gastroenterology   • [place] ERCP DX COLLECTION SPECIMEN BRUSHING/WASHING N/A 10/26/2017    Procedure: ENDOSCOPIC RETROGRADE CHOLANGIOPANCREATOGRAPHY;  Surgeon: Rod Francis MD;  Location: [facility];  Service: Gastroenterology         Social History:     Social History     Socioeconomic History   • Marital status:      Spouse name: Not on file   • Number of children: Not on file   • Years of education: Not on file   • Highest education level: Not on file   Social Needs   • Financial resource strain: Not on file   • Food insecurity - worry: Not on file   • Food insecurity - inability: Not on file   • Transportation needs - medical: Not on file   • Transportation needs - non-medical: Not on file   Occupational History   • Not on file   Tobacco Use   • Smoking status: Former Smoker     Years: 10.     Last attempt to quit:      Years since quittin.1   • Smokeless tobacco: Former User     Quit date: 1995   Substance and Sexual Activity   • Alcohol use: No   • Drug use: No   • Sexual activity: Defer   Other Topics Concern   • Not on file   Social History Narrative   • Not on file       Family History:     Family History   Problem Relation Age of Onset   • No Known Problems Mother    • Diabetes Father     • Hypertension Father    • Stroke Father    • Diabetes Sister        Review of Systems:     Review of Systems   Constitutional: Negative for chills, fatigue and fever.   HENT: Negative for congestion and sore throat.    Eyes: Negative for pain.   Respiratory: Negative for cough, chest tightness and shortness of breath.    Cardiovascular: Negative for chest pain and palpitations.   Gastrointestinal: Negative for abdominal pain.   Genitourinary: Positive for flank pain. Negative for decreased urine volume, difficulty urinating, frequency, hematuria and urgency.   Musculoskeletal: Positive for back pain.   Neurological: Negative for dizziness and headaches.   Hematological: Does not bruise/bleed easily.         IPSS Questionnaire (AUA-7):  Over the past month…    1)  How often have you had a sensation of not emptying your bladder completely after you finish urinating?  5 - Almost always   2)  How often have you had to urinate again less than two hours after you finished urinating? 3 - About half the time   3)  How often have you found you stopped and started again several times when you urinated?  0 - Not at all   4) How difficult have you found it to postpone urination?  0 - Not at all   5) How often have you had a weak urinary stream?  0 - Not at all   6) How often have you had to push or strain to begin urination?  0 - Not at all   7) How many times did you most typically get up to urinate from the time you went to bed until the time you got up in the morning?  1 - 1 time   Total score:  0-7 mildly symptomatic   9 8-19 moderately symptomatic     20-35 severely symptomatic         I have reviewed the follow portions of the patient's history and confirmed they are accurate today:  allergies, current medications, past family history, past medical history, past social history, past surgical history, problem list and ROS  Physical Exam:     Physical Exam   Constitutional: He is oriented to person, place, and time.  "  HENT:   Head: Normocephalic and atraumatic.   Right Ear: External ear normal.   Left Ear: External ear normal.   Nose: Nose normal.   Mouth/Throat: Oropharynx is clear and moist.   Eyes: Conjunctivae and EOM are normal. Pupils are equal, round, and reactive to light.   Neck: Normal range of motion. Neck supple. No thyromegaly present.   Cardiovascular: Normal rate, regular rhythm, normal heart sounds and intact distal pulses.   No murmur heard.  Pulmonary/Chest: Effort normal and breath sounds normal. No respiratory distress. He has no wheezes. He has no rales. He exhibits no tenderness.   Abdominal: Soft. Bowel sounds are normal. He exhibits no distension and no mass. There is no tenderness. No hernia.   Genitourinary: Rectum normal, prostate normal and penis normal.   Genitourinary Comments: No nodules   Musculoskeletal: Normal range of motion. He exhibits no edema or tenderness.   Lymphadenopathy:     He has no cervical adenopathy.   Neurological: He is alert and oriented to person, place, and time. No cranial nerve deficit. He exhibits normal muscle tone. Coordination normal.   Skin: Skin is warm. No rash noted.   Psychiatric: He has a normal mood and affect. His behavior is normal. Judgment and thought content normal.   Nursing note and vitals reviewed.      Ht 167.6 cm (65.98\")   Wt 55.3 kg (122 lb)   BMI 19.70 kg/m²    Procedure:         Assessment:     Encounter Diagnoses   Name Primary?   • Mass of bladder Yes   • Benign prostatic hyperplasia with urinary frequency      Orders Placed This Encounter   Procedures   • PSA DIAGNOSTIC     Standing Status:   Future     Standing Expiration Date:   2/6/2022       Plan:   Will check a psa today.  Will start on maximal medical therapy.  Will schedule cystoscopy    Patient's Body mass index is 19.7 kg/m². BMI is within normal parameters. No follow-up required..      Counseling was given to patient for the following topics impressions as follows: BPH and bladder " mass. The interim medical history and current results were reviewed.  A treatment plan with follow-up was made for Mass of bladder [N32.89].  This document has been electronically signed by Glenn Rolon MD February 6, 2019 9:37 AM

## 2019-03-05 ENCOUNTER — EPISODE CHANGES (OUTPATIENT)
Dept: CASE MANAGEMENT | Facility: OTHER | Age: 84
End: 2019-03-05

## 2019-03-05 ENCOUNTER — PATIENT OUTREACH (OUTPATIENT)
Dept: CASE MANAGEMENT | Facility: OTHER | Age: 84
End: 2019-03-05

## 2019-03-05 NOTE — OUTREACH NOTE
"Care Management Plan 3/5/2019   Lifestyle Goals Eat a healthy diet;Avoid respiratory irritants;Routine follow-up with doctor(s)   Barriers Pain   Self Management Breathing techniques;Use oxygen;Medication Adherence   Annual Wellness Visit:  Patient Refuses at This Time   Care Gaps Addressed Other (See Comment)   Care Gaps Addressed zoster   Specific Disease Process Teaching COPD   Does patient have depression diagnosis? No   Advanced Directives: Send Information   Ed Visits past 12 months: -   Hospitalizations past 12 months -   Medication Adherence Medications understood   Health Literacy Good     The main concerns and/or symptoms the patient would like to address are: none.    Education/instruction provided by Care Coordinator: Patient states he has been doing well. Per patient, his back had not been hurting in quite a while until this morning. Pt states he woke up to back pain but thinks its just \"old age.\" Pt states he has been seeing Dr. Rolon in urology and that the doctor thinks it may have been a shadow that was seen on the CT rather than a tumor. Pt states he had a PSA drawn and it was normal. Patient also reports he saw his PCP yesterday and that she told him his bloodwork that they new there was very good and that his cholesterol levels were excellent. Pt denies any s/s disease exacerbation at this time; denies any difficulty voiding. Per patient, he can still complete his own ADLs/IADLs. CC encouraged pt to call as needed.    Follow Up Outreach Due: as needed    Tanya He RN    "

## 2019-03-08 ENCOUNTER — PROCEDURE VISIT (OUTPATIENT)
Dept: UROLOGY | Facility: CLINIC | Age: 84
End: 2019-03-08

## 2019-03-08 VITALS — HEIGHT: 65 IN | WEIGHT: 122 LBS | BODY MASS INDEX: 20.33 KG/M2

## 2019-03-08 DIAGNOSIS — R35.0 FREQUENCY OF URINATION: Primary | ICD-10-CM

## 2019-03-08 DIAGNOSIS — R39.11 BENIGN PROSTATIC HYPERPLASIA WITH URINARY HESITANCY: ICD-10-CM

## 2019-03-08 DIAGNOSIS — N40.1 BENIGN PROSTATIC HYPERPLASIA WITH URINARY HESITANCY: ICD-10-CM

## 2019-03-08 DIAGNOSIS — N32.89 BLADDER MASS: ICD-10-CM

## 2019-03-08 LAB
BILIRUB BLD-MCNC: NEGATIVE MG/DL
CLARITY, POC: CLEAR
COLOR UR: YELLOW
GLUCOSE UR STRIP-MCNC: NEGATIVE MG/DL
KETONES UR QL: NEGATIVE
LEUKOCYTE EST, POC: NEGATIVE
NITRITE UR-MCNC: NEGATIVE MG/ML
PH UR: 5.5 [PH] (ref 5–8)
PROT UR STRIP-MCNC: NEGATIVE MG/DL
RBC # UR STRIP: NEGATIVE /UL
SP GR UR: 1.01 (ref 1–1.03)
UROBILINOGEN UR QL: NORMAL

## 2019-03-08 PROCEDURE — 52000 CYSTOURETHROSCOPY: CPT | Performed by: UROLOGY

## 2019-03-08 PROCEDURE — 81003 URINALYSIS AUTO W/O SCOPE: CPT | Performed by: UROLOGY

## 2019-03-08 RX ORDER — FINASTERIDE 5 MG/1
5 TABLET, FILM COATED ORAL DAILY
Qty: 30 TABLET | Refills: 11 | Status: SHIPPED | OUTPATIENT
Start: 2019-03-08 | End: 2019-12-02 | Stop reason: SDUPTHER

## 2019-03-08 NOTE — PROGRESS NOTES
Chief Complaint:          CT showing median lobe of the state versus mass    HPI:   83 y.o. male.  Patient is here for cystoscopy  HPI      Past Medical History:        Past Medical History:   Diagnosis Date   • Arthritis    • COPD (chronic obstructive pulmonary disease) (CMS/[facility])          Current Meds:     Current Outpatient Medications   Medication Sig Dispense Refill   • acetaminophen-codeine (TYLENOL #3) 300-30 MG per tablet Take 1 tablet by mouth Every 6 (Six) Hours As Needed for Moderate Pain . 10 tablet 0   • aspirin 325 MG tablet Take 325 mg by mouth Daily.     • finasteride (PROSCAR) 5 MG tablet Take 1 tablet by mouth Daily. 90 tablet 3   • ipratropium-albuterol (DUO-NEB) 0.5-2.5 mg/3 ml nebulizer Take 3 mL by nebulization 4 (Four) Times a Day As Needed for Wheezing. Dx: R06.02, J41.1Resending to include IC-D 10 360 mL 3   • LORazepam (ATIVAN) 0.5 MG tablet Take 0.5 mg by mouth Daily.     • pantoprazole (PROTONIX) 40 MG EC tablet Take 40 mg by mouth Daily.     • tamsulosin (FLOMAX) 0.4 MG capsule 24 hr capsule Take 1 capsule by mouth Daily. 30 capsule 0   • tamsulosin (FLOMAX) 0.4 MG capsule 24 hr capsule Take 1 capsule by mouth Every Night. 90 capsule 3   • tiotropium bromide monohydrate (SPIRIVA RESPIMAT) 2.5 MCG/ACT aerosol solution inhaler Inhale 2 puffs Daily. 4 g 6   • VENTOLIN  (90 BASE) MCG/ACT inhaler Inhale 2 puffs Every 6 (Six) Hours As Needed for Wheezing or Shortness of Air.       Current Facility-Administered Medications   Medication Dose Route Frequency Provider Last Rate Last Dose   • ipratropium-albuterol (DUO-NEB) nebulizer solution 3 mL  3 mL Nebulization 4x Daily - RT Saravanan Méndez MD            Allergies:      Allergies   Allergen Reactions   • Penicillins Anaphylaxis        Past Surgical History:     Past Surgical History:   Procedure Laterality Date   • BRONCHOSCOPY N/A 8/23/2018    Procedure: BRONCHOSCOPY WITH ENDOBRONCHIAL ULTRASOUND;  Surgeon: Saravanan Méndez MD;   Location: [facility] OR;  Service: Pulmonary   • CHOLECYSTECTOMY N/A 2017    Procedure: CHOLECYSTECTOMY LAPAROSCOPIC;  Surgeon: Jose Velazquez MD;  Location: [facility] OR;  Service:    • EYE SURGERY Bilateral    • INGUINAL HERNIA REPAIR Left     left   • CT ERCP DX COLLECTION SPECIMEN BRUSHING/WASHING N/A 2017    Procedure: ENDOSCOPIC RETROGRADE CHOLANGIOPANCREATOGRAPHY;  Surgeon: Rod Francis MD;  Location: [facility] OR;  Service: Gastroenterology   • CT ERCP DX COLLECTION SPECIMEN BRUSHING/WASHING N/A 10/26/2017    Procedure: ENDOSCOPIC RETROGRADE CHOLANGIOPANCREATOGRAPHY;  Surgeon: Rod Francis MD;  Location: [facility] OR;  Service: Gastroenterology         Social History:     Social History     Socioeconomic History   • Marital status:      Spouse name: Not on file   • Number of children: Not on file   • Years of education: Not on file   • Highest education level: Not on file   Social Needs   • Financial resource strain: Not on file   • Food insecurity - worry: Not on file   • Food insecurity - inability: Not on file   • Transportation needs - medical: Not on file   • Transportation needs - non-medical: Not on file   Occupational History   • Not on file   Tobacco Use   • Smoking status: Former Smoker     Years: 10.00     Last attempt to quit:      Years since quittin.2   • Smokeless tobacco: Former User     Quit date: 1995   Substance and Sexual Activity   • Alcohol use: No   • Drug use: No   • Sexual activity: Defer   Other Topics Concern   • Not on file   Social History Narrative   • Not on file       Family History:     Family History   Problem Relation Age of Onset   • No Known Problems Mother    • Diabetes Father    • Hypertension Father    • Stroke Father    • Diabetes Sister        Review of Systems:     Review of Systems   Constitutional: Negative for fatigue.   HENT: Negative for sinus pressure and sinus pain.    Eyes: Negative for pain and redness.  "  Respiratory: Negative for chest tightness.    Cardiovascular: Negative for chest pain.   Gastrointestinal: Negative for abdominal pain, constipation and diarrhea.   Endocrine: Negative for heat intolerance.   Genitourinary: Negative for frequency.   Musculoskeletal: Negative for back pain.   Skin: Negative for rash.   Allergic/Immunologic: Negative for food allergies.   Neurological: Negative for headaches.   Hematological: Does not bruise/bleed easily.   Psychiatric/Behavioral: The patient is not nervous/anxious.              Physical Exam:     Physical Exam    Ht 165.1 cm (65\")   Wt 55.3 kg (122 lb)   BMI 20.30 kg/m²    Procedure:     Procedure: Cystoscopy Male    Indication: Mass versus median lobe of the prostate on CT scan    Urinalysis Performed Today:  Negative for Infection    Informed Consent Obtained    Sterile prep performed in usual fashion    11 cc of topical lidocaine inserted urethrally for 10 minutes    Flexible cystoscope inserted and bladder examined    Findings: The patient's urethra was normal the posterior urethra showed trilobar enlargement of the prostate and viewing the bladder showed no tumors but he did have a prominent median lobe of the prostate which is not causing him any obstructive symptoms.    Additional Procedure with Cystoscopy: none      .    Assessment:     Encounter Diagnoses   Name Primary?   • Frequency of urination Yes   • Bladder mass    • Benign prostatic hyperplasia with urinary hesitancy      Orders Placed This Encounter   Procedures   • POC Urinalysis Dipstick, Automated       Plan:   The patient cystoscopy shows an enlarged median lobe and I would encourage him to stay on finasteride    This document has been electronically signed by Glenn Rolon MD March 8, 2019 3:03 PM  "

## 2019-08-06 ENCOUNTER — OFFICE VISIT (OUTPATIENT)
Dept: PULMONOLOGY | Facility: CLINIC | Age: 84
End: 2019-08-06

## 2019-08-06 VITALS
OXYGEN SATURATION: 92 % | TEMPERATURE: 97.7 F | BODY MASS INDEX: 19.29 KG/M2 | HEIGHT: 66 IN | HEART RATE: 87 BPM | WEIGHT: 120 LBS | SYSTOLIC BLOOD PRESSURE: 118 MMHG | DIASTOLIC BLOOD PRESSURE: 56 MMHG

## 2019-08-06 DIAGNOSIS — J43.2 CENTRILOBULAR EMPHYSEMA (HCC): ICD-10-CM

## 2019-08-06 DIAGNOSIS — R91.1 LEFT UPPER LOBE PULMONARY NODULE: Primary | ICD-10-CM

## 2019-08-06 PROCEDURE — 99213 OFFICE O/P EST LOW 20 MIN: CPT | Performed by: INTERNAL MEDICINE

## 2019-08-06 RX ORDER — BUDESONIDE AND FORMOTEROL FUMARATE DIHYDRATE 160; 4.5 UG/1; UG/1
2 AEROSOL RESPIRATORY (INHALATION) 2 TIMES DAILY
Qty: 1 INHALER | Refills: 11 | Status: ON HOLD | OUTPATIENT
Start: 2019-08-06 | End: 2022-11-01

## 2019-10-02 ENCOUNTER — TELEPHONE (OUTPATIENT)
Dept: UROLOGY | Facility: CLINIC | Age: 84
End: 2019-10-02

## 2019-11-11 RX ORDER — IPRATROPIUM BROMIDE AND ALBUTEROL SULFATE 2.5; .5 MG/3ML; MG/3ML
SOLUTION RESPIRATORY (INHALATION)
Qty: 1 VIAL | Refills: 3 | Status: ON HOLD | OUTPATIENT
Start: 2019-11-11 | End: 2022-11-01

## 2019-11-25 ENCOUNTER — TELEPHONE (OUTPATIENT)
Dept: UROLOGY | Facility: CLINIC | Age: 84
End: 2019-11-25

## 2019-11-25 NOTE — TELEPHONE ENCOUNTER
[facility] called stating the patient was requesting refill on his finasteride and flomax. I have refilled that for the patient.     Thanks,  Marcia Jaimes, CMA

## 2019-12-02 DIAGNOSIS — R39.11 BENIGN PROSTATIC HYPERPLASIA WITH URINARY HESITANCY: ICD-10-CM

## 2019-12-02 DIAGNOSIS — N40.1 BENIGN PROSTATIC HYPERPLASIA WITH URINARY HESITANCY: ICD-10-CM

## 2019-12-02 DIAGNOSIS — R35.0 BENIGN PROSTATIC HYPERPLASIA WITH URINARY FREQUENCY: ICD-10-CM

## 2019-12-02 DIAGNOSIS — N40.1 BENIGN PROSTATIC HYPERPLASIA WITH URINARY FREQUENCY: ICD-10-CM

## 2019-12-02 RX ORDER — FINASTERIDE 5 MG/1
5 TABLET, FILM COATED ORAL DAILY
Qty: 90 TABLET | Refills: 3 | OUTPATIENT
Start: 2019-12-02 | End: 2020-05-20 | Stop reason: SDUPTHER

## 2019-12-02 RX ORDER — TAMSULOSIN HYDROCHLORIDE 0.4 MG/1
1 CAPSULE ORAL NIGHTLY
Qty: 90 CAPSULE | Refills: 3 | OUTPATIENT
Start: 2019-12-02 | End: 2020-05-20 | Stop reason: SDUPTHER

## 2019-12-02 NOTE — TELEPHONE ENCOUNTER
RECEIVED FAX TO REFILLS ON FINESTRIDE AND TAMSULOSIN SENT OVER VIA FAX COULDN'T PULL THE MAIL ORDER UP IN THE FORMULARY.

## 2020-01-29 RX ORDER — AZITHROMYCIN 250 MG/1
TABLET, FILM COATED ORAL
Qty: 6 TABLET | Refills: 0 | OUTPATIENT
Start: 2020-01-29

## 2020-05-20 ENCOUNTER — OFFICE VISIT (OUTPATIENT)
Dept: UROLOGY | Facility: CLINIC | Age: 85
End: 2020-05-20

## 2020-05-20 VITALS — BODY MASS INDEX: 19.44 KG/M2 | TEMPERATURE: 97.2 F | HEIGHT: 66 IN | WEIGHT: 121 LBS

## 2020-05-20 DIAGNOSIS — R35.0 FREQUENCY OF URINATION: Primary | ICD-10-CM

## 2020-05-20 DIAGNOSIS — R39.11 BENIGN PROSTATIC HYPERPLASIA WITH URINARY HESITANCY: ICD-10-CM

## 2020-05-20 DIAGNOSIS — N40.1 BENIGN PROSTATIC HYPERPLASIA WITH URINARY FREQUENCY: ICD-10-CM

## 2020-05-20 DIAGNOSIS — N40.1 BENIGN PROSTATIC HYPERPLASIA WITH URINARY HESITANCY: ICD-10-CM

## 2020-05-20 DIAGNOSIS — R35.0 BENIGN PROSTATIC HYPERPLASIA WITH URINARY FREQUENCY: ICD-10-CM

## 2020-05-20 LAB
BILIRUB BLD-MCNC: NEGATIVE MG/DL
CLARITY, POC: CLEAR
COLOR UR: YELLOW
GLUCOSE UR STRIP-MCNC: NEGATIVE MG/DL
KETONES UR QL: NEGATIVE
LEUKOCYTE EST, POC: NEGATIVE
NITRITE UR-MCNC: NEGATIVE MG/ML
PH UR: 7 [PH] (ref 5–8)
PROT UR STRIP-MCNC: NEGATIVE MG/DL
RBC # UR STRIP: NEGATIVE /UL
SP GR UR: 1.01 (ref 1–1.03)
UROBILINOGEN UR QL: NORMAL

## 2020-05-20 PROCEDURE — 99213 OFFICE O/P EST LOW 20 MIN: CPT | Performed by: UROLOGY

## 2020-05-20 PROCEDURE — 81003 URINALYSIS AUTO W/O SCOPE: CPT | Performed by: UROLOGY

## 2020-05-20 RX ORDER — FINASTERIDE 5 MG/1
5 TABLET, FILM COATED ORAL DAILY
Qty: 90 TABLET | Refills: 3 | OUTPATIENT
Start: 2020-05-20

## 2020-05-20 RX ORDER — AMITRIPTYLINE HYDROCHLORIDE 25 MG/1
25 TABLET, FILM COATED ORAL NIGHTLY
COMMUNITY
Start: 2020-05-12

## 2020-05-20 RX ORDER — TAMSULOSIN HYDROCHLORIDE 0.4 MG/1
1 CAPSULE ORAL NIGHTLY
Qty: 90 CAPSULE | Refills: 3 | OUTPATIENT
Start: 2020-05-20

## 2020-05-20 NOTE — PROGRESS NOTES
Chief Complaint:        BPH      HPI:   84 y.o. male.  Pt is on finasteride and he has noticed an improvement in his stream and is not having side effects from the medication.  HPI      Past Medical History:        Past Medical History:   Diagnosis Date   • Arthritis    • COPD (chronic obstructive pulmonary disease) (CMS/[facility])          Current Meds:     Current Outpatient Medications   Medication Sig Dispense Refill   • acetaminophen-codeine (TYLENOL #3) 300-30 MG per tablet Take 1 tablet by mouth Every 6 (Six) Hours As Needed for Moderate Pain . 10 tablet 0   • amitriptyline (ELAVIL) 25 MG tablet Take 25 mg by mouth At Night As Needed.     • aspirin 325 MG tablet Take 325 mg by mouth Daily.     • budesonide-formoterol (SYMBICORT) 160-4.5 MCG/ACT inhaler Inhale 2 puffs 2 (Two) Times a Day. 1 inhaler 11   • finasteride (PROSCAR) 5 MG tablet Take 1 tablet by mouth Daily. 90 tablet 3   • ipratropium-albuterol (DUO-NEB) 0.5-2.5 mg/3 ml nebulizer USE 1 VIAL IN NEBULIZER 4 TIMES DAILY AS NEEDED FOR WHEEZING 1 vial 3   • pantoprazole (PROTONIX) 40 MG EC tablet Take 40 mg by mouth Daily.     • tamsulosin (FLOMAX) 0.4 MG capsule 24 hr capsule Take 1 capsule by mouth Every Night. 90 capsule 3   • VENTOLIN  (90 BASE) MCG/ACT inhaler Inhale 2 puffs Every 6 (Six) Hours As Needed for Wheezing or Shortness of Air.       No current facility-administered medications for this visit.         Allergies:      Allergies   Allergen Reactions   • Penicillins Anaphylaxis        Past Surgical History:     Past Surgical History:   Procedure Laterality Date   • BRONCHOSCOPY N/A 8/23/2018    Procedure: BRONCHOSCOPY WITH ENDOBRONCHIAL ULTRASOUND;  Surgeon: Saravanan Méndez MD;  Location:  [facility] OR;  Service: Pulmonary   • CHOLECYSTECTOMY N/A 6/16/2017    Procedure: CHOLECYSTECTOMY LAPAROSCOPIC;  Surgeon: Jose Velazquez MD;  Location:  [facility] OR;  Service:    • EYE SURGERY Bilateral 2013   • INGUINAL HERNIA REPAIR Left 2013    left   • [place]  ERCP DX COLLECTION SPECIMEN BRUSHING/WASHING N/A 2017    Procedure: ENDOSCOPIC RETROGRADE CHOLANGIOPANCREATOGRAPHY;  Surgeon: Rod Francis MD;  Location: [facility] OR;  Service: Gastroenterology   • [place] ERCP DX COLLECTION SPECIMEN BRUSHING/WASHING N/A 10/26/2017    Procedure: ENDOSCOPIC RETROGRADE CHOLANGIOPANCREATOGRAPHY;  Surgeon: Rod Francis MD;  Location: [facility];  Service: Gastroenterology         Social History:     Social History     Socioeconomic History   • Marital status:      Spouse name: Not on file   • Number of children: Not on file   • Years of education: Not on file   • Highest education level: Not on file   Tobacco Use   • Smoking status: Former Smoker     Years: 10.     Last attempt to quit:      Years since quittin.4   • Smokeless tobacco: Former User     Quit date: 1995   Substance and Sexual Activity   • Alcohol use: No   • Drug use: No   • Sexual activity: Defer       Family History:     Family History   Problem Relation Age of Onset   • No Known Problems Mother    • Diabetes Father    • Hypertension Father    • Stroke Father    • Diabetes Sister        Review of Systems:     Review of Systems   Constitutional: Negative for chills and fever.   HENT: Negative for sinus pressure.    Respiratory: Negative for cough.    Cardiovascular: Negative for leg swelling.   Gastrointestinal: Negative for abdominal pain.   Genitourinary: Positive for dysuria and urgency. Negative for frequency.   Musculoskeletal: Negative for back pain.   Neurological: Negative for headaches.   Psychiatric/Behavioral: The patient is not nervous/anxious.        IPSS Questionnaire (AUA-7):  Over the past month…    1)  Incomplete Emptying  How often have you had a sensation of not emptying your bladder?  0 - Not at all   2)  Frequency  How often have you had to urinate less than every two hours? 0 - Not at all   3)  Intermittency  How often have you found you stopped and started again  several times when you urinated?  3 - About half the time   4) Urgency  How often have you found it difficult to postpone urination?  4 - More than half the time   5) Weak Stream  How often have you had a weak urinary stream?  4 - More than half the time   6) Straining  How often have you had to push or strain to begin urination?  0 - Not at all   7) Nocturia  How many times did you typically get up at night to urinate?  1 - 1 time   Total Score:  12       Quality of life due to urinary symptoms:  If you were to spend the rest of your life with your urinary condition the way it is now, how would you feel about that? 1-Pleased   Urine Leakage (Incontinence) 0-No Leakage       I have reviewed the follow portions of the patient's history and confirmed they are accurate today:  allergies, current medications, past family history, past medical history, past social history, past surgical history, problem list and ROS  Physical Exam:     Physical Exam   Constitutional: He is oriented to person, place, and time.   HENT:   Head: Normocephalic and atraumatic.   Right Ear: External ear normal.   Left Ear: External ear normal.   Nose: Nose normal.   Mouth/Throat: Oropharynx is clear and moist.   Eyes: Pupils are equal, round, and reactive to light. Conjunctivae and EOM are normal.   Neck: Normal range of motion. Neck supple. No thyromegaly present.   Cardiovascular: Normal rate, regular rhythm, normal heart sounds and intact distal pulses.   No murmur heard.  Pulmonary/Chest: Effort normal and breath sounds normal. No respiratory distress. He has no wheezes. He has no rales. He exhibits no tenderness.   Abdominal: Soft. Bowel sounds are normal. He exhibits no distension and no mass. There is no tenderness. No hernia.   Genitourinary: Rectum normal, prostate normal and penis normal.   Musculoskeletal: Normal range of motion. He exhibits no edema or tenderness.   Lymphadenopathy:     He has no cervical adenopathy.   Neurological:  "He is alert and oriented to person, place, and time. No cranial nerve deficit. He exhibits normal muscle tone. Coordination normal.   Skin: Skin is warm. No rash noted.   Psychiatric: He has a normal mood and affect. His behavior is normal. Judgment and thought content normal.   Nursing note and vitals reviewed.      Temp 97.2 °F (36.2 °C)   Ht 167.6 cm (66\")   Wt 54.9 kg (121 lb)   BMI 19.53 kg/m²    Procedure:         Assessment:   No diagnosis found.    No orders of the defined types were placed in this encounter.      Patient reports that he is not currently experiencing any symptoms of urinary incontinence.      Plan:   Will see pt in a year.    Patient's Body mass index is 19.53 kg/m². BMI is within normal parameters. No follow-up required..      Smoking Cessation Counseling:  Former smoker.  Patient does not currently use any tobacco products.     Counseling was given to patient for the following topics instructions for management as follows: Bph. The interim medical history and current results were reviewed.  A treatment plan with follow-up was made for No primary diagnosis found.. I spent 16 minutes face to face with Sarbjit Martin and 80 percentage was spent in counseling.       This document has been electronically signed by Glenn Rolon MD May 20, 2020 08:15      "

## 2021-02-09 ENCOUNTER — IMMUNIZATION (OUTPATIENT)
Dept: VACCINE CLINIC | Facility: HOSPITAL | Age: 86
End: 2021-02-09

## 2021-02-09 PROCEDURE — 0001A: CPT | Performed by: INTERNAL MEDICINE

## 2021-02-09 PROCEDURE — 91300 HC SARSCOV02 VAC 30MCG/0.3ML IM: CPT | Performed by: INTERNAL MEDICINE

## 2021-03-02 ENCOUNTER — IMMUNIZATION (OUTPATIENT)
Dept: VACCINE CLINIC | Facility: HOSPITAL | Age: 86
End: 2021-03-02

## 2021-03-02 PROCEDURE — 91300 HC SARSCOV02 VAC 30MCG/0.3ML IM: CPT | Performed by: INTERNAL MEDICINE

## 2021-03-02 PROCEDURE — 0002A: CPT | Performed by: INTERNAL MEDICINE

## 2021-06-29 ENCOUNTER — OFFICE VISIT (OUTPATIENT)
Dept: UROLOGY | Facility: CLINIC | Age: 86
End: 2021-06-29

## 2021-06-29 VITALS — BODY MASS INDEX: 18.48 KG/M2 | WEIGHT: 115 LBS | HEIGHT: 66 IN

## 2021-06-29 DIAGNOSIS — N40.0 BENIGN PROSTATIC HYPERPLASIA WITHOUT LOWER URINARY TRACT SYMPTOMS: Primary | ICD-10-CM

## 2021-06-29 PROCEDURE — 99213 OFFICE O/P EST LOW 20 MIN: CPT | Performed by: UROLOGY

## 2021-06-29 RX ORDER — ERGOCALCIFEROL 1.25 MG/1
CAPSULE ORAL
Status: ON HOLD | COMMUNITY
Start: 2021-04-28 | End: 2022-11-01

## 2021-06-29 RX ORDER — LEVOTHYROXINE SODIUM 0.05 MG/1
50 TABLET ORAL DAILY
COMMUNITY
Start: 2021-06-07

## 2021-06-29 NOTE — PROGRESS NOTES
Chief Complaint:          Chief Complaint   Patient presents with   • enlarged lobe       HPI:   85 y.o. male returns today with a history of an enlarged prostate and median bar status post cystoscopy by Dr. Glenn Rolon in 2019 he reports no lower urinary tract symptomatology particularly irritative symptoms such as frequency urgency dysuria and obstructive symptomatology particularly dribbling, hesitancy, intermittency.  PSA 0.5 he does not need refills I will see him back on an as-needed basis      Past Medical History:        Past Medical History:   Diagnosis Date   • Arthritis    • COPD (chronic obstructive pulmonary disease) (CMS/[facility])          Current Meds:     Current Outpatient Medications   Medication Sig Dispense Refill   • acetaminophen-codeine (TYLENOL #3) 300-30 MG per tablet Take 1 tablet by mouth Every 6 (Six) Hours As Needed for Moderate Pain . 10 tablet 0   • amitriptyline (ELAVIL) 25 MG tablet Take 25 mg by mouth At Night As Needed.     • aspirin 325 MG tablet Take 325 mg by mouth Daily.     • budesonide-formoterol (SYMBICORT) 160-4.5 MCG/ACT inhaler Inhale 2 puffs 2 (Two) Times a Day. 1 inhaler 11   • finasteride (Proscar) 5 MG tablet Take 1 tablet by mouth Daily. 90 tablet 3   • ipratropium-albuterol (DUO-NEB) 0.5-2.5 mg/3 ml nebulizer USE 1 VIAL IN NEBULIZER 4 TIMES DAILY AS NEEDED FOR WHEEZING 1 vial 3   • levothyroxine (SYNTHROID, LEVOTHROID) 50 MCG tablet      • pantoprazole (PROTONIX) 40 MG EC tablet Take 40 mg by mouth Daily.     • tamsulosin (Flomax) 0.4 MG capsule 24 hr capsule Take 1 capsule by mouth Every Night. 90 capsule 3   • VENTOLIN  (90 BASE) MCG/ACT inhaler Inhale 2 puffs Every 6 (Six) Hours As Needed for Wheezing or Shortness of Air.     • vitamin D (ERGOCALCIFEROL) 1.25 MG (73170 UT) capsule capsule        No current facility-administered medications for this visit.        Allergies:      Allergies   Allergen Reactions   • Penicillins Anaphylaxis        Past Surgical  History:     Past Surgical History:   Procedure Laterality Date   • BRONCHOSCOPY N/A 2018    Procedure: BRONCHOSCOPY WITH ENDOBRONCHIAL ULTRASOUND;  Surgeon: Saravanan Méndez MD;  Location: [facility];  Service: Pulmonary   • CHOLECYSTECTOMY N/A 2017    Procedure: CHOLECYSTECTOMY LAPAROSCOPIC;  Surgeon: Jose Velazquez MD;  Location: [facility];  Service:    • EYE SURGERY Bilateral    • INGUINAL HERNIA REPAIR Left     left   • CT ERCP DX COLLECTION SPECIMEN BRUSHING/WASHING N/A 2017    Procedure: ENDOSCOPIC RETROGRADE CHOLANGIOPANCREATOGRAPHY;  Surgeon: Rod Francis MD;  Location: [facility] OR;  Service: Gastroenterology   • CT ERCP DX COLLECTION SPECIMEN BRUSHING/WASHING N/A 10/26/2017    Procedure: ENDOSCOPIC RETROGRADE CHOLANGIOPANCREATOGRAPHY;  Surgeon: Rod Francis MD;  Location: [facility];  Service: Gastroenterology         Social History:     Social History     Socioeconomic History   • Marital status:      Spouse name: Not on file   • Number of children: Not on file   • Years of education: Not on file   • Highest education level: Not on file   Tobacco Use   • Smoking status: Former Smoker     Years: 10.00     Quit date:      Years since quittin.5   • Smokeless tobacco: Former User     Quit date: 1995   Substance and Sexual Activity   • Alcohol use: No   • Drug use: No   • Sexual activity: Defer       Family History:     Family History   Problem Relation Age of Onset   • No Known Problems Mother    • Diabetes Father    • Hypertension Father    • Stroke Father    • Diabetes Sister        Review of Systems:     Review of Systems   Constitutional: Negative.    HENT: Negative.    Eyes: Negative.    Respiratory: Negative.    Cardiovascular: Negative.    Gastrointestinal: Negative.    Endocrine: Negative.    Musculoskeletal: Negative.    Allergic/Immunologic: Negative.    Neurological: Negative.    Hematological: Negative.    Psychiatric/Behavioral: Negative.         Physical Exam:     Physical Exam  Vitals and nursing note reviewed.   Constitutional:       Appearance: He is well-developed.   HENT:      Head: Normocephalic and atraumatic.   Eyes:      Conjunctiva/sclera: Conjunctivae normal.      Pupils: Pupils are equal, round, and reactive to light.   Cardiovascular:      Rate and Rhythm: Normal rate and regular rhythm.      Heart sounds: Normal heart sounds.   Pulmonary:      Effort: Pulmonary effort is normal.      Breath sounds: Normal breath sounds.   Abdominal:      General: Bowel sounds are normal.      Palpations: Abdomen is soft.   Genitourinary:     Penis: Normal.       Testes: Normal.      Prostate: Normal.      Rectum: Normal.   Musculoskeletal:         General: Normal range of motion.      Cervical back: Normal range of motion.   Skin:     General: Skin is warm and dry.   Neurological:      Mental Status: He is alert and oriented to person, place, and time.      Deep Tendon Reflexes: Reflexes are normal and symmetric.   Psychiatric:         Behavior: Behavior normal.         Thought Content: Thought content normal.         Judgment: Judgment normal.         I have reviewed the following portions of the patient's history: allergies, current medications, past family history, past medical history, past social history, past surgical history, problem list and ROS and confirm it's accurate.      Procedure:       Assessment/Plan:   BPH: Discussed the pathophysiology of BPH and obstruction.  We discussed the static and dynamic effect effects of BPH as well as using 5 alpha reductase inhibitors versus alpha blockade.  We discussed the indications for transurethral surgery as well.  And/ or other therapeutic options available including all of the newer techniques.  Currently asymptomatic  BPH: Discussed the pathophysiology of BPH and obstruction.  We discussed the static and dynamic effect effects of BPH as well as using 5 alpha reductase inhibitors versus alpha  blockade.  We discussed the indications for transurethral surgery as well.  And/ or other therapeutic options available including all of the newer techniques.            Patient's There is no height or weight on file to calculate BMI. indicating that he is within normal range (BMI 18.5-24.9). No BMI management plan needed..              This document has been electronically signed by FABIANA VASQUEZ MD June 29, 2021 08:12 EDT

## 2021-06-30 PROBLEM — N40.0 BENIGN PROSTATIC HYPERPLASIA WITHOUT LOWER URINARY TRACT SYMPTOMS: Status: ACTIVE | Noted: 2021-06-30

## 2022-02-10 ENCOUNTER — TRANSCRIBE ORDERS (OUTPATIENT)
Dept: ADMINISTRATIVE | Facility: HOSPITAL | Age: 87
End: 2022-02-10

## 2022-02-10 ENCOUNTER — HOSPITAL ENCOUNTER (OUTPATIENT)
Dept: GENERAL RADIOLOGY | Facility: HOSPITAL | Age: 87
Discharge: HOME | End: 2022-02-10
Admitting: NURSE PRACTITIONER

## 2022-02-10 DIAGNOSIS — R06.02 SHORTNESS OF BREATH: ICD-10-CM

## 2022-02-10 DIAGNOSIS — R06.02 SHORTNESS OF BREATH: Primary | ICD-10-CM

## 2022-02-10 PROCEDURE — 71046 X-RAY EXAM CHEST 2 VIEWS: CPT

## 2022-02-10 PROCEDURE — 71046 X-RAY EXAM CHEST 2 VIEWS: CPT | Performed by: RADIOLOGY

## 2022-10-31 ENCOUNTER — APPOINTMENT (OUTPATIENT)
Dept: GENERAL RADIOLOGY | Facility: HOSPITAL | Age: 87
End: 2022-10-31

## 2022-10-31 ENCOUNTER — HOSPITAL ENCOUNTER (INPATIENT)
Facility: HOSPITAL | Age: 87
LOS: 1 days | Discharge: HOME | End: 2022-11-01
Attending: STUDENT IN AN ORGANIZED HEALTH CARE EDUCATION/TRAINING PROGRAM | Admitting: INTERNAL MEDICINE

## 2022-10-31 DIAGNOSIS — I21.4 NSTEMI (NON-ST ELEVATED MYOCARDIAL INFARCTION): Primary | ICD-10-CM

## 2022-10-31 DIAGNOSIS — J44.1 COPD EXACERBATION: ICD-10-CM

## 2022-10-31 LAB
A-A DO2: 42.8 MMHG (ref 0–300)
ARTERIAL PATENCY WRIST A: ABNORMAL
ATMOSPHERIC PRESS: 727 MMHG
BASE EXCESS BLDA CALC-SCNC: -1.6 MMOL/L (ref 0–2)
BDY SITE: ABNORMAL
BODY TEMPERATURE: 0 C
CO2 BLDA-SCNC: 24.3 MMOL/L (ref 22–33)
COHGB MFR BLD: 1.1 % (ref 0–5)
DEPRECATED RDW RBC AUTO: 45.5 FL (ref 37–54)
ERYTHROCYTE [DISTWIDTH] IN BLOOD BY AUTOMATED COUNT: 14.9 % (ref 12.3–15.4)
FLUAV RNA RESP QL NAA+PROBE: NOT DETECTED
FLUBV RNA RESP QL NAA+PROBE: NOT DETECTED
HCO3 BLDA-SCNC: 23.1 MMOL/L (ref 20–26)
HCT VFR BLD AUTO: 46.8 % (ref 37.5–51)
HCT VFR BLD CALC: 46.7 % (ref 38–51)
HGB BLD-MCNC: 15.1 G/DL (ref 13–17.7)
HGB BLDA-MCNC: 15.2 G/DL (ref 14–18)
INHALED O2 CONCENTRATION: 21 %
Lab: ABNORMAL
MCH RBC QN AUTO: 27 PG (ref 26.6–33)
MCHC RBC AUTO-ENTMCNC: 32.3 G/DL (ref 31.5–35.7)
MCV RBC AUTO: 83.6 FL (ref 79–97)
METHGB BLD QL: 0.4 % (ref 0–3)
MODALITY: ABNORMAL
NOTE: ABNORMAL
NOTIFIED BY: ABNORMAL
NOTIFIED WHO: ABNORMAL
OXYHGB MFR BLDV: 87.4 % (ref 94–99)
PCO2 BLDA: 38.3 MM HG (ref 35–45)
PCO2 TEMP ADJ BLD: ABNORMAL MM[HG]
PH BLDA: 7.39 PH UNITS (ref 7.35–7.45)
PH, TEMP CORRECTED: ABNORMAL
PLATELET # BLD AUTO: 215 10*3/MM3 (ref 140–450)
PMV BLD AUTO: 12.1 FL (ref 6–12)
PO2 BLDA: 56.8 MM HG (ref 83–108)
PO2 TEMP ADJ BLD: ABNORMAL MM[HG]
RBC # BLD AUTO: 5.6 10*6/MM3 (ref 4.14–5.8)
SAO2 % BLDCOA: 88.7 % (ref 94–99)
SARS-COV-2 RNA RESP QL NAA+PROBE: NOT DETECTED
VENTILATOR MODE: ABNORMAL
WBC NRBC COR # BLD: 9.37 10*3/MM3 (ref 3.4–10.8)

## 2022-10-31 PROCEDURE — 85007 BL SMEAR W/DIFF WBC COUNT: CPT | Performed by: STUDENT IN AN ORGANIZED HEALTH CARE EDUCATION/TRAINING PROGRAM

## 2022-10-31 PROCEDURE — 82805 BLOOD GASES W/O2 SATURATION: CPT

## 2022-10-31 PROCEDURE — 73090 X-RAY EXAM OF FOREARM: CPT

## 2022-10-31 PROCEDURE — 83036 HEMOGLOBIN GLYCOSYLATED A1C: CPT | Performed by: INTERNAL MEDICINE

## 2022-10-31 PROCEDURE — 80053 COMPREHEN METABOLIC PANEL: CPT | Performed by: STUDENT IN AN ORGANIZED HEALTH CARE EDUCATION/TRAINING PROGRAM

## 2022-10-31 PROCEDURE — 25010000002 DEXAMETHASONE SODIUM PHOSPHATE 10 MG/ML SOLUTION: Performed by: STUDENT IN AN ORGANIZED HEALTH CARE EDUCATION/TRAINING PROGRAM

## 2022-10-31 PROCEDURE — 82375 ASSAY CARBOXYHB QUANT: CPT

## 2022-10-31 PROCEDURE — 93010 ELECTROCARDIOGRAM REPORT: CPT | Performed by: INTERNAL MEDICINE

## 2022-10-31 PROCEDURE — 93005 ELECTROCARDIOGRAM TRACING: CPT | Performed by: STUDENT IN AN ORGANIZED HEALTH CARE EDUCATION/TRAINING PROGRAM

## 2022-10-31 PROCEDURE — 99223 1ST HOSP IP/OBS HIGH 75: CPT | Performed by: INTERNAL MEDICINE

## 2022-10-31 PROCEDURE — 85025 COMPLETE CBC W/AUTO DIFF WBC: CPT | Performed by: STUDENT IN AN ORGANIZED HEALTH CARE EDUCATION/TRAINING PROGRAM

## 2022-10-31 PROCEDURE — 36600 WITHDRAWAL OF ARTERIAL BLOOD: CPT

## 2022-10-31 PROCEDURE — 36415 COLL VENOUS BLD VENIPUNCTURE: CPT

## 2022-10-31 PROCEDURE — 87636 SARSCOV2 & INF A&B AMP PRB: CPT | Performed by: STUDENT IN AN ORGANIZED HEALTH CARE EDUCATION/TRAINING PROGRAM

## 2022-10-31 PROCEDURE — 83050 HGB METHEMOGLOBIN QUAN: CPT

## 2022-10-31 PROCEDURE — 94640 AIRWAY INHALATION TREATMENT: CPT

## 2022-10-31 PROCEDURE — 94799 UNLISTED PULMONARY SVC/PX: CPT

## 2022-10-31 PROCEDURE — 84484 ASSAY OF TROPONIN QUANT: CPT | Performed by: STUDENT IN AN ORGANIZED HEALTH CARE EDUCATION/TRAINING PROGRAM

## 2022-10-31 PROCEDURE — 71045 X-RAY EXAM CHEST 1 VIEW: CPT

## 2022-10-31 PROCEDURE — 99284 EMERGENCY DEPT VISIT MOD MDM: CPT

## 2022-10-31 RX ORDER — DEXAMETHASONE SODIUM PHOSPHATE 10 MG/ML
10 INJECTION, SOLUTION INTRAMUSCULAR; INTRAVENOUS ONCE
Status: COMPLETED | OUTPATIENT
Start: 2022-10-31 | End: 2022-10-31

## 2022-10-31 RX ORDER — IPRATROPIUM BROMIDE AND ALBUTEROL SULFATE 2.5; .5 MG/3ML; MG/3ML
3 SOLUTION RESPIRATORY (INHALATION) ONCE
Status: COMPLETED | OUTPATIENT
Start: 2022-10-31 | End: 2022-10-31

## 2022-10-31 RX ADMIN — DEXAMETHASONE SODIUM PHOSPHATE 10 MG: 10 INJECTION INTRAMUSCULAR; INTRAVENOUS at 23:23

## 2022-10-31 RX ADMIN — IPRATROPIUM BROMIDE AND ALBUTEROL SULFATE 3 ML: .5; 2.5 SOLUTION RESPIRATORY (INHALATION) at 23:46

## 2022-11-01 ENCOUNTER — READMISSION MANAGEMENT (OUTPATIENT)
Dept: CALL CENTER | Facility: HOSPITAL | Age: 87
End: 2022-11-01

## 2022-11-01 ENCOUNTER — APPOINTMENT (OUTPATIENT)
Dept: GENERAL RADIOLOGY | Facility: HOSPITAL | Age: 87
End: 2022-11-01

## 2022-11-01 ENCOUNTER — APPOINTMENT (OUTPATIENT)
Dept: CT IMAGING | Facility: HOSPITAL | Age: 87
End: 2022-11-01

## 2022-11-01 ENCOUNTER — APPOINTMENT (OUTPATIENT)
Dept: CARDIOLOGY | Facility: HOSPITAL | Age: 87
End: 2022-11-01

## 2022-11-01 VITALS
HEIGHT: 66 IN | DIASTOLIC BLOOD PRESSURE: 58 MMHG | OXYGEN SATURATION: 93 % | WEIGHT: 101.3 LBS | RESPIRATION RATE: 18 BRPM | HEART RATE: 78 BPM | TEMPERATURE: 98 F | BODY MASS INDEX: 16.28 KG/M2 | SYSTOLIC BLOOD PRESSURE: 113 MMHG

## 2022-11-01 PROBLEM — J44.1 COPD EXACERBATION (HCC): Status: ACTIVE | Noted: 2022-11-01

## 2022-11-01 PROBLEM — R78.81 BACTEREMIA: Status: RESOLVED | Noted: 2017-06-28 | Resolved: 2022-11-01

## 2022-11-01 PROBLEM — I21.4 NSTEMI (NON-ST ELEVATED MYOCARDIAL INFARCTION) (HCC): Status: ACTIVE | Noted: 2022-11-01

## 2022-11-01 PROBLEM — R07.9 CHEST PAIN: Status: RESOLVED | Noted: 2018-11-13 | Resolved: 2022-11-01

## 2022-11-01 PROBLEM — B99.9 RECURRENT INFECTIONS: Status: RESOLVED | Noted: 2018-07-25 | Resolved: 2022-11-01

## 2022-11-01 PROBLEM — R63.6 UNDERWEIGHT: Status: ACTIVE | Noted: 2022-11-01

## 2022-11-01 PROBLEM — E43 SEVERE MALNUTRITION (HCC): Status: ACTIVE | Noted: 2022-11-01

## 2022-11-01 PROBLEM — R06.02 SOB (SHORTNESS OF BREATH): Status: RESOLVED | Noted: 2018-07-25 | Resolved: 2022-01-01

## 2022-11-01 PROBLEM — R10.9 PAIN IN THE ABDOMEN: Status: RESOLVED | Noted: 2017-06-25 | Resolved: 2022-11-01

## 2022-11-01 PROBLEM — K80.50 BILE DUCT CALCULUS: Status: RESOLVED | Noted: 2017-06-24 | Resolved: 2022-11-01

## 2022-11-01 LAB
ALBUMIN SERPL-MCNC: 2.99 G/DL (ref 3.5–5.2)
ALBUMIN/GLOB SERPL: 0.7 G/DL
ALP SERPL-CCNC: 77 U/L (ref 39–117)
ALT SERPL W P-5'-P-CCNC: 24 U/L (ref 1–41)
ANION GAP SERPL CALCULATED.3IONS-SCNC: 17.4 MMOL/L (ref 5–15)
APTT PPP: 24.9 SECONDS (ref 26.5–34.5)
APTT PPP: 32.1 SECONDS (ref 26.5–34.5)
APTT PPP: 32.7 SECONDS (ref 26.5–34.5)
AST SERPL-CCNC: 27 U/L (ref 1–40)
BH CV ECHO MEAS - ACS: 2.5 CM
BH CV ECHO MEAS - AO MAX PG: 6 MMHG
BH CV ECHO MEAS - AO MEAN PG: 3 MMHG
BH CV ECHO MEAS - AO ROOT DIAM: 4.3 CM
BH CV ECHO MEAS - AO V2 MAX: 122 CM/SEC
BH CV ECHO MEAS - AO V2 VTI: 21.8 CM
BH CV ECHO MEAS - EDV(CUBED): 44.9 ML
BH CV ECHO MEAS - ESV(CUBED): 5.5 ML
BH CV ECHO MEAS - FS: 50.2 %
BH CV ECHO MEAS - IVS/LVPW: 0.94 CM
BH CV ECHO MEAS - IVSD: 0.91 CM
BH CV ECHO MEAS - LA DIMENSION: 2.6 CM
BH CV ECHO MEAS - LV MASS(C)D: 97.4 GRAMS
BH CV ECHO MEAS - LVIDD: 3.6 CM
BH CV ECHO MEAS - LVIDS: 1.77 CM
BH CV ECHO MEAS - LVOT AREA: 3.1 CM2
BH CV ECHO MEAS - LVOT DIAM: 2 CM
BH CV ECHO MEAS - LVPWD: 0.98 CM
BH CV ECHO MEAS - MV A MAX VEL: 91.9 CM/SEC
BH CV ECHO MEAS - MV E MAX VEL: 56.3 CM/SEC
BH CV ECHO MEAS - MV E/A: 0.61
BH CV ECHO MEAS - PA ACC TIME: 0.1 SEC
BH CV ECHO MEAS - PA PR(ACCEL): 33.1 MMHG
BH CV ECHO MEAS - RAP SYSTOLE: 10 MMHG
BH CV ECHO MEAS - RVSP: 35.2 MMHG
BH CV ECHO MEAS - TAPSE (>1.6): 1.67 CM
BH CV ECHO MEAS - TR MAX PG: 25.2 MMHG
BH CV ECHO MEAS - TR MAX VEL: 251 CM/SEC
BILIRUB SERPL-MCNC: 0.6 MG/DL (ref 0–1.2)
BUN SERPL-MCNC: 71 MG/DL (ref 8–23)
BUN/CREAT SERPL: 51.1 (ref 7–25)
CALCIUM SPEC-SCNC: 9.3 MG/DL (ref 8.6–10.5)
CHLORIDE SERPL-SCNC: 95 MMOL/L (ref 98–107)
CO2 SERPL-SCNC: 22.6 MMOL/L (ref 22–29)
CREAT SERPL-MCNC: 1.39 MG/DL (ref 0.76–1.27)
DEPRECATED RDW RBC AUTO: 45.7 FL (ref 37–54)
DOHLE BODIES: PRESENT
EGFRCR SERPLBLD CKD-EPI 2021: 49.7 ML/MIN/1.73
ERYTHROCYTE [DISTWIDTH] IN BLOOD BY AUTOMATED COUNT: 15.2 % (ref 12.3–15.4)
GLOBULIN UR ELPH-MCNC: 4 GM/DL
GLUCOSE SERPL-MCNC: 131 MG/DL (ref 65–99)
HBA1C MFR BLD: 6 % (ref 4.8–5.6)
HCT VFR BLD AUTO: 47.9 % (ref 37.5–51)
HGB BLD-MCNC: 15.5 G/DL (ref 13–17.7)
INR PPP: 0.98 (ref 0.9–1.1)
LARGE PLATELETS: ABNORMAL
LYMPHOCYTES # BLD MANUAL: 0.94 10*3/MM3 (ref 0.7–3.1)
LYMPHOCYTES # BLD MANUAL: 1.62 10*3/MM3 (ref 0.7–3.1)
LYMPHOCYTES NFR BLD MANUAL: 13 % (ref 5–12)
LYMPHOCYTES NFR BLD MANUAL: 4 % (ref 5–12)
MAGNESIUM SERPL-MCNC: 2.8 MG/DL (ref 1.6–2.4)
MAXIMAL PREDICTED HEART RATE: 135 BPM
MCH RBC QN AUTO: 27.1 PG (ref 26.6–33)
MCHC RBC AUTO-ENTMCNC: 32.4 G/DL (ref 31.5–35.7)
MCV RBC AUTO: 83.7 FL (ref 79–97)
METAMYELOCYTES NFR BLD MANUAL: 1 % (ref 0–0)
METAMYELOCYTES NFR BLD MANUAL: 2 % (ref 0–0)
MONOCYTES # BLD: 0.43 10*3/MM3 (ref 0.1–0.9)
MONOCYTES # BLD: 1.22 10*3/MM3 (ref 0.1–0.9)
MYELOCYTES NFR BLD MANUAL: 1 % (ref 0–0)
NEUTROPHILS # BLD AUTO: 7.12 10*3/MM3 (ref 1.7–7)
NEUTROPHILS # BLD AUTO: 8.42 10*3/MM3 (ref 1.7–7)
NEUTROPHILS NFR BLD MANUAL: 69 % (ref 42.7–76)
NEUTROPHILS NFR BLD MANUAL: 71 % (ref 42.7–76)
NEUTS BAND NFR BLD MANUAL: 5 % (ref 0–5)
NEUTS BAND NFR BLD MANUAL: 9 % (ref 0–5)
NEUTS HYPERSEG # BLD: ABNORMAL 10*3/UL
NEUTS VAC BLD QL SMEAR: ABNORMAL
PLAT MORPH BLD: NORMAL
PLATELET # BLD AUTO: 181 10*3/MM3 (ref 140–450)
PMV BLD AUTO: 12.6 FL (ref 6–12)
POTASSIUM SERPL-SCNC: 4.1 MMOL/L (ref 3.5–5.2)
PROT SERPL-MCNC: 7 G/DL (ref 6–8.5)
PROTHROMBIN TIME: 13.2 SECONDS (ref 12.1–14.7)
QT INTERVAL: 306 MS
QT INTERVAL: 326 MS
QT INTERVAL: 346 MS
QT INTERVAL: 410 MS
QTC INTERVAL: 441 MS
QTC INTERVAL: 472 MS
QTC INTERVAL: 490 MS
QTC INTERVAL: 495 MS
RBC # BLD AUTO: 5.72 10*6/MM3 (ref 4.14–5.8)
RBC MORPH BLD: NORMAL
RBC MORPH BLD: NORMAL
SODIUM SERPL-SCNC: 135 MMOL/L (ref 136–145)
STRESS TARGET HR: 115 BPM
TOXIC GRANULATION: ABNORMAL
TOXIC GRANULATION: ABNORMAL
TROPONIN T SERPL-MCNC: 0.04 NG/ML (ref 0–0.03)
TROPONIN T SERPL-MCNC: 0.05 NG/ML (ref 0–0.03)
TROPONIN T SERPL-MCNC: 0.07 NG/ML (ref 0–0.03)
TROPONIN T SERPL-MCNC: 0.07 NG/ML (ref 0–0.03)
TSH SERPL DL<=0.05 MIU/L-ACNC: 0.66 UIU/ML (ref 0.27–4.2)
VARIANT LYMPHS NFR BLD MANUAL: 10 % (ref 19.6–45.3)
VARIANT LYMPHS NFR BLD MANUAL: 15 % (ref 19.6–45.3)
WBC NRBC COR # BLD: 10.8 10*3/MM3 (ref 3.4–10.8)

## 2022-11-01 PROCEDURE — 93306 TTE W/DOPPLER COMPLETE: CPT | Performed by: INTERNAL MEDICINE

## 2022-11-01 PROCEDURE — 97162 PT EVAL MOD COMPLEX 30 MIN: CPT

## 2022-11-01 PROCEDURE — 93010 ELECTROCARDIOGRAM REPORT: CPT | Performed by: INTERNAL MEDICINE

## 2022-11-01 PROCEDURE — 99222 1ST HOSP IP/OBS MODERATE 55: CPT | Performed by: SPECIALIST

## 2022-11-01 PROCEDURE — 93005 ELECTROCARDIOGRAM TRACING: CPT | Performed by: INTERNAL MEDICINE

## 2022-11-01 PROCEDURE — 85730 THROMBOPLASTIN TIME PARTIAL: CPT | Performed by: INTERNAL MEDICINE

## 2022-11-01 PROCEDURE — 84484 ASSAY OF TROPONIN QUANT: CPT | Performed by: INTERNAL MEDICINE

## 2022-11-01 PROCEDURE — 93306 TTE W/DOPPLER COMPLETE: CPT

## 2022-11-01 PROCEDURE — 25010000002 HEPARIN (PORCINE) 25000-0.45 UT/250ML-% SOLUTION: Performed by: STUDENT IN AN ORGANIZED HEALTH CARE EDUCATION/TRAINING PROGRAM

## 2022-11-01 PROCEDURE — 94799 UNLISTED PULMONARY SVC/PX: CPT

## 2022-11-01 PROCEDURE — 74230 X-RAY XM SWLNG FUNCJ C+: CPT | Performed by: RADIOLOGY

## 2022-11-01 PROCEDURE — 84443 ASSAY THYROID STIM HORMONE: CPT | Performed by: INTERNAL MEDICINE

## 2022-11-01 PROCEDURE — 71260 CT THORAX DX C+: CPT

## 2022-11-01 PROCEDURE — 84484 ASSAY OF TROPONIN QUANT: CPT | Performed by: STUDENT IN AN ORGANIZED HEALTH CARE EDUCATION/TRAINING PROGRAM

## 2022-11-01 PROCEDURE — 85610 PROTHROMBIN TIME: CPT | Performed by: INTERNAL MEDICINE

## 2022-11-01 PROCEDURE — 25010000002 METHYLPREDNISOLONE PER 125 MG: Performed by: INTERNAL MEDICINE

## 2022-11-01 PROCEDURE — 92611 MOTION FLUOROSCOPY/SWALLOW: CPT

## 2022-11-01 PROCEDURE — 93005 ELECTROCARDIOGRAM TRACING: CPT | Performed by: STUDENT IN AN ORGANIZED HEALTH CARE EDUCATION/TRAINING PROGRAM

## 2022-11-01 PROCEDURE — 94761 N-INVAS EAR/PLS OXIMETRY MLT: CPT

## 2022-11-01 PROCEDURE — 74230 X-RAY XM SWLNG FUNCJ C+: CPT

## 2022-11-01 PROCEDURE — 85007 BL SMEAR W/DIFF WBC COUNT: CPT | Performed by: STUDENT IN AN ORGANIZED HEALTH CARE EDUCATION/TRAINING PROGRAM

## 2022-11-01 PROCEDURE — 25010000002 IOPAMIDOL 61 % SOLUTION: Performed by: STUDENT IN AN ORGANIZED HEALTH CARE EDUCATION/TRAINING PROGRAM

## 2022-11-01 PROCEDURE — 25010000002 HEPARIN (PORCINE) PER 1000 UNITS: Performed by: STUDENT IN AN ORGANIZED HEALTH CARE EDUCATION/TRAINING PROGRAM

## 2022-11-01 PROCEDURE — 85025 COMPLETE CBC W/AUTO DIFF WBC: CPT | Performed by: STUDENT IN AN ORGANIZED HEALTH CARE EDUCATION/TRAINING PROGRAM

## 2022-11-01 PROCEDURE — 99239 HOSP IP/OBS DSCHRG MGMT >30: CPT | Performed by: INTERNAL MEDICINE

## 2022-11-01 PROCEDURE — 25010000002 HEPARIN (PORCINE) PER 1000 UNITS: Performed by: INTERNAL MEDICINE

## 2022-11-01 PROCEDURE — 83735 ASSAY OF MAGNESIUM: CPT | Performed by: INTERNAL MEDICINE

## 2022-11-01 RX ORDER — MELOXICAM 7.5 MG/1
15 TABLET ORAL DAILY
Status: DISCONTINUED | OUTPATIENT
Start: 2022-11-01 | End: 2022-11-01 | Stop reason: HOSPADM

## 2022-11-01 RX ORDER — BUDESONIDE AND FORMOTEROL FUMARATE DIHYDRATE 160; 4.5 UG/1; UG/1
2 AEROSOL RESPIRATORY (INHALATION)
Qty: 10.2 G | Refills: 1 | Status: SHIPPED | OUTPATIENT
Start: 2022-11-01 | End: 2022-12-13

## 2022-11-01 RX ORDER — SODIUM CHLORIDE 0.9 % (FLUSH) 0.9 %
3-10 SYRINGE (ML) INJECTION AS NEEDED
Status: DISCONTINUED | OUTPATIENT
Start: 2022-11-01 | End: 2022-11-01 | Stop reason: HOSPADM

## 2022-11-01 RX ORDER — ATORVASTATIN CALCIUM 40 MG/1
40 TABLET, FILM COATED ORAL NIGHTLY
Qty: 90 TABLET | Refills: 1 | Status: SHIPPED | OUTPATIENT
Start: 2022-11-01 | End: 2022-12-13

## 2022-11-01 RX ORDER — GUAIFENESIN 600 MG/1
600 TABLET, EXTENDED RELEASE ORAL EVERY 12 HOURS SCHEDULED
Status: DISCONTINUED | OUTPATIENT
Start: 2022-11-01 | End: 2022-11-01 | Stop reason: HOSPADM

## 2022-11-01 RX ORDER — ALBUTEROL SULFATE 2.5 MG/3ML
2.5 SOLUTION RESPIRATORY (INHALATION) EVERY 6 HOURS PRN
Status: DISCONTINUED | OUTPATIENT
Start: 2022-11-01 | End: 2022-11-01 | Stop reason: HOSPADM

## 2022-11-01 RX ORDER — LEVOTHYROXINE SODIUM 0.05 MG/1
50 TABLET ORAL DAILY
Status: DISCONTINUED | OUTPATIENT
Start: 2022-11-01 | End: 2022-11-01 | Stop reason: HOSPADM

## 2022-11-01 RX ORDER — ASPIRIN 81 MG/1
81 TABLET ORAL DAILY
Status: DISCONTINUED | OUTPATIENT
Start: 2022-11-02 | End: 2022-11-01

## 2022-11-01 RX ORDER — IPRATROPIUM BROMIDE AND ALBUTEROL SULFATE 2.5; .5 MG/3ML; MG/3ML
3 SOLUTION RESPIRATORY (INHALATION)
Status: DISCONTINUED | OUTPATIENT
Start: 2022-11-01 | End: 2022-11-01 | Stop reason: HOSPADM

## 2022-11-01 RX ORDER — FINASTERIDE 5 MG/1
5 TABLET, FILM COATED ORAL DAILY
Status: DISCONTINUED | OUTPATIENT
Start: 2022-11-01 | End: 2022-11-01 | Stop reason: HOSPADM

## 2022-11-01 RX ORDER — IPRATROPIUM BROMIDE AND ALBUTEROL SULFATE 2.5; .5 MG/3ML; MG/3ML
3 SOLUTION RESPIRATORY (INHALATION) EVERY 4 HOURS PRN
Status: DISCONTINUED | OUTPATIENT
Start: 2022-11-01 | End: 2022-11-01 | Stop reason: HOSPADM

## 2022-11-01 RX ORDER — ASPIRIN 325 MG
325 TABLET ORAL DAILY
Status: DISCONTINUED | OUTPATIENT
Start: 2022-11-01 | End: 2022-11-01 | Stop reason: HOSPADM

## 2022-11-01 RX ORDER — HEPARIN SODIUM 5000 [USP'U]/ML
60 INJECTION, SOLUTION INTRAVENOUS; SUBCUTANEOUS ONCE
Status: COMPLETED | OUTPATIENT
Start: 2022-11-01 | End: 2022-11-01

## 2022-11-01 RX ORDER — HEPARIN SODIUM 5000 [USP'U]/ML
30 INJECTION, SOLUTION INTRAVENOUS; SUBCUTANEOUS AS NEEDED
Status: DISCONTINUED | OUTPATIENT
Start: 2022-11-01 | End: 2022-11-01 | Stop reason: HOSPADM

## 2022-11-01 RX ORDER — IPRATROPIUM BROMIDE AND ALBUTEROL SULFATE 2.5; .5 MG/3ML; MG/3ML
3 SOLUTION RESPIRATORY (INHALATION) 4 TIMES DAILY PRN
COMMUNITY
End: 2022-12-13

## 2022-11-01 RX ORDER — LORAZEPAM 0.5 MG/1
0.5 TABLET ORAL DAILY PRN
COMMUNITY

## 2022-11-01 RX ORDER — METHYLPREDNISOLONE SODIUM SUCCINATE 125 MG/2ML
62.5 INJECTION, POWDER, LYOPHILIZED, FOR SOLUTION INTRAMUSCULAR; INTRAVENOUS DAILY
Status: DISCONTINUED | OUTPATIENT
Start: 2022-11-01 | End: 2022-11-01 | Stop reason: HOSPADM

## 2022-11-01 RX ORDER — METOPROLOL TARTRATE 5 MG/5ML
2.5 INJECTION INTRAVENOUS ONCE
Status: COMPLETED | OUTPATIENT
Start: 2022-11-01 | End: 2022-11-01

## 2022-11-01 RX ORDER — OMEPRAZOLE 40 MG/1
40 CAPSULE, DELAYED RELEASE ORAL DAILY
COMMUNITY
End: 2022-12-13

## 2022-11-01 RX ORDER — PANTOPRAZOLE SODIUM 40 MG/1
40 TABLET, DELAYED RELEASE ORAL EVERY MORNING
Status: DISCONTINUED | OUTPATIENT
Start: 2022-11-02 | End: 2022-11-01 | Stop reason: HOSPADM

## 2022-11-01 RX ORDER — PREDNISONE 10 MG/1
TABLET ORAL
Qty: 21 TABLET | Refills: 0 | Status: SHIPPED | OUTPATIENT
Start: 2022-11-01 | End: 2022-11-10

## 2022-11-01 RX ORDER — LORAZEPAM 0.5 MG/1
0.5 TABLET ORAL DAILY PRN
Status: DISCONTINUED | OUTPATIENT
Start: 2022-11-01 | End: 2022-11-01 | Stop reason: HOSPADM

## 2022-11-01 RX ORDER — DIPHENOXYLATE HYDROCHLORIDE AND ATROPINE SULFATE 2.5; .025 MG/1; MG/1
1 TABLET ORAL DAILY
Status: DISCONTINUED | OUTPATIENT
Start: 2022-11-01 | End: 2022-11-01 | Stop reason: HOSPADM

## 2022-11-01 RX ORDER — ASPIRIN 81 MG/1
324 TABLET, CHEWABLE ORAL ONCE
Status: COMPLETED | OUTPATIENT
Start: 2022-11-01 | End: 2022-11-01

## 2022-11-01 RX ORDER — HEPARIN SODIUM 5000 [USP'U]/ML
60 INJECTION, SOLUTION INTRAVENOUS; SUBCUTANEOUS AS NEEDED
Status: DISCONTINUED | OUTPATIENT
Start: 2022-11-01 | End: 2022-11-01 | Stop reason: HOSPADM

## 2022-11-01 RX ORDER — DICYCLOMINE HYDROCHLORIDE 10 MG/1
10 CAPSULE ORAL
COMMUNITY
End: 2022-12-13

## 2022-11-01 RX ORDER — METHION/INOS/CHOL BT/B COM/LIV 110MG-86MG
100 CAPSULE ORAL DAILY
Status: DISCONTINUED | OUTPATIENT
Start: 2022-11-01 | End: 2022-11-01 | Stop reason: HOSPADM

## 2022-11-01 RX ORDER — MELOXICAM 15 MG/1
15 TABLET ORAL DAILY
COMMUNITY
End: 2022-12-13

## 2022-11-01 RX ORDER — AMITRIPTYLINE HYDROCHLORIDE 25 MG/1
25 TABLET, FILM COATED ORAL NIGHTLY PRN
Status: DISCONTINUED | OUTPATIENT
Start: 2022-11-01 | End: 2022-11-01 | Stop reason: HOSPADM

## 2022-11-01 RX ORDER — ATORVASTATIN CALCIUM 20 MG/1
20 TABLET, FILM COATED ORAL NIGHTLY
Status: DISCONTINUED | OUTPATIENT
Start: 2022-11-01 | End: 2022-11-01

## 2022-11-01 RX ORDER — SODIUM CHLORIDE 0.9 % (FLUSH) 0.9 %
3 SYRINGE (ML) INJECTION EVERY 12 HOURS SCHEDULED
Status: DISCONTINUED | OUTPATIENT
Start: 2022-11-01 | End: 2022-11-01 | Stop reason: HOSPADM

## 2022-11-01 RX ORDER — ATORVASTATIN CALCIUM 40 MG/1
40 TABLET, FILM COATED ORAL NIGHTLY
Status: DISCONTINUED | OUTPATIENT
Start: 2022-11-01 | End: 2022-11-01 | Stop reason: HOSPADM

## 2022-11-01 RX ORDER — NITROGLYCERIN 0.4 MG/1
0.4 TABLET SUBLINGUAL
Status: DISCONTINUED | OUTPATIENT
Start: 2022-11-01 | End: 2022-11-01 | Stop reason: HOSPADM

## 2022-11-01 RX ORDER — IPRATROPIUM BROMIDE AND ALBUTEROL SULFATE 2.5; .5 MG/3ML; MG/3ML
3 SOLUTION RESPIRATORY (INHALATION) 4 TIMES DAILY PRN
Status: DISCONTINUED | OUTPATIENT
Start: 2022-11-01 | End: 2022-11-01 | Stop reason: HOSPADM

## 2022-11-01 RX ORDER — DICYCLOMINE HYDROCHLORIDE 10 MG/1
10 CAPSULE ORAL
Status: DISCONTINUED | OUTPATIENT
Start: 2022-11-01 | End: 2022-11-01 | Stop reason: HOSPADM

## 2022-11-01 RX ORDER — TAMSULOSIN HYDROCHLORIDE 0.4 MG/1
0.4 CAPSULE ORAL NIGHTLY
Status: DISCONTINUED | OUTPATIENT
Start: 2022-11-01 | End: 2022-11-01 | Stop reason: HOSPADM

## 2022-11-01 RX ORDER — HEPARIN SODIUM 10000 [USP'U]/100ML
12 INJECTION, SOLUTION INTRAVENOUS
Status: DISCONTINUED | OUTPATIENT
Start: 2022-11-01 | End: 2022-11-01 | Stop reason: HOSPADM

## 2022-11-01 RX ADMIN — HEPARIN SODIUM 12 UNITS/KG/HR: 10000 INJECTION, SOLUTION INTRAVENOUS at 00:56

## 2022-11-01 RX ADMIN — METHYLPREDNISOLONE SODIUM SUCCINATE 62.5 MG: 125 INJECTION, POWDER, FOR SOLUTION INTRAMUSCULAR; INTRAVENOUS at 08:38

## 2022-11-01 RX ADMIN — Medication 100 MG: at 08:39

## 2022-11-01 RX ADMIN — IPRATROPIUM BROMIDE AND ALBUTEROL SULFATE 3 ML: .5; 3 SOLUTION RESPIRATORY (INHALATION) at 07:06

## 2022-11-01 RX ADMIN — ATORVASTATIN CALCIUM 20 MG: 20 TABLET, FILM COATED ORAL at 00:54

## 2022-11-01 RX ADMIN — GUAIFENESIN 600 MG: 600 TABLET, EXTENDED RELEASE ORAL at 08:39

## 2022-11-01 RX ADMIN — METOPROLOL TARTRATE 2.5 MG: 1 INJECTION, SOLUTION INTRAVENOUS at 08:37

## 2022-11-01 RX ADMIN — ASPIRIN 324 MG: 81 TABLET, CHEWABLE ORAL at 00:44

## 2022-11-01 RX ADMIN — Medication 1 TABLET: at 08:39

## 2022-11-01 RX ADMIN — IOPAMIDOL 70 ML: 612 INJECTION, SOLUTION INTRAVENOUS at 04:19

## 2022-11-01 RX ADMIN — ASPIRIN 324 MG: 81 TABLET, CHEWABLE ORAL at 08:39

## 2022-11-01 RX ADMIN — HEPARIN SODIUM 3000 UNITS: 5000 INJECTION INTRAVENOUS; SUBCUTANEOUS at 00:54

## 2022-11-01 RX ADMIN — HEPARIN SODIUM 3000 UNITS: 5000 INJECTION INTRAVENOUS; SUBCUTANEOUS at 07:20

## 2022-11-01 NOTE — CASE MANAGEMENT/SOCIAL WORK
Discharge Planning Assessment  [facility]     Patient Name: Sarbjit Martin  MRN: [id number]  Today's Date: 11/1/2022    Admit Date: 10/31/2022    Plan: SS spoke with Cony with [facility] who stated that pt's would have to be admitted to Palliative Care program before program will set up DME.  SS advised that pt would be discharged home this pm per Physician.  Per Cony [facility] had not received referral at this time.  SS refaxed pt information through epic and notified Cony to contact [facility] SS if referral was not received.  Pt continues to state he will go home this pm.  SS spoke with pt who stated that he had home 02 at home prior to admit to [facility].  SS spoke with Anshul Hall who stated that pt's home 02 was picked up a few months ago.  SS made referral to [facility] for home 02 per Isabel at [phone] and faxed Physician order and documentation to [phone].  Isabel stated that [facility] will provide a portable tank to [facility] prior to pt's discharge this pm.  Pt stated nephew to provide transportation home via private auto.     Discharge Plan     Row Name 11/01/22 1717       Plan    Plan SS spoke with Cony with [facility] who stated that pt's would have to be admitted to Palliative Care program before program will set up DME.  SS advised that pt would be discharged home this pm per Physician.  Per Cony [facility] had not received referral at this time.  SS refaxed pt information through epic and notified Cony to contact [facility] SS if referral was not received.  Pt continues to state he will go home this pm.  SS spoke with pt who stated that he had home 02 at home prior to admit to [facility].  SS spoke with Anshul per Isabel who stated that pt's home 02 was picked up a few months ago.  SS made referral to [facility] for home 02 per Isabel at [phone] and faxed Physician order and documentation to [phone].  Isabel stated that [facility] will provide a portable tank to [facility] prior to pt's discharge this pm.  Pt  stated nephew to provide transportation home via private auto.    Row Name 11/01/22 8658       Plan    Plan SS spoke with Cony at [facility] who stated that Deepak with Hospice has left for the day.  Cony will attempt to contact Deepak to determine if home 02 can be arranged for palliative care pt's without qualifying sat.  SS will follow.    Row Name 11/01/22 1620       Plan    Plan SS spoke to [facility] per [place] and Palliative Care referral information was provided. SS to follow.    Row Name 11/01/22 1172       Plan    Plan SS received consult for home palliative [facility] referral pt lives alone needs O2. SS contacted [facility] [phone] per Cony and she voices that someone will call back for referral information. SS faxed referral to [facility]. SS to follow.              Continued Care and Services - Admitted Since 10/31/2022     Home Medical Care     Service Provider Request Status Selected Services Address Phone Fax Patient Preferred    [facility] Pending - Request Sent N/A [address] [address] [phone] --                Cony Orellana, BSW

## 2022-11-01 NOTE — DISCHARGE PLACEMENT REQUEST
"Sarbjit Lockett (87 y.o. Male)     Date of Birth   1935    Social Security Number       Address   [address] [address]    Home Phone   [phone]    MRN   [id number]       Yazidism   Mosque    Marital Status                               Admission Date   10/31/22    Admission Type   Emergency    Admitting Provider   Rah Pastrana MD    Attending Provider   Fabrice Hartman DO    Department, Room/Bed   [address], 3304/1S       Discharge Date       Discharge Disposition       Discharge Destination                               Attending Provider: Fabrice Hartman DO    Allergies: Penicillins    Isolation: None   Infection: None   Code Status: No CPR    Ht: 167.6 cm (66\")   Wt: 45.9 kg (101 lb 4.8 oz)    Admission Cmt: None   Principal Problem: NSTEMI (non-ST elevated myocardial infarction) (HCC) [I21.4]                 Active Insurance as of 10/31/2022     Primary Coverage     Payor Plan Insurance Group Employer/Plan Group    ANTH MEDICARE REPLACEMENT ANTH MEDICARE ADVANTAGE [id number]     Payor Plan Address Payor Plan Phone Number Payor Plan Fax Number Effective Dates    [address] [phone]  2022 - None Entered    [facility] [address]       Subscriber Name Subscriber Birth Date Member ID       SARBJIT LOCKETT 1935 [id number]                 Emergency Contacts      (Rel.) Home Phone Work Phone Mobile Phone    Jesse Reyes (Relative) -- -- [phone]        [address] [address]  Phone:  [phone]  Fax:  [phone]        Patient: ROOM: 3304-1S   Sarbjit Lockett MRN:  [id number]   [address] [facility] [address] :  1935  SSN:    Phone: [phone] Sex:  M   PCP: Goldie Steward                 Emergency Contact Information      Name Relation Home Work Mobile     StevesSevenJesse Relative     [phone]          INSURANCE " PAYOR PLAN GROUP # SUBSCRIBER ID   Primary:    [employer] MEDICARE REPLACEMENT [id number]   Admitting Diagnosis: NSTEMI (non-ST elevated myocardial infarction) (HCC) [I21.4]  Order Date:  2022        Case Management  Consult       (Order ID: [id number])     Diagnosis:         Priority:  Routine Expected Date:   Expiration Date:        Interval:   Count:    Reason for Consult? home palliative VNA referral pt lives alone needs O2        Authorizing Provider:Ofelia Mohan APRN  Authorizing Provider's NPI: [id number]  Order Entered By: Ofelia Mohan APRN 2022 12:12 PM     Electronically signed by: Ofelia Mohan APRN 2022 12:12 PM            History & Physical      Rah Pastrana MD at 22 0417              [facility]    Patient Identification:  Name:  Sarbjit Martin  Age:  87 y.o.  Sex:  male  :  1935  MRN:  [id number]   Visit Number:  [id number]  Admit Date: 10/31/2022   Room number:  3304/1S  Primary Care Physician:  Goldie Steward APRN    Date of Admission: 10/31/2022     Subjective     Chief complaint:    Chief Complaint   Patient presents with   • URI     Pt has been congested for the last 3 or 4 days with a bad cough.      History of presenting illness:  87 y.o. male who was admitted on 10/31/2022 from the ED with shortness of air.  The patient has a past medical history of COPD with centrilobular emphysema (does not require oxygen at home), left upper lobe 15 mm spiculated nodule noted in 2018 with a negative fine-needle aspiration, heart failure with preserved ejection fraction, moderate pulmonary hypertension, and past E. coli bacteremia due to common bile duct stone.  The patient states that he has has been iil for 2 weeks with shortness of air and coughing.  The patient states that he is not really producing a lot of sputum.  When he is good and healthy, he does not normally cough.  He  also has noted some wheezing that improved with albuterol.  He has felt fatigued but denies any fevers or chills.  He does state for the last 3 months he has not had much of an appetite and has lost 17 pounds unintentionally.  The patient lives alone but he states that he is able to obtain food anytime that he needs to and denies having food insecurity.  In the emergency department, the patient was noted to be hypoxic.  Chest x-ray did not show any pneumonia.  However, the patient did have elevated troponin levels and some ST elevation in the septal and anterior leads.  The patient does not have a prior history of coronary artery disease and he has not experienced any chest pain recently.  As result of the suspected type II non-ST elevation MI due to the acute hypoxia from the acute COPD exacerbation, the patient was admitted to the telemetry floor for further evaluation and treatment.  ---------------------------------------------------------------------------------------------------------------------   Review of Systems   Constitutional: Positive for appetite change (Decreased), fatigue and unexpected weight change (Lost 17 pounds in 3 months). Negative for chills, diaphoresis and fever.   HENT: Positive for congestion, rhinorrhea and sinus pressure.    Eyes: Negative for discharge and redness.   Respiratory: Positive for cough and shortness of breath.    Cardiovascular: Negative for chest pain and leg swelling.   Gastrointestinal: Positive for nausea. Negative for diarrhea and vomiting.   Genitourinary: Positive for dysuria. Negative for hematuria.   Musculoskeletal: Negative for arthralgias and myalgias.   Skin: Negative for rash and wound.   Neurological: Positive for headaches. Negative for facial asymmetry, speech difficulty and weakness.   Psychiatric/Behavioral: Negative for agitation, behavioral problems and confusion.      ---------------------------------------------------------------------------------------------------------------------   Past Medical History:   Diagnosis Date   • Angiodysplasia of the colon    • Arthritis    • Bacteremia due to Escherichia coli 06/28/2017    Same time as the common bile duct stone   • Benign prostatic hyperplasia without lower urinary tract symptoms 06/30/2021   • Bile duct calculus 06/24/2017   • 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HCC)     Centrilobular   • Diverticulosis    • GERD (gastroesophageal reflux disease)    • Heart failure with preserved ejection fraction (HCC)    • Left upper lobe pulmonary nodule 01/30/2019    15 mm and spiculated in 2018; FNA was negative   • Moderate pulmonary hypertension (HCC)    • Status post laparoscopic cholecystectomy 06/15/2017     Past Surgical History:   Procedure Laterality Date   • BRONCHOSCOPY N/A 8/23/2018    Procedure: BRONCHOSCOPY WITH ENDOBRONCHIAL ULTRASOUND;  Surgeon: Saravanan Méndez MD;  Location: [facility];  Service: Pulmonary   • CHOLECYSTECTOMY N/A 6/16/2017    Procedure: CHOLECYSTECTOMY LAPAROSCOPIC;  Surgeon: Jose Velazquez MD;  Location: [facility];  Service:    • EYE SURGERY Bilateral 2013   • INGUINAL HERNIA REPAIR Left 2013    left   • ME ERCP DX COLLECTION SPECIMEN BRUSHING/WASHING N/A 6/26/2017    Procedure: ENDOSCOPIC RETROGRADE CHOLANGIOPANCREATOGRAPHY;  Surgeon: Rod Francis MD;  Location: [facility];  Service: Gastroenterology   • ME ERCP DX COLLECTION SPECIMEN BRUSHING/WASHING N/A 10/26/2017    Procedure: ENDOSCOPIC RETROGRADE CHOLANGIOPANCREATOGRAPHY;  Surgeon: Rod Francis MD;  Location: [facility];  Service: Gastroenterology     Family History   Problem Relation Age of Onset   • No Known Problems Mother    • Diabetes Father    • Hypertension Father    • Stroke Father    • Diabetes Sister      Social History     Socioeconomic History   • Marital status:    Tobacco Use   •  Smoking status: Former     Years: 10.00     Types: Cigarettes     Quit date:      Years since quittin.8   • Smokeless tobacco: Former     Quit date: 1995   Substance and Sexual Activity   • Alcohol use: No   • Drug use: No   • Sexual activity: Defer     ---------------------------------------------------------------------------------------------------------------------   Allergies:  Penicillins  ---------------------------------------------------------------------------------------------------------------------   Medications below are reported home medications pulling from within the system; at this time, these medications have not been reconciled unless otherwise specified and are in the verification process for further verifcation as current home medications.      Prior to Admission Medications     Prescriptions Last Dose Informant Patient Reported? Taking?    acetaminophen-codeine (TYLENOL #3) 300-30 MG per tablet   No No    Take 1 tablet by mouth Every 6 (Six) Hours As Needed for Moderate Pain .    amitriptyline (ELAVIL) 25 MG tablet   Yes No    Take 25 mg by mouth At Night As Needed.    aspirin 325 MG tablet   Yes No    Take 325 mg by mouth Daily.    budesonide-formoterol (SYMBICORT) 160-4.5 MCG/ACT inhaler   No No    Inhale 2 puffs 2 (Two) Times a Day.    finasteride (Proscar) 5 MG tablet   No No    Take 1 tablet by mouth Daily.    ipratropium-albuterol (DUO-NEB) 0.5-2.5 mg/3 ml nebulizer   No No    USE 1 VIAL IN NEBULIZER 4 TIMES DAILY AS NEEDED FOR WHEEZING    levothyroxine (SYNTHROID, LEVOTHROID) 50 MCG tablet   Yes No    pantoprazole (PROTONIX) 40 MG EC tablet   Yes No    Take 40 mg by mouth Daily.    tamsulosin (Flomax) 0.4 MG capsule 24 hr capsule   No No    Take 1 capsule by mouth Every Night.    VENTOLIN  (90 BASE) MCG/ACT inhaler  Self Yes No    Inhale 2 puffs Every 6 (Six) Hours As Needed for Wheezing or Shortness of Air.    vitamin D (ERGOCALCIFEROL) 1.25 MG (68772 UT) capsule  capsule   Yes No        Objective     Vital Signs:  Temp:  [97.9 °F (36.6 °C)] 97.9 °F (36.6 °C)  Heart Rate:  [90-99] 99  Resp:  [16-18] 18  BP: (111-120)/(60-67) 111/60    Mean Arterial Pressure (Non-Invasive) for the past 24 hrs (Last 3 readings):   Noninvasive MAP (mmHg)   11/01/22 0500 74     SpO2:  [81 %-96 %] 94 %  on  Flow (L/min):  [2] 2;   Device (Oxygen Therapy): nasal cannula  Body mass index is 16.35 kg/m².    Wt Readings from Last 3 Encounters:   11/01/22 45.9 kg (101 lb 4.8 oz)   06/29/21 52.2 kg (115 lb)   05/20/20 54.9 kg (121 lb)      ----------------------------------------------------------------------------------------------------------------------  Physical Exam  Vitals and nursing note reviewed.   Constitutional:       General: He is awake. He is in acute distress.      Appearance: He is well-developed. He is cachectic. He is not toxic-appearing or diaphoretic.      Comments: On 2 L/min nasal cannula.   HENT:      Head: Normocephalic and atraumatic.      Comments: He has bilateral muscle and fat wasting of his temporal areas.     Right Ear: External ear normal.      Left Ear: External ear normal.      Nose: Nose normal.   Eyes:      General: No scleral icterus.        Right eye: No discharge.         Left eye: No discharge.      Pupils: Pupils are equal, round, and reactive to light.   Cardiovascular:      Rate and Rhythm: Normal rate and regular rhythm.      Pulses: Normal pulses.      Heart sounds: No murmur heard.  Pulmonary:      Effort: Accessory muscle usage, prolonged expiration and respiratory distress present.      Breath sounds: Decreased air movement present. No stridor. Wheezing present. No rales.   Abdominal:      General: Bowel sounds are normal. There is no distension.      Palpations: Abdomen is soft.   Musculoskeletal:         General: No swelling, deformity or signs of injury.      Comments: He has bilateral muscle and fat wasting of his arms and legs.   Skin:     Capillary  Refill: Capillary refill takes less than 2 seconds.      Coloration: Skin is not jaundiced or pale.   Neurological:      Mental Status: He is alert and oriented to person, place, and time. Mental status is at baseline.      Cranial Nerves: No cranial nerve deficit.   Psychiatric:         Mood and Affect: Mood normal.         Behavior: Behavior normal. Behavior is cooperative.         Thought Content: Thought content normal.         Judgment: Judgment normal.       ---------------------------------------------------------------------------------------------------------------------  EKG: The patient has had 2 EKGs so far while in the emergency department.  One was dated 10/31/2022; it showed sinus tachycardia with a heart rate of 125 and a QTC of 441 ms.  There is an incomplete right bundle branch block, inferior Q waves, and ST elevation in leads V2-V5.  An EKG dated 11/1/2022 shows similar findings, with the same leads showing ST elevation and Q waves.  When compared to an EKG dated 11/13/2018, the comparison EKG has the same Q waves but the ST elevation is mild and only in leads V2-V3.  I then compared the 2 current EKGs to 2 dated 6/24/2017 and 5/10/2015; the only thing in common between all 4 of these EKGs is that there are Q waves in the inferior leads.  Please note that I have personally looked at the EKG from this admission, the comparison EKGs in the medical records, and the above is my interpretation of this admission's EKG; I await the final cardiology read.  Dr. Edwards from the emergency department told me that the on-call cardiologist did look at the EKGs and give her a verbal report that he did not think this was an ST elevation MI.        Telemetry: Sinus tachycardia with heart rates in the 90s; however, while he was talking to me, his heart rates jumped up to 120s-130s.  Please note that I personally looked at the telemetry strips.        Last echocardiogram:  Results for orders placed during the  hospital encounter of 12/12/18    Adult Transthoracic Echo Complete W/ Cont if Necessary Per Protocol    Interpretation Summary  · Left ventricular systolic function is normal. Estimated EF appears to be in the range of 61 - 65%  · Mild tricuspid valve regurgitation is present.  · Left ventricular diastolic dysfunction (grade I) consistent with impaired relaxation.  · Estimated right ventricular systolic pressure from tricuspid regurgitation is moderately elevated (45-55 mmHg). Moderate pulmonary hypertension is present.  · Mild dilation of the aortic root is present. Mild dilation of the sinuses of Valsalva is present both measuring about 4.1 cm  · There is no evidence of pericardial effusion    I have personally read the ECHO final report.  --------------------------------------------------------------------------------------------------------------------  LABS:    CBC and coagulation:  Results from last 7 days   Lab Units 11/01/22  0545 10/31/22  2341   WBC 10*3/mm3  --  9.37   HEMOGLOBIN g/dL  --  15.1   HEMATOCRIT %  --  46.8   MCV fL  --  83.6   MCHC g/dL  --  32.3   PLATELETS 10*3/mm3  --  215   INR  0.98  --      Acid/base balance:  Results from last 7 days   Lab Units 10/31/22  2325   PH, ARTERIAL pH units 7.389   PO2 ART mm Hg 56.8*   PCO2, ARTERIAL mm Hg 38.3   HCO3 ART mmol/L 23.1     Renal and electrolytes:  Results from last 7 days   Lab Units 11/01/22  0325 10/31/22  2341   SODIUM mmol/L  --  135*   POTASSIUM mmol/L  --  4.1   MAGNESIUM mg/dL 2.8*  --    CHLORIDE mmol/L  --  95*   CO2 mmol/L  --  22.6   BUN mg/dL  --  71*   CREATININE mg/dL  --  1.39*   CALCIUM mg/dL  --  9.3   GLUCOSE mg/dL  --  131*     Estimated Creatinine Clearance: 25.2 mL/min (A) (by C-G formula based on SCr of 1.39 mg/dL (H)).    Liver and pancreatic function:  Results from last 7 days   Lab Units 10/31/22  2341   ALBUMIN g/dL 2.99*   BILIRUBIN mg/dL 0.6   ALK PHOS U/L 77   AST (SGOT) U/L 27   ALT (SGPT) U/L 24     Endocrine  function:  Lab Results   Component Value Date    HGBA1C 6.00 (H) 10/31/2022     Lab Results   Component Value Date    TSH 0.664 11/01/2022     Cardiac:  Results from last 7 days   Lab Units 11/01/22  0325 10/31/22  2341   TROPONIN T ng/mL 0.072* 0.069*       Cultures:  Microbiology Results (last 10 days)     Procedure Component Value - Date/Time    COVID-19 and FLU A/B PCR - Swab, Nasopharynx [[id number]]  (Normal) Collected: 10/31/22 2310    Lab Status: Final result Specimen: Swab from Nasopharynx Updated: 10/31/22 2334     COVID19 Not Detected     Influenza A PCR Not Detected     Influenza B PCR Not Detected    Narrative:      Fact sheet for providers: https://www.fda.gov/media/292885/download    Fact sheet for patients: https://www.fda.gov/media/484548/download    Test performed by PCR.        I have personally looked at the labs and they are summarized above.  ----------------------------------------------------------------------------------------------------------------------  Detailed radiology reports for the last 24 hours:    Imaging Results (Last 24 Hours)     Procedure Component Value Units Date/Time    CT Chest With Contrast Diagnostic [[id number]] Collected: 11/01/22 0436     Updated: 11/01/22 0438    Narrative:      CT Chest W    INDICATION:   Myocardial infarction. COPD. Cough and wheezing. Weight loss.    TECHNIQUE:   CT of the thorax with IV contrast. Coronal and sagittal reconstructions were obtained.  Radiation dose reduction techniques included automated exposure control or exposure modulation based on body size. Count of known CT and cardiac nuc med studies  performed in previous 12 months: 0.     COMPARISON:   8/16/2018    FINDINGS:  There is atherosclerotic disease and coronary artery disease. There is dilatation of the aortic root at 4.6 cm. This is unchanged. There is ectasia of the ascending aorta at 3.7 cm, also unchanged. There is no pleural or pericardial effusion. Upper  abdominal images  show numerous surgical clips along the lesser curvature of the stomach. Gallbladder is surgically absent. There is no adenopathy by size criteria. There are no suspicious osseous lesions.    Lung windows show COPD. There is fibrosis and cavitation in the left lower lobe that appears slightly worsened. There is a large lobular mass in the left upper lobe abutting the mediastinum. This was previously a spiculated nodule measuring about 1.5 cm.  The mass now measures approximately 3.9 x 3.4 x 6.7 cm. PET/CT is once again recommended. There is a 5 mm noncalcified nodule in the left upper lobe on image 31., And there is a 3 mm left upper lobe nodule on image 23. These appear to be present on the  old study and are felt to be benign. There is some mild fibrosis in the right middle lobe. There is bronchial wall thickening in both lungs compatible with bronchitis. There are some tree-in-bud type infiltrates in the right lung compatible with  bronchiolitis.      Impression:        1. 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 PET CT is recommended for follow-up.  2. Severe fibrosis and cavitation in the left lower lobe appears worsened.  3. COPD and bronchitis. There is evidence of bronchiolitis in the right lung.  4. Stable dilatation of the aortic root measuring 4.6 cm.    Signer Name: Rommel Umanzor MD   Signed: 11/1/2022 4:36 AM   Workstation Name: [email or web address]    [facility]    XR Forearm 2 View Left [[id number]] Collected: 10/31/22 2305     Updated: 10/31/22 2308    Narrative:      CR Forearm 2 Vws LT    INDICATION:   Pain after fall.    COMPARISON:   None available.    FINDINGS:   AP and lateral views of the left forearm. Please note that the AP forearm view is included in the chest series today. No fracture or dislocation.  No bone erosion or destruction. Articulation at the elbow and wrist is anatomic.  No foreign body. There is  degenerative change at the  wrist.      Impression:      No acute fracture or malalignment.    Signer Name: Rommel Umanzor MD   Signed: 10/31/2022 11:05 PM   Workstation Name: [email or web address]    [facility]    XR Chest AP [[id number]] Collected: 10/31/22 2304     Updated: 10/31/22 2306    Narrative:      CR Chest 1 Vw    INDICATION:   Dyspnea.     COMPARISON:    2/10/2022    FINDINGS:  Portable AP view(s) of the chest.  Cardiac and mediastinal contours are normal. There is atherosclerotic disease in the aorta. Severe COPD is noted with chronic scarring in the bases. Scattered granulomatous calcifications are present. No acute  infiltrates or pneumothorax.      Impression:      No acute cardiopulmonary findings. Severe COPD.    Signer Name: Rommel Umanzor MD   Signed: 10/31/2022 11:04 PM   Workstation Name: [email or web address]    [facility]        I have personally looked at the radiology images and I have read the available final reports.    Assessment & Plan       -Acute hypoxic respiratory failure that was present on admission and thought to be due to an acute COPD exacerbation, in a patient with known history of COPD with centrilobular emphysema  -Elevated troponins on admission, suspect type II non-ST elevation MI  -Prolonged QTC of 495 ms  -Suspect chronic illness malnutrition, BMI 16.35 kg/m²  -Acute renal insufficiency with baseline creatinine in 2019 of 1 and admission creatinine now of 1.39, with the patient not meeting acute kidney injury criteria on admission  -History of heart failure with preserved ejection fraction, without an acute exacerbation  -History of moderate pulmonary hypertension  -History of a left upper lobe 15 mm spiculated lung mass dating back to 2018, with an unremarkable fine-needle aspiration that was performed at that time, now measuring 3.9 x 3.4 x 6.7 cm    We will admit the patient to the telemetry floor.  We will consult cardiology about the non-ST elevation MI.  We will  give the patient aspirin and Lipitor.  Please note that a heparin drip was started while in the emergency department after the emergency department attending discussed the patient's case with cardiology.  For the acute COPD exacerbation, I will start the patient on Solu-Medrol and scheduled breathing treatments.  The patient's not really producing sputum and thus I will not give him an antibiotic.  I have ordered an echocardiogram to see what the state of his heart is post non-ST elevation MI but also to see if he has any strain, in particular right ventricular strain, from pulmonary hypertension.  I asked emergency department to do a CT scan without contrast of his lungs; lateral left upper lobe spiculated mass is now in 1 dimension over 6 cm.  I have a suspicion that the weight loss and lack of appetite are due to a lung cancer that has been undiagnosed until now.  Therefore, I will consult pulmonology.    We will avoid QT prolonging agents and continue to monitor the electrolytes closely. In order to lessen the risk of worsening QTc, the goal potassium level is 4-4.5 and goal magnesium level is 2-2.2.    For the malnutrition, I will consult nutrition for an assessment.  I will start him on a multivitamin and Megace.  I am concerned that the patient is of advanced age, has unintentional weight loss, and has a prior history of the spiculated left upper lobe mass.  Therefore, I will consult palliative care, , PT, OT, and speech therapy.    VTE Prophylaxis:   Mechanical Order History:     None      Pharmalogical Order History:      Ordered     Dose Route Frequency Stop    11/01/22 0042  heparin (porcine) 5000 UNIT/ML injection 3,000 Units         60 Units/kg IV Once 11/01/22 0054    11/01/22 0042  heparin 20061 units/250 mL (100 units/mL) in 0.45 % NaCl infusion  5.98 mL/hr         12 Units/kg/hr IV Titrated --    11/01/22 0042  heparin (porcine) 5000 UNIT/ML injection 3,000 Units         60 Units/kg  IV As Needed --    11/01/22 0042  heparin (porcine) 5000 UNIT/ML injection 1,500 Units         30 Units/kg IV As Needed --              The patient is high risk due to the following diagnoses/reasons: Acute hypoxic respiratory failure    Disposition: Undetermined at this time    Rah Pastrana MD  [facility]  11/01/22  06:24 EDT        Electronically signed by Rah Pastrana MD at 11/01/22 0645       Vital Signs (last day)     Date/Time Temp Temp src Pulse Resp BP Patient Position SpO2    11/01/22 1433 98 (36.7) Oral 78 18 113/58 Lying 93    11/01/22 1246 -- -- -- -- -- -- 96    11/01/22 1045 98.2 (36.8) Oral 50 18 106/71 Lying 97    11/01/22 0837 -- -- 98 -- 127/60 -- --    11/01/22 0716 -- -- -- -- -- -- 96    11/01/22 0706 -- -- 97 18 -- -- 96    11/01/22 0629 97.8 (36.6) Oral 92 18 119/65 Lying 96    11/01/22 0523 -- -- -- -- -- -- 94    11/01/22 0522 -- -- -- -- -- -- 81    11/01/22 0500 97.9 (36.6) Oral 99 18 111/60 Lying 90    11/01/22 00:07:37 -- -- 92 -- -- -- 96    10/31/22 2346 -- -- 90 18 -- -- 91    10/31/22 2219 97.9 (36.6) Tympanic 92 16 120/67 Sitting 96          Intake & Output (last day)       10/31 0701  11/01 0700 11/01 0701 11/02 0700    P.O.  870    Total Intake(mL/kg)  870 (19)    Net  +870          Urine Unmeasured Occurrence  1 x        Lines, Drains & Airways     Active LDAs     Name Placement date Placement time Site Days    Peripheral IV 10/31/22 2320 Right Antecubital 10/31/22  2320  Antecubital  less than 1    Peripheral IV 11/01/22 0328 Left Antecubital 11/01/22  0328  Antecubital  less than 1                  Current Facility-Administered Medications   Medication Dose Route Frequency Provider Last Rate Last Admin   • [START ON 11/2/2022] aspirin EC tablet 81 mg  81 mg Oral Daily Rah Pastrana MD       • atorvastatin (LIPITOR) tablet 40 mg  40 mg Oral Nightly Rah Pastrana MD       • guaiFENesin (MUCINEX) 12 hr tablet 600 mg  600 mg Oral Q12H  Rah Pastrana MD   600 mg at 11/01/22 0839   • heparin (porcine) 5000 UNIT/ML injection 1,500 Units  30 Units/kg Intravenous PRN Rah Pastrana MD       • heparin (porcine) 5000 UNIT/ML injection 3,000 Units  60 Units/kg Intravenous PRN Rah Pastrana MD   3,000 Units at 11/01/22 0720   • heparin 30279 units/250 mL (100 units/mL) in 0.45 % NaCl infusion  12 Units/kg/hr Intravenous Titrated Rah Pastrana MD 7.98 mL/hr at 11/01/22 0722 16 Units/kg/hr at 11/01/22 0722   • ipratropium-albuterol (DUO-NEB) nebulizer solution 3 mL  3 mL Nebulization Q6H - RT Rah Pastrana MD   3 mL at 11/01/22 0706   • ipratropium-albuterol (DUO-NEB) nebulizer solution 3 mL  3 mL Nebulization Q4H PRN Rah Pastrana MD       • methylPREDNISolone sodium succinate (SOLU-Medrol) injection 62.5 mg  62.5 mg Intravenous Daily Rah Pastrana MD   62.5 mg at 11/01/22 0838   • multivitamin (THERAGRAN) tablet 1 tablet  1 tablet Oral Daily Rah Pastrana MD   1 tablet at 11/01/22 0839   • nitroglycerin (NITROSTAT) SL tablet 0.4 mg  0.4 mg Sublingual Q5 Min PRN Rah Pastrana MD       • Pharmacy Consult   Does not apply Once Rah Pastrana MD       • sodium chloride 0.9 % flush 3 mL  3 mL Intravenous Q12H Rah Pastrana MD       • sodium chloride 0.9 % flush 3-10 mL  3-10 mL Intravenous PRN Rah Pastrana MD       • thiamine (VITAMIN B-1) tablet 100 mg  100 mg Oral Daily Rah Pastrana MD   100 mg at 11/01/22 0839     Lab Results (most recent)     Procedure Component Value Units Date/Time    Troponin [[id number]]  (Abnormal) Collected: 11/01/22 1213    Specimen: Blood Updated: 11/01/22 1303     Troponin T 0.047 ng/mL      Comment: Specimen hemolyzed.  Results may be affected.       Narrative:      Troponin T Reference Range:  <= 0.03 ng/mL-   Negative for AMI  >0.03 ng/mL-     Abnormal for myocardial necrosis.  Clinicians would have to utilize clinical acumen, EKG, Troponin and serial changes to  determine if it is an Acute Myocardial Infarction or myocardial injury due to an underlying chronic condition.       Results may be falsely decreased if patient taking Biotin.      aPTT [[id number]]  (Normal) Collected: 11/01/22 1213    Specimen: Blood Updated: 11/01/22 1241     PTT 32.1 seconds     Narrative:      PTT Heparin Therapeutic Range:  59 - 95 seconds      Manual Differential [[id number]]  (Abnormal) Collected: 11/01/22 0545    Specimen: Blood Updated: 11/01/22 0857     Neutrophil % 69.0 %      Lymphocyte % 15.0 %      Monocyte % 4.0 %      Bands %  9.0 %      Metamyelocyte % 2.0 %      Myelocyte % 1.0 %      Neutrophils Absolute 8.42 10*3/mm3      Lymphocytes Absolute 1.62 10*3/mm3      Monocytes Absolute 0.43 10*3/mm3      RBC Morphology Normal     Hypersegmented Neutrophils Slight/1+     Toxic Granulation Mod/2+     Platelet Morphology Normal    Troponin [[id number]]  (Abnormal) Collected: 11/01/22 0649    Specimen: Blood Updated: 11/01/22 0731     Troponin T 0.036 ng/mL     Narrative:      Troponin T Reference Range:  <= 0.03 ng/mL-   Negative for AMI  >0.03 ng/mL-     Abnormal for myocardial necrosis.  Clinicians would have to utilize clinical acumen, EKG, Troponin and serial changes to determine if it is an Acute Myocardial Infarction or myocardial injury due to an underlying chronic condition.       Results may be falsely decreased if patient taking Biotin.      aPTT [[id number]]  (Normal) Collected: 11/01/22 0649    Specimen: Blood Updated: 11/01/22 0731     PTT 32.7 seconds     Narrative:      PTT Heparin Therapeutic Range:  59 - 95 seconds      CBC & Differential [[id number]]  (Abnormal) Collected: 11/01/22 0545    Specimen: Blood Updated: 11/01/22 0643    Narrative:      The following orders were created for panel order CBC & Differential.  Procedure                               Abnormality         Status                     ---------                               -----------         ------                      CBC Auto Differential[[id number]]        Abnormal            Final result               Scan Slide[[id number]]                                                                    Please view results for these tests on the individual orders.    CBC Auto Differential [[id number]]  (Abnormal) Collected: 11/01/22 0545    Specimen: Blood Updated: 11/01/22 0643     WBC 10.80 10*3/mm3      RBC 5.72 10*6/mm3      Hemoglobin 15.5 g/dL      Hematocrit 47.9 %      MCV 83.7 fL      MCH 27.1 pg      MCHC 32.4 g/dL      RDW 15.2 %      RDW-SD 45.7 fl      MPV 12.6 fL      Platelets 181 10*3/mm3     Protime-INR [[id number]]  (Normal) Collected: 11/01/22 0545    Specimen: Blood Updated: 11/01/22 0614     Protime 13.2 Seconds      INR 0.98    Narrative:      Suggested INR therapeutic range for stable oral anticoagulant therapy:    Low Intensity therapy:   1.5-2.0  Moderate Intensity therapy:   2.0-3.0  High Intensity therapy:   2.5-4.0    TSH [[id number]]  (Normal) Collected: 11/01/22 0325    Specimen: Blood from Arm, Left Updated: 11/01/22 0455     TSH 0.664 uIU/mL     Hemoglobin A1c [[id number]]  (Abnormal) Collected: 10/31/22 2341    Specimen: Blood from Arm, Left Updated: 11/01/22 0450     Hemoglobin A1C 6.00 %     Narrative:      Hemoglobin A1C Ranges:    Increased Risk for Diabetes  5.7% to 6.4%  Diabetes                     >= 6.5%  Diabetic Goal                < 7.0%    Magnesium [[id number]]  (Abnormal) Collected: 11/01/22 0325    Specimen: Blood from Arm, Left Updated: 11/01/22 0442     Magnesium 2.8 mg/dL     Manual Differential [[id number]]  (Abnormal) Collected: 10/31/22 2341    Specimen: Blood from Arm, Left Updated: 11/01/22 0039     Neutrophil % 71.0 %      Lymphocyte % 10.0 %      Monocyte % 13.0 %      Bands %  5.0 %      Metamyelocyte % 1.0 %      Neutrophils Absolute 7.12 10*3/mm3      Lymphocytes Absolute 0.94 10*3/mm3      Monocytes Absolute 1.22 10*3/mm3      RBC Morphology Normal     Dohle Bodies Present      Toxic Granulation Slight/1+     Vacuolated Neutrophils Slight/1+     Large Platelets Slight/1+    Comprehensive Metabolic Panel [[id number]]  (Abnormal) Collected: 10/31/22 2341    Specimen: Blood from Arm, Left Updated: 11/01/22 0012     Glucose 131 mg/dL      BUN 71 mg/dL      Creatinine 1.39 mg/dL      Sodium 135 mmol/L      Potassium 4.1 mmol/L      Chloride 95 mmol/L      CO2 22.6 mmol/L      Calcium 9.3 mg/dL      Total Protein 7.0 g/dL      Albumin 2.99 g/dL      ALT (SGPT) 24 U/L      AST (SGOT) 27 U/L      Alkaline Phosphatase 77 U/L      Total Bilirubin 0.6 mg/dL      Globulin 4.0 gm/dL      A/G Ratio 0.7 g/dL      BUN/Creatinine Ratio 51.1     Anion Gap 17.4 mmol/L      eGFR 49.7 mL/min/1.73      Comment: National Kidney Foundation and American Society of Nephrology (ASN) Task Force recommended calculation based on the Chronic Kidney Disease Epidemiology Collaboration (CKD-EPI) equation refit without adjustment for race.       Narrative:      GFR Normal >60  Chronic Kidney Disease <60  Kidney Failure <15    The GFR formula is only valid for adults with stable renal function between ages 18 and 70.    CBC & Differential [[id number]]  (Abnormal) Collected: 10/31/22 2341    Specimen: Blood from Arm, Left Updated: 10/31/22 2354    Narrative:      The following orders were created for panel order CBC & Differential.  Procedure                               Abnormality         Status                     ---------                               -----------         ------                     CBC Auto Differential[[id number]]        Abnormal            Final result               Scan Slide[[id number]]                                                                    Please view results for these tests on the individual orders.    CBC Auto Differential [[id number]]  (Abnormal) Collected: 10/31/22 2341    Specimen: Blood from Arm, Left Updated: 10/31/22 2354     WBC 9.37 10*3/mm3      RBC 5.60 10*6/mm3       Hemoglobin 15.1 g/dL      Hematocrit 46.8 %      MCV 83.6 fL      MCH 27.0 pg      MCHC 32.3 g/dL      RDW 14.9 %      RDW-SD 45.5 fl      MPV 12.1 fL      Platelets 215 10*3/mm3     COVID-19 and FLU A/B PCR - Swab, Nasopharynx [[id number]]  (Normal) Collected: 10/31/22 2310    Specimen: Swab from Nasopharynx Updated: 10/31/22 2334     COVID19 Not Detected     Influenza A PCR Not Detected     Influenza B PCR Not Detected    Narrative:      Fact sheet for providers: https://www.fda.gov/media/986867/download    Fact sheet for patients: https://www.fda.gov/media/448700/download    Test performed by PCR.    Blood Gas, Arterial With Co-Ox [[id number]]  (Abnormal) Collected: 10/31/22 2325    Specimen: Arterial Blood Updated: 10/31/22 2328     Site Left Brachial     David's Test N/A     pH, Arterial 7.389 pH units      pCO2, Arterial 38.3 mm Hg      pO2, Arterial 56.8 mm Hg      Comment: 84 Value below reference range        HCO3, Arterial 23.1 mmol/L      Base Excess, Arterial -1.6 mmol/L      O2 Saturation, Arterial 88.7 %      Comment: 84 Value below reference range        Hemoglobin, Blood Gas 15.2 g/dL      Hematocrit, Blood Gas 46.7 %      Oxyhemoglobin 87.4 %      Comment: 84 Value below reference range        Methemoglobin 0.40 %      Carboxyhemoglobin 1.1 %      A-a DO2 42.8 mmHg      CO2 Content 24.3 mmol/L      Temperature 0.0 C      Barometric Pressure for Blood Gas 727 mmHg      Modality Room Air     FIO2 21 %      Ventilator Mode NA     Note Read back and acknowledge     Notified Who DR GONZALES // RN     Notified By JACOB     Collected by [id number]     Comment: Meter: [id number]     :  [id number]        pH, Temp Corrected --     pCO2, Temperature Corrected --     pO2, Temperature Corrected --          Imaging Results (Most Recent)     Procedure Component Value Units Date/Time    FL Video Swallow Single Contrast [[id number]] Collected: 11/01/22 1035     Updated: 11/01/22 1152    Narrative:      FL VIDEO  SWALLOW SINGLE-CONTRAST-     CLINICAL INDICATION: aspiration; I21.4-Non-ST elevation (NSTEMI)  myocardial infarction; J44.1-Chronic obstructive pulmonary disease with  (acute) exacerbation        TECHNIQUE:   Speech therapy service was present. The patient was examined in the  sitting lateral position and was given several consistencies of barium.     FINDINGS: Intermittent silent aspiration with thin liquids.     FLUOROSCOPY TIME: 1.8 minutes     Images: Cine loop was acquired       Impression:      Intermittent silent aspiration with thin liquids.     For additional information please see the report provided by the speech  therapy service     This report was finalized on 11/1/2022 11:50 AM by Dr. Barrie Guzman MD.       CT Chest With Contrast Diagnostic [[id number]] Collected: 11/01/22 0436     Updated: 11/01/22 0438    Narrative:      CT Chest W    INDICATION:   Myocardial infarction. COPD. Cough and wheezing. Weight loss.    TECHNIQUE:   CT of the thorax with IV contrast. Coronal and sagittal reconstructions were obtained.  Radiation dose reduction techniques included automated exposure control or exposure modulation based on body size. Count of known CT and cardiac nuc med studies  performed in previous 12 months: 0.     COMPARISON:   8/16/2018    FINDINGS:  There is atherosclerotic disease and coronary artery disease. There is dilatation of the aortic root at 4.6 cm. This is unchanged. There is ectasia of the ascending aorta at 3.7 cm, also unchanged. There is no pleural or pericardial effusion. Upper  abdominal images show numerous surgical clips along the lesser curvature of the stomach. Gallbladder is surgically absent. There is no adenopathy by size criteria. There are no suspicious osseous lesions.    Lung windows show COPD. There is fibrosis and cavitation in the left lower lobe that appears slightly worsened. There is a large lobular mass in the left upper lobe abutting the mediastinum. This was  previously a spiculated nodule measuring about 1.5 cm.  The mass now measures approximately 3.9 x 3.4 x 6.7 cm. PET/CT is once again recommended. There is a 5 mm noncalcified nodule in the left upper lobe on image 31., And there is a 3 mm left upper lobe nodule on image 23. These appear to be present on the  old study and are felt to be benign. There is some mild fibrosis in the right middle lobe. There is bronchial wall thickening in both lungs compatible with bronchitis. There are some tree-in-bud type infiltrates in the right lung compatible with  bronchiolitis.      Impression:        1. 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 PET CT is recommended for follow-up.  2. Severe fibrosis and cavitation in the left lower lobe appears worsened.  3. COPD and bronchitis. There is evidence of bronchiolitis in the right lung.  4. Stable dilatation of the aortic root measuring 4.6 cm.    Signer Name: Rommel Umanzor MD   Signed: 11/1/2022 4:36 AM   Workstation Name: [facility]    Radiology [facility]    XR Forearm 2 View Left [[id number]] Collected: 10/31/22 2305     Updated: 10/31/22 2308    Narrative:      CR Forearm 2 Vws LT    INDICATION:   Pain after fall.    COMPARISON:   None available.    FINDINGS:   AP and lateral views of the left forearm. Please note that the AP forearm view is included in the chest series today. No fracture or dislocation.  No bone erosion or destruction. Articulation at the elbow and wrist is anatomic.  No foreign body. There is  degenerative change at the wrist.      Impression:      No acute fracture or malalignment.    Signer Name: Rommel Umanzor MD   Signed: 10/31/2022 11:05 PM   Workstation Name: [facility]    Radiology [facility]    XR Chest AP [[id number]] Collected: 10/31/22 2304     Updated: 10/31/22 2306    Narrative:      CR Chest 1 Vw    INDICATION:   Dyspnea.     COMPARISON:    2/10/2022    FINDINGS:  Portable AP  view(s) of the chest.  Cardiac and mediastinal contours are normal. There is atherosclerotic disease in the aorta. Severe COPD is noted with chronic scarring in the bases. Scattered granulomatous calcifications are present. No acute  infiltrates or pneumothorax.      Impression:      No acute cardiopulmonary findings. Severe COPD.    Signer Name: Rommel Uamnzor MD   Signed: 10/31/2022 11:04 PM   Workstation Name: [facility]    [facility]        Operative/Procedure Notes (most recent note)    No notes of this type exist for this encounter.         Physician Progress Notes (most recent note)    No notes of this type exist for this encounter.            Consult Notes (most recent note)      Ofelia Mohan, DAVID at 11/01/22 1215      Consult Orders    1. Inpatient Palliative Care MD Consult [[id number]] ordered by Rah Pastrana MD at 11/01/22 0541               Palliative Care Initial Consult     Attending Physician: Fabrice Hartman,*  Referring Provider: Rah Pastrana     Palliative care reason for consult: GOC/ACP support for pt and family  Code Status:   Code Status and Medical Interventions:   Ordered at: 11/01/22 0228     Level Of Support Discussed With:    Patient     Code Status (Patient has no pulse and is not breathing):    CPR (Attempt to Resuscitate)     Medical Interventions (Patient has pulse or is breathing):    Full Support      Advanced Directives: Advance Directive Status: Patient does not have advance directive   Healthcare surrogate: ARJUN Reyes and Nephgeovanny Mosqueda  Goals of Care: Sarbjit states that he does not want to be resuscitated or to be put on a ventilator, furthermore states that he does not want further workup/treatment and would like to return home at time of discharge.    HPI:  Sarbjit Martin is a 87 y.o. male admitted on 10/31/2022 with shortness of breath. He has a medical history of COPD with centrilobular emphysema (does not require oxygen  at home), left upper lobe 15 mm spiculated nodule noted in 2018 with a negative fine-needle aspiration, heart failure with preserved ejection fraction, moderate pulmonary hypertension, and past E. coli bacteremia due to common bile duct stone. Per Leamon he says he has not felt well for several weeks and has been short of breath and has had a mostly non productive cough. He said he has just felt weak and tired and has not had an appetite for months now and has lost around 20 lbs. Upon admission pts x-ray did not show pneumonia, however he did have elevated troponin's and some ST elevation in septal and anterior leads. Due to suspect  type II non-ST elevation MI due to the acute hypoxia from the acute COPD exacerbation, the patient was admitted to the telemetry floor for further evaluation and treatment.        ROS: Negative except as above in HPI.     Past Medical History:   Diagnosis Date   • Angiodysplasia of the colon    • Arthritis    • Bacteremia due to Escherichia coli 06/28/2017    Same time as the common bile duct stone   • Benign prostatic hyperplasia without lower urinary tract symptoms 06/30/2021   • Bile duct calculus 06/24/2017   • 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HCC)     Centrilobular   • Diverticulosis    • GERD (gastroesophageal reflux disease)    • Heart failure with preserved ejection fraction (HCC)    • Left upper lobe pulmonary nodule 01/30/2019    15 mm and spiculated in 2018; FNA was negative   • Moderate pulmonary hypertension (HCC)    • Status post laparoscopic cholecystectomy 06/15/2017     Past Surgical History:   Procedure Laterality Date   • BRONCHOSCOPY N/A 8/23/2018    Procedure: BRONCHOSCOPY WITH ENDOBRONCHIAL ULTRASOUND;  Surgeon: Saravanan Méndez MD;  Location:  [facility] OR;  Service: Pulmonary   • CHOLECYSTECTOMY N/A 6/16/2017    Procedure: CHOLECYSTECTOMY LAPAROSCOPIC;  Surgeon: Jose Velazquez MD;  Location: [facility] OR;  Service:    • EYE SURGERY Bilateral     • INGUINAL HERNIA REPAIR Left 2013    left   • OK ERCP DX COLLECTION SPECIMEN BRUSHING/WASHING N/A 2017    Procedure: ENDOSCOPIC RETROGRADE CHOLANGIOPANCREATOGRAPHY;  Surgeon: Rod Francis MD;  Location: [facility] OR;  Service: Gastroenterology   • OK ERCP DX COLLECTION SPECIMEN BRUSHING/WASHING N/A 10/26/2017    Procedure: ENDOSCOPIC RETROGRADE CHOLANGIOPANCREATOGRAPHY;  Surgeon: Rod Francis MD;  Location: [facility] OR;  Service: Gastroenterology     Social History     Socioeconomic History   • Marital status:    Tobacco Use   • Smoking status: Former     Years: 10.00     Types: Cigarettes     Quit date:      Years since quittin.8   • Smokeless tobacco: Former     Quit date: 1995   Substance and Sexual Activity   • Alcohol use: No   • Drug use: No   • Sexual activity: Defer     Family History   Problem Relation Age of Onset   • No Known Problems Mother    • Diabetes Father    • Hypertension Father    • Stroke Father    • Diabetes Sister        Allergies   Allergen Reactions   • Penicillins Anaphylaxis       Current Facility-Administered Medications   Medication Dose Route Frequency Provider Last Rate Last Admin   • [START ON 2022] aspirin EC tablet 81 mg  81 mg Oral Daily Rah Pastrana MD       • atorvastatin (LIPITOR) tablet 40 mg  40 mg Oral Nightly Rah Pastrana MD       • guaiFENesin (MUCINEX) 12 hr tablet 600 mg  600 mg Oral Q12H Rah Pastrana MD   600 mg at 22 0839   • heparin (porcine) 5000 UNIT/ML injection 1,500 Units  30 Units/kg Intravenous PRN Rah Pastrana MD       • heparin (porcine) 5000 UNIT/ML injection 3,000 Units  60 Units/kg Intravenous PRN Rah Pastrana MD   3,000 Units at 22 0720   • heparin 99203 units/250 mL (100 units/mL) in 0.45 % NaCl infusion  12 Units/kg/hr Intravenous Titrated Rah Pastrana MD 7.98 mL/hr at 22 0722 16 Units/kg/hr at 22 0722   • ipratropium-albuterol (DUO-NEB)  "nebulizer solution 3 mL  3 mL Nebulization Q6H - RT Rah Pastrana MD   3 mL at 11/01/22 0706   • ipratropium-albuterol (DUO-NEB) nebulizer solution 3 mL  3 mL Nebulization Q4H PRN Rah Pastrana MD       • methylPREDNISolone sodium succinate (SOLU-Medrol) injection 62.5 mg  62.5 mg Intravenous Daily Rah Pastrana MD   62.5 mg at 11/01/22 0838   • multivitamin (THERAGRAN) tablet 1 tablet  1 tablet Oral Daily Rah Pastrana MD   1 tablet at 11/01/22 0839   • nitroglycerin (NITROSTAT) SL tablet 0.4 mg  0.4 mg Sublingual Q5 Min PRN Rah Pastrana MD       • Pharmacy Consult   Does not apply Once Rah Pastrana MD       • sodium chloride 0.9 % flush 3 mL  3 mL Intravenous Q12H Rah Pastrana MD       • sodium chloride 0.9 % flush 3-10 mL  3-10 mL Intravenous PRN Rah Pastrana MD       • thiamine (VITAMIN B-1) tablet 100 mg  100 mg Oral Daily Rah Pastrana MD   100 mg at 11/01/22 0839     heparin, 12 Units/kg/hr, Last Rate: 16 Units/kg/hr (11/01/22 0722)      •  heparin (porcine)  •  heparin (porcine)  •  ipratropium-albuterol  •  nitroglycerin  •  sodium chloride    Current medication reviewed for route, type, dose and frequency and are current per MAR.    Palliative Performance Scale Score:     /71 (BP Location: Left arm, Patient Position: Lying)   Pulse 50   Temp 98.2 °F (36.8 °C) (Oral)   Resp 18   Ht 167.6 cm (66\")   Wt 45.9 kg (101 lb 4.8 oz)   SpO2 97%   BMI 16.35 kg/m²     Intake/Output Summary (Last 24 hours) at 11/1/2022 1216  Last data filed at 11/1/2022 0900  Gross per 24 hour   Intake 410 ml   Output --   Net 410 ml       PE:  General Appearance:    Chronically ill appearing, thin frail, alert, cooperative, NAD   HEENT:    NC/AT, without obvious abnormality, EOMI, anicteric    Neck:   supple, trachea midline, no JVD   Lungs:     Coarse upper lobes sounds, diminished bases, cough    Heart:    Bradycardia, normal S1 and S2, no M/R/G   Abdomen:     Soft, NT, " ND, NABS    Extremities:   Moves all extremities, no edema   Pulses:   Pulses palpable and equal bilaterally   Skin:   Warm, dry, fragile   Neurologic:   A/Ox3, cooperative   Psych:   Calm, appropriate       Labs:   Results from last 7 days   Lab Units 11/01/22  0545   WBC 10*3/mm3 10.80   HEMOGLOBIN g/dL 15.5   HEMATOCRIT % 47.9   PLATELETS 10*3/mm3 181     Results from last 7 days   Lab Units 10/31/22  2341   SODIUM mmol/L 135*   POTASSIUM mmol/L 4.1   CHLORIDE mmol/L 95*   CO2 mmol/L 22.6   BUN mg/dL 71*   CREATININE mg/dL 1.39*   GLUCOSE mg/dL 131*   CALCIUM mg/dL 9.3     Results from last 7 days   Lab Units 10/31/22  2341   SODIUM mmol/L 135*   POTASSIUM mmol/L 4.1   CHLORIDE mmol/L 95*   CO2 mmol/L 22.6   BUN mg/dL 71*   CREATININE mg/dL 1.39*   CALCIUM mg/dL 9.3   BILIRUBIN mg/dL 0.6   ALK PHOS U/L 77   ALT (SGPT) U/L 24   AST (SGOT) U/L 27   GLUCOSE mg/dL 131*     Imaging Results (Last 72 Hours)     Procedure Component Value Units Date/Time    FL Video Swallow Single Contrast [[id number]] Collected: 11/01/22 1035     Updated: 11/01/22 1152    Narrative:      FL VIDEO SWALLOW SINGLE-CONTRAST-     CLINICAL INDICATION: aspiration; I21.4-Non-ST elevation (NSTEMI)  myocardial infarction; J44.1-Chronic obstructive pulmonary disease with  (acute) exacerbation        TECHNIQUE:   Speech therapy service was present. The patient was examined in the  sitting lateral position and was given several consistencies of barium.     FINDINGS: Intermittent silent aspiration with thin liquids.     FLUOROSCOPY TIME: 1.8 minutes     Images: Cine loop was acquired       Impression:      Intermittent silent aspiration with thin liquids.     For additional information please see the report provided by the speech  therapy service     This report was finalized on 11/1/2022 11:50 AM by Dr. Barrie Guzman MD.       CT Chest With Contrast Diagnostic [[id number]] Collected: 11/01/22 0436     Updated: 11/01/22 0438    Narrative:      CT Chest  W    INDICATION:   Myocardial infarction. COPD. Cough and wheezing. Weight loss.    TECHNIQUE:   CT of the thorax with IV contrast. Coronal and sagittal reconstructions were obtained.  Radiation dose reduction techniques included automated exposure control or exposure modulation based on body size. Count of known CT and cardiac nuc med studies  performed in previous 12 months: 0.     COMPARISON:   8/16/2018    FINDINGS:  There is atherosclerotic disease and coronary artery disease. There is dilatation of the aortic root at 4.6 cm. This is unchanged. There is ectasia of the ascending aorta at 3.7 cm, also unchanged. There is no pleural or pericardial effusion. Upper  abdominal images show numerous surgical clips along the lesser curvature of the stomach. Gallbladder is surgically absent. There is no adenopathy by size criteria. There are no suspicious osseous lesions.    Lung windows show COPD. There is fibrosis and cavitation in the left lower lobe that appears slightly worsened. There is a large lobular mass in the left upper lobe abutting the mediastinum. This was previously a spiculated nodule measuring about 1.5 cm.  The mass now measures approximately 3.9 x 3.4 x 6.7 cm. PET/CT is once again recommended. There is a 5 mm noncalcified nodule in the left upper lobe on image 31., And there is a 3 mm left upper lobe nodule on image 23. These appear to be present on the  old study and are felt to be benign. There is some mild fibrosis in the right middle lobe. There is bronchial wall thickening in both lungs compatible with bronchitis. There are some tree-in-bud type infiltrates in the right lung compatible with  bronchiolitis.      Impression:        1. 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 PET CT is recommended for follow-up.  2. Severe fibrosis and cavitation in the left lower lobe appears worsened.  3. COPD and bronchitis. There is evidence of bronchiolitis in the right  lung.  4. Stable dilatation of the aortic root measuring 4.6 cm.    Signer Name: Rommel Umanzor MD   Signed: 11/1/2022 4:36 AM   Workstation Name: [facility]    XR Forearm 2 View Left [[id number]] Collected: 10/31/22 2305     Updated: 10/31/22 2308    Narrative:      CR Forearm 2 Vws LT    INDICATION:   Pain after fall.    COMPARISON:   None available.    FINDINGS:   AP and lateral views of the left forearm. Please note that the AP forearm view is included in the chest series today. No fracture or dislocation.  No bone erosion or destruction. Articulation at the elbow and wrist is anatomic.  No foreign body. There is  degenerative change at the wrist.      Impression:      No acute fracture or malalignment.    Signer Name: Rommel Umanzor MD   Signed: 10/31/2022 11:05 PM   Workstation Name: [facility]    XR Chest AP [[id number]] Collected: 10/31/22 2304     Updated: 10/31/22 2306    Narrative:      CR Chest 1 Vw    INDICATION:   Dyspnea.     COMPARISON:    2/10/2022    FINDINGS:  Portable AP view(s) of the chest.  Cardiac and mediastinal contours are normal. There is atherosclerotic disease in the aorta. Severe COPD is noted with chronic scarring in the bases. Scattered granulomatous calcifications are present. No acute  infiltrates or pneumothorax.      Impression:      No acute cardiopulmonary findings. Severe COPD.    Signer Name: Rommel Umanzor MD   Signed: 10/31/2022 11:04 PM   Workstation Name: [facility]          Diagnostics: Reviewed    A: Sarbjit Martin is a 87 y.o. male admitted on 10/31/2022 with shortness of breath. He has a medical history of COPD with centrilobular emphysema (does not require oxygen at home), left upper lobe 15 mm spiculated nodule noted in 2018 with a negative fine-needle aspiration, heart failure with preserved ejection fraction, moderate pulmonary hypertension, and past  E. coli bacteremia due to common bile duct stone. Per Sarbjit he says he has not felt well for several weeks and has been short of breath and has had a mostly non productive cough. He said he has just felt weak and tired and has not had an appetite for months now and has lost around 20 lbs. Upon admission pts x-ray did not show pneumonia, however he did have elevated troponin's and some ST elevation in septal and anterior leads. Due to suspect  type II non-ST elevation MI due to the acute hypoxia from the acute COPD exacerbation, the patient was admitted to the telemetry floor for further evaluation and treatment.           P: Palliative care was consulted to discuss GOC/ACP and support for pt and family.  I was able to speak with Sarbjit regarding what his goals for his treatment, goals for his care. Per discussion with Dr Hartman at bedside pt does not want resuscitation or to be put on a ventilator and does not wish to have further work-up for his condition. Sarbjit reiterated his wishes to me as well and would just like to return home. I did speak with him about getting services at home that could help with getting oxygen and other equipment, as well as medication, a nurse to see him and they would have a  if he should need anything further. I did reach out to nephew Jesse to express my concern that Sarbjit will be needing more support/help from extra services and family in the near future. He stated that he and his cousin Panda would discuss it and would be more available and check on Sarbjit. I let him know that I put a Palliative home referral in for Sarbjit at time of discharge and spoke with Amy in SS.    We appreciate the consult and the opportunity to participate in Sarbjit Martin's care. We will continue to follow along. Please do not hesitate to contact us regarding further symptom management or goals of care needs, including after hours or on weekends via our on call provider at [phone].      Time: 30 minutes spent reviewing medical and medication records, assessing and examining patient, discussing with family, answering questions, providing some guidance about a plan and documentation of care, and coordinating care with other healthcare members, with > 50% time spent face to face.     DAVID Alanis    11/1/2022      Electronically signed by Ofelia Mohan APRN at 11/01/22 1306          Nutrition Notes (most recent note)      Blanche Sin RD at 11/01/22 1514          Malnutrition Severity Assessment      Patient meets criteria for : Severe Malnutrition  Malnutrition Type (last 8 hours)     Malnutrition Severity Assessment     Row Name 11/01/22 1502       Malnutrition Severity Assessment    Malnutrition Type Chronic Disease - Related Malnutrition    Row Name 11/01/22 1502       Insufficient Energy Intake     Insufficient Energy Intake Findings Severe    Insufficient Energy Intake  <75% of est. energy requirement for > or equal to 3 months    Row Name 11/01/22 1502       Unintentional Weight Loss     Unintentional Weight Loss Findings Severe    Unintentional Weight Loss  Weight loss greater than 10% in six months  16.5 %    Row Name 11/01/22 1502       Muscle Loss    Loss of Muscle Mass Findings Severe    [place] Region Severe - deep hollowing/scooping, lack of muscle to touch, facial bones well defined    Clavicle Bone Region Severe - protruding prominent bone    Acromion Bone Region Severe - squared shoulders, bones, and acromion process protrusion prominent    Scapular Bone Region Severe - prominent bones, depressions easily visible between ribs, scapula, spine, shoulders    Dorsal Hand Region Severe - prominent depression    Patellar Region Severe - prominent bone, square looking, very little muscle definition    Anterior Thigh Region Severe - line/depression along thigh, obviously thin    Posterior Calf Region Severe - thin with very little definition/firmness    Row Name 11/01/22  1502       Fat Loss    Subcutaneous Fat Loss Findings Severe    Orbital Region  Severe - pronounced hollowness/depression, dark circles, loose saggy skin    Upper Arm Region Severe - mostly skin, very little space between folds, fingers touch    Thoracic & Lumbar Region Severe - ribs visible with prominent depressions, iliac crest very prominent    Row Name 22 1502       Criteria Met (Must meet criteria for severity in at least 2 of these categories: M Wasting, Fat Loss, Fluid, Secondary Signs, Wt. Status, Intake)    Patient meets criteria for  Severe Malnutrition                     Electronically signed by Blanche Sin RD at 22 1514          Physical Therapy Notes (most recent note)      Nabor Thomas, PT at 22 0943  Version 1 of 1         Acute Care - Physical Therapy Initial Evaluation   Ted     Patient Name: Sarbjit Martin  : 1935  MRN: [id number]  Today's Date: 2022      Visit Dx:     ICD-10-CM ICD-9-CM   1. NSTEMI (non-ST elevated myocardial infarction) ([facility])  I21.4 410.70   2. COPD exacerbation ([facility])  J44.1 491.21     Patient Active Problem List   Diagnosis   • Status post laparoscopic cholecystectomy   • COPD (chronic obstructive pulmonary disease) ([facility])   • Abnormal CT of the chest   • Pulmonary nodule   • Benign prostatic hyperplasia without lower urinary tract symptoms   • NSTEMI (non-ST elevated myocardial infarction) ([facility])   • COPD exacerbation ([facility])   • Underweight     Past Medical History:   Diagnosis Date   • Angiodysplasia of the colon    • Arthritis    • Bacteremia due to Escherichia coli 2017    Same time as the common bile duct stone   • Benign prostatic hyperplasia without lower urinary tract symptoms 2021   • Bile duct calculus 2017   • 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[facility])     Centrilobular   • Diverticulosis    • GERD (gastroesophageal reflux disease)    • Heart failure with preserved ejection fraction  ([facility])    • Left upper lobe pulmonary nodule 01/30/2019    15 mm and spiculated in 2018; FNA was negative   • Moderate pulmonary hypertension ([facility])    • Status post laparoscopic cholecystectomy 06/15/2017     Past Surgical History:   Procedure Laterality Date   • BRONCHOSCOPY N/A 8/23/2018    Procedure: BRONCHOSCOPY WITH ENDOBRONCHIAL ULTRASOUND;  Surgeon: Saravanan Méndez MD;  Location: [facility] OR;  Service: Pulmonary   • CHOLECYSTECTOMY N/A 6/16/2017    Procedure: CHOLECYSTECTOMY LAPAROSCOPIC;  Surgeon: Jose Velazquez MD;  Location: [facility] OR;  Service:    • EYE SURGERY Bilateral 2013   • INGUINAL HERNIA REPAIR Left 2013    left   • IL ERCP DX COLLECTION SPECIMEN BRUSHING/WASHING N/A 6/26/2017    Procedure: ENDOSCOPIC RETROGRADE CHOLANGIOPANCREATOGRAPHY;  Surgeon: Rod Francis MD;  Location: [facility] OR;  Service: Gastroenterology   • IL ERCP DX COLLECTION SPECIMEN BRUSHING/WASHING N/A 10/26/2017    Procedure: ENDOSCOPIC RETROGRADE CHOLANGIOPANCREATOGRAPHY;  Surgeon: Rod Francis MD;  Location: [facility] OR;  Service: Gastroenterology     PT Assessment (last 12 hours)     PT Evaluation and Treatment     Row Name 11/01/22 0900          Physical Therapy Time and Intention    Subjective Information no complaints  -KM     Document Type evaluation  -KM     Mode of Treatment individual therapy;physical therapy  -KM     Patient Effort good  -KM     Symptoms Noted During/After Treatment --  loss of balance  -KM     Row Name 11/01/22 0900          General Information    Patient Profile Reviewed yes  -KM     Patient Observations alert;cooperative;agree to therapy  -KM     Prior Level of Function independent:;all household mobility;ADL's  -KM     Existing Precautions/Restrictions fall  -KM     Limitations/Impairments safety/cognitive  -KM     Equipment Issued to Patient gait belt  -KM     Risks Reviewed patient:;LOB;nausea/vomiting;dizziness;increased discomfort  -KM     Benefits Reviewed patient:;improve  function;increase independence;increase strength;increase balance  -[facility] Name 11/01/22 0900          Living Environment    Current Living Arrangements home  -KM     People in Home alone  -KM     Primary Care Provided by self  -[facility] Name 11/01/22 0900          Home Use of Assistive/Adaptive Equipment    Equipment Currently Used at Home cane, straight  -SONDRA     [facility] Name 11/01/22 0900          Cognition    Affect/Mental Status (Cognition) [facility]     Orientation Status (Cognition) oriented x 4;verbal cues/prompts needed for orientation  -     Follows Commands (Cognition) follows one-step commands  [facility] Name 11/01/22 0900          Range of Motion (ROM)    Range of Motion bilateral lower extremities;ROM is WFL  -KM     Row Name 11/01/22 0900          Strength (Manual Muscle Testing)    Strength (Manual Muscle Testing) bilateral lower extremities;strength is [facility] Name 11/01/22 0900          Bed Mobility    Bed Mobility bed mobility (all) activities  -     All Activities, [employer] (Bed Mobility) standby assist  [facility]     Assistive Device (Bed Mobility) head of bed elevated;bed rails  -[facility] Name 11/01/22 0900          Transfers    Transfers sit-stand transfer;stand-sit transfer;bed-chair transfer  -[facility] Name 11/01/22 0900          Bed-Chair Transfer    Bed-Chair [employer] (Transfers) contact guard  -     Assistive Device (Bed-Chair Transfers) cane, straight  SONDRA     [facility] Name 11/01/22 0900          Sit-Stand Transfer    Sit-Stand [employer] (Transfers) contact guard  -KM     Row Name 11/01/22 0900          Stand-Sit Transfer    Stand-Sit [employer] (Transfers) contact guard  [facility]     Assistive Device (Stand-Sit Transfers) cane, straight  -SONDRA     [facility] Name 11/01/22 0900          Gait/Stairs (Locomotion)    Gait/Stairs Locomotion gait/ambulation independence;gait/ambulation assistive device;distance ambulated  -     [employer] Level (Gait) contact guard  -      Assistive Device (Gait) cane, straight  -KM     Distance in Feet (Gait) 260'  -KM     Pattern (Gait) step-through  -KM     Deviations/Abnormal Patterns (Gait) ataxic;base of support, narrow;gait speed decreased  -KM     Bilateral Gait Deviations decreased arm swing;forward flexed posture  -KM     Row Name 11/01/22 0900          Safety Issues, Functional Mobility    Safety Issues Affecting Function (Mobility) insight into deficits/self-awareness  -KM     Impairments Affecting Function (Mobility) balance;postural/trunk control  -KM     Row Name 11/01/22 0900          Balance    Balance Assessment sitting static balance;standing dynamic balance  -KM     Static Sitting Balance supervision  -KM     Position, Sitting Balance sitting edge of bed  -KM     Dynamic Standing Balance contact guard  -KM     Position/Device Used, Standing Balance cane, straight  -KM     Balance Interventions dynamic  -KM     Row Name 11/01/22 0900          Plan of Care Review    Plan of Care Reviewed With patient  -KM     Outcome Evaluation Pt. evaluation completed during PT session. He was able to perform functional mobility skills w/ CGA-Kay. He ambulated prolonged distance w/ CGA and SPC. Pt. had multiple losses of balance while ambulating. He stated that losing balance while ambulating is normal for him, which is concerning since he lives at home alone. He would benefit from using a RW for mobility and ambulation. Pt. would benefit from skilled PT services. Anticipated discharge TBD based on pt. function as he is followed during stay.  -     Row Name 11/01/22 0900          Therapy Assessment/Plan (PT)    Patient/Family Therapy Goals Statement (PT) get home and take care of self  -KM     Functional Level at Time of Evaluation (PT) CGA  -KM     PT Diagnosis (PT) decreased balance  -KM     Rehab Potential (PT) good, to achieve stated therapy goals  -KM     Criteria for Skilled Interventions Met (PT) yes;skilled treatment is necessary  -KM      Therapy Frequency (PT) 2 times/wk  2-5x/wk  -KM     Predicted Duration of Therapy Intervention (PT) until discharge  -     Problem List (PT) problems related to;balance  -KM     Activity Limitations Related to Problem List (PT) unable to ambulate safely;unable to transfer safely  -     Row Name 11/01/22 0900          Therapy Plan Review/Discharge Plan (PT)    Therapy Plan Review (PT) evaluation/treatment results reviewed;care plan/treatment goals reviewed;risks/benefits reviewed;patient  -     Row Name 11/01/22 0900          Physical Therapy Goals    Transfer Goal Selection (PT) transfer, PT goal 1  -KM     Gait Training Goal Selection (PT) gait training, PT goal 1  -     Row Name 11/01/22 0900          Transfer Goal 1 (PT)    Activity/Assistive Device (Transfer Goal 1, PT) sit-to-stand/stand-to-sit;bed-to-chair/chair-to-bed;walker, rolling  -KM     [place] Level/Cues Needed (Transfer Goal 1, PT) modified independence  w/o loss of balance  -KM     Time Frame (Transfer Goal 1, PT) by discharge  -     Row Name 11/01/22 0900          Gait Training Goal 1 (PT)    Activity/Assistive Device (Gait Training Goal 1, PT) gait (walking locomotion);assistive device use;walker, rolling  -KM     [place] Level (Gait Training Goal 1, PT) modified independence  -KM     Distance (Gait Training Goal 1, PT) 300'  w/o loss of balance  -KM     Time Frame (Gait Training Goal 1, PT) by discharge  [facility]           User Key  (r) = Recorded By, (t) = Taken By, (c) = Cosigned By    Initials Name Provider Type    Nabor Salcedo, PT Physical Therapist                Physical Therapy Education     Title: PT OT SLP Therapies (Done)     Topic: Physical Therapy (Done)     Point: Mobility training (Done)     Learning Progress Summary           Patient Acceptance, E,TB, VU by SONDRA at 11/1/2022 0940                   Point: Home exercise program (Done)     Learning Progress Summary           Patient Acceptance, E,TB, VU by SONDRA at  11/1/2022 0940                   Point: Body mechanics (Done)     Learning Progress Summary           Patient Acceptance, E,TB, VU by  at 11/1/2022 0940                   Point: Precautions (Done)     Learning Progress Summary           Patient Acceptance, E,TB, VU by  at 11/1/2022 0940                               User Key     Initials Effective Dates Name Provider Type Discipline     05/24/22 -  Nabor Thomas, PT Physical Therapist PT              PT Recommendation and Plan  Anticipated Discharge Disposition (PT):  (TBD as pt. mobility is followed during stay)  Planned Therapy Interventions (PT): balance training, bed mobility training, gait training, home exercise program, patient/family education, postural re-education, strengthening, stretching, transfer training  Therapy Frequency (PT): 2 times/wk (2-5x/wk)  Plan of Care Reviewed With: patient  Outcome Evaluation: Pt. evaluation completed during PT session. He was able to perform functional mobility skills w/ CGA-Kay. He ambulated prolonged distance w/ CGA and SPC. Pt. had multiple losses of balance while ambulating. He stated that losing balance while ambulating is normal for him, which is concerning since he lives at home alone. He would benefit from using a RW for mobility and ambulation. Pt. would benefit from skilled PT services. Anticipated discharge TBD based on pt. function as he is followed during stay.       Time Calculation:    PT Charges     Row Name 11/01/22 0921             Time Calculation    Start Time 0850  -KM      PT Received On 11/01/22  -KM      PT Goal Re-Cert Due Date 11/15/22  -KM            User Key  (r) = Recorded By, (t) = Taken By, (c) = Cosigned By    Initials Name Provider Type     Nabor Thomas, PT Physical Therapist              Therapy Charges for Today     Code Description Service Date Service Provider Modifiers Qty    63918831678 HC PT EVAL MOD COMPLEXITY 4 11/1/2022 Nabor Thomas, PT GP 1               Nabor  William, PT  11/1/2022      Electronically signed by Nabor Thomas, PT at 11/01/22 0944          Occupational Therapy Notes (most recent note)      Yeny Wallis, OT at 11/01/22 1423        Patient leaving unit for procedure.     Electronically signed by Yeny Wallis, OT at 11/01/22 1424          Speech Language Pathology Notes (most recent note)      Mony Martin MA,CCC-SLP at 11/01/22 1032        DYSPHAGIA THERAPY PLAN OF CARE:    Sarbjit Martin will benefit from formal dysphagia therapy x3-5 days per week at 15-60 minute sessions, as functionally tolerated, for 3-7 days, to address:    Long Term Goal:  Pt will accept least restrictive diet tolerance w/o overt s/s aspiration.     Short Term Goals:  1. Pt will perform OROM/GARDENIA exercises x3 sets x10 reps w/ min cues.  2. Pt will perform resistive breathing exercises x3 sets x5 reps w /resistance of 2-4 to improve respiratory support and control.   3. Pt will perform laryngeal adduction/elevation exercises x3 sets x10 reps w/ min cues.   4. Pt will perform CTAR exercises repetitive x3 sets x12 reps w/ mod cues.   5. Pt will participate in instrumental re-evaluation of swallowing fnx pending progress towards this poc.    *Goals to be added/modified as necessary.     Thank you-  Mony Martin M.A., CCC-SLP    Electronically signed by Mony Martin MA,CCC-SLP at 11/01/22 1033         ADL Documentation (most recent)    Flowsheet Row Most Recent Value   Transferring 2 - assistive person   Toileting 2 - assistive person   Bathing 2 - assistive person   Dressing 2 - assistive person   Eating 0 - independent   Communication 0 - understands/communicates without difficulty   Swallowing 2 - difficulty swallowing foods   Equipment Currently Used at Home cane, quad  [unknown provider]

## 2022-11-01 NOTE — THERAPY EVALUATION
Acute Care - Physical Therapy Initial Evaluation  AARON Jean     Patient Name: Sarbjit Martin  : 1935  MRN: [id number]  Today's Date: 2022      Visit Dx:     ICD-10-CM ICD-9-CM   1. NSTEMI (non-ST elevated myocardial infarction) (HCC)  I21.4 410.70   2. COPD exacerbation ([facility])  J44.1 491.21     Patient Active Problem List   Diagnosis   • Status post laparoscopic cholecystectomy   • COPD (chronic obstructive pulmonary disease) (HCC)   • Abnormal CT of the chest   • Pulmonary nodule   • Benign prostatic hyperplasia without lower urinary tract symptoms   • NSTEMI (non-ST elevated myocardial infarction) (HCC)   • COPD exacerbation (HCC)   • Underweight     Past Medical History:   Diagnosis Date   • Angiodysplasia of the colon    • Arthritis    • Bacteremia due to Escherichia coli 2017    Same time as the common bile duct stone   • Benign prostatic hyperplasia without lower urinary tract symptoms 2021   • Bile duct calculus 2017   • 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HCC)     Centrilobular   • Diverticulosis    • GERD (gastroesophageal reflux disease)    • Heart failure with preserved ejection fraction (HCC)    • Left upper lobe pulmonary nodule 2019    15 mm and spiculated in 2018; FNA was negative   • Moderate pulmonary hypertension (HCC)    • Status post laparoscopic cholecystectomy 06/15/2017     Past Surgical History:   Procedure Laterality Date   • BRONCHOSCOPY N/A 2018    Procedure: BRONCHOSCOPY WITH ENDOBRONCHIAL ULTRASOUND;  Surgeon: Saravanna Méndez MD;  Location: [facility] OR;  Service: Pulmonary   • CHOLECYSTECTOMY N/A 2017    Procedure: CHOLECYSTECTOMY LAPAROSCOPIC;  Surgeon: Jose Velazquez MD;  Location: [facility] OR;  Service:    • EYE SURGERY Bilateral    • INGUINAL HERNIA REPAIR Left     left   • OR ERCP DX COLLECTION SPECIMEN BRUSHING/WASHING N/A 2017    Procedure: ENDOSCOPIC RETROGRADE CHOLANGIOPANCREATOGRAPHY;  Surgeon:  Rod Francis MD;  Location: [facility] OR;  Service: Gastroenterology   • TN ERCP DX COLLECTION SPECIMEN BRUSHING/WASHING N/A 10/26/2017    Procedure: ENDOSCOPIC RETROGRADE CHOLANGIOPANCREATOGRAPHY;  Surgeon: Rod Francis MD;  Location: [facility] OR;  Service: Gastroenterology     PT Assessment (last 12 hours)     PT Evaluation and Treatment     Row Name 11/01/22 0900          Physical Therapy Time and Intention    Subjective Information no complaints  -KM     Document Type evaluation  -KM     Mode of Treatment individual therapy;physical therapy  -KM     Patient Effort good  -KM     Symptoms Noted During/After Treatment --  loss of balance  -KM     Row Name 11/01/22 0900          General Information    Patient Profile Reviewed yes  -KM     Patient Observations alert;cooperative;agree to therapy  -KM     Prior Level of Function independent:;all household mobility;ADL's  -KM     Existing Precautions/Restrictions fall  -KM     Limitations/Impairments safety/cognitive  -KM     Equipment Issued to Patient gait belt  -KM     Risks Reviewed patient:;LOB;nausea/vomiting;dizziness;increased discomfort  -KM     Benefits Reviewed patient:;improve function;increase independence;increase strength;increase balance  -KM     Row Name 11/01/22 0900          Living Environment    Current Living Arrangements home  -KM     People in Home alone  -KM     Primary Care Provided by self  -KM     Row Name 11/01/22 0900          Home Use of Assistive/Adaptive Equipment    Equipment Currently Used at Home cane, straight  -KM     Row Name 11/01/22 0900          Cognition    Affect/Mental Status (Cognition) WFL  -KM     Orientation Status (Cognition) oriented x 4;verbal cues/prompts needed for orientation  -KM     Follows Commands (Cognition) follows one-step commands  -KM     Row Name 11/01/22 0900          Range of Motion (ROM)    Range of Motion bilateral lower extremities;ROM is WFL  -KM     Row Name 11/01/22 0900          Strength  (Manual Muscle Testing)    Strength (Manual Muscle Testing) bilateral lower extremities;strength is WFL  -KM     Row Name 11/01/22 0900          Bed Mobility    Bed Mobility bed mobility (all) activities  -KM     All Activities, [place] (Bed Mobility) standby assist  -KM     Assistive Device (Bed Mobility) head of bed elevated;bed rails  -KM     Row Name 11/01/22 0900          Transfers    Transfers sit-stand transfer;stand-sit transfer;bed-chair transfer  -KM     Row Name 11/01/22 0900          Bed-Chair Transfer    Bed-Chair [place] (Transfers) contact guard  -KM     Assistive Device (Bed-Chair Transfers) cane, straight  -KM     Row Name 11/01/22 0900          Sit-Stand Transfer    Sit-Stand [place] (Transfers) contact guard  -KM     Row Name 11/01/22 0900          Stand-Sit Transfer    Stand-Sit [place] (Transfers) contact guard  -KM     Assistive Device (Stand-Sit Transfers) cane, straight  -KM     Row Name 11/01/22 0900          Gait/Stairs (Locomotion)    Gait/Stairs Locomotion gait/ambulation independence;gait/ambulation assistive device;distance ambulated  -KM     [place] Level (Gait) contact guard  -KM     Assistive Device (Gait) cane, straight  -KM     Distance in Feet (Gait) 260'  -KM     Pattern (Gait) step-through  -KM     Deviations/Abnormal Patterns (Gait) ataxic;base of support, narrow;gait speed decreased  -KM     Bilateral Gait Deviations decreased arm swing;forward flexed posture  -KM     Row Name 11/01/22 0900          Safety Issues, Functional Mobility    Safety Issues Affecting Function (Mobility) insight into deficits/self-awareness  -KM     Impairments Affecting Function (Mobility) balance;postural/trunk control  -     Row Name 11/01/22 0900          Balance    Balance Assessment sitting static balance;standing dynamic balance  -KM     Static Sitting Balance supervision  -KM     Position, Sitting Balance sitting edge of bed  -KM     Dynamic Standing Balance contact  guard  -KM     Position/Device Used, Standing Balance cane, straight  -KM     Balance Interventions dynamic  -KM     Row Name 11/01/22 0900          Plan of Care Review    Plan of Care Reviewed With patient  -KM     Outcome Evaluation Pt. evaluation completed during PT session. He was able to perform functional mobility skills w/ CGA-Kay. He ambulated prolonged distance w/ CGA and SPC. Pt. had multiple losses of balance while ambulating. He stated that losing balance while ambulating is normal for him, which is concerning since he lives at home alone. He would benefit from using a RW for mobility and ambulation. Pt. would benefit from skilled PT services. Anticipated discharge TBD based on pt. function as he is followed during stay.  -     Row Name 11/01/22 0900          Therapy Assessment/Plan (PT)    Patient/Family Therapy Goals Statement (PT) get home and take care of self  -KM     Functional Level at Time of Evaluation (PT) CGA  -KM     PT Diagnosis (PT) decreased balance  -KM     Rehab Potential (PT) good, to achieve stated therapy goals  -     Criteria for Skilled Interventions Met (PT) yes;skilled treatment is necessary  -     Therapy Frequency (PT) 2 times/wk  2-5x/wk  -KM     Predicted Duration of Therapy Intervention (PT) until discharge  -     Problem List (PT) problems related to;balance  -KM     Activity Limitations Related to Problem List (PT) unable to ambulate safely;unable to transfer safely  -KM     Row Name 11/01/22 0900          Therapy Plan Review/Discharge Plan (PT)    Therapy Plan Review (PT) evaluation/treatment results reviewed;care plan/treatment goals reviewed;risks/benefits reviewed;patient  -KM     Row Name 11/01/22 0900          Physical Therapy Goals    Transfer Goal Selection (PT) transfer, PT goal 1  -KM     Gait Training Goal Selection (PT) gait training, PT goal 1  -KM     Row Name 11/01/22 0900          Transfer Goal 1 (PT)    Activity/Assistive Device (Transfer Goal 1,  PT) sit-to-stand/stand-to-sit;bed-to-chair/chair-to-bed;walker, rolling  -KM     [place] Level/Cues Needed (Transfer Goal 1, PT) modified independence  w/o loss of balance  -KM     Time Frame (Transfer Goal 1, PT) by discharge  -KM     Row Name 11/01/22 0900          Gait Training Goal 1 (PT)    Activity/Assistive Device (Gait Training Goal 1, PT) gait (walking locomotion);assistive device use;walker, rolling  -KM     [place] Level (Gait Training Goal 1, PT) modified independence  -KM     Distance (Gait Training Goal 1, PT) 300'  w/o loss of balance  -KM     Time Frame (Gait Training Goal 1, PT) by discharge  -KM           User Key  (r) = Recorded By, (t) = Taken By, (c) = Cosigned By    Initials Name Provider Type    Nabor Salcedo, MC Physical Therapist                Physical Therapy Education     Title: PT OT SLP Therapies (Done)     Topic: Physical Therapy (Done)     Point: Mobility training (Done)     Learning Progress Summary           Patient Acceptance, E,TB, VU by  at 11/1/2022 0940                   Point: Home exercise program (Done)     Learning Progress Summary           Patient Acceptance, E,TB, VU by  at 11/1/2022 0940                   Point: Body mechanics (Done)     Learning Progress Summary           Patient Acceptance, E,TB, VU by  at 11/1/2022 0940                   Point: Precautions (Done)     Learning Progress Summary           Patient Acceptance, E,TB, VU by  at 11/1/2022 0940                               User Key     Initials Effective Dates Name Provider Type Discipline    SONDRA 05/24/22 -  Nabor Thomas, MC Physical Therapist PT              PT Recommendation and Plan  Anticipated Discharge Disposition (PT):  (TBD as pt. mobility is followed during stay)  Planned Therapy Interventions (PT): balance training, bed mobility training, gait training, home exercise program, patient/family education, postural re-education, strengthening, stretching, transfer training  Therapy  Frequency (PT): 2 times/wk (2-5x/wk)  Plan of Care Reviewed With: patient  Outcome Evaluation: Pt. evaluation completed during PT session. He was able to perform functional mobility skills w/ CGA-Kay. He ambulated prolonged distance w/ CGA and SPC. Pt. had multiple losses of balance while ambulating. He stated that losing balance while ambulating is normal for him, which is concerning since he lives at home alone. He would benefit from using a RW for mobility and ambulation. Pt. would benefit from skilled PT services. Anticipated discharge TBD based on pt. function as he is followed during stay.       Time Calculation:    PT Charges     Row Name 11/01/22 0921             Time Calculation    Start Time 0850  -KM      PT Received On 11/01/22  -KM      PT Goal Re-Cert Due Date 11/15/22  -KM            User Key  (r) = Recorded By, (t) = Taken By, (c) = Cosigned By    Initials Name Provider Type    Nabor Salcedo, PT Physical Therapist              Therapy Charges for Today     Code Description Service Date Service Provider Modifiers Qty    69703689883 HC PT EVAL MOD COMPLEXITY 4 11/1/2022 Nabor Thomas, PT GP 1               Nabor Thomas PT  11/1/2022

## 2022-11-01 NOTE — H&P
[facility]    Patient Identification:  Name:  Sarbjit Martin  Age:  87 y.o.  Sex:  male  :  1935  MRN:  [id number]   Visit Number:  [id number]  Admit Date: 10/31/2022   Room number:  3304/1S  Primary Care Physician:  Goldie Steward APRN    Date of Admission: 10/31/2022     Subjective     Chief complaint:    Chief Complaint   Patient presents with   • URI     Pt has been congested for the last 3 or 4 days with a bad cough.      History of presenting illness:  87 y.o. male who was admitted on 10/31/2022 from the ED with shortness of air.  The patient has a past medical history of COPD with centrilobular emphysema (does not require oxygen at home), left upper lobe 15 mm spiculated nodule noted in 2018 with a negative fine-needle aspiration, heart failure with preserved ejection fraction, moderate pulmonary hypertension, and past E. coli bacteremia due to common bile duct stone.  The patient states that he has has been iil for 2 weeks with shortness of air and coughing.  The patient states that he is not really producing a lot of sputum.  When he is good and healthy, he does not normally cough.  He also has noted some wheezing that improved with albuterol.  He has felt fatigued but denies any fevers or chills.  He does state for the last 3 months he has not had much of an appetite and has lost 17 pounds unintentionally.  The patient lives alone but he states that he is able to obtain food anytime that he needs to and denies having food insecurity.  In the emergency department, the patient was noted to be hypoxic.  Chest x-ray did not show any pneumonia.  However, the patient did have elevated troponin levels and some ST elevation in the septal and anterior leads.  The patient does not have a prior history of coronary artery disease and he has not experienced any chest pain recently.  As result of the suspected type II non-ST elevation MI due to the acute  hypoxia from the acute COPD exacerbation, the patient was admitted to the telemetry floor for further evaluation and treatment.  ---------------------------------------------------------------------------------------------------------------------   Review of Systems   Constitutional: Positive for appetite change (Decreased), fatigue and unexpected weight change (Lost 17 pounds in 3 months). Negative for chills, diaphoresis and fever.   HENT: Positive for congestion, rhinorrhea and sinus pressure.    Eyes: Negative for discharge and redness.   Respiratory: Positive for cough and shortness of breath.    Cardiovascular: Negative for chest pain and leg swelling.   Gastrointestinal: Positive for nausea. Negative for diarrhea and vomiting.   Genitourinary: Positive for dysuria. Negative for hematuria.   Musculoskeletal: Negative for arthralgias and myalgias.   Skin: Negative for rash and wound.   Neurological: Positive for headaches. Negative for facial asymmetry, speech difficulty and weakness.   Psychiatric/Behavioral: Negative for agitation, behavioral problems and confusion.     ---------------------------------------------------------------------------------------------------------------------   Past Medical History:   Diagnosis Date   • Angiodysplasia of the colon    • Arthritis    • Bacteremia due to Escherichia coli 06/28/2017    Same time as the common bile duct stone   • Benign prostatic hyperplasia without lower urinary tract symptoms 06/30/2021   • Bile duct calculus 06/24/2017   • 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HCC)     Centrilobular   • Diverticulosis    • GERD (gastroesophageal reflux disease)    • Heart failure with preserved ejection fraction (HCC)    • Left upper lobe pulmonary nodule 01/30/2019    15 mm and spiculated in 2018; FNA was negative   • Moderate pulmonary hypertension (HCC)    • Status post laparoscopic cholecystectomy 06/15/2017     Past Surgical History:    Procedure Laterality Date   • BRONCHOSCOPY N/A 2018    Procedure: BRONCHOSCOPY WITH ENDOBRONCHIAL ULTRASOUND;  Surgeon: Saravanan Méndez MD;  Location: [facility] OR;  Service: Pulmonary   • CHOLECYSTECTOMY N/A 2017    Procedure: CHOLECYSTECTOMY LAPAROSCOPIC;  Surgeon: Jose Velazquez MD;  Location: [facility] OR;  Service:    • EYE SURGERY Bilateral 2013   • INGUINAL HERNIA REPAIR Left 2013    left   • TX ERCP DX COLLECTION SPECIMEN BRUSHING/WASHING N/A 2017    Procedure: ENDOSCOPIC RETROGRADE CHOLANGIOPANCREATOGRAPHY;  Surgeon: Rod Francis MD;  Location: [facility] OR;  Service: Gastroenterology   • TX ERCP DX COLLECTION SPECIMEN BRUSHING/WASHING N/A 10/26/2017    Procedure: ENDOSCOPIC RETROGRADE CHOLANGIOPANCREATOGRAPHY;  Surgeon: Rod Francis MD;  Location: [facility] OR;  Service: Gastroenterology     Family History   Problem Relation Age of Onset   • No Known Problems Mother    • Diabetes Father    • Hypertension Father    • Stroke Father    • Diabetes Sister      Social History     Socioeconomic History   • Marital status:    Tobacco Use   • Smoking status: Former     Years: 10.00     Types: Cigarettes     Quit date:      Years since quittin   • Smokeless tobacco: Former     Quit date: 1995   Substance and Sexual Activity   • Alcohol use: No   • Drug use: No   • Sexual activity: Defer     ---------------------------------------------------------------------------------------------------------------------   Allergies:  Penicillins  ---------------------------------------------------------------------------------------------------------------------   Medications below are reported home medications pulling from within the system; at this time, these medications have not been reconciled unless otherwise specified and are in the verification process for further verifcation as current home medications.      Prior to Admission Medications     Prescriptions Last Dose Informant  Patient Reported? Taking?    acetaminophen-codeine (TYLENOL #3) 300-30 MG per tablet   No No    Take 1 tablet by mouth Every 6 (Six) Hours As Needed for Moderate Pain .    amitriptyline (ELAVIL) 25 MG tablet   Yes No    Take 25 mg by mouth At Night As Needed.    aspirin 325 MG tablet   Yes No    Take 325 mg by mouth Daily.    budesonide-formoterol (SYMBICORT) 160-4.5 MCG/ACT inhaler   No No    Inhale 2 puffs 2 (Two) Times a Day.    finasteride (Proscar) 5 MG tablet   No No    Take 1 tablet by mouth Daily.    ipratropium-albuterol (DUO-NEB) 0.5-2.5 mg/3 ml nebulizer   No No    USE 1 VIAL IN NEBULIZER 4 TIMES DAILY AS NEEDED FOR WHEEZING    levothyroxine (SYNTHROID, LEVOTHROID) 50 MCG tablet   Yes No    pantoprazole (PROTONIX) 40 MG EC tablet   Yes No    Take 40 mg by mouth Daily.    tamsulosin (Flomax) 0.4 MG capsule 24 hr capsule   No No    Take 1 capsule by mouth Every Night.    VENTOLIN  (90 BASE) MCG/ACT inhaler  Self Yes No    Inhale 2 puffs Every 6 (Six) Hours As Needed for Wheezing or Shortness of Air.    vitamin D (ERGOCALCIFEROL) 1.25 MG (85820 UT) capsule capsule   Yes No        Objective     Vital Signs:  Temp:  [97.9 °F (36.6 °C)] 97.9 °F (36.6 °C)  Heart Rate:  [90-99] 99  Resp:  [16-18] 18  BP: (111-120)/(60-67) 111/60    Mean Arterial Pressure (Non-Invasive) for the past 24 hrs (Last 3 readings):   Noninvasive MAP (mmHg)   11/01/22 0500 74     SpO2:  [81 %-96 %] 94 %  on  Flow (L/min):  [2] 2;   Device (Oxygen Therapy): nasal cannula  Body mass index is 16.35 kg/m².    Wt Readings from Last 3 Encounters:   11/01/22 45.9 kg (101 lb 4.8 oz)   06/29/21 52.2 kg (115 lb)   05/20/20 54.9 kg (121 lb)      ----------------------------------------------------------------------------------------------------------------------  Physical Exam  Vitals and nursing note reviewed.   Constitutional:       General: He is awake. He is in acute distress.      Appearance: He is well-developed. He is cachectic. He is  not toxic-appearing or diaphoretic.      Comments: On 2 L/min nasal cannula.   HENT:      Head: Normocephalic and atraumatic.      Comments: He has bilateral muscle and fat wasting of his temporal areas.     Right Ear: External ear normal.      Left Ear: External ear normal.      Nose: Nose normal.   Eyes:      General: No scleral icterus.        Right eye: No discharge.         Left eye: No discharge.      Pupils: Pupils are equal, round, and reactive to light.   Cardiovascular:      Rate and Rhythm: Normal rate and regular rhythm.      Pulses: Normal pulses.      Heart sounds: No murmur heard.  Pulmonary:      Effort: Accessory muscle usage, prolonged expiration and respiratory distress present.      Breath sounds: Decreased air movement present. No stridor. Wheezing present. No rales.   Abdominal:      General: Bowel sounds are normal. There is no distension.      Palpations: Abdomen is soft.   Musculoskeletal:         General: No swelling, deformity or signs of injury.      Comments: He has bilateral muscle and fat wasting of his arms and legs.   Skin:     Capillary Refill: Capillary refill takes less than 2 seconds.      Coloration: Skin is not jaundiced or pale.   Neurological:      Mental Status: He is alert and oriented to person, place, and time. Mental status is at baseline.      Cranial Nerves: No cranial nerve deficit.   Psychiatric:         Mood and Affect: Mood normal.         Behavior: Behavior normal. Behavior is cooperative.         Thought Content: Thought content normal.         Judgment: Judgment normal.       ---------------------------------------------------------------------------------------------------------------------  EKG: The patient has had 2 EKGs so far while in the emergency department.  One was dated 10/31/2022; it showed sinus tachycardia with a heart rate of 125 and a QTC of 441 ms.  There is an incomplete right bundle branch block, inferior Q waves, and ST elevation in leads  V2-V5.  An EKG dated 11/1/2022 shows similar findings, with the same leads showing ST elevation and Q waves.  When compared to an EKG dated 11/13/2018, the comparison EKG has the same Q waves but the ST elevation is mild and only in leads V2-V3.  I then compared the 2 current EKGs to 2 dated 6/24/2017 and 5/10/2015; the only thing in common between all 4 of these EKGs is that there are Q waves in the inferior leads.  Please note that I have personally looked at the EKG from this admission, the comparison EKGs in the medical records, and the above is my interpretation of this admission's EKG; I await the final cardiology read.  Dr. Edwards from the emergency department told me that the on-call cardiologist did look at the EKGs and give her a verbal report that he did not think this was an ST elevation MI.        Telemetry: Sinus tachycardia with heart rates in the 90s; however, while he was talking to me, his heart rates jumped up to 120s-130s.  Please note that I personally looked at the telemetry strips.        Last echocardiogram:  Results for orders placed during the hospital encounter of 12/12/18    Adult Transthoracic Echo Complete W/ Cont if Necessary Per Protocol    Interpretation Summary  · Left ventricular systolic function is normal. Estimated EF appears to be in the range of 61 - 65%  · Mild tricuspid valve regurgitation is present.  · Left ventricular diastolic dysfunction (grade I) consistent with impaired relaxation.  · Estimated right ventricular systolic pressure from tricuspid regurgitation is moderately elevated (45-55 mmHg). Moderate pulmonary hypertension is present.  · Mild dilation of the aortic root is present. Mild dilation of the sinuses of Valsalva is present both measuring about 4.1 cm  · There is no evidence of pericardial effusion    I have personally read the ECHO final  report.  --------------------------------------------------------------------------------------------------------------------  LABS:    CBC and coagulation:  Results from last 7 days   Lab Units 11/01/22  0545 10/31/22  2341   WBC 10*3/mm3  --  9.37   HEMOGLOBIN g/dL  --  15.1   HEMATOCRIT %  --  46.8   MCV fL  --  83.6   MCHC g/dL  --  32.3   PLATELETS 10*3/mm3  --  215   INR  0.98  --      Acid/base balance:  Results from last 7 days   Lab Units 10/31/22  2325   PH, ARTERIAL pH units 7.389   PO2 ART mm Hg 56.8*   PCO2, ARTERIAL mm Hg 38.3   HCO3 ART mmol/L 23.1     Renal and electrolytes:  Results from last 7 days   Lab Units 11/01/22  0325 10/31/22  2341   SODIUM mmol/L  --  135*   POTASSIUM mmol/L  --  4.1   MAGNESIUM mg/dL 2.8*  --    CHLORIDE mmol/L  --  95*   CO2 mmol/L  --  22.6   BUN mg/dL  --  71*   CREATININE mg/dL  --  1.39*   CALCIUM mg/dL  --  9.3   GLUCOSE mg/dL  --  131*     Estimated Creatinine Clearance: 25.2 mL/min (A) (by C-G formula based on SCr of 1.39 mg/dL (H)).    Liver and pancreatic function:  Results from last 7 days   Lab Units 10/31/22  2341   ALBUMIN g/dL 2.99*   BILIRUBIN mg/dL 0.6   ALK PHOS U/L 77   AST (SGOT) U/L 27   ALT (SGPT) U/L 24     Endocrine function:  Lab Results   Component Value Date    HGBA1C 6.00 (H) 10/31/2022     Lab Results   Component Value Date    TSH 0.664 11/01/2022     Cardiac:  Results from last 7 days   Lab Units 11/01/22  0325 10/31/22  2341   TROPONIN T ng/mL 0.072* 0.069*       Cultures:  Microbiology Results (last 10 days)     Procedure Component Value - Date/Time    COVID-19 and FLU A/B PCR - Swab, Nasopharynx [[id number]]  (Normal) Collected: 10/31/22 2310    Lab Status: Final result Specimen: Swab from Nasopharynx Updated: 10/31/22 2334     COVID19 Not Detected     Influenza A PCR Not Detected     Influenza B PCR Not Detected    Narrative:      Fact sheet for providers: https://www.fda.gov/media/346870/download    Fact sheet for patients:  https://www.fda.gov/media/255413/download    Test performed by PCR.        I have personally looked at the labs and they are summarized above.  ----------------------------------------------------------------------------------------------------------------------  Detailed radiology reports for the last 24 hours:    Imaging Results (Last 24 Hours)     Procedure Component Value Units Date/Time    CT Chest With Contrast Diagnostic [[id number]] Collected: 11/01/22 0436     Updated: 11/01/22 0438    Narrative:      CT Chest W    INDICATION:   Myocardial infarction. COPD. Cough and wheezing. Weight loss.    TECHNIQUE:   CT of the thorax with IV contrast. Coronal and sagittal reconstructions were obtained.  Radiation dose reduction techniques included automated exposure control or exposure modulation based on body size. Count of known CT and cardiac nuc med studies  performed in previous 12 months: 0.     COMPARISON:   8/16/2018    FINDINGS:  There is atherosclerotic disease and coronary artery disease. There is dilatation of the aortic root at 4.6 cm. This is unchanged. There is ectasia of the ascending aorta at 3.7 cm, also unchanged. There is no pleural or pericardial effusion. Upper  abdominal images show numerous surgical clips along the lesser curvature of the stomach. Gallbladder is surgically absent. There is no adenopathy by size criteria. There are no suspicious osseous lesions.    Lung windows show COPD. There is fibrosis and cavitation in the left lower lobe that appears slightly worsened. There is a large lobular mass in the left upper lobe abutting the mediastinum. This was previously a spiculated nodule measuring about 1.5 cm.  The mass now measures approximately 3.9 x 3.4 x 6.7 cm. PET/CT is once again recommended. There is a 5 mm noncalcified nodule in the left upper lobe on image 31., And there is a 3 mm left upper lobe nodule on image 23. These appear to be present on the  old study and are felt to be  benign. There is some mild fibrosis in the right middle lobe. There is bronchial wall thickening in both lungs compatible with bronchitis. There are some tree-in-bud type infiltrates in the right lung compatible with  bronchiolitis.      Impression:        1. 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 PET CT is recommended for follow-up.  2. Severe fibrosis and cavitation in the left lower lobe appears worsened.  3. COPD and bronchitis. There is evidence of bronchiolitis in the right lung.  4. Stable dilatation of the aortic root measuring 4.6 cm.    Signer Name: Rommel Umanzor MD   Signed: 11/1/2022 4:36 AM   Workstation Name: [facility]    Radiology [facility]    XR Forearm 2 View Left [[id number]] Collected: 10/31/22 2305     Updated: 10/31/22 2308    Narrative:      CR Forearm 2 Vws LT    INDICATION:   Pain after fall.    COMPARISON:   None available.    FINDINGS:   AP and lateral views of the left forearm. Please note that the AP forearm view is included in the chest series today. No fracture or dislocation.  No bone erosion or destruction. Articulation at the elbow and wrist is anatomic.  No foreign body. There is  degenerative change at the wrist.      Impression:      No acute fracture or malalignment.    Signer Name: Rommel Umanzor MD   Signed: 10/31/2022 11:05 PM   Workstation Name: [facility]    Radiology [facility]    XR Chest AP [[id number]] Collected: 10/31/22 2304     Updated: 10/31/22 2306    Narrative:      CR Chest 1 Vw    INDICATION:   Dyspnea.     COMPARISON:    2/10/2022    FINDINGS:  Portable AP view(s) of the chest.  Cardiac and mediastinal contours are normal. There is atherosclerotic disease in the aorta. Severe COPD is noted with chronic scarring in the bases. Scattered granulomatous calcifications are present. No acute  infiltrates or pneumothorax.      Impression:      No acute cardiopulmonary findings. Severe COPD.    Signer  Name: Rommel Umanzor MD   Signed: 10/31/2022 11:04 PM   Workstation Name: [email or web address]    [facility]        I have personally looked at the radiology images and I have read the available final reports.    Assessment & Plan       -Acute hypoxic respiratory failure that was present on admission and thought to be due to an acute COPD exacerbation, in a patient with known history of COPD with centrilobular emphysema  -Elevated troponins on admission, suspect type II non-ST elevation MI  -Prolonged QTC of 495 ms  -Suspect chronic illness malnutrition, BMI 16.35 kg/m²  -Acute renal insufficiency with baseline creatinine in 2019 of 1 and admission creatinine now of 1.39, with the patient not meeting acute kidney injury criteria on admission  -History of heart failure with preserved ejection fraction, without an acute exacerbation  -History of moderate pulmonary hypertension  -History of a left upper lobe 15 mm spiculated lung mass dating back to 2018, with an unremarkable fine-needle aspiration that was performed at that time, now measuring 3.9 x 3.4 x 6.7 cm    We will admit the patient to the telemetry floor.  We will consult cardiology about the non-ST elevation MI.  We will give the patient aspirin and Lipitor.  Please note that a heparin drip was started while in the emergency department after the emergency department attending discussed the patient's case with cardiology.  For the acute COPD exacerbation, I will start the patient on Solu-Medrol and scheduled breathing treatments.  The patient's not really producing sputum and thus I will not give him an antibiotic.  I have ordered an echocardiogram to see what the state of his heart is post non-ST elevation MI but also to see if he has any strain, in particular right ventricular strain, from pulmonary hypertension.  I asked emergency department to do a CT scan without contrast of his lungs; lateral left upper lobe spiculated mass is now in 1  dimension over 6 cm.  I have a suspicion that the weight loss and lack of appetite are due to a lung cancer that has been undiagnosed until now.  Therefore, I will consult pulmonology.    We will avoid QT prolonging agents and continue to monitor the electrolytes closely. In order to lessen the risk of worsening QTc, the goal potassium level is 4-4.5 and goal magnesium level is 2-2.2.    For the malnutrition, I will consult nutrition for an assessment.  I will start him on a multivitamin and Megace.  I am concerned that the patient is of advanced age, has unintentional weight loss, and has a prior history of the spiculated left upper lobe mass.  Therefore, I will consult palliative care, , PT, OT, and speech therapy.    VTE Prophylaxis:   Mechanical Order History:     None      Pharmalogical Order History:      Ordered     Dose Route Frequency Stop    11/01/22 0042  heparin (porcine) 5000 UNIT/ML injection 3,000 Units         60 Units/kg IV Once 11/01/22 0054    11/01/22 0042  heparin 23761 units/250 mL (100 units/mL) in 0.45 % NaCl infusion  5.98 mL/hr         12 Units/kg/hr IV Titrated --    11/01/22 0042  heparin (porcine) 5000 UNIT/ML injection 3,000 Units         60 Units/kg IV As Needed --    11/01/22 0042  heparin (porcine) 5000 UNIT/ML injection 1,500 Units         30 Units/kg IV As Needed --              The patient is high risk due to the following diagnoses/reasons: Acute hypoxic respiratory failure    Disposition: Undetermined at this time    Rah Pastrana MD  [facility]  11/01/22  06:24 EDT

## 2022-11-01 NOTE — PROGRESS NOTES
Malnutrition Severity Assessment      Patient meets criteria for : Severe Malnutrition  Malnutrition Type (last 8 hours)     Malnutrition Severity Assessment     Row Name 11/01/22 1502       Malnutrition Severity Assessment    Malnutrition Type Chronic Disease - Related Malnutrition    Row Name 11/01/22 1502       Insufficient Energy Intake     Insufficient Energy Intake Findings Severe    Insufficient Energy Intake  <75% of est. energy requirement for > or equal to 3 months    Row Name 11/01/22 1502       Unintentional Weight Loss     Unintentional Weight Loss Findings Severe    Unintentional Weight Loss  Weight loss greater than 10% in six months  16.5 %    Row Name 11/01/22 1502       Muscle Loss    Loss of Muscle Mass Findings Severe    [place] Severe - deep hollowing/scooping, lack of muscle to touch, facial bones well defined    Clavicle Bone Region Severe - protruding prominent bone    Acromion Bone Region Severe - squared shoulders, bones, and acromion process protrusion prominent    Scapular Bone Region Severe - prominent bones, depressions easily visible between ribs, scapula, spine, shoulders    Dorsal Hand Region Severe - prominent depression    Patellar Region Severe - prominent bone, square looking, very little muscle definition    Anterior Thigh Region Severe - line/depression along thigh, obviously thin    Posterior Calf Region Severe - thin with very little definition/firmness    Row Name 11/01/22 1502       Fat Loss    Subcutaneous Fat Loss Findings Severe    Orbital Region  Severe - pronounced hollowness/depression, dark circles, loose saggy skin    Upper Arm Region Severe - mostly skin, very little space between folds, fingers touch    Thoracic & Lumbar Region Severe - ribs visible with prominent depressions, iliac crest very prominent    Row Name 11/01/22 1502       Criteria Met (Must meet criteria for severity in at least 2 of these categories: M Wasting, Fat Loss, Fluid, Secondary Signs,  Wt. Status, Intake)    Patient meets criteria for  Severe Malnutrition

## 2022-11-01 NOTE — CONSULTS
Date of Admit: 10/31/2022  Date of Consult: 11/01/22  Provider, No Known          Assessment    1. Progressive shortness of breath  2. COPD with C OPD exacerbation  3. Lung nodule with progressive weight loss and loss of appetite  4. Frequent premature atrial complexes on EKG  5. Elevated troponin admission troponin is trending down with no chest pain reported  6. History of moderate pulm hypertension  7. History of remote tobacco abuse  8. Acute HFpEF  9. Moderate aortic root dilatation  10. Advanced age          Recommendations     1. Will get an echocardiac to assess cardiac function wall motion and valve morphology  2. We will proceed with stress testing for assessment of ischemia continue with aspirin  3. We will get lipid profile  4. We will reassess PA pressure with the echocardiogram        Reason for consultation: NSTEMI    Subjective       Subjective     History of Present Illness    Sarbjit Martin is a 87 y.o. male with a past medical history significant for COPD (quit smoking 1992), pulmonary nodules (left upper lobe 15 mm spiculated nodule noted in 2018 with a negative fine-needle aspiration) , BPH, GERD, Diverticulitis, HFpEF (ECHO on 12/12/2018  with EF 61-65 %), pulmonary hypertension, and arthritis according to problem list in chart.  Patient presented to [facility] emergency room on 10/31/2022 with complaints of shortness of breath for the past 2 weeks. Patient also reports decreased appetite and about 17 pound weight loss in the last 3 months.  While in the emergency room, patient was found to be hypoxic with a normal CXR.  However, it has been reported that his troponin was elevated and his EKG revealed some ST elevation in the septal and anterior leads. Patient does not report any known CAD and patient denied any chest pain recently. As a result of the elevated troponin, EKG changes and hypoxia, patient was admitted and Cardiology has been consulted for further evaluation and  management.   Patient is lying in bed resting quietly with wife at bedside. No acute distress noted at this time. Patient denies chest pain but does report increased shortness of breath with cough that is not always productive. Patient does report bilateral calf when he walks short distances and long with increased shortness of breath. Pulsating noted in the RUQ/ liver area visually and with palpitation.     Cardiac risk factors:COPD- quit smoking 1992    Last Echo:  12/12/2018: updated ECHO scheduled 11/1/2022      Last Stress:   12/12/2018      Last Cath: none found in chart     Past Medical History:   Diagnosis Date   • Angiodysplasia of the colon    • Arthritis    • Bacteremia due to Escherichia coli 06/28/2017    Same time as the common bile duct stone   • Benign prostatic hyperplasia without lower urinary tract symptoms 06/30/2021   • Bile duct calculus 06/24/2017   • 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HCC)     Centrilobular   • Diverticulosis    • GERD (gastroesophageal reflux disease)    • Heart failure with preserved ejection fraction (HCC)    • Left upper lobe pulmonary nodule 01/30/2019    15 mm and spiculated in 2018; FNA was negative   • Moderate pulmonary hypertension (HCC)    • Status post laparoscopic cholecystectomy 06/15/2017     Past Surgical History:   Procedure Laterality Date   • BRONCHOSCOPY N/A 8/23/2018    Procedure: BRONCHOSCOPY WITH ENDOBRONCHIAL ULTRASOUND;  Surgeon: Saravanan Méndez MD;  Location: [facility];  Service: Pulmonary   • CHOLECYSTECTOMY N/A 6/16/2017    Procedure: CHOLECYSTECTOMY LAPAROSCOPIC;  Surgeon: Jose Velazquez MD;  Location: [facility] OR;  Service:    • EYE SURGERY Bilateral 2013   • INGUINAL HERNIA REPAIR Left 2013    left   • ID ERCP DX COLLECTION SPECIMEN BRUSHING/WASHING N/A 6/26/2017    Procedure: ENDOSCOPIC RETROGRADE CHOLANGIOPANCREATOGRAPHY;  Surgeon: Rod Francis MD;  Location: [facility] OR;  Service: Gastroenterology   • ID ERCP  DX COLLECTION SPECIMEN BRUSHING/WASHING N/A 10/26/2017    Procedure: ENDOSCOPIC RETROGRADE CHOLANGIOPANCREATOGRAPHY;  Surgeon: Rod Francis MD;  Location: [facility];  Service: Gastroenterology     Family History   Problem Relation Age of Onset   • No Known Problems Mother    • Diabetes Father    • Hypertension Father    • Stroke Father    • Diabetes Sister      Social History     Tobacco Use   • Smoking status: Former     Years: 10.00     Types: Cigarettes     Quit date:      Years since quittin.8   • Smokeless tobacco: Former     Quit date: 1995   Substance Use Topics   • Alcohol use: No   • Drug use: No     Medications Prior to Admission   Medication Sig Dispense Refill Last Dose   • acetaminophen-codeine (TYLENOL #3) 300-30 MG per tablet Take 1 tablet by mouth Every 6 (Six) Hours As Needed for Moderate Pain . 10 tablet 0    • amitriptyline (ELAVIL) 25 MG tablet Take 25 mg by mouth At Night As Needed.   Unknown   • aspirin 325 MG tablet Take 325 mg by mouth Daily.      • budesonide-formoterol (SYMBICORT) 160-4.5 MCG/ACT inhaler Inhale 2 puffs 2 (Two) Times a Day. 1 inhaler 11    • dicyclomine (BENTYL) 10 MG capsule Take 1 capsule by mouth 4 (Four) Times a Day Before Meals & at Bedtime.   Unknown   • finasteride (Proscar) 5 MG tablet Take 1 tablet by mouth Daily. 90 tablet 3 Unknown   • ipratropium-albuterol (DUO-NEB) 0.5-2.5 mg/3 ml nebulizer USE 1 VIAL IN NEBULIZER 4 TIMES DAILY AS NEEDED FOR WHEEZING 1 vial 3    • levothyroxine (SYNTHROID, LEVOTHROID) 50 MCG tablet Take 1 tablet by mouth Daily.   Unknown   • LORazepam (ATIVAN) 0.5 MG tablet Take 1 tablet by mouth Daily.   Unknown   • meloxicam (MOBIC) 15 MG tablet Take 1 tablet by mouth Daily.   Unknown   • omeprazole (priLOSEC) 40 MG capsule Take 1 capsule by mouth Daily.   Unknown   • pantoprazole (PROTONIX) 40 MG EC tablet Take 40 mg by mouth Daily.      • tamsulosin (Flomax) 0.4 MG capsule 24 hr capsule Take 1 capsule by mouth Every  "Night. 90 capsule 3 Unknown   • VENTOLIN  (90 BASE) MCG/ACT inhaler Inhale 2 puffs Every 6 (Six) Hours As Needed for Wheezing or Shortness of Air.   Unknown   • vitamin D (ERGOCALCIFEROL) 1.25 MG (82001 UT) capsule capsule         Allergies:  Penicillins    Review of Systems   Constitutional: Positive for activity change and unexpected weight change. Negative for diaphoresis and fatigue.        Cannot walk very far without having increased shortness of breath and pain in bilateral calves. Patient reports a 17 pound weight loss \"that I know of\"   HENT: Negative for facial swelling and trouble swallowing.    Eyes: Negative for photophobia and visual disturbance.   Respiratory: Positive for cough and shortness of breath.    Cardiovascular: Negative for chest pain, palpitations and leg swelling.   Gastrointestinal: Positive for abdominal pain.        Decreased appetite   Endocrine: Negative for polyphagia and polyuria.   Genitourinary: Negative for hematuria.   Musculoskeletal: Positive for gait problem and myalgias.        Pain in bilateral calves with ambulating short distances   Skin: Negative for color change.   Allergic/Immunologic: Negative for food allergies.   Neurological: Negative for dizziness, syncope and headaches.   Hematological: Negative for adenopathy.   Psychiatric/Behavioral: Negative for agitation and behavioral problems.       Objective       Objective      Vital Signs  Temp:  [97.8 °F (36.6 °C)-97.9 °F (36.6 °C)] 97.8 °F (36.6 °C)  Heart Rate:  [90-99] 98  Resp:  [16-18] 18  BP: (111-127)/(60-67) 127/60     Vital Signs (last 72 hrs)       10/29 0700  10/30 0659 10/30 0700  10/31 0659 10/31 0700  11/01 0659 11/01 0700  11/01 1043   Most Recent      Temp (°F)     97.8 -  97.9       97.8 (36.6) 11/01 0629    Heart Rate     90 -  99    97 -  98     98 11/01 0837    Resp     16 -  18      18     18 11/01 0706    BP     111/60 -  120/67      127/60     127/60 11/01 0837    SpO2 (%)     81 -  96    "   96     96 11/01 0716        Body mass index is 16.35 kg/m².    Intake/Output Summary (Last 24 hours) at 11/1/2022 1043  Last data filed at 11/1/2022 0900  Gross per 24 hour   Intake 410 ml   Output --   Net 410 ml     Physical Exam  Constitutional:       Comments: Thin built    HENT:      Head: Normocephalic and atraumatic.      Nose: Nose normal.      Mouth/Throat:      Mouth: Mucous membranes are moist.   Eyes:      Conjunctiva/sclera: Conjunctivae normal.   Cardiovascular:      Rate and Rhythm: Normal rate and regular rhythm.      Comments: Faint DP/PP pulses in feet lynn. Left peristolic heave noted on exam.   Pulmonary:      Effort: Pulmonary effort is normal.      Comments: Coarse crackles throughout lynn   Abdominal:      General: Abdomen is flat.      Palpations: Abdomen is soft.      Comments: Pulsating liver noted visually and with light palpitation   Musculoskeletal:         General: Normal range of motion.      Cervical back: Normal range of motion.   Skin:     General: Skin is warm and dry.   Neurological:      General: No focal deficit present.      Mental Status: He is alert and oriented to person, place, and time. Mental status is at baseline.   Psychiatric:         Mood and Affect: Mood normal.         Behavior: Behavior normal.           Results review     Results Review:    I reviewed the patient's new clinical results.  Results from last 7 days   Lab Units 11/01/22  0649 11/01/22  0325 10/31/22  2341   TROPONIN T ng/mL 0.036* 0.072* 0.069*     Results from last 7 days   Lab Units 11/01/22  0545 10/31/22  2341   WBC 10*3/mm3 10.80 9.37   HEMOGLOBIN g/dL 15.5 15.1   PLATELETS 10*3/mm3 181 215     Results from last 7 days   Lab Units 10/31/22  2341   SODIUM mmol/L 135*   POTASSIUM mmol/L 4.1   CHLORIDE mmol/L 95*   CO2 mmol/L 22.6   BUN mg/dL 71*   CREATININE mg/dL 1.39*   CALCIUM mg/dL 9.3   GLUCOSE mg/dL 131*   ALT (SGPT) U/L 24   AST (SGOT) U/L 27     Lab Results   Component Value Date    INR  0.98 11/01/2022    INR 1.07 11/09/2018    INR 1.04 08/23/2018    INR 1.07 06/28/2017    INR 1.02 06/27/2017    INR 1.08 06/26/2017     Lab Results   Component Value Date    MG 2.8 (H) 11/01/2022     Lab Results   Component Value Date    TSH 0.664 11/01/2022    PSA 0.500 02/06/2019      No results found for: PROBNP      ECG  11/1/2022    10/31/2022      ECG/EMG Results (last 24 hours)     Procedure Component Value Units Date/Time    ECG 12 Lead Chest Pain [[id number]] Collected: 10/31/22 2226     Updated: 10/31/22 2304     QT Interval 306 ms      QTC Interval 441 ms     Narrative:      Test Reason : Chest Pain  Blood Pressure :   */*   mmHG  Vent. Rate : 125 BPM     Atrial Rate : 125 BPM     P-R Int : 158 ms          QRS Dur :  94 ms      QT Int : 306 ms       P-R-T Axes :  88 104  65 degrees     QTc Int : 441 ms    Sinus tachycardia with premature atrial complexes  Rightward axis  Incomplete right bundle branch block  Anterior infarct , age undetermined  Abnormal ECG  When compared with ECG of 09-NOV-2018 12:06,  premature atrial complexes are now present  Vent. rate has increased BY  61 BPM  Incomplete right bundle branch block is now present  Anterior infarct is now present    Referred By: JANET           Confirmed By:     ECG 12 Lead Other; abnormal ECG's [[id number]] Collected: 11/01/22 0032     Updated: 11/01/22 0033     QT Interval 346 ms      QTC Interval 495 ms     Narrative:      Test Reason : Other~  Blood Pressure :   */*   mmHG  Vent. Rate : 123 BPM     Atrial Rate : 123 BPM     P-R Int : 142 ms          QRS Dur :  94 ms      QT Int : 346 ms       P-R-T Axes :  86  95  69 degrees     QTc Int : 495 ms    Sinus tachycardia with premature supraventricular complexes  Rightward axis  Incomplete right bundle branch block  Anterior infarct (cited on or before 31-OCT-2022)  Abnormal ECG  When compared with ECG of 31-OCT-2022 22:26, (Unconfirmed)  ST no longer elevated in Inferior leads    Referred By: JANET            Confirmed By:     ECG 12 Lead Rhythm Change [[id number]] Collected: 11/01/22 0659     Updated: 11/01/22 0700     QT Interval 326 ms      QTC Interval 472 ms     Narrative:      Test Reason : Rhythm Change  Blood Pressure :   */*   mmHG  Vent. Rate : 126 BPM     Atrial Rate : 300 BPM     P-R Int :   * ms          QRS Dur :  78 ms      QT Int : 326 ms       P-R-T Axes :   *  75  69 degrees     QTc Int : 472 ms    Atrial flutter with variable AV block  Septal infarct (cited on or before 31-OCT-2022)  T wave abnormality, consider anterior ischemia  Abnormal ECG  When compared with ECG of 01-NOV-2022 00:32, (Unconfirmed)  Atrial flutter has replaced Sinus rhythm  Questionable change in initial forces of Anteroseptal leads    Referred By: JT           Confirmed By:     Adult Transthoracic Echo Complete With Contrast if Necessary Per Protocol [[id number]] Resulted: 11/01/22 1010     Updated: 11/01/22 1011     Target HR (85%) 115 bpm      Max. Pred. HR (100%) 135 bpm      LVIDd 3.6 cm      LVIDs 1.77 cm      IVSd 0.91 cm      LVPWd 0.98 cm      FS 50.2 %      IVS/LVPW 0.94 cm      ESV(cubed) 5.5 ml      EDV(cubed) 44.9 ml      LVOT area 3.1 cm2      LV mass(C)d 97.4 grams      LVOT diam 2.00 cm      MV E max trina 56.3 cm/sec      MV A max trina 91.9 cm/sec      MV E/A 0.61     TAPSE (>1.6) 1.67 cm      LA dimension (2D)  2.6 cm      Ao pk trina 122.0 cm/sec      Ao max PG 6.0 mmHg      Ao mean PG 3.0 mmHg      Ao V2 VTI 21.8 cm      TR max trina 251.0 cm/sec      TR max PG 25.2 mmHg      RVSP(TR) 35.2 mmHg      RAP systole 10.0 mmHg      PA acc time 0.10 sec      PA pr(Accel) 33.1 mmHg      Ao root diam 4.3 cm      ACS 2.5 cm     Narrative:      •  Estimated right ventricular systolic pressure from tricuspid   regurgitation is mildly elevated (35-45 mmHg).      Impression:                Telemetry:  NSR- ST with occ PAC's 's            Imaging Results (Last 72 Hours)     Procedure Component Value Units Date/Time    FL  Video Swallow Single Contrast [[id number]] Collected: 11/01/22 1035     Updated: 11/01/22 1035    Narrative:      FL VIDEO SWALLOW SINGLE-CONTRAST-     CLINICAL INDICATION: aspiration; I21.4-Non-ST elevation (NSTEMI)  myocardial infarction; J44.1-Chronic obstructive pulmonary disease with  (acute) exacerbation        TECHNIQUE:   Speech therapy service was present. The patient was examined in the  sitting lateral position and was given several consistencies of barium.     FINDINGS: Intermittent silent aspiration with thin liquids.     FLUOROSCOPY TIME: 1.8 minutes     Images: Cine loop was acquired       Impression:      Intermittent silent aspiration with thin liquids.     For additional information please see the report provided by the speech  therapy service       CT Chest With Contrast Diagnostic [[id number]] Collected: 11/01/22 0436     Updated: 11/01/22 0438    Narrative:      CT Chest W    INDICATION:   Myocardial infarction. COPD. Cough and wheezing. Weight loss.    TECHNIQUE:   CT of the thorax with IV contrast. Coronal and sagittal reconstructions were obtained.  Radiation dose reduction techniques included automated exposure control or exposure modulation based on body size. Count of known CT and cardiac nuc med studies  performed in previous 12 months: 0.     COMPARISON:   8/16/2018    FINDINGS:  There is atherosclerotic disease and coronary artery disease. There is dilatation of the aortic root at 4.6 cm. This is unchanged. There is ectasia of the ascending aorta at 3.7 cm, also unchanged. There is no pleural or pericardial effusion. Upper  abdominal images show numerous surgical clips along the lesser curvature of the stomach. Gallbladder is surgically absent. There is no adenopathy by size criteria. There are no suspicious osseous lesions.    Lung windows show COPD. There is fibrosis and cavitation in the left lower lobe that appears slightly worsened. There is a large lobular mass in the left upper  lobe abutting the mediastinum. This was previously a spiculated nodule measuring about 1.5 cm.  The mass now measures approximately 3.9 x 3.4 x 6.7 cm. PET/CT is once again recommended. There is a 5 mm noncalcified nodule in the left upper lobe on image 31., And there is a 3 mm left upper lobe nodule on image 23. These appear to be present on the  old study and are felt to be benign. There is some mild fibrosis in the right middle lobe. There is bronchial wall thickening in both lungs compatible with bronchitis. There are some tree-in-bud type infiltrates in the right lung compatible with  bronchiolitis.      Impression:        1. 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 PET CT is recommended for follow-up.  2. Severe fibrosis and cavitation in the left lower lobe appears worsened.  3. COPD and bronchitis. There is evidence of bronchiolitis in the right lung.  4. Stable dilatation of the aortic root measuring 4.6 cm.    Signer Name: Rommel Umanzor MD   Signed: 11/1/2022 4:36 AM   Workstation Name: [facility]    Radiology [facility]    XR Forearm 2 View Left [[id number]] Collected: 10/31/22 2305     Updated: 10/31/22 2308    Narrative:      CR Forearm 2 Vws LT    INDICATION:   Pain after fall.    COMPARISON:   None available.    FINDINGS:   AP and lateral views of the left forearm. Please note that the AP forearm view is included in the chest series today. No fracture or dislocation.  No bone erosion or destruction. Articulation at the elbow and wrist is anatomic.  No foreign body. There is  degenerative change at the wrist.      Impression:      No acute fracture or malalignment.    Signer Name: Rommel Umanzor MD   Signed: 10/31/2022 11:05 PM   Workstation Name: [facility]    [facility]    XR Chest AP [[id number]] Collected: 10/31/22 2304     Updated: 10/31/22 2306    Narrative:      CR Chest 1 Vw    INDICATION:   Dyspnea.     COMPARISON:     2/10/2022    FINDINGS:  Portable AP view(s) of the chest.  Cardiac and mediastinal contours are normal. There is atherosclerotic disease in the aorta. Severe COPD is noted with chronic scarring in the bases. Scattered granulomatous calcifications are present. No acute  infiltrates or pneumothorax.      Impression:      No acute cardiopulmonary findings. Severe COPD.    Signer Name: Rommel Umanzor MD   Signed: 10/31/2022 11:04 PM   Workstation Name: [facility]    [facility]          I have discussed my impression and recommendations with the patient and family.        Thank you very much for asking us to be involved in this patient's care.  We will follow along with you.    Electronically signed by DAVID Mooney, 11/01/22, 3:03 PM EDT.    Please note that portions of this note were completed with a voice recognition program.

## 2022-11-01 NOTE — DISCHARGE PLACEMENT REQUEST
"Sarbjit Lockett (87 y.o. Male)     Date of Birth   1935    Social Security Number       Address   [address] [address]    Home Phone   [phone]    MRN   [id number]       Bahai   Yarsani    Marital Status                               Admission Date   10/31/22    Admission Type   Emergency    Admitting Provider   Rah Pastrana MD    Attending Provider   Fabrice Hartman DO    Department, Room/Bed   [address], 3304/1S       Discharge Date       Discharge Disposition   Home or Self Care    Discharge Destination                               Attending Provider: Fabrice Hartman DO    Allergies: Penicillins    Isolation: None   Infection: None   Code Status: No CPR    Ht: 167.6 cm (66\")   Wt: 45.9 kg (101 lb 4.8 oz)    Admission Cmt: None   Principal Problem: NSTEMI (non-ST elevated myocardial infarction) (HCC) [I21.4]                 Active Insurance as of 10/31/2022     Primary Coverage     Payor Plan Insurance Group Employer/Plan Group    ANTH MEDICARE REPLACEMENT ANTH MEDICARE ADVANTAGE [id number]     Payor Plan Address Payor Plan Phone Number Payor Plan Fax Number Effective Dates    [address] [phone]  2022 - None Entered    [facility] [address]       Subscriber Name Subscriber Birth Date Member ID       SARBJIT LOCKETT 1935 [id number]                 Emergency Contacts      (Rel.) Home Phone Work Phone Mobile Phone    Eric,Jesse (Relative) -- -- [phone]              [address] [address]  Phone:  [phone]  Fax:  [phone]        Patient: ROOM: 3304-1S   Sarbjit Lockett MRN:  [id number]   [address] [facility] [address] :  1935  SSN:    Phone: [phone] Sex:  M   PCP: Goldie Steward                 Emergency Contact Information      Name Relation Home Work Mobile     Storms,Jesse Relative     [phone]        "   INSURANCE PAYOR PLAN GROUP # SUBSCRIBER ID   Primary:    [employer] MEDICARE REPLACEMENT [id number]   Admitting Diagnosis: NSTEMI (non-ST elevated myocardial infarction) (HCC) [I21.4]  Order Date:  2022        Case Management  Consult       (Order ID: [id number])     Diagnosis:         Priority:  Routine Expected Date:   Expiration Date:        Interval:   Count:    Reason for Consult? home palliative [facility] referral pt lives alone needs O2        Authorizing Provider:Ofelia Mohan APRN  Authorizing Provider's NPI: [id number]  Order Entered By: Ofelia Mohan APRN 2022 12:12 PM     Electronically signed by: Ofelia Mohan APRN 2022 12:12 PM        Discharge Summary      Fabrice Hartman DO at 22 1537              [facility]    Patient Identification:  Name:  Sarbjit Martin  Age:  87 y.o.  Sex:  male  :  1935  MRN:  [id number]  Visit Number:  [id number]    Date of Admission: 10/31/2022  Date of Discharge: 2022    PCP: Goldie Steward APRN    DISCHARGE DIAGNOSIS   1.  Acute hypoxic respiratory failure  2.  COPD exacerbation  3.  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  Severe protein/calorie malnutrition      CONSULTS  None      PROCEDURES PERFORMED   None      HOSPITAL COURSE  Mr. Martin is a 87 y.o. male who presented to [facility] ED on 10/1/2022 with a chief complaint of shortness of breath.  Patient has a past medical history markable for COPD, left upper lobe spiculated nodule, chronic diastolic CHF and moderate pulmonary hypertension.  Patient reports feeling ill for approximately 2 weeks prior to evaluation in the emergency department.  He reported shortness of breath with coughing and producing minimal sputum.  Patient also reported intermittent wheezing but this did improve with use of albuterol at home.  He also reported fatigue but denied any fevers or  chills.  Patient also reported loss of appetite for approximately 3 months with unintentional weight loss of approximately 20 pounds.  For this reason, patient did present to the emergency department for further treatment and evaluation.  Please see initial H&P for specific details.  After thorough evaluation in the emergency department, patient was diagnosed with acute hypoxic respiratory failure secondary to COPD exacerbation and admitted for further treatment and evaluation.    In regards to COPD, patient was started on duo nebs every 6 hours as well as azithromycin and Solu-Medrol.  Patient reports that shortness of breath dramatically improved within 24 hours of hospitalization.  Patient is now requesting to be discharged home.  In regards to elevated cardiac enzymes, patient was technically diagnosed with NSTEMI and started on a heparin drip with cardiology consultation.  Recommendation was made to obtain transthoracic echocardiogram.  Transthoracic echocardiogram is currently pending interpretation.  In regards to left upper lobe very large spiculated mass, lengthy discussion was held with patient regarding potential diagnosis of underlying lung cancer.  Patient stated he is 87 years old and has lived a long life and feels he has nothing to live for.  He reports all of his family is  and he has no friends and lives at home by himself where he only watches TV and goes to sleep.  Patient states he is ready to die and does not want any further work-up for his underlying suspected malignancy.  Also discussed underlying NSTEMI and recommendation for stress test with transthoracic echocardiogram.  Patient expressed his disinterest in pursuing this evaluation as well.  As such, did have palliative care services visit the patient.  After further discussion, patient has reported his desire to simply return home with a prescription for steroid taper and a COPD rescue kit with anticipated follow-up by palliative  care services in his home.  I did discuss with patient that not treating his underlying medical conditions would likely lead to hastening of his lifespan.  Patient expresses understanding and again reiterated he was okay with not pursuing further work-up as he has nothing left to live for.  It should be noted patient is not suicidal and has no intentions of harming himself.  With this in mind, patient will be discharged home with palliative care services and a steroid taper as well as COPD rescue kit.  The beforementioned plan was thoroughly discussed with the patient and he expresses understanding and willingness to proceed with the beforementioned plan.    VITAL SIGNS:      10/31/22  2219 11/01/22  0500   Weight: 49.9 kg (110 lb) 45.9 kg (101 lb 4.8 oz)     Body mass index is 16.35 kg/m².    PHYSICAL EXAM:  General -thin appearing elderly  male in no apparent distress  HEENT -head normocephalic and atraumatic, pupils equally round and reactive to light  Lungs -decreased breath sounds bilaterally with bilateral expiratory wheezing  Cardiovascular -regular rate and rhythm with no murmurs, rubs or clicks appreciated  GI -abdomen soft, nontender nondistended  Neurological -cranial nerves II through XII intact with no focal deficit or unintentional motor movement appreciated    DISCHARGE DISPOSITION   Stable    DISCHARGE MEDICATIONS:     Discharge Medications      New Medications      Instructions Start Date   atorvastatin 40 MG tablet  Commonly known as: LIPITOR   40 mg, Oral, Nightly      budesonide-formoterol 160-4.5 MCG/ACT inhaler  Commonly known as: SYMBICORT   2 puffs, Inhalation, 2 Times Daily - RT         Continue These Medications      Instructions Start Date   amitriptyline 25 MG tablet  Commonly known as: ELAVIL   25 mg, Oral, Nightly PRN      aspirin 325 MG tablet   325 mg, Oral, Daily      dicyclomine 10 MG capsule  Commonly known as: BENTYL   10 mg, Oral, 4 Times Daily Before Meals & Nightly       finasteride 5 MG tablet  Commonly known as: Proscar   5 mg, Oral, Daily      ipratropium-albuterol 0.5-2.5 mg/3 ml nebulizer  Commonly known as: DUO-NEB   3 mL, Nebulization, 4 Times Daily PRN      levothyroxine 50 MCG tablet  Commonly known as: SYNTHROID, LEVOTHROID   50 mcg, Oral, Daily      LORazepam 0.5 MG tablet  Commonly known as: ATIVAN   0.5 mg, Oral, Daily PRN      meloxicam 15 MG tablet  Commonly known as: MOBIC   15 mg, Oral, Daily      omeprazole 40 MG capsule  Commonly known as: priLOSEC   40 mg, Oral, Daily      tamsulosin 0.4 MG capsule 24 hr capsule  Commonly known as: Flomax   0.4 mg, Oral, Nightly      Ventolin  (90 Base) MCG/ACT inhaler  Generic drug: albuterol sulfate HFA   2 puffs, Inhalation, Every 6 Hours PRN                   Additional Instructions for the Follow-ups that You Need to Schedule     Discharge Follow-up with PCP   As directed       Currently Documented PCP:    Goldie Steward APRN    PCP Phone Number:    [phone]     Follow Up Details: please follow up with pcp in 2-3 weeks            Follow-up Information     Goldie Steward APRN .    Specialty: Nurse Practitioner  Why: please follow up with pcp in 2-3 weeks  Contact information:  [address] [facility] [facility] [id number] [phone]                         TEST  RESULTS PENDING AT DISCHARGE       The ASCVD Risk score (Jimmie DK, et al., 2019) failed to calculate for the following reasons:    The 2019 ASCVD risk score is only valid for ages 40 to 79    The patient has a prior MI or stroke diagnosis     Fabrice Hartman DO  11/01/22  15:37 EDT    Please note that this discharge summary required more than 30 minutes to complete.    Please send a copy of this dictation to the following providers:  Goldie Steward APRN    Electronically signed by Fabrice Hartman DO at 11/01/22 1550       Discharge Order (From admission, onward)     Start     Ordered    11/01/22 1527  Discharge patient   Once        Expected Discharge Date: 11/01/22    Expected Discharge Time: Afternoon    Discharge Disposition: Home or Self Care    Physician of Record for Attribution - Please select from Treatment Team: ASHLEY MCNAMARA [[id number]]    Review needed by CMO to determine Physician of Record: No       Question Answer Comment   Physician of Record for Attribution - Please select from Treatment Team ASHLEY MCNAMARA    Review needed by CMO to determine Physician of Record No        11/01/22 1532

## 2022-11-01 NOTE — NURSING NOTE
Pt cut heparin tubing and O2 monitor with pocket knife. Educated pt that he could not have knife and patient family is now in possession. MD made aware of pt cutting tubing with knife. Heparin gtt to be discontinued

## 2022-11-01 NOTE — PLAN OF CARE
DYSPHAGIA THERAPY PLAN OF CARE:    Sarbjit Martin will benefit from formal dysphagia therapy x3-5 days per week at 15-60 minute sessions, as functionally tolerated, for 3-7 days, to address:    Long Term Goal:  Pt will accept least restrictive diet tolerance w/o overt s/s aspiration.     Short Term Goals:  1. Pt will perform OROM/GARDENIA exercises x3 sets x10 reps w/ min cues.  2. Pt will perform resistive breathing exercises x3 sets x5 reps w /resistance of 2-4 to improve respiratory support and control.   3. Pt will perform laryngeal adduction/elevation exercises x3 sets x10 reps w/ min cues.   4. Pt will perform CTAR exercises repetitive x3 sets x12 reps w/ mod cues.   5. Pt will participate in instrumental re-evaluation of swallowing fnx pending progress towards this poc.    *Goals to be added/modified as necessary.     Thank you-  Mony Martin M.A., CCC-SLP   Patient with left leg weakness and left foot drop, etiology is not clear.   Possible CVA vs Spinal etiology.   Will place on ASA for secondary stroke prevention.   Lipitor for hyperlipidemia.   Will get PT/OT and speech eval.   Neurology consult called.   Further management as per patient's clinical course.

## 2022-11-01 NOTE — CASE MANAGEMENT/SOCIAL WORK
Discharge Planning Assessment   [facility]     Patient Name: Sarbjit Martin  MRN: [id number]  Today's Date: 11/1/2022    Admit Date: 10/31/2022     Discharge Needs Assessment     Row Name 11/01/22 1341       Living Environment    People in Home alone    Primary Care Provided by self    Family Caregiver if Needed other relative(s)    Quality of Family Relationships unable to assess    Able to Return to Prior Arrangements yes       Transition Planning    Patient/Family Anticipates Transition to home    Transportation Anticipated family or friend will provide  Nephew to provide transportation at discharge.       Discharge Needs Assessment    Equipment Currently Used at Home cane, quad  unknown provider    Discharge Facility/Level of Care Needs --  Pt did not utilize home health services prior to admission. Pt is agreeable for [facility]-Palliative Care services to be arranged. SS discussed this with Palliative Care and consult has been ordered.               Discharge Plan     Row Name 11/01/22 1344       Plan    Plan Palliative Care consult ordered and they are following. Pt lives alone and he plans to return home at discharge. Pt has a quad cane via unknown provider. Pt does not utilize home health services. Pt is agreeable for Palliative Care services to be arranged via [facility]. Pt lives in [facility]. PCP is Goldie Steward. Pt does not have a POA or living will. SS to follow and assist as needed with discharge planning.    15:32: SS received consult for home palliative [facility] referral pt lives alone needs O2. SS contacted [facility] [phone] per Cony and she voices that someone will call back for referral information. SS faxed referral to [facility]. SS to follow.    16:21: SS spoke to [facility] per Ch[place]ita and Palliative Care referral information was provided. SS to follow.    Patient/Family in Agreement with Plan yes              Expected Discharge Date and Time      Expected Discharge Date Expected Discharge Time    Nov 4, 2022          Demographic Summary     Row Name 11/01/22 8190       General Information    Referral Source nursing    Reason for Consult --  SS received consult for discharge planning. SS spoke to pt at bedside.              TAQUERIA Olivares

## 2022-11-01 NOTE — DISCHARGE SUMMARY
[facility] HOSPITALIST MEDICINE DISCHARGE SUMMARY    Patient Identification:  Name:  Sarbjit Martin  Age:  87 y.o.  Sex:  male  :  1935  MRN:  [id number]  Visit Number:  [id number]    Date of Admission: 10/31/2022  Date of Discharge: 2022    PCP: Goldie Steward, DAVID    DISCHARGE DIAGNOSIS   1.  Acute hypoxic respiratory failure  2.  COPD exacerbation  3.  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  Severe protein/calorie malnutrition      CONSULTS  None      PROCEDURES PERFORMED   None      HOSPITAL COURSE  Mr. Martin is a 87 y.o. male who presented to [facility] ED on 10/1/2022 with a chief complaint of shortness of breath.  Patient has a past medical history markable for COPD, left upper lobe spiculated nodule, chronic diastolic CHF and moderate pulmonary hypertension.  Patient reports feeling ill for approximately 2 weeks prior to evaluation in the emergency department.  He reported shortness of breath with coughing and producing minimal sputum.  Patient also reported intermittent wheezing but this did improve with use of albuterol at home.  He also reported fatigue but denied any fevers or chills.  Patient also reported loss of appetite for approximately 3 months with unintentional weight loss of approximately 20 pounds.  For this reason, patient did present to the emergency department for further treatment and evaluation.  Please see initial H&P for specific details.  After thorough evaluation in the emergency department, patient was diagnosed with acute hypoxic respiratory failure secondary to COPD exacerbation and admitted for further treatment and evaluation.    In regards to COPD, patient was started on duo nebs every 6 hours as well as azithromycin and Solu-Medrol.  Patient reports that shortness of breath dramatically improved within 24 hours of hospitalization.  Patient is now requesting to be discharged home.  In regards to elevated cardiac enzymes,  patient was technically diagnosed with NSTEMI and started on a heparin drip with cardiology consultation.  Recommendation was made to obtain transthoracic echocardiogram.  Transthoracic echocardiogram is currently pending interpretation.  In regards to left upper lobe very large spiculated mass, lengthy discussion was held with patient regarding potential diagnosis of underlying lung cancer.  Patient stated he is 87 years old and has lived a long life and feels he has nothing to live for.  He reports all of his family is  and he has no friends and lives at home by himself where he only watches TV and goes to sleep.  Patient states he is ready to die and does not want any further work-up for his underlying suspected malignancy.  Also discussed underlying NSTEMI and recommendation for stress test with transthoracic echocardiogram.  Patient expressed his disinterest in pursuing this evaluation as well.  As such, did have palliative care services visit the patient.  After further discussion, patient has reported his desire to simply return home with a prescription for steroid taper and a COPD rescue kit with anticipated follow-up by palliative care services in his home.  I did discuss with patient that not treating his underlying medical conditions would likely lead to hastening of his lifespan.  Patient expresses understanding and again reiterated he was okay with not pursuing further work-up as he has nothing left to live for.  It should be noted patient is not suicidal and has no intentions of harming himself.  With this in mind, patient will be discharged home with palliative care services and a steroid taper as well as COPD rescue kit.  The beforementioned plan was thoroughly discussed with the patient and he expresses understanding and willingness to proceed with the beforementioned plan.    VITAL SIGNS:      10/31/22  2219 22  0500   Weight: 49.9 kg (110 lb) 45.9 kg (101 lb 4.8 oz)     Body mass  index is 16.35 kg/m².    PHYSICAL EXAM:  General -thin appearing elderly  male in no apparent distress  HEENT -head normocephalic and atraumatic, pupils equally round and reactive to light  Lungs -decreased breath sounds bilaterally with bilateral expiratory wheezing  Cardiovascular -regular rate and rhythm with no murmurs, rubs or clicks appreciated  GI -abdomen soft, nontender nondistended  Neurological -cranial nerves II through XII intact with no focal deficit or unintentional motor movement appreciated    DISCHARGE DISPOSITION   Stable    DISCHARGE MEDICATIONS:     Discharge Medications      New Medications      Instructions Start Date   atorvastatin 40 MG tablet  Commonly known as: LIPITOR   40 mg, Oral, Nightly      budesonide-formoterol 160-4.5 MCG/ACT inhaler  Commonly known as: SYMBICORT   2 puffs, Inhalation, 2 Times Daily - RT         Continue These Medications      Instructions Start Date   amitriptyline 25 MG tablet  Commonly known as: ELAVIL   25 mg, Oral, Nightly PRN      aspirin 325 MG tablet   325 mg, Oral, Daily      dicyclomine 10 MG capsule  Commonly known as: BENTYL   10 mg, Oral, 4 Times Daily Before Meals & Nightly      finasteride 5 MG tablet  Commonly known as: Proscar   5 mg, Oral, Daily      ipratropium-albuterol 0.5-2.5 mg/3 ml nebulizer  Commonly known as: DUO-NEB   3 mL, Nebulization, 4 Times Daily PRN      levothyroxine 50 MCG tablet  Commonly known as: SYNTHROID, LEVOTHROID   50 mcg, Oral, Daily      LORazepam 0.5 MG tablet  Commonly known as: ATIVAN   0.5 mg, Oral, Daily PRN      meloxicam 15 MG tablet  Commonly known as: MOBIC   15 mg, Oral, Daily      omeprazole 40 MG capsule  Commonly known as: priLOSEC   40 mg, Oral, Daily      tamsulosin 0.4 MG capsule 24 hr capsule  Commonly known as: Flomax   0.4 mg, Oral, Nightly      Ventolin  (90 Base) MCG/ACT inhaler  Generic drug: albuterol sulfate HFA   2 puffs, Inhalation, Every 6 Hours PRN                   Additional  Instructions for the Follow-ups that You Need to Schedule     Discharge Follow-up with PCP   As directed       Currently Documented PCP:    Goldie Steward APRN    PCP Phone Number:    [phone]     Follow Up Details: please follow up with pcp in 2-3 weeks            Follow-up Information     Goldie Steward APRN .    Specialty: Nurse Practitioner  Why: please follow up with pcp in 2-3 weeks  Contact information:  [address] [facility] [address] [phone]                         TEST  RESULTS PENDING AT DISCHARGE       The ASCVD Risk score (Jimmie DE LOS SANTOS, et al., 2019) failed to calculate for the following reasons:    The 2019 ASCVD risk score is only valid for ages 40 to 79    The patient has a prior MI or stroke diagnosis     Fabrice Hartman DO  11/01/22  15:37 EDT    Please note that this discharge summary required more than 30 minutes to complete.    Please send a copy of this dictation to the following providers:  Goldie Steward APRN

## 2022-11-01 NOTE — MBS/VFSS/FEES
Acute Care - Speech Language Pathology   Swallow Initial Evaluation  Ted   MODIFIED BARIUM SWALLOW STUDY     Patient Name: Sarbjit Martin  : 1935  MRN: [id number]  Today's Date: 2022             Admit Date: 10/31/2022    Visit Dx:     ICD-10-CM ICD-9-CM   1. NSTEMI (non-ST elevated myocardial infarction) ([facility])  I21.4 410.70   2. COPD exacerbation ([facility])  J44.1 491.21     Patient Active Problem List   Diagnosis   • Status post laparoscopic cholecystectomy   • COPD (chronic obstructive pulmonary disease) ([facility])   • Abnormal CT of the chest   • Pulmonary nodule   • Benign prostatic hyperplasia without lower urinary tract symptoms   • NSTEMI (non-ST elevated myocardial infarction) ([facility])   • COPD exacerbation ([facility])   • Underweight     Past Medical History:   Diagnosis Date   • Angiodysplasia of the colon    • Arthritis    • Bacteremia due to Escherichia coli 2017    Same time as the common bile duct stone   • Benign prostatic hyperplasia without lower urinary tract symptoms 2021   • Bile duct calculus 2017   • 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[facility])     Centrilobular   • Diverticulosis    • GERD (gastroesophageal reflux disease)    • Heart failure with preserved ejection fraction ([facility])    • Left upper lobe pulmonary nodule 2019    15 mm and spiculated in 2018; FNA was negative   • Moderate pulmonary hypertension ([facility])    • Status post laparoscopic cholecystectomy 06/15/2017     Past Surgical History:   Procedure Laterality Date   • BRONCHOSCOPY N/A 2018    Procedure: BRONCHOSCOPY WITH ENDOBRONCHIAL ULTRASOUND;  Surgeon: Saraavnan Méndez MD;  Location:  [facility] OR;  Service: Pulmonary   • CHOLECYSTECTOMY N/A 2017    Procedure: CHOLECYSTECTOMY LAPAROSCOPIC;  Surgeon: Jose Velazquez MD;  Location: [facility] OR;  Service:    • EYE SURGERY Bilateral    • INGUINAL HERNIA REPAIR Left     left   • [place] ERCP DX COLLECTION SPECIMEN BRUSHING/WASHING N/A  "2017    Procedure: ENDOSCOPIC RETROGRADE CHOLANGIOPANCREATOGRAPHY;  Surgeon: Rod Francis MD;  Location: [facility] OR;  Service: Gastroenterology   • [place] ERCP DX COLLECTION SPECIMEN BRUSHING/WASHING N/A 10/26/2017    Procedure: ENDOSCOPIC RETROGRADE CHOLANGIOPANCREATOGRAPHY;  Surgeon: Rod Francis MD;  Location: [facility] OR;  Service: Gastroenterology     Sarbjit Martin  presents to the radiology suite this am from 3S 3304/1S to participate in an instrumental MBS to evaluate safety/efficacy of swallowing fnx, determine safest/least restrictive diet. He is currently on clear liquids pending this evaluation.     Mr. Martin presented to [facility] ED w/ soa, cough and congestion. He is admitted w/ NSTEMI, acute hypoxic respiratory failure, acute COPD exacerbation. He reported a recent 17 pound weight loss. He is referred to r/o dysphagia. Per his current medical/respiratory status, he is felt to most benefit from instrumental MBS to objectively evaluate swallowing fnx. Mr. Martin reports h/o frequent recurrent PNA, and does endorse choking on mixed consistencies such as \"cornbread and milk.\"       Social History     Socioeconomic History   • Marital status:    Tobacco Use   • Smoking status: Former     Years: 10.00     Types: Cigarettes     Quit date:      Years since quittin.8   • Smokeless tobacco: Former     Quit date: 1995   Substance and Sexual Activity   • Alcohol use: No   • Drug use: No   • Sexual activity: Defer      CT Chest W     INDICATION:   Myocardial infarction. COPD. Cough and wheezing. Weight loss.     TECHNIQUE:   CT of the thorax with IV contrast. Coronal and sagittal reconstructions were obtained.  Radiation dose reduction techniques included automated exposure control or exposure modulation based on body size. Count of known CT and cardiac nuc med studies  performed in previous 12 months: 0.      COMPARISON:   2018     FINDINGS:  There is atherosclerotic disease and " coronary artery disease. There is dilatation of the aortic root at 4.6 cm. This is unchanged. There is ectasia of the ascending aorta at 3.7 cm, also unchanged. There is no pleural or pericardial effusion. Upper  abdominal images show numerous surgical clips along the lesser curvature of the stomach. Gallbladder is surgically absent. There is no adenopathy by size criteria. There are no suspicious osseous lesions.     Lung windows show COPD. There is fibrosis and cavitation in the left lower lobe that appears slightly worsened. There is a large lobular mass in the left upper lobe abutting the mediastinum. This was previously a spiculated nodule measuring about 1.5 cm.  The mass now measures approximately 3.9 x 3.4 x 6.7 cm. PET/CT is once again recommended. There is a 5 mm noncalcified nodule in the left upper lobe on image 31., And there is a 3 mm left upper lobe nodule on image 23. These appear to be present on the  old study and are felt to be benign. There is some mild fibrosis in the right middle lobe. There is bronchial wall thickening in both lungs compatible with bronchitis. There are some tree-in-bud type infiltrates in the right lung compatible with  bronchiolitis.     IMPRESSION:     1. 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 PET CT is recommended for follow-up.  2. Severe fibrosis and cavitation in the left lower lobe appears worsened.  3. COPD and bronchitis. There is evidence of bronchiolitis in the right lung.  4. Stable dilatation of the aortic root measuring 4.6 cm.     Signer Name: Rommel Umanzor MD   Signed: 11/1/2022 4:36 AM   [facility]station Name: [facility]    [facility]    Diet Orders (active) (From admission, onward)     Start     Ordered    11/01/22 0446  Diet Clear Liquid  Diet Effective Now         11/01/22 0445              He is observed on 2L O2 via NC w/o complications.     Risks and benefits of the procedure are explained w/ pt  verbalizing understanding/agreement to participate. Proceed per protocol.     Pt is positioned upright and centered in a supportive chair to accept multiple po presentations of solid cracker, puree, honey thick, nectar thick, and thin liquids via spoon, cup and straw, along w/ whole placebo pill in puree. He is able to self feed.     All views are from the lateral plane.     Facial/oral structures are symmetrical upon observation w/o lingual deviation upon protrusion. Oral mucosa are moist, pink and clean. Secretions are clear, thin and well controlled. OROM/GARDENIA is adequate to imitate oral postures. Gag is not assessed. Volitional cough is adequate in intensity, congestive in quality, nonproductive. Baseline sporadic congestive cough evidenced before, during and after this evaluation, no direct correlation to po presentations. Vocal quality is adequate in intensity, clear in quality w/ intelligible speech. Pt is a/a and cooperative to particpate. He is oriented to person, place, and time, follows simple directives, and participates in simple conversational exchanges.     Upon po presentations, adequate bolus anticipation w/ good labial seal for bolus clearance via spoon bowl, cup rim stability and suction via straw. Bolus formation, manipulation,  and control are only slightly weak, mildly increased oral prep time w/ solid, rotary mastication pattern. A-p transit is slightly delayed w/o significant oral residue. Tongue base retraction and linguavelar seal are mildly weak w/ premature spillage of thin liquids via straw, resulting in laryngeal penetration and silent aspiration before the swallow. Other No laryngeal penetration or aspiration is evidenced before the swallow.     Pharyngeal swallow is timely w/ mildly weak hyolaryngeal elevation and slightly delayed epiglottic inversion. Deep laryngeal penetration and silent aspiration occurs during the swallow w/ thin liquids via cup and straw. Compensatory chin tuck is  ineffective to consistently alleviate this. Cued cough is effective to greatly diminish laryngeal residue and aspirated material, however, does not completely clear. Repeat nectar thick liquid via straw trial wfl. Pharyngeal contraction is mildly weak w/ mild tongue base and vallecular residue w/ all consistencies, trace pyriform sinus and posterior pharyngeal wall residue w/ nectar thick and thin liquids. No other laryngeal penetration or aspiration evidenced during or after the swallow.     He initially begins to masticate whole placebo pill, verbal cues effective to elicit transit of partially masticated pill.     Full esophageal sweep reveals no obvious mucosal abnormalities. Motility appears somewhat decreased w/ delayed transit time, no obvious retrograde flow noted.      Impression: Per this evaluation, Mr. Martin presents w/ a mild oropharyngeal swallow w/ silent aspiration of thin liquids. He is felt to most benefit from a modified po diet of mechanical soft consistency, whole foods, NECTAR thick liquids, water protocol between meals/meds, dysphagia tx per poc.     SLP Recommendation and Plan  1. Mechanical soft, whole foods, NECTAR thick liquids.   2. Meds whole in puree/nectars.   3. Water protocol between meals/meds.   4. Universal aspiration precautions.   5. EMILY precautions.   6. Oral care protocol.   7. Dysphagia tx per poc.   SLP to f/u for diet safety, dysphagia tx as tolerated.     D/w pt results and recommendations w/ verbal understanding and agreement.     D/w RN via telephone results and recommendations w/ verbal understanding and agreement.     Communicated results and recommendations to MD.     Thank you for allowing me to participate in the care of your patient-  Monymelly Martin M.A., CCC-SLP      EDUCATION  The patient has been educated in the following areas:   Dysphagia (Swallowing Impairment) Modified Diet Instruction.      Time Calculation:    Time Calculation- SLP     Row Name 11/01/22  0958             Time Calculation- SLP    SLP Received On 11/01/22  -DORIE      SLP - Next Appointment 11/02/22  -            User Key  (r) = Recorded By, (t) = Taken By, (c) = Cosigned By    Initials Name Provider Type    Mony Ospina MA,CCC-SLP Speech and Language Pathologist                Therapy Charges for Today     Code Description Service Date Service Provider Modifiers Qty    [id number] HC ST MOTION FLUORO EVAL SWALLOW 8 11/1/2022 Mony Martin MA,CCC-SLP GN 1               Mony Martin MA,CCC-SLP  11/1/2022

## 2022-11-01 NOTE — NURSING NOTE
On previous documentation patient's oxygen saturation dropped to 81 percent on room air at 05:22 am. 2 liters n/c was then placed and the patient's oxygen saturation came back up to 94 percent at 05:23.

## 2022-11-01 NOTE — NURSING NOTE
Patient ambulated down the calderon. Brought him back to his room and his oxygen saturation was 77 on room air. Placed 2 liters n/c and his oxygen saturation came back up to 91 percent.

## 2022-11-01 NOTE — PAYOR COMM NOTE
"[facility]  NPI:[id number]    Utilization Review  Contact: Leatha Allred RN  Phone: [phone]  Fax:[phone]    INITIATE INPATIENT AUTHORIZATION   REF# [id number]  MRN [id number]    Sarbjit Lockett (87 y.o. Male)     Date of Birth   1935    Social Security Number       Address   [address] [address]    Home Phone   [phone]    MRN   [id number]       Taoism   Nondenominational    Marital Status                               Admission Date   10/31/22    Admission Type   Emergency    Admitting Provider   Rah Pastrana MD    Attending Provider   Fabrice Hartman DO    Department, Room/Bed   [address], 3304/1S       Discharge Date       Discharge Disposition       Discharge Destination                               Attending Provider: Fabrice Hartman DO    Allergies: Penicillins    Isolation: None   Infection: None   Code Status: CPR    Ht: 167.6 cm (66\")   Wt: 45.9 kg (101 lb 4.8 oz)    Admission Cmt: None   Principal Problem: NSTEMI (non-ST elevated myocardial infarction) ([facility]) [I21.4]                 Active Insurance as of 10/31/2022     Primary Coverage     Payor Plan Insurance Group Employer/Plan Group    ANTH MEDICARE REPLACEMENT ANTHEM MEDICARE ADVANTAGE [id number]     Payor Plan Address Payor Plan Phone Number Payor Plan Fax Number Effective Dates    [address] [phone]  4/1/2022 - None Entered    [facility] [id number]       Subscriber Name Subscriber Birth Date Member ID       SARBJIT LOCKETT 1935 [id number]                 Emergency Contacts      (Rel.) Home Phone Work Phone Mobile Phone    Jesse Reyes (Relative) -- -- [phone]            "

## 2022-11-01 NOTE — CASE MANAGEMENT/SOCIAL WORK
Discharge Planning Assessment   Ted     Patient Name: Sarbjit Martin  MRN: [id number]  Today's Date: 11/1/2022    Admit Date: 10/31/2022    Plan: CLAIRE spoke with Cony at [facility] who stated that Deepak with Hospice has left for the day.  Cony will attempt to contact Deepak to determine if home 02 can be arranged for palliative care pt's without qualifying sat.  CLAIRE will follow.      TOM Kingsley

## 2022-11-01 NOTE — CONSULTS
Palliative Care Initial Consult     Attending Physician: Fabrice Hartman,*  Referring Provider: Rah Pastrana     Palliative care reason for consult: GOC/ACP support for pt and family  Code Status:   Code Status and Medical Interventions:   Ordered at: 11/01/22 0228     Level Of Support Discussed With:    Patient     Code Status (Patient has no pulse and is not breathing):    CPR (Attempt to Resuscitate)     Medical Interventions (Patient has pulse or is breathing):    Full Support      Advanced Directives: Advance Directive Status: Patient does not have advance directive   Healthcare surrogate: NOK nephgeovanny Reyes and Nephew Panda  Goals of Care: Sarbjit states that he does not want to be resuscitated or to be put on a ventilator, furthermore states that he does not want further workup/treatment and would like to return home at time of discharge.    HPI:  Sarbjit Martin is a 87 y.o. male admitted on 10/31/2022 with shortness of breath. He has a medical history of COPD with centrilobular emphysema (does not require oxygen at home), left upper lobe 15 mm spiculated nodule noted in 2018 with a negative fine-needle aspiration, heart failure with preserved ejection fraction, moderate pulmonary hypertension, and past E. coli bacteremia due to common bile duct stone. Per Sarbjit he says he has not felt well for several weeks and has been short of breath and has had a mostly non productive cough. He said he has just felt weak and tired and has not had an appetite for months now and has lost around 20 lbs. Upon admission pts x-ray did not show pneumonia, however he did have elevated troponin's and some ST elevation in septal and anterior leads. Due to suspect  type II non-ST elevation MI due to the acute hypoxia from the acute COPD exacerbation, the patient was admitted to the telemetry floor for further evaluation and treatment.        ROS: Negative except as above in HPI.     Past Medical History:   Diagnosis  Date   • Angiodysplasia of the colon    • Arthritis    • Bacteremia due to Escherichia coli 2017    Same time as the common bile duct stone   • Benign prostatic hyperplasia without lower urinary tract symptoms 2021   • Bile duct calculus 2017   • 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HCC)     Centrilobular   • Diverticulosis    • GERD (gastroesophageal reflux disease)    • Heart failure with preserved ejection fraction (HCC)    • Left upper lobe pulmonary nodule 2019    15 mm and spiculated in 2018; FNA was negative   • Moderate pulmonary hypertension (HCC)    • Status post laparoscopic cholecystectomy 06/15/2017     Past Surgical History:   Procedure Laterality Date   • BRONCHOSCOPY N/A 2018    Procedure: BRONCHOSCOPY WITH ENDOBRONCHIAL ULTRASOUND;  Surgeon: Saravanan Méndez MD;  Location: [facility];  Service: Pulmonary   • CHOLECYSTECTOMY N/A 2017    Procedure: CHOLECYSTECTOMY LAPAROSCOPIC;  Surgeon: Jose Velazquez MD;  Location: [facility];  Service:    • EYE SURGERY Bilateral    • INGUINAL HERNIA REPAIR Left     left   • TX ERCP DX COLLECTION SPECIMEN BRUSHING/WASHING N/A 2017    Procedure: ENDOSCOPIC RETROGRADE CHOLANGIOPANCREATOGRAPHY;  Surgeon: Rod Francis MD;  Location: [facility];  Service: Gastroenterology   • TX ERCP DX COLLECTION SPECIMEN BRUSHING/WASHING N/A 10/26/2017    Procedure: ENDOSCOPIC RETROGRADE CHOLANGIOPANCREATOGRAPHY;  Surgeon: Rod Francis MD;  Location: [facility];  Service: Gastroenterology     Social History     Socioeconomic History   • Marital status:    Tobacco Use   • Smoking status: Former     Years: 10.00     Types: Cigarettes     Quit date:      Years since quittin.8   • Smokeless tobacco: Former     Quit date: 1995   Substance and Sexual Activity   • Alcohol use: No   • Drug use: No   • Sexual activity: Defer     Family History   Problem Relation Age of Onset   • No Known  Problems Mother    • Diabetes Father    • Hypertension Father    • Stroke Father    • Diabetes Sister        Allergies   Allergen Reactions   • Penicillins Anaphylaxis       Current Facility-Administered Medications   Medication Dose Route Frequency Provider Last Rate Last Admin   • [START ON 11/2/2022] aspirin EC tablet 81 mg  81 mg Oral Daily Rah Pastrana MD       • atorvastatin (LIPITOR) tablet 40 mg  40 mg Oral Nightly Rah Pastrana MD       • guaiFENesin (MUCINEX) 12 hr tablet 600 mg  600 mg Oral Q12H Rah Pastrana MD   600 mg at 11/01/22 0839   • heparin (porcine) 5000 UNIT/ML injection 1,500 Units  30 Units/kg Intravenous PRN Rah Pastrana MD       • heparin (porcine) 5000 UNIT/ML injection 3,000 Units  60 Units/kg Intravenous PRN Rah Pastrana MD   3,000 Units at 11/01/22 0720   • heparin 02533 units/250 mL (100 units/mL) in 0.45 % NaCl infusion  12 Units/kg/hr Intravenous Titrated Rah Pastrana MD 7.98 mL/hr at 11/01/22 0722 16 Units/kg/hr at 11/01/22 0722   • ipratropium-albuterol (DUO-NEB) nebulizer solution 3 mL  3 mL Nebulization Q6H - RT Rah Pastrana MD   3 mL at 11/01/22 0706   • ipratropium-albuterol (DUO-NEB) nebulizer solution 3 mL  3 mL Nebulization Q4H PRN Rah Pastrana MD       • methylPREDNISolone sodium succinate (SOLU-Medrol) injection 62.5 mg  62.5 mg Intravenous Daily Rah Pastrana MD   62.5 mg at 11/01/22 0838   • multivitamin (THERAGRAN) tablet 1 tablet  1 tablet Oral Daily Rah Pastrana MD   1 tablet at 11/01/22 0839   • nitroglycerin (NITROSTAT) SL tablet 0.4 mg  0.4 mg Sublingual Q5 Min PRN Rah Pastrana MD       • Pharmacy Consult   Does not apply Once Rah Pastrana MD       • sodium chloride 0.9 % flush 3 mL  3 mL Intravenous Q12H Rah Pastrana MD       • sodium chloride 0.9 % flush 3-10 mL  3-10 mL Intravenous PRN Rah Pastrana MD       • thiamine (VITAMIN B-1) tablet 100 mg  100 mg Oral Daily Rah Pastrana,  "MD   100 mg at 11/01/22 0839     heparin, 12 Units/kg/hr, Last Rate: 16 Units/kg/hr (11/01/22 0722)      •  heparin (porcine)  •  heparin (porcine)  •  ipratropium-albuterol  •  nitroglycerin  •  sodium chloride    Current medication reviewed for route, type, dose and frequency and are current per MAR.    Palliative Performance Scale Score:     /71 (BP Location: Left arm, Patient Position: Lying)   Pulse 50   Temp 98.2 °F (36.8 °C) (Oral)   Resp 18   Ht 167.6 cm (66\")   Wt 45.9 kg (101 lb 4.8 oz)   SpO2 97%   BMI 16.35 kg/m²     Intake/Output Summary (Last 24 hours) at 11/1/2022 1216  Last data filed at 11/1/2022 0900  Gross per 24 hour   Intake 410 ml   Output --   Net 410 ml       PE:  General Appearance:    Chronically ill appearing, thin frail, alert, cooperative, NAD   HEENT:    NC/AT, without obvious abnormality, EOMI, anicteric    Neck:   supple, trachea midline, no JVD   Lungs:     Coarse upper lobes sounds, diminished bases, cough    Heart:    Bradycardia, normal S1 and S2, no M/R/G   Abdomen:     Soft, NT, ND, NABS    Extremities:   Moves all extremities, no edema   Pulses:   Pulses palpable and equal bilaterally   Skin:   Warm, dry, fragile   Neurologic:   A/Ox3, cooperative   Psych:   Calm, appropriate       Labs:   Results from last 7 days   Lab Units 11/01/22  0545   WBC 10*3/mm3 10.80   HEMOGLOBIN g/dL 15.5   HEMATOCRIT % 47.9   PLATELETS 10*3/mm3 181     Results from last 7 days   Lab Units 10/31/22  2341   SODIUM mmol/L 135*   POTASSIUM mmol/L 4.1   CHLORIDE mmol/L 95*   CO2 mmol/L 22.6   BUN mg/dL 71*   CREATININE mg/dL 1.39*   GLUCOSE mg/dL 131*   CALCIUM mg/dL 9.3     Results from last 7 days   Lab Units 10/31/22  2341   SODIUM mmol/L 135*   POTASSIUM mmol/L 4.1   CHLORIDE mmol/L 95*   CO2 mmol/L 22.6   BUN mg/dL 71*   CREATININE mg/dL 1.39*   CALCIUM mg/dL 9.3   BILIRUBIN mg/dL 0.6   ALK PHOS U/L 77   ALT (SGPT) U/L 24   AST (SGOT) U/L 27   GLUCOSE mg/dL 131*     Imaging Results " (Last 72 Hours)     Procedure Component Value Units Date/Time    FL Video Swallow Single Contrast [[id number]] Collected: 11/01/22 1035     Updated: 11/01/22 1152    Narrative:      FL VIDEO SWALLOW SINGLE-CONTRAST-     CLINICAL INDICATION: aspiration; I21.4-Non-ST elevation (NSTEMI)  myocardial infarction; J44.1-Chronic obstructive pulmonary disease with  (acute) exacerbation        TECHNIQUE:   Speech therapy service was present. The patient was examined in the  sitting lateral position and was given several consistencies of barium.     FINDINGS: Intermittent silent aspiration with thin liquids.     FLUOROSCOPY TIME: 1.8 minutes     Images: Cine loop was acquired       Impression:      Intermittent silent aspiration with thin liquids.     For additional information please see the report provided by the speech  therapy service     This report was finalized on 11/1/2022 11:50 AM by Dr. Barrie Guzman MD.       CT Chest With Contrast Diagnostic [[id number]] Collected: 11/01/22 0436     Updated: 11/01/22 0438    Narrative:      CT Chest W    INDICATION:   Myocardial infarction. COPD. Cough and wheezing. Weight loss.    TECHNIQUE:   CT of the thorax with IV contrast. Coronal and sagittal reconstructions were obtained.  Radiation dose reduction techniques included automated exposure control or exposure modulation based on body size. Count of known CT and cardiac nuc med studies  performed in previous 12 months: 0.     COMPARISON:   8/16/2018    FINDINGS:  There is atherosclerotic disease and coronary artery disease. There is dilatation of the aortic root at 4.6 cm. This is unchanged. There is ectasia of the ascending aorta at 3.7 cm, also unchanged. There is no pleural or pericardial effusion. Upper  abdominal images show numerous surgical clips along the lesser curvature of the stomach. Gallbladder is surgically absent. There is no adenopathy by size criteria. There are no suspicious osseous lesions.    Lung windows show  COPD. There is fibrosis and cavitation in the left lower lobe that appears slightly worsened. There is a large lobular mass in the left upper lobe abutting the mediastinum. This was previously a spiculated nodule measuring about 1.5 cm.  The mass now measures approximately 3.9 x 3.4 x 6.7 cm. PET/CT is once again recommended. There is a 5 mm noncalcified nodule in the left upper lobe on image 31., And there is a 3 mm left upper lobe nodule on image 23. These appear to be present on the  old study and are felt to be benign. There is some mild fibrosis in the right middle lobe. There is bronchial wall thickening in both lungs compatible with bronchitis. There are some tree-in-bud type infiltrates in the right lung compatible with  bronchiolitis.      Impression:        1. 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 PET CT is recommended for follow-up.  2. Severe fibrosis and cavitation in the left lower lobe appears worsened.  3. COPD and bronchitis. There is evidence of bronchiolitis in the right lung.  4. Stable dilatation of the aortic root measuring 4.6 cm.    Signer Name: Rommel Umanzor MD   Signed: 11/1/2022 4:36 AM   Workstation Name: [facility]    Radiology [facility]    XR Forearm 2 View Left [[id number]] Collected: 10/31/22 2305     Updated: 10/31/22 2308    Narrative:      CR Forearm 2 Vws LT    INDICATION:   Pain after fall.    COMPARISON:   None available.    FINDINGS:   AP and lateral views of the left forearm. Please note that the AP forearm view is included in the chest series today. No fracture or dislocation.  No bone erosion or destruction. Articulation at the elbow and wrist is anatomic.  No foreign body. There is  degenerative change at the wrist.      Impression:      No acute fracture or malalignment.    Signer Name: Rommel Umanzor MD   Signed: 10/31/2022 11:05 PM   Workstation Name: [facility]    Radiology [facility]    XR Chest AP  [[id number]] Collected: 10/31/22 2304     Updated: 10/31/22 2306    Narrative:      CR Chest 1 Vw    INDICATION:   Dyspnea.     COMPARISON:    2/10/2022    FINDINGS:  Portable AP view(s) of the chest.  Cardiac and mediastinal contours are normal. There is atherosclerotic disease in the aorta. Severe COPD is noted with chronic scarring in the bases. Scattered granulomatous calcifications are present. No acute  infiltrates or pneumothorax.      Impression:      No acute cardiopulmonary findings. Severe COPD.    Signer Name: Rommel Umanzor MD   Signed: 10/31/2022 11:04 PM   Workstation Name: [email or web address]    [facility]          Diagnostics: Reviewed    A: Sarbjit Martin is a 87 y.o. male admitted on 10/31/2022 with shortness of breath. He has a medical history of COPD with centrilobular emphysema (does not require oxygen at home), left upper lobe 15 mm spiculated nodule noted in 2018 with a negative fine-needle aspiration, heart failure with preserved ejection fraction, moderate pulmonary hypertension, and past E. coli bacteremia due to common bile duct stone. Per Sarbjit he says he has not felt well for several weeks and has been short of breath and has had a mostly non productive cough. He said he has just felt weak and tired and has not had an appetite for months now and has lost around 20 lbs. Upon admission pts x-ray did not show pneumonia, however he did have elevated troponin's and some ST elevation in septal and anterior leads. Due to suspect  type II non-ST elevation MI due to the acute hypoxia from the acute COPD exacerbation, the patient was admitted to the telemetry floor for further evaluation and treatment.           P: Palliative care was consulted to discuss GOC/ACP and support for pt and family.  I was able to speak with Sarbjit regarding what his goals for his treatment, goals for his care. Per discussion with Dr Hartman at bedside pt does not want resuscitation or to be put on a  ventilator and does not wish to have further work-up for his condition. Sarbjit reiterated his wishes to me as well and would just like to return home. I did speak with him about getting services at home that could help with getting oxygen and other equipment, as well as medication, a nurse to see him and they would have a  if he should need anything further. I did reach out to nephew Jesse to express my concern that Sarbjit will be needing more support/help from extra services and family in the near future. He stated that he and his cousin Panda would discuss it and would be more available and check on Sarbjit. I let him know that I put a Palliative home referral in for Sarbjit at time of discharge and spoke with Amy in SS.    We appreciate the consult and the opportunity to participate in Sarbjit Martin's care. We will continue to follow along. Please do not hesitate to contact us regarding further symptom management or goals of care needs, including after hours or on weekends via our on call provider at [phone].     Time: 30 minutes spent reviewing medical and medication records, assessing and examining patient, discussing with family, answering questions, providing some guidance about a plan and documentation of care, and coordinating care with other healthcare members, with > 50% time spent face to face.     Ofelia Mohan, APRN    11/1/2022

## 2022-11-01 NOTE — PLAN OF CARE
Goal Outcome Evaluation:              Outcome Evaluation: Pt admitted from the ED this shift. Pt is resting in bed with Heparin infusing, see MAR. Pt has no cocncerns or complaints at this time. VSS. No s/s of acute distress noted. Will continue to follow plan of care.

## 2022-11-01 NOTE — ED PROVIDER NOTES
Subjective   History of Present Illness     Mr. Martin is an 87-year-old male with past medical history for COPD presenting to the emergency department for dyspnea and chest pain.  Patient reports symptom onset a week prior.  He reports a nonproductive cough as well as increased congestion.  Patient denies any fevers or other infectious-like symptoms.  Reports utilizing his albuterol inhaler with some relief in symptoms.  Denies any nausea, vomiting or diaphoresis.  No dizziness or lightheadedness.  Patient also reports he had a mechanical fall yesterday with superficial laceration to the left forearm.  Neurovascularly intact with no sensorimotor changes.  Denies blood thinner use.    Review of Systems   Constitutional: Negative.  Negative for fever.   HENT: Negative.    Respiratory: Positive for shortness of breath.    Cardiovascular: Positive for chest pain.   Gastrointestinal: Negative.  Negative for abdominal pain.   Endocrine: Negative.    Genitourinary: Negative.  Negative for dysuria.   Skin: Negative.    Neurological: Negative.    Psychiatric/Behavioral: Negative.    All other systems reviewed and are negative.      Past Medical History:   Diagnosis Date   • Angiodysplasia of the colon    • Arthritis    • Bacteremia due to Escherichia coli 06/28/2017    Same time as the common bile duct stone   • Benign prostatic hyperplasia without lower urinary tract symptoms 06/30/2021   • Bile duct calculus 06/24/2017   • 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HCC)     Centrilobular   • Diverticulosis    • GERD (gastroesophageal reflux disease)    • Heart failure with preserved ejection fraction (HCC)    • Left upper lobe pulmonary nodule 01/30/2019    15 mm and spiculated in 2018; FNA was negative   • Moderate pulmonary hypertension (HCC)    • Status post laparoscopic cholecystectomy 06/15/2017       Allergies   Allergen Reactions   • Penicillins Anaphylaxis       Past Surgical History:   Procedure  Laterality Date   • BRONCHOSCOPY N/A 2018    Procedure: BRONCHOSCOPY WITH ENDOBRONCHIAL ULTRASOUND;  Surgeon: Saravanan Méndez MD;  Location: [facility] OR;  Service: Pulmonary   • CHOLECYSTECTOMY N/A 2017    Procedure: CHOLECYSTECTOMY LAPAROSCOPIC;  Surgeon: Jose Velazquez MD;  Location: [facility] OR;  Service:    • EYE SURGERY Bilateral    • INGUINAL HERNIA REPAIR Left     left   • MO ERCP DX COLLECTION SPECIMEN BRUSHING/WASHING N/A 2017    Procedure: ENDOSCOPIC RETROGRADE CHOLANGIOPANCREATOGRAPHY;  Surgeon: Rod Francis MD;  Location: [facility] OR;  Service: Gastroenterology   • MO ERCP DX COLLECTION SPECIMEN BRUSHING/WASHING N/A 10/26/2017    Procedure: ENDOSCOPIC RETROGRADE CHOLANGIOPANCREATOGRAPHY;  Surgeon: Rod Francis MD;  Location: [facility] OR;  Service: Gastroenterology       Family History   Problem Relation Age of Onset   • No Known Problems Mother    • Diabetes Father    • Hypertension Father    • Stroke Father    • Diabetes Sister        Social History     Socioeconomic History   • Marital status:    Tobacco Use   • Smoking status: Former     Years: 10.00     Types: Cigarettes     Quit date:      Years since quittin.8   • Smokeless tobacco: Former     Quit date: 1995   Substance and Sexual Activity   • Alcohol use: No   • Drug use: No   • Sexual activity: Defer           Objective   Physical Exam  Constitutional:       General: He is not in acute distress.     Appearance: Normal appearance. He is not ill-appearing.   HENT:      Head: Normocephalic and atraumatic.      Nose: No congestion or rhinorrhea.   Eyes:      Extraocular Movements: Extraocular movements intact.      Pupils: Pupils are equal, round, and reactive to light.   Cardiovascular:      Rate and Rhythm: Normal rate and regular rhythm.      Pulses: Normal pulses.      Heart sounds: Normal heart sounds.   Pulmonary:      Effort: Pulmonary effort is normal.      Breath sounds: Normal breath  sounds.   Abdominal:      General: Bowel sounds are normal.      Palpations: Abdomen is soft.   Musculoskeletal:         General: Normal range of motion.      Cervical back: Normal range of motion.   Skin:     General: Skin is warm.      Capillary Refill: Capillary refill takes less than 2 seconds.      Findings: Lesion (Superficial laceration to the left forearm) present.   Neurological:      General: No focal deficit present.      Mental Status: He is alert and oriented to person, place, and time.      Cranial Nerves: No cranial nerve deficit.      Sensory: No sensory deficit.      Motor: No weakness.      Deep Tendon Reflexes: Reflexes normal.         Procedures           ED Course  ED Course as of 11/02/22 0110   Mon Oct 31, 2022   2244 EKG at 2226, sinus tachycardia with PACs, 125 bpm, , cures 94, QTc 441, right axis deviation, incomplete RBBB.  ST elevations in V2 and V3 not meeting 2mm STEMI criteria.  1 mm ST and V4.  Discussed with Dr. Edwards.    Electronically signed by Adrian Williamson MD, 10/31/22, 10:46 PM EDT.    2nd ECG obtained which shows st elevation has now moved to V2 (anterior leads) w/ notable Q waves present. Cardiology consulted.    [LS]   Tue Nov 01, 2022   0203 Cardiology consulted Dr. Wilkerson reviewed serial ECG's and would like Heparin to be initiated and trending of trop. Recommendations also include ASA and lipitor.  [LS]      ED Course User Index  [LS] Maria M Edwards MD                                           MDM  Number of Diagnoses or Management Options     Amount and/or Complexity of Data Reviewed  Clinical lab tests: reviewed and ordered  Tests in the radiology section of CPT®: reviewed and ordered  Tests in the medicine section of CPT®: reviewed and ordered  Review and summarize past medical records: yes  Independent visualization of images, tracings, or specimens: yes    Risk of Complications, Morbidity, and/or Mortality  Presenting problems: high  Diagnostic  procedures: high  Management options: high        Final diagnoses:   NSTEMI (non-ST elevated myocardial infarction) (HCC)   COPD exacerbation ([facility])       ED Disposition  ED Disposition     ED Disposition   Decision to Admit    Condition   --    Comment   Level of Care: Telemetry [5]   Diagnosis: NSTEMI (non-ST elevated myocardial infarction) (HCC) [508236]   Isolate for COVID?: No [0]   Certification: I Certify That Inpatient Hospital Services Are Medically Necessary For Greater Than 2 Midnights               Goldie Steward, APRN  [address]  [facility] [address]  [phone]    Follow up in 3 week(s)  2-3 weeks: NOV 22 @12:15PM    Mary Ruiz, APRN  [address]  [facility] [address]  [phone]    Follow up in 3 week(s)  NOV 22@ 10AM         Medication List      New Prescriptions    atorvastatin 40 MG tablet  Commonly known as: LIPITOR  Take 1 tablet by mouth Every Night.     budesonide-formoterol 160-4.5 MCG/ACT inhaler  Commonly known as: SYMBICORT  Inhale 2 puffs by mouth 2 (Two) Times a Day.     predniSONE 10 MG tablet  Commonly known as: DELTASONE  Take 4 tablets by mouth Daily for 3 days, THEN 2 tablets Daily for 3 days, THEN 1 tablet Daily for 3 days.  Start taking on: November 1, 2022           Where to Get Your Medications      These medications were sent to [facility] [place] [address] [place] [address]    Hours: 8AM-6PM Mon-Fri Phone: [phone]   · atorvastatin 40 MG tablet  · budesonide-formoterol 160-4.5 MCG/ACT inhaler  · predniSONE 10 MG tablet          Maria M Edwards MD  11/02/22 0111

## 2022-11-01 NOTE — DISCHARGE PLACEMENT REQUEST
"Sarbjit Lockett (87 y.o. Male)     Date of Birth   1935    Social Security Number       Address   [address] [place] [address]    Home Phone   [phone]    MRN   [id number]       Church   [employer]    Marital Status                               Admission Date   10/31/22    Admission Type   Emergency    Admitting Provider   Rah Pastrana MD    Attending Provider   Fabrice Hartman DO    Department, Room/Bed   [address], 3304/1S       Discharge Date       Discharge Disposition   Home or Self Care    Discharge Destination                               Attending Provider: Fabrice Hartman DO    Allergies: Penicillins    Isolation: None   Infection: None   Code Status: No CPR    Ht: 167.6 cm (66\")   Wt: 45.9 kg (101 lb 4.8 oz)    Admission Cmt: None   Principal Problem: NSTEMI (non-ST elevated myocardial infarction) (HCC) [I21.4]                 Active Insurance as of 10/31/2022     Primary Coverage     Payor Plan Insurance Group Employer/Plan Group    ANTH MEDICARE REPLACEMENT ANTH MEDICARE ADVANTAGE [id number]     Payor Plan Address Payor Plan Phone Number Payor Plan Fax Number Effective Dates    [address] [phone]  2022 - None Entered    [facility] [id number]       Subscriber Name Subscriber Birth Date Member ID       SARBJIT LOCKETT 1935 [id number]                 Emergency Contacts      (Rel.) Home Phone Work Phone Mobile Phone    Jesse Reyes (Relative) -- -- [phone]               History & Physical      Rah Pastrana MD at [facility]              [facility] HOSPITALIST HISTORY AND PHYSICAL    Patient Identification:  Name:  Sarbjit Lockett  Age:  87 y.o.  Sex:  male  :  1935  MRN:  [id number]   Visit Number:  [id number]  Admit Date: 10/31/2022   Room number:  3304/1S  Primary Care Physician:  Goldie Steward APRN    Date of Admission: 10/31/2022     Subjective     Chief " complaint:    Chief Complaint   Patient presents with   • URI     Pt has been congested for the last 3 or 4 days with a bad cough.      History of presenting illness:  87 y.o. male who was admitted on 10/31/2022 from the ED with shortness of air.  The patient has a past medical history of COPD with centrilobular emphysema (does not require oxygen at home), left upper lobe 15 mm spiculated nodule noted in 2018 with a negative fine-needle aspiration, heart failure with preserved ejection fraction, moderate pulmonary hypertension, and past E. coli bacteremia due to common bile duct stone.  The patient states that he has has been iil for 2 weeks with shortness of air and coughing.  The patient states that he is not really producing a lot of sputum.  When he is good and healthy, he does not normally cough.  He also has noted some wheezing that improved with albuterol.  He has felt fatigued but denies any fevers or chills.  He does state for the last 3 months he has not had much of an appetite and has lost 17 pounds unintentionally.  The patient lives alone but he states that he is able to obtain food anytime that he needs to and denies having food insecurity.  In the emergency department, the patient was noted to be hypoxic.  Chest x-ray did not show any pneumonia.  However, the patient did have elevated troponin levels and some ST elevation in the septal and anterior leads.  The patient does not have a prior history of coronary artery disease and he has not experienced any chest pain recently.  As result of the suspected type II non-ST elevation MI due to the acute hypoxia from the acute COPD exacerbation, the patient was admitted to the telemetry floor for further evaluation and treatment.  ---------------------------------------------------------------------------------------------------------------------   Review of Systems   Constitutional: Positive for appetite change (Decreased), fatigue and unexpected weight  change (Lost 17 pounds in 3 months). Negative for chills, diaphoresis and fever.   HENT: Positive for congestion, rhinorrhea and sinus pressure.    Eyes: Negative for discharge and redness.   Respiratory: Positive for cough and shortness of breath.    Cardiovascular: Negative for chest pain and leg swelling.   Gastrointestinal: Positive for nausea. Negative for diarrhea and vomiting.   Genitourinary: Positive for dysuria. Negative for hematuria.   Musculoskeletal: Negative for arthralgias and myalgias.   Skin: Negative for rash and wound.   Neurological: Positive for headaches. Negative for facial asymmetry, speech difficulty and weakness.   Psychiatric/Behavioral: Negative for agitation, behavioral problems and confusion.     ---------------------------------------------------------------------------------------------------------------------   Past Medical History:   Diagnosis Date   • Angiodysplasia of the colon    • Arthritis    • Bacteremia due to Escherichia coli 06/28/2017    Same time as the common bile duct stone   • Benign prostatic hyperplasia without lower urinary tract symptoms 06/30/2021   • Bile duct calculus 06/24/2017   • 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HCC)     Centrilobular   • Diverticulosis    • GERD (gastroesophageal reflux disease)    • Heart failure with preserved ejection fraction (HCC)    • Left upper lobe pulmonary nodule 01/30/2019    15 mm and spiculated in 2018; FNA was negative   • Moderate pulmonary hypertension (HCC)    • Status post laparoscopic cholecystectomy 06/15/2017     Past Surgical History:   Procedure Laterality Date   • BRONCHOSCOPY N/A 8/23/2018    Procedure: BRONCHOSCOPY WITH ENDOBRONCHIAL ULTRASOUND;  Surgeon: Saravanan Méndez MD;  Location: [facility];  Service: Pulmonary   • CHOLECYSTECTOMY N/A 6/16/2017    Procedure: CHOLECYSTECTOMY LAPAROSCOPIC;  Surgeon: Jose Velazquez MD;  Location: [facility] OR;  Service:    • EYE SURGERY Bilateral 2013    • INGUINAL HERNIA REPAIR Left 2013    left   • CO ERCP DX COLLECTION SPECIMEN BRUSHING/WASHING N/A 2017    Procedure: ENDOSCOPIC RETROGRADE CHOLANGIOPANCREATOGRAPHY;  Surgeon: Rod Francis MD;  Location: [facility];  Service: Gastroenterology   • CO ERCP DX COLLECTION SPECIMEN BRUSHING/WASHING N/A 10/26/2017    Procedure: ENDOSCOPIC RETROGRADE CHOLANGIOPANCREATOGRAPHY;  Surgeon: Rod Francis MD;  Location: [facility] OR;  Service: Gastroenterology     Family History   Problem Relation Age of Onset   • No Known Problems Mother    • Diabetes Father    • Hypertension Father    • Stroke Father    • Diabetes Sister      Social History     Socioeconomic History   • Marital status:    Tobacco Use   • Smoking status: Former     Years: 10.00     Types: Cigarettes     Quit date:      Years since quittin.8   • Smokeless tobacco: Former     Quit date: 1995   Substance and Sexual Activity   • Alcohol use: No   • Drug use: No   • Sexual activity: Defer     ---------------------------------------------------------------------------------------------------------------------   Allergies:  Penicillins  ---------------------------------------------------------------------------------------------------------------------   Medications below are reported home medications pulling from within the system; at this time, these medications have not been reconciled unless otherwise specified and are in the verification process for further verifcation as current home medications.      Prior to Admission Medications     Prescriptions Last Dose Informant Patient Reported? Taking?    acetaminophen-codeine (TYLENOL #3) 300-30 MG per tablet   No No    Take 1 tablet by mouth Every 6 (Six) Hours As Needed for Moderate Pain .    amitriptyline (ELAVIL) 25 MG tablet   Yes No    Take 25 mg by mouth At Night As Needed.    aspirin 325 MG tablet   Yes No    Take 325 mg by mouth Daily.    budesonide-formoterol (SYMBICORT)  160-4.5 MCG/ACT inhaler   No No    Inhale 2 puffs 2 (Two) Times a Day.    finasteride (Proscar) 5 MG tablet   No No    Take 1 tablet by mouth Daily.    ipratropium-albuterol (DUO-NEB) 0.5-2.5 mg/3 ml nebulizer   No No    USE 1 VIAL IN NEBULIZER 4 TIMES DAILY AS NEEDED FOR WHEEZING    levothyroxine (SYNTHROID, LEVOTHROID) 50 MCG tablet   Yes No    pantoprazole (PROTONIX) 40 MG EC tablet   Yes No    Take 40 mg by mouth Daily.    tamsulosin (Flomax) 0.4 MG capsule 24 hr capsule   No No    Take 1 capsule by mouth Every Night.    VENTOLIN  (90 BASE) MCG/ACT inhaler  Self Yes No    Inhale 2 puffs Every 6 (Six) Hours As Needed for Wheezing or Shortness of Air.    vitamin D (ERGOCALCIFEROL) 1.25 MG (37119 UT) capsule capsule   Yes No        Objective     Vital Signs:  Temp:  [97.9 °F (36.6 °C)] 97.9 °F (36.6 °C)  Heart Rate:  [90-99] 99  Resp:  [16-18] 18  BP: (111-120)/(60-67) 111/60    Mean Arterial Pressure (Non-Invasive) for the past 24 hrs (Last 3 readings):   Noninvasive MAP (mmHg)   11/01/22 0500 74     SpO2:  [81 %-96 %] 94 %  on  Flow (L/min):  [2] 2;   Device (Oxygen Therapy): nasal cannula  Body mass index is 16.35 kg/m².    Wt Readings from Last 3 Encounters:   11/01/22 45.9 kg (101 lb 4.8 oz)   06/29/21 52.2 kg (115 lb)   05/20/20 54.9 kg (121 lb)      ----------------------------------------------------------------------------------------------------------------------  Physical Exam  Vitals and nursing note reviewed.   Constitutional:       General: He is awake. He is in acute distress.      Appearance: He is well-developed. He is cachectic. He is not toxic-appearing or diaphoretic.      Comments: On 2 L/min nasal cannula.   HENT:      Head: Normocephalic and atraumatic.      Comments: He has bilateral muscle and fat wasting of his temporal areas.     Right Ear: External ear normal.      Left Ear: External ear normal.      Nose: Nose normal.   Eyes:      General: No scleral icterus.        Right eye: No  discharge.         Left eye: No discharge.      Pupils: Pupils are equal, round, and reactive to light.   Cardiovascular:      Rate and Rhythm: Normal rate and regular rhythm.      Pulses: Normal pulses.      Heart sounds: No murmur heard.  Pulmonary:      Effort: Accessory muscle usage, prolonged expiration and respiratory distress present.      Breath sounds: Decreased air movement present. No stridor. Wheezing present. No rales.   Abdominal:      General: Bowel sounds are normal. There is no distension.      Palpations: Abdomen is soft.   Musculoskeletal:         General: No swelling, deformity or signs of injury.      Comments: He has bilateral muscle and fat wasting of his arms and legs.   Skin:     Capillary Refill: Capillary refill takes less than 2 seconds.      Coloration: Skin is not jaundiced or pale.   Neurological:      Mental Status: He is alert and oriented to person, place, and time. Mental status is at baseline.      Cranial Nerves: No cranial nerve deficit.   Psychiatric:         Mood and Affect: Mood normal.         Behavior: Behavior normal. Behavior is cooperative.         Thought Content: Thought content normal.         Judgment: Judgment normal.       ---------------------------------------------------------------------------------------------------------------------  EKG: The patient has had 2 EKGs so far while in the emergency department.  One was dated 10/31/2022; it showed sinus tachycardia with a heart rate of 125 and a QTC of 441 ms.  There is an incomplete right bundle branch block, inferior Q waves, and ST elevation in leads V2-V5.  An EKG dated 11/1/2022 shows similar findings, with the same leads showing ST elevation and Q waves.  When compared to an EKG dated 11/13/2018, the comparison EKG has the same Q waves but the ST elevation is mild and only in leads V2-V3.  I then compared the 2 current EKGs to 2 dated 6/24/2017 and 5/10/2015; the only thing in common between all 4 of these  EKGs is that there are Q waves in the inferior leads.  Please note that I have personally looked at the EKG from this admission, the comparison EKGs in the medical records, and the above is my interpretation of this admission's EKG; I await the final cardiology read.  Dr. Edwards from the emergency department told me that the on-call cardiologist did look at the EKGs and give her a verbal report that he did not think this was an ST elevation MI.        Telemetry: Sinus tachycardia with heart rates in the 90s; however, while he was talking to me, his heart rates jumped up to 120s-130s.  Please note that I personally looked at the telemetry strips.        Last echocardiogram:  Results for orders placed during the hospital encounter of 12/12/18    Adult Transthoracic Echo Complete W/ Cont if Necessary Per Protocol    Interpretation Summary  · Left ventricular systolic function is normal. Estimated EF appears to be in the range of 61 - 65%  · Mild tricuspid valve regurgitation is present.  · Left ventricular diastolic dysfunction (grade I) consistent with impaired relaxation.  · Estimated right ventricular systolic pressure from tricuspid regurgitation is moderately elevated (45-55 mmHg). Moderate pulmonary hypertension is present.  · Mild dilation of the aortic root is present. Mild dilation of the sinuses of Valsalva is present both measuring about 4.1 cm  · There is no evidence of pericardial effusion    I have personally read the ECHO final report.  --------------------------------------------------------------------------------------------------------------------  LABS:    CBC and coagulation:  Results from last 7 days   Lab Units 11/01/22  0545 10/31/22  2341   WBC 10*3/mm3  --  9.37   HEMOGLOBIN g/dL  --  15.1   HEMATOCRIT %  --  46.8   MCV fL  --  83.6   MCHC g/dL  --  32.3   PLATELETS 10*3/mm3  --  215   INR  0.98  --      Acid/base balance:  Results from last 7 days   Lab Units 10/31/22  2325   PH, ARTERIAL pH  units 7.389   PO2 ART mm Hg 56.8*   PCO2, ARTERIAL mm Hg 38.3   HCO3 ART mmol/L 23.1     Renal and electrolytes:  Results from last 7 days   Lab Units 11/01/22  0325 10/31/22  2341   SODIUM mmol/L  --  135*   POTASSIUM mmol/L  --  4.1   MAGNESIUM mg/dL 2.8*  --    CHLORIDE mmol/L  --  95*   CO2 mmol/L  --  22.6   BUN mg/dL  --  71*   CREATININE mg/dL  --  1.39*   CALCIUM mg/dL  --  9.3   GLUCOSE mg/dL  --  131*     Estimated Creatinine Clearance: 25.2 mL/min (A) (by C-G formula based on SCr of 1.39 mg/dL (H)).    Liver and pancreatic function:  Results from last 7 days   Lab Units 10/31/22  2341   ALBUMIN g/dL 2.99*   BILIRUBIN mg/dL 0.6   ALK PHOS U/L 77   AST (SGOT) U/L 27   ALT (SGPT) U/L 24     Endocrine function:  Lab Results   Component Value Date    HGBA1C 6.00 (H) 10/31/2022     Lab Results   Component Value Date    TSH 0.664 11/01/2022     Cardiac:  Results from last 7 days   Lab Units 11/01/22  0325 10/31/22  2341   TROPONIN T ng/mL 0.072* 0.069*       Cultures:  Microbiology Results (last 10 days)     Procedure Component Value - Date/Time    COVID-19 and FLU A/B PCR - Swab, Nasopharynx [[id number]]  (Normal) Collected: 10/31/22 2310    Lab Status: Final result Specimen: Swab from Nasopharynx Updated: 10/31/22 2334     COVID19 Not Detected     Influenza A PCR Not Detected     Influenza B PCR Not Detected    Narrative:      Fact sheet for providers: https://www.fda.gov/media/810271/download    Fact sheet for patients: https://www.fda.gov/media/951440/download    Test performed by PCR.        I have personally looked at the labs and they are summarized above.  ----------------------------------------------------------------------------------------------------------------------  Detailed radiology reports for the last 24 hours:    Imaging Results (Last 24 Hours)     Procedure Component Value Units Date/Time    CT Chest With Contrast Diagnostic [[id number]] Collected: 11/01/22 0436     Updated: 11/01/22 0438     Narrative:      CT Chest W    INDICATION:   Myocardial infarction. COPD. Cough and wheezing. Weight loss.    TECHNIQUE:   CT of the thorax with IV contrast. Coronal and sagittal reconstructions were obtained.  Radiation dose reduction techniques included automated exposure control or exposure modulation based on body size. Count of known CT and cardiac nuc med studies  performed in previous 12 months: 0.     COMPARISON:   8/16/2018    FINDINGS:  There is atherosclerotic disease and coronary artery disease. There is dilatation of the aortic root at 4.6 cm. This is unchanged. There is ectasia of the ascending aorta at 3.7 cm, also unchanged. There is no pleural or pericardial effusion. Upper  abdominal images show numerous surgical clips along the lesser curvature of the stomach. Gallbladder is surgically absent. There is no adenopathy by size criteria. There are no suspicious osseous lesions.    Lung windows show COPD. There is fibrosis and cavitation in the left lower lobe that appears slightly worsened. There is a large lobular mass in the left upper lobe abutting the mediastinum. This was previously a spiculated nodule measuring about 1.5 cm.  The mass now measures approximately 3.9 x 3.4 x 6.7 cm. PET/CT is once again recommended. There is a 5 mm noncalcified nodule in the left upper lobe on image 31., And there is a 3 mm left upper lobe nodule on image 23. These appear to be present on the  old study and are felt to be benign. There is some mild fibrosis in the right middle lobe. There is bronchial wall thickening in both lungs compatible with bronchitis. There are some tree-in-bud type infiltrates in the right lung compatible with  bronchiolitis.      Impression:        1. 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 PET CT is recommended for follow-up.  2. Severe fibrosis and cavitation in the left lower lobe appears worsened.  3. COPD and bronchitis. There is evidence of  bronchiolitis in the right lung.  4. Stable dilatation of the aortic root measuring 4.6 cm.    Signer Name: Rommel Umanzor MD   Signed: 11/1/2022 4:36 AM   Workstation Name: [facility]    Radiology [facility]    XR Forearm 2 View Left [[id number]] Collected: 10/31/22 2305     Updated: 10/31/22 2308    Narrative:      CR Forearm 2 Vws LT    INDICATION:   Pain after fall.    COMPARISON:   None available.    FINDINGS:   AP and lateral views of the left forearm. Please note that the AP forearm view is included in the chest series today. No fracture or dislocation.  No bone erosion or destruction. Articulation at the elbow and wrist is anatomic.  No foreign body. There is  degenerative change at the wrist.      Impression:      No acute fracture or malalignment.    Signer Name: Rommel Umanzor MD   Signed: 10/31/2022 11:05 PM   Workstation Name: [facility]    Radiology [facility]    XR Chest AP [[id number]] Collected: 10/31/22 2304     Updated: 10/31/22 2306    Narrative:      CR Chest 1 Vw    INDICATION:   Dyspnea.     COMPARISON:    2/10/2022    FINDINGS:  Portable AP view(s) of the chest.  Cardiac and mediastinal contours are normal. There is atherosclerotic disease in the aorta. Severe COPD is noted with chronic scarring in the bases. Scattered granulomatous calcifications are present. No acute  infiltrates or pneumothorax.      Impression:      No acute cardiopulmonary findings. Severe COPD.    Signer Name: Rommel Umanzor MD   Signed: 10/31/2022 11:04 PM   Workstation Name: [facility]    Radiology [facility]        I have personally looked at the radiology images and I have read the available final reports.    Assessment & Plan       -Acute hypoxic respiratory failure that was present on admission and thought to be due to an acute COPD exacerbation, in a patient with known history of COPD with centrilobular emphysema  -Elevated troponins on admission, suspect type II  non-ST elevation MI  -Prolonged QTC of 495 ms  -Suspect chronic illness malnutrition, BMI 16.35 kg/m²  -Acute renal insufficiency with baseline creatinine in 2019 of 1 and admission creatinine now of 1.39, with the patient not meeting acute kidney injury criteria on admission  -History of heart failure with preserved ejection fraction, without an acute exacerbation  -History of moderate pulmonary hypertension  -History of a left upper lobe 15 mm spiculated lung mass dating back to 2018, with an unremarkable fine-needle aspiration that was performed at that time, now measuring 3.9 x 3.4 x 6.7 cm    We will admit the patient to the telemetry floor.  We will consult cardiology about the non-ST elevation MI.  We will give the patient aspirin and Lipitor.  Please note that a heparin drip was started while in the emergency department after the emergency department attending discussed the patient's case with cardiology.  For the acute COPD exacerbation, I will start the patient on Solu-Medrol and scheduled breathing treatments.  The patient's not really producing sputum and thus I will not give him an antibiotic.  I have ordered an echocardiogram to see what the state of his heart is post non-ST elevation MI but also to see if he has any strain, in particular right ventricular strain, from pulmonary hypertension.  I asked emergency department to do a CT scan without contrast of his lungs; lateral left upper lobe spiculated mass is now in 1 dimension over 6 cm.  I have a suspicion that the weight loss and lack of appetite are due to a lung cancer that has been undiagnosed until now.  Therefore, I will consult pulmonology.    We will avoid QT prolonging agents and continue to monitor the electrolytes closely. In order to lessen the risk of worsening QTc, the goal potassium level is 4-4.5 and goal magnesium level is 2-2.2.    For the malnutrition, I will consult nutrition for an assessment.  I will start him on a multivitamin  and Edgardo.  I am concerned that the patient is of advanced age, has unintentional weight loss, and has a prior history of the spiculated left upper lobe mass.  Therefore, I will consult palliative care, , PT, OT, and speech therapy.    VTE Prophylaxis:   Mechanical Order History:     None      Pharmalogical Order History:      Ordered     Dose Route Frequency Stop    11/01/22 0042  heparin (porcine) 5000 UNIT/ML injection 3,000 Units         60 Units/kg IV Once 11/01/22 0054    11/01/22 0042  heparin 41044 units/250 mL (100 units/mL) in 0.45 % NaCl infusion  5.98 mL/hr         12 Units/kg/hr IV Titrated --    11/01/22 0042  heparin (porcine) 5000 UNIT/ML injection 3,000 Units         60 Units/kg IV As Needed --    11/01/22 0042  heparin (porcine) 5000 UNIT/ML injection 1,500 Units         30 Units/kg IV As Needed --              The patient is high risk due to the following diagnoses/reasons: Acute hypoxic respiratory failure    Disposition: Undetermined at this time    Rah Pastrana MD  [facility]  11/01/22  06:24 EDT        Electronically signed by Rah Pastrana MD at 11/01/22 0645       Oxygen Therapy (last day)     Date/Time SpO2 Device (Oxygen Therapy) Flow (L/min) Oxygen Concentration (%) ETCO2 (mmHg)    11/01/22 1433 93 room air -- -- --    11/01/22 1246 96 nasal cannula 2 -- --    11/01/22 1045 97 room air -- -- --    11/01/22 0835 -- nasal cannula 2 -- --    11/01/22 0716 96 nasal cannula 2 -- --    11/01/22 0706 96 nasal cannula 2 -- --    11/01/22 0629 96 nasal cannula 2 -- --    11/01/22 0523 94 nasal cannula 2 -- --    11/01/22 0522 81 room air -- -- --    11/01/22 0500 90 -- -- -- --    11/01/22 0452 -- room air -- -- --    11/01/22 00:07:37 96 room air -- -- --    10/31/22 2346 91 room air -- -- --    10/31/22 2219 96 room air -- -- --          Lines, Drains & Airways     Active LDAs     Name Placement date Placement time Site Days    Peripheral IV  10/31/22 2320 Right Antecubital 10/31/22  2320  Antecubital  less than 1    Peripheral IV 22 Left Antecubital 22  Antecubital  less than 1                     Discharge Summary      Fabrice Hartman DO at 22 1537              [facility] HOSPITALIST MEDICINE DISCHARGE SUMMARY    Patient Identification:  Name:  Sarbjit Martin  Age:  87 y.o.  Sex:  male  :  1935  MRN:  [id number]  Visit Number:  [id number]    Date of Admission: 10/31/2022  Date of Discharge: 2022    PCP: Goldie Steward, DAVID    DISCHARGE DIAGNOSIS   1.  Acute hypoxic respiratory failure  2.  COPD exacerbation  3.  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  Severe protein/calorie malnutrition      CONSULTS  None      PROCEDURES PERFORMED   None      HOSPITAL COURSE  Mr. Martin is a 87 y.o. male who presented to [facility] ED on 10/1/2022 with a chief complaint of shortness of breath.  Patient has a past medical history markable for COPD, left upper lobe spiculated nodule, chronic diastolic CHF and moderate pulmonary hypertension.  Patient reports feeling ill for approximately 2 weeks prior to evaluation in the emergency department.  He reported shortness of breath with coughing and producing minimal sputum.  Patient also reported intermittent wheezing but this did improve with use of albuterol at home.  He also reported fatigue but denied any fevers or chills.  Patient also reported loss of appetite for approximately 3 months with unintentional weight loss of approximately 20 pounds.  For this reason, patient did present to the emergency department for further treatment and evaluation.  Please see initial H&P for specific details.  After thorough evaluation in the emergency department, patient was diagnosed with acute hypoxic respiratory failure secondary to COPD exacerbation and admitted for further treatment and evaluation.    In regards to COPD, patient was started on  duo nebs every 6 hours as well as azithromycin and Solu-Medrol.  Patient reports that shortness of breath dramatically improved within 24 hours of hospitalization.  Patient is now requesting to be discharged home.  In regards to elevated cardiac enzymes, patient was technically diagnosed with NSTEMI and started on a heparin drip with cardiology consultation.  Recommendation was made to obtain transthoracic echocardiogram.  Transthoracic echocardiogram is currently pending interpretation.  In regards to left upper lobe very large spiculated mass, lengthy discussion was held with patient regarding potential diagnosis of underlying lung cancer.  Patient stated he is 87 years old and has lived a long life and feels he has nothing to live for.  He reports all of his family is  and he has no friends and lives at home by himself where he only watches TV and goes to sleep.  Patient states he is ready to die and does not want any further work-up for his underlying suspected malignancy.  Also discussed underlying NSTEMI and recommendation for stress test with transthoracic echocardiogram.  Patient expressed his disinterest in pursuing this evaluation as well.  As such, did have palliative care services visit the patient.  After further discussion, patient has reported his desire to simply return home with a prescription for steroid taper and a COPD rescue kit with anticipated follow-up by palliative care services in his home.  I did discuss with patient that not treating his underlying medical conditions would likely lead to hastening of his lifespan.  Patient expresses understanding and again reiterated he was okay with not pursuing further work-up as he has nothing left to live for.  It should be noted patient is not suicidal and has no intentions of harming himself.  With this in mind, patient will be discharged home with palliative care services and a steroid taper as well as COPD rescue kit.  The beforementioned  plan was thoroughly discussed with the patient and he expresses understanding and willingness to proceed with the beforementioned plan.    VITAL SIGNS:      10/31/22  2219 11/01/22  0500   Weight: 49.9 kg (110 lb) 45.9 kg (101 lb 4.8 oz)     Body mass index is 16.35 kg/m².    PHYSICAL EXAM:  General -thin appearing elderly  male in no apparent distress  HEENT -head normocephalic and atraumatic, pupils equally round and reactive to light  Lungs -decreased breath sounds bilaterally with bilateral expiratory wheezing  Cardiovascular -regular rate and rhythm with no murmurs, rubs or clicks appreciated  GI -abdomen soft, nontender nondistended  Neurological -cranial nerves II through XII intact with no focal deficit or unintentional motor movement appreciated    DISCHARGE DISPOSITION   Stable    DISCHARGE MEDICATIONS:     Discharge Medications      New Medications      Instructions Start Date   atorvastatin 40 MG tablet  Commonly known as: LIPITOR   40 mg, Oral, Nightly      budesonide-formoterol 160-4.5 MCG/ACT inhaler  Commonly known as: SYMBICORT   2 puffs, Inhalation, 2 Times Daily - RT         Continue These Medications      Instructions Start Date   amitriptyline 25 MG tablet  Commonly known as: ELAVIL   25 mg, Oral, Nightly PRN      aspirin 325 MG tablet   325 mg, Oral, Daily      dicyclomine 10 MG capsule  Commonly known as: BENTYL   10 mg, Oral, 4 Times Daily Before Meals & Nightly      finasteride 5 MG tablet  Commonly known as: Proscar   5 mg, Oral, Daily      ipratropium-albuterol 0.5-2.5 mg/3 ml nebulizer  Commonly known as: DUO-NEB   3 mL, Nebulization, 4 Times Daily PRN      levothyroxine 50 MCG tablet  Commonly known as: SYNTHROID, LEVOTHROID   50 mcg, Oral, Daily      LORazepam 0.5 MG tablet  Commonly known as: ATIVAN   0.5 mg, Oral, Daily PRN      meloxicam 15 MG tablet  Commonly known as: MOBIC   15 mg, Oral, Daily      omeprazole 40 MG capsule  Commonly known as: priLOSEC   40 mg, Oral,  Daily      tamsulosin 0.4 MG capsule 24 hr capsule  Commonly known as: Flomax   0.4 mg, Oral, Nightly      Ventolin  (90 Base) MCG/ACT inhaler  Generic drug: albuterol sulfate HFA   2 puffs, Inhalation, Every 6 Hours PRN                   Additional Instructions for the Follow-ups that You Need to Schedule     Discharge Follow-up with PCP   As directed       Currently Documented PCP:    Goldie Steward APRN    PCP Phone Number:    [phone]     Follow Up Details: please follow up with pcp in 2-3 weeks            Follow-up Information     Goldie Steward APRN .    Specialty: Nurse Practitioner  Why: please follow up with pcp in 2-3 weeks  Contact information:  [address] [facility] [place] [address] [phone]                         TEST  RESULTS PENDING AT DISCHARGE       The ASCVD Risk score (Jimmie DE LOS SANTOS, et al., 2019) failed to calculate for the following reasons:    The 2019 ASCVD risk score is only valid for ages 40 to 79    The patient has a prior MI or stroke diagnosis     Fabrice Hartman DO  11/01/22  15:37 EDT    Please note that this discharge summary required more than 30 minutes to complete.    Please send a copy of this dictation to the following providers:  Goldie Steward APRN    Electronically signed by Fabrice Hartman DO at 11/01/22 1550       Discharge Order (From admission, onward)     Start     Ordered    11/01/22 1527  Discharge patient  Once        Expected Discharge Date: 11/01/22    Expected Discharge Time: Afternoon    Discharge Disposition: Home or Self Care    Physician of Record for Attribution - Please select from Treatment Team: FABRICE HARTMAN [[id number]]    Review needed by CMO to determine Physician of Record: No       Question Answer Comment   Physician of Record for Attribution - Please select from Treatment Team FABRICE HARTMAN    Review needed by CMO to determine Physician of Record No        11/01/22 1537

## 2022-11-02 NOTE — PAYOR COMM NOTE
"[facility]  NPI:[id number]    Utilization Review  Contact: Leatha Allred RN  Phone: [phone]  Fax:[phone]    DISCHARGE NOTIFICATION       PENDING AUTH # [id number]    Sarbjit Lockett (87 y.o. Male)     Date of Birth   1935    Social Security Number       Address   [address] [place] [address]    Home Phone   [phone]    MRN   [id number]       Voodoo   Muslim    Marital Status                               Admission Date   10/31/22    Admission Type   Emergency    Admitting Provider   Rah Pastrana MD    Attending Provider       Department, Room/Bed   [address], 3304/1S       Discharge Date   2022    Discharge Disposition   Home or Self Care    Discharge Destination                               Attending Provider: (none)   Allergies: Penicillins    Isolation: None   Infection: None   Code Status: Prior    Ht: 167.6 cm (66\")   Wt: 45.9 kg (101 lb 4.8 oz)    Admission Cmt: None   Principal Problem: NSTEMI (non-ST elevated myocardial infarction) (HCC) [I21.4]                 Active Insurance as of 10/31/2022     Primary Coverage     Payor Plan Insurance Group Employer/Plan Group    ANTHEM MEDICARE REPLACEMENT ANTHEM MEDICARE ADVANTAGE [id number]     Payor Plan Address Payor Plan Phone Number Payor Plan Fax Number Effective Dates    [address] [phone]  2022 - None Entered    [facility] [address]       Subscriber Name Subscriber Birth Date Member ID       SARBJIT LOCKETT 1935 [id number]                 Emergency Contacts      (Rel.) Home Phone Work Phone Mobile Phone    Jesse Reyes (Relative) -- -- [phone]               Discharge Summary      Fabrice Hartman DO at 22 1537              [facility] HOSPITALIST MEDICINE DISCHARGE SUMMARY    Patient Identification:  Name:  Sarbjit Lockett  Age:  87 y.o.  Sex:  male  :  1935  MRN:  [id number]  Visit Number:  " [id number]    Date of Admission: 10/31/2022  Date of Discharge: 11/1/2022    PCP: Goldie Steward APRN    DISCHARGE DIAGNOSIS   1.  Acute hypoxic respiratory failure  2.  COPD exacerbation  3.  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  Severe protein/calorie malnutrition      CONSULTS  None      PROCEDURES PERFORMED   None      HOSPITAL COURSE  Mr. Martin is a 87 y.o. male who presented to [facility] ED on 10/1/2022 with a chief complaint of shortness of breath.  Patient has a past medical history markable for COPD, left upper lobe spiculated nodule, chronic diastolic CHF and moderate pulmonary hypertension.  Patient reports feeling ill for approximately 2 weeks prior to evaluation in the emergency department.  He reported shortness of breath with coughing and producing minimal sputum.  Patient also reported intermittent wheezing but this did improve with use of albuterol at home.  He also reported fatigue but denied any fevers or chills.  Patient also reported loss of appetite for approximately 3 months with unintentional weight loss of approximately 20 pounds.  For this reason, patient did present to the emergency department for further treatment and evaluation.  Please see initial H&P for specific details.  After thorough evaluation in the emergency department, patient was diagnosed with acute hypoxic respiratory failure secondary to COPD exacerbation and admitted for further treatment and evaluation.    In regards to COPD, patient was started on duo nebs every 6 hours as well as azithromycin and Solu-Medrol.  Patient reports that shortness of breath dramatically improved within 24 hours of hospitalization.  Patient is now requesting to be discharged home.  In regards to elevated cardiac enzymes, patient was technically diagnosed with NSTEMI and started on a heparin drip with cardiology consultation.  Recommendation was made to obtain transthoracic echocardiogram.  Transthoracic  echocardiogram is currently pending interpretation.  In regards to left upper lobe very large spiculated mass, lengthy discussion was held with patient regarding potential diagnosis of underlying lung cancer.  Patient stated he is 87 years old and has lived a long life and feels he has nothing to live for.  He reports all of his family is  and he has no friends and lives at home by himself where he only watches TV and goes to sleep.  Patient states he is ready to die and does not want any further work-up for his underlying suspected malignancy.  Also discussed underlying NSTEMI and recommendation for stress test with transthoracic echocardiogram.  Patient expressed his disinterest in pursuing this evaluation as well.  As such, did have palliative care services visit the patient.  After further discussion, patient has reported his desire to simply return home with a prescription for steroid taper and a COPD rescue kit with anticipated follow-up by palliative care services in his home.  I did discuss with patient that not treating his underlying medical conditions would likely lead to hastening of his lifespan.  Patient expresses understanding and again reiterated he was okay with not pursuing further work-up as he has nothing left to live for.  It should be noted patient is not suicidal and has no intentions of harming himself.  With this in mind, patient will be discharged home with palliative care services and a steroid taper as well as COPD rescue kit.  The beforementioned plan was thoroughly discussed with the patient and he expresses understanding and willingness to proceed with the beforementioned plan.    VITAL SIGNS:      10/31/22  2219 22  0500   Weight: 49.9 kg (110 lb) 45.9 kg (101 lb 4.8 oz)     Body mass index is 16.35 kg/m².    PHYSICAL EXAM:  General -thin appearing elderly  male in no apparent distress  HEENT -head normocephalic and atraumatic, pupils equally round and reactive  to light  Lungs -decreased breath sounds bilaterally with bilateral expiratory wheezing  Cardiovascular -regular rate and rhythm with no murmurs, rubs or clicks appreciated  GI -abdomen soft, nontender nondistended  Neurological -cranial nerves II through XII intact with no focal deficit or unintentional motor movement appreciated    DISCHARGE DISPOSITION   Stable    DISCHARGE MEDICATIONS:     Discharge Medications      New Medications      Instructions Start Date   atorvastatin 40 MG tablet  Commonly known as: LIPITOR   40 mg, Oral, Nightly      budesonide-formoterol 160-4.5 MCG/ACT inhaler  Commonly known as: SYMBICORT   2 puffs, Inhalation, 2 Times Daily - RT         Continue These Medications      Instructions Start Date   amitriptyline 25 MG tablet  Commonly known as: ELAVIL   25 mg, Oral, Nightly PRN      aspirin 325 MG tablet   325 mg, Oral, Daily      dicyclomine 10 MG capsule  Commonly known as: BENTYL   10 mg, Oral, 4 Times Daily Before Meals & Nightly      finasteride 5 MG tablet  Commonly known as: Proscar   5 mg, Oral, Daily      ipratropium-albuterol 0.5-2.5 mg/3 ml nebulizer  Commonly known as: DUO-NEB   3 mL, Nebulization, 4 Times Daily PRN      levothyroxine 50 MCG tablet  Commonly known as: SYNTHROID, LEVOTHROID   50 mcg, Oral, Daily      LORazepam 0.5 MG tablet  Commonly known as: ATIVAN   0.5 mg, Oral, Daily PRN      meloxicam 15 MG tablet  Commonly known as: MOBIC   15 mg, Oral, Daily      omeprazole 40 MG capsule  Commonly known as: priLOSEC   40 mg, Oral, Daily      tamsulosin 0.4 MG capsule 24 hr capsule  Commonly known as: Flomax   0.4 mg, Oral, Nightly      Ventolin  (90 Base) MCG/ACT inhaler  Generic drug: albuterol sulfate HFA   2 puffs, Inhalation, Every 6 Hours PRN                   Additional Instructions for the Follow-ups that You Need to Schedule     Discharge Follow-up with PCP   As directed       Currently Documented PCP:    Goldie Steward APRN    PCP Phone Number:     [phone]     Follow Up Details: please follow up with pcp in 2-3 weeks            Follow-up Information     Goldie Steward APRN .    Specialty: Nurse Practitioner  Why: please follow up with pcp in 2-3 weeks  Contact information:  [place] [address]  [phone]                         TEST  RESULTS PENDING AT DISCHARGE       The ASCVD Risk score (Jimmie DK, et al., 2019) failed to calculate for the following reasons:    The 2019 ASCVD risk score is only valid for ages 40 to 79    The patient has a prior MI or stroke diagnosis     Fabrice Hartman DO  11/01/22  15:37 EDT    Please note that this discharge summary required more than 30 minutes to complete.    Please send a copy of this dictation to the following providers:  Goldie Steward APRN    Electronically signed by Fabrice Hartman DO at 11/01/22 9493

## 2022-11-02 NOTE — OUTREACH NOTE
Prep Survey    Flowsheet Row Responses   [facility] facility patient discharged from? [place]   Is LACE score < 7 ? No   Emergency Room discharge w/ pulse ox? No   Eligibility Not Eligible   What are the reasons patient is not eligible? Hospice/Pallative Care   Does the patient have one of the following disease processes/diagnoses(primary or secondary)? Acute MI (STEMI,NSTEMI)  [NSTEMI, Ischemic cardiomyopathy, COPD exacerbation]   Prep survey completed? Yes          JOS Gonzales Registered Nurse

## 2022-11-02 NOTE — CASE MANAGEMENT/SOCIAL WORK
Discharge Planning Assessment  [facility]     Patient Name: Sarbjit Martin  MRN: [id number]  Today's Date: 11/2/2022    Admit Date: 10/31/2022    Plan: SS spoke with Cony with [facility] who stated that pt's would have to be admitted to Palliative Care program before program will set up DME.  SS advised that pt would be discharged home this pm per Physician.  Per Cony [facility] had not received referral at this time.  SS refaxed pt information through epic and notified Cony to contact [facility] SS if referral was not received.  Pt continues to state he will go home this pm.  SS spoke with pt who stated that he had home 02 at home prior to admit to [facility].  SS spoke with Anshul per Isabel who stated that pt's home 02 was picked up a few months ago.  SS made referral to [facility] for home 02 per Isabel at [phone] and faxed Physician order and documentation to [phone].  Isabel stated that [facility] will provide a portable tank to [facility] prior to pt's discharge this pm.  Pt stated nephew to provide transportation home via private auto.     Discharge Plan     Row Name 11/02/22 0909       Plan    Final Discharge Disposition Code 01 - home or self-care    Final Note Pt discharged home with [facility]iative Care to follow up.              Continued Care and Services - Discharged on 11/1/2022 Admission date: 10/31/2022 - Discharge disposition: Home or Self Care    Home Medical Care     Service Provider Request Status Selected Services Address Phone Fax Patient Preferred    [facility] Pending - [facility] N[facility] [address] [address] [phone] --                  Cony Orellana, CAITLYNW

## 2022-11-03 NOTE — CASE MANAGEMENT/SOCIAL WORK
Discharge Planning Assessment   [facility]     Patient Name: Sarbjit Martin  MRN: [id number]  Today's Date: 11/3/2022    Admit Date: 10/31/2022    Plan: SS received follow up call from [facility] who stated that they were at pt's home and pt did not have home oxygen.  SS followed up with Isabel at [facility] who stated that they had attempted to call pt's home and he didn't answer the phone and they planned for follow up with pt on this date.  SS notified Isabel that [facility] Palliative Care was at pt's home at this time.  [facility] stated understanding.     Discharge Plan     Row Name 11/03/22 0906       Plan    Plan SS received follow up call from [facility] who stated that they were at pt's home and pt did not have home oxygen.  SS followed up with Isabel at [facility] who stated that they had attempted to call pt's home and he didn't answer the phone and they planned for follow up with pt on this date.  SS notified Isabel that [facility] was at pt's home at this time.  [facility] stated understanding.              Continued Care and Services - Discharged on 11/1/2022 Admission date: 10/31/2022 - Discharge disposition: Home or Self Care    Home Medical Care     Service Provider Request Status Selected Services Address Phone Fax Patient Preferred    [facility] N[facility] [address] [address] [phone] --                  TOM Kingsley

## 2022-11-22 ENCOUNTER — OFFICE VISIT (OUTPATIENT)
Dept: CARDIOLOGY | Facility: CLINIC | Age: 87
End: 2022-11-22

## 2022-11-22 VITALS
HEIGHT: 66 IN | WEIGHT: 103.2 LBS | HEART RATE: 97 BPM | RESPIRATION RATE: 16 BRPM | SYSTOLIC BLOOD PRESSURE: 116 MMHG | BODY MASS INDEX: 16.59 KG/M2 | DIASTOLIC BLOOD PRESSURE: 71 MMHG

## 2022-11-22 DIAGNOSIS — I71.9 AORTIC ANEURYSM WITHOUT RUPTURE, UNSPECIFIED PORTION OF AORTA: ICD-10-CM

## 2022-11-22 DIAGNOSIS — R91.1 PULMONARY NODULE: ICD-10-CM

## 2022-11-22 DIAGNOSIS — I21.4 NSTEMI (NON-ST ELEVATED MYOCARDIAL INFARCTION): Primary | ICD-10-CM

## 2022-11-22 PROCEDURE — 99213 OFFICE O/P EST LOW 20 MIN: CPT | Performed by: NURSE PRACTITIONER

## 2022-11-22 RX ORDER — PANTOPRAZOLE SODIUM 40 MG/1
40 TABLET, DELAYED RELEASE ORAL DAILY
COMMUNITY
End: 2022-12-13

## 2022-11-22 NOTE — PROGRESS NOTES
[facility]             MaryDAVID Mark Gloria Jean, APRN  Sarbjit Martin  1935 11/22/2022    Patient Active Problem List   Diagnosis   • Status post laparoscopic cholecystectomy   • COPD (chronic obstructive pulmonary disease) (HCC)   • Abnormal CT of the chest   • Pulmonary nodule   • Benign prostatic hyperplasia without lower urinary tract symptoms   • NSTEMI (non-ST elevated myocardial infarction) (HCC)   • COPD exacerbation (HCC)   • Underweight   • Severe malnutrition (HCC)   • Aortic aneurysm (HCC)       Dear Goldie Steward APRN:    Subjective     Chief Complaint   Patient presents with   • Follow-up     Recent MI s/p hosp   • Shortness of Breath     Routine activity   • Edema     LE   • Med Management     List provided       HPI:     This is a 87 y.o. male with known past medical history of COPD, chronic diastolic CHF, moderate pulmonary hypertension and pulmonary nodule.    Sarbjit Martin presents today for hospital follow up.  Patient was recently mated to [facility] in October/November 2022 for acute hypoxic respiratory failure with COPD exacerbation.  He was found to have NSTEMI and started on a heparin drip.  It was recommended that patient undergo echocardiogram and nuclear stress test however he was not interested in pursuing at that time and it appears that patient was sent home with follow-up with palliative care services as he did not want any further treatment for his NSTEMI and possible lung cancer.  Patient presents today and states he has been doing overall well since his hospital admission.  Denies any chest pain.  Breathing is at his baseline.  Blood pressure is stable.  Continues to be disinterested in having further work-up for his lung nodule and NSTEMI.    • Diagnostic Testing  1. Echocardiogram 12/2018: EF 61 to 65% with mild tricuspid valve regurgitation, mild dilation of the aortic root and moderate pulmonary  hypertension  2. Nuclear stress test 12/2018: EF greater than 70% with no evidence of ischemia  3. Echocardiogram 11/2022: EF 61 to 65% with mild aortic valve regurgitation, mild tricuspid valve regurgitation and mild mitral valve regurgitation  4. 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    Patient Active Problem List   Diagnosis   • Status post laparoscopic cholecystectomy   • COPD (chronic obstructive pulmonary disease) (HCC)   • Abnormal CT of the chest   • Pulmonary nodule   • Benign prostatic hyperplasia without lower urinary tract symptoms   • NSTEMI (non-ST elevated myocardial infarction) (HCC)   • COPD exacerbation (HCC)   • Underweight   • Severe malnutrition (HCC)   • Aortic aneurysm (HCC)       family history includes Diabetes in his father and sister; Hypertension in his father; No Known Problems in his mother; Stroke in his father.     reports that he quit smoking about 30 years ago. His smoking use included cigarettes. He has a 30.00 pack-year smoking history. He quit smokeless tobacco use about 27 years ago. He reports that he does not drink alcohol and does not use drugs.    Allergies   Allergen Reactions   • Penicillins Anaphylaxis         Current Outpatient Medications:   •  amitriptyline (ELAVIL) 25 MG tablet, Take 1 tablet by mouth At Night As Needed for Sleep., Disp: , Rfl:   •  aspirin 325 MG tablet, Take 325 mg by mouth Daily., Disp: , Rfl:   •  atorvastatin (LIPITOR) 40 MG tablet, Take 1 tablet by mouth Every Night., Disp: 90 tablet, Rfl: 1  •  dicyclomine (BENTYL) 10 MG capsule, Take 1 capsule by mouth 4 (Four) Times a Day Before Meals & at Bedtime., Disp: , Rfl:   •  finasteride (Proscar) 5 MG tablet, Take 1 tablet by mouth Daily., Disp: 90 tablet, Rfl: 3  •  ipratropium-albuterol (DUO-NEB) 0.5-2.5 mg/3 ml nebulizer, Take 3 mL by  nebulization 4 (Four) Times a Day As Needed for Wheezing., Disp: , Rfl:   •  levothyroxine (SYNTHROID, LEVOTHROID) 50 MCG tablet, Take 1 tablet by mouth Daily., Disp: , Rfl:   •  LORazepam (ATIVAN) 0.5 MG tablet, Take 1 tablet by mouth Daily As Needed for Anxiety., Disp: , Rfl:   •  meloxicam (MOBIC) 15 MG tablet, Take 1 tablet by mouth Daily., Disp: , Rfl:   •  omeprazole (priLOSEC) 40 MG capsule, Take 1 capsule by mouth Daily., Disp: , Rfl:   •  tamsulosin (Flomax) 0.4 MG capsule 24 hr capsule, Take 1 capsule by mouth Every Night., Disp: 90 capsule, Rfl: 3  •  VENTOLIN  (90 BASE) MCG/ACT inhaler, Inhale 2 puffs Every 6 (Six) Hours As Needed for Wheezing or Shortness of Air., Disp: , Rfl:   •  budesonide-formoterol (SYMBICORT) 160-4.5 MCG/ACT inhaler, Inhale 2 puffs by mouth 2 (Two) Times a Day., Disp: 10.2 g, Rfl: 1  •  pantoprazole (PROTONIX) 40 MG EC tablet, Take 40 mg by mouth Daily., Disp: , Rfl:       Physical Exam:  I have reviewed the patient's current vital signs as documented in the patient's EMR.   Vitals:    11/22/22 0936   BP: 116/71   Pulse: 97   Resp: 16     Body mass index is 16.66 kg/m².       11/22/22 0936   Weight: 46.8 kg (103 lb 3.2 oz)      Physical Exam  Constitutional:       General: He is not in acute distress.     Appearance: Normal appearance. He is well-developed.   HENT:      Head: Normocephalic and atraumatic.      Mouth/Throat:      Mouth: Mucous membranes are moist.   Eyes:      Extraocular Movements: Extraocular movements intact.      Pupils: Pupils are equal, round, and reactive to light.   Neck:      Vascular: No JVD.   Cardiovascular:      Rate and Rhythm: Normal rate and regular rhythm.      Heart sounds: Normal heart sounds. No murmur heard.    No S3 or S4 sounds.   Pulmonary:      Effort: Pulmonary effort is normal. No respiratory distress.      Breath sounds: No wheezing.      Comments: Decreased breath sounds  Abdominal:      General: Bowel sounds are normal. There  is no distension.      Palpations: Abdomen is soft. There is no hepatomegaly.      Tenderness: There is no abdominal tenderness.   Musculoskeletal:         General: Normal range of motion.      Cervical back: Normal range of motion and neck supple.   Skin:     General: Skin is warm and dry.      Coloration: Skin is not jaundiced or pale.   Neurological:      General: No focal deficit present.      Mental Status: He is alert and oriented to person, place, and time. Mental status is at baseline.   Psychiatric:         Mood and Affect: Mood normal.         Behavior: Behavior normal.         Thought Content: Thought content normal.         Judgment: Judgment normal.            DATA REVIEWED:     TTE/ROBERT:  Results for orders placed during the hospital encounter of 10/31/22    Adult Transthoracic Echo Complete With Contrast if Necessary Per Protocol    Interpretation Summary  •  Normal left ventricular cavity size and wall thickness noted. All left ventricular wall segments contract normally.  •  Left ventricular ejection fraction appears to be 61 - 65%.  •  Estimated right ventricular systolic pressure from tricuspid regurgitation is mildly elevated (35-45 mmHg).  •  The aortic valve exhibits sclerosis. There is mild calcification of the aortic valve. The aortic valve appears trileaflet. Mild aortic valve regurgitation is present. No hemodynamically significant aortic valve stenosis is present.  •  The mitral valve is structurally normal with no significant stenosis present. Mild mitral valve regurgitation is present.  •  Mild tricuspid valve regurgitation is present. Estimated right ventricular systolic pressure from tricuspid regurgitation is mildly elevated (35-45 mmHg).  •  There is no evidence of pericardial effusion. .      Laboratory evaluations:    Lab Results   Component Value Date    GLUCOSE 131 (H) 10/31/2022    BUN 71 (H) 10/31/2022    CREATININE 1.39 (H) 10/31/2022    EGFRIFNONA 70 02/02/2019    BCR 51.1 (H)  10/31/2022    K 4.1 10/31/2022    CO2 22.6 10/31/2022    CALCIUM 9.3 10/31/2022    ALBUMIN 2.99 (L) 10/31/2022    LABIL2 1.6 05/10/2015    AST 27 10/31/2022    ALT 24 10/31/2022     Lab Results   Component Value Date    WBC 10.80 11/01/2022    HGB 15.5 11/01/2022    HCT 47.9 11/01/2022    MCV 83.7 11/01/2022     11/01/2022     No results found for: CHOL, CHLPL, TRIG, HDL, LDL, LDLDIRECT  Lab Results   Component Value Date    TSH 0.664 11/01/2022     Lab Results   Component Value Date    HGBA1C 6.00 (H) 10/31/2022     Lab Results   Component Value Date    ALT 24 10/31/2022     Lab Results   Component Value Date    HGBA1C 6.00 (H) 10/31/2022     Lab Results   Component Value Date    CREATININE 1.39 (H) 10/31/2022     No results found for: IRON, TIBC, FERRITIN  Lab Results   Component Value Date    INR 0.98 11/01/2022    INR 1.07 11/09/2018    INR 1.04 08/23/2018    PROTIME 13.2 11/01/2022    PROTIME 14.2 11/09/2018    PROTIME 13.8 08/23/2018           --------------------------------------------------------------------------------------------------------------------------    ASSESSMENT/PLAN:      Diagnosis Plan   1. NSTEMI (non-ST elevated myocardial infarction) (HCC)        2. Pulmonary nodule        3. Aortic aneurysm without rupture, unspecified portion of aorta (HCC)            1. Recent NSTEMI  • Patient had recent NSTEMI during his hospital admission however he declined further work-up with nuclear stress test during his admission.  Denies any current chest pain.  Recent echocardiogram showed normal LV function.  Patient states he is not interested in pursuing any further ischemic work-up at this time.    2. 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    3.  Aortic aneurysm  · Recent CT of the chest showed dilation of the aortic root  at 4.6 cm with ectasia of the ascending aorta at 3.7 cm.  Discussed with patient about monitoring with CT scans every 6 months and states he will think about this and discuss with his PCP for further monitoring      This document has been @Electronically signed by DAVID Ramsay, 11/22/22, 9:34 AM EST.       Dictated Utilizing Dragon Dictation: Part of this note may be an electronic transcription/translation of spoken language to printed text using the Dragon Dictation System.    Follow-up appointment and medication changes provided in hand delivered After Visit Summary as well as reviewed in the room.

## 2022-12-12 ENCOUNTER — APPOINTMENT (OUTPATIENT)
Dept: CT IMAGING | Facility: HOSPITAL | Age: 87
DRG: 521 | End: 2022-12-12
Payer: MEDICARE

## 2022-12-12 ENCOUNTER — APPOINTMENT (OUTPATIENT)
Dept: GENERAL RADIOLOGY | Facility: HOSPITAL | Age: 87
DRG: 521 | End: 2022-12-12
Payer: MEDICARE

## 2022-12-12 ENCOUNTER — HOSPITAL ENCOUNTER (INPATIENT)
Facility: HOSPITAL | Age: 87
LOS: 7 days | Discharge: HOME HEALTH | DRG: 521 | End: 2022-12-20
Attending: STUDENT IN AN ORGANIZED HEALTH CARE EDUCATION/TRAINING PROGRAM | Admitting: INTERNAL MEDICINE
Payer: MEDICARE

## 2022-12-12 DIAGNOSIS — S72.002A CLOSED FRACTURE OF LEFT HIP, INITIAL ENCOUNTER: Primary | ICD-10-CM

## 2022-12-12 PROCEDURE — 73502 X-RAY EXAM HIP UNI 2-3 VIEWS: CPT

## 2022-12-12 PROCEDURE — 70450 CT HEAD/BRAIN W/O DYE: CPT

## 2022-12-12 PROCEDURE — 99223 1ST HOSP IP/OBS HIGH 75: CPT | Performed by: INTERNAL MEDICINE

## 2022-12-12 PROCEDURE — 72125 CT NECK SPINE W/O DYE: CPT

## 2022-12-12 PROCEDURE — 93005 ELECTROCARDIOGRAM TRACING: CPT | Performed by: STUDENT IN AN ORGANIZED HEALTH CARE EDUCATION/TRAINING PROGRAM

## 2022-12-12 PROCEDURE — 99285 EMERGENCY DEPT VISIT HI MDM: CPT

## 2022-12-13 ENCOUNTER — APPOINTMENT (OUTPATIENT)
Dept: GENERAL RADIOLOGY | Facility: HOSPITAL | Age: 87
DRG: 521 | End: 2022-12-13
Payer: MEDICARE

## 2022-12-13 ENCOUNTER — ANESTHESIA (OUTPATIENT)
Dept: PERIOP | Facility: HOSPITAL | Age: 87
DRG: 521 | End: 2022-12-13
Payer: MEDICARE

## 2022-12-13 ENCOUNTER — ANESTHESIA EVENT (OUTPATIENT)
Dept: PERIOP | Facility: HOSPITAL | Age: 87
DRG: 521 | End: 2022-12-13
Payer: MEDICARE

## 2022-12-13 PROBLEM — S72.002A CLOSED FRACTURE OF LEFT HIP, INITIAL ENCOUNTER (HCC): Status: ACTIVE | Noted: 2022-12-13

## 2022-12-13 PROBLEM — S72.002A CLOSED FRACTURE OF LEFT HIP: Status: ACTIVE | Noted: 2022-12-12

## 2022-12-13 LAB
A-A DO2: 106.7 MMHG (ref 0–300)
ABO GROUP BLD: NORMAL
ABO GROUP BLD: NORMAL
ALBUMIN SERPL-MCNC: 3.49 G/DL (ref 3.5–5.2)
ALBUMIN/GLOB SERPL: 1 G/DL
ALP SERPL-CCNC: 72 U/L (ref 39–117)
ALT SERPL W P-5'-P-CCNC: 15 U/L (ref 1–41)
ANION GAP SERPL CALCULATED.3IONS-SCNC: 14.8 MMOL/L (ref 5–15)
APTT PPP: 31.6 SECONDS (ref 26.5–34.5)
ARTERIAL PATENCY WRIST A: ABNORMAL
AST SERPL-CCNC: 21 U/L (ref 1–40)
ATMOSPHERIC PRESS: 730 MMHG
B PARAPERT DNA SPEC QL NAA+PROBE: NOT DETECTED
B PERT DNA SPEC QL NAA+PROBE: NOT DETECTED
BASE EXCESS BLDA CALC-SCNC: 2.9 MMOL/L (ref 0–2)
BASOPHILS # BLD AUTO: 0.07 10*3/MM3 (ref 0–0.2)
BASOPHILS NFR BLD AUTO: 0.5 % (ref 0–1.5)
BDY SITE: ABNORMAL
BILIRUB SERPL-MCNC: 0.5 MG/DL (ref 0–1.2)
BILIRUB UR QL STRIP: NEGATIVE
BLD GP AB SCN SERPL QL: NEGATIVE
BODY TEMPERATURE: 0 C
BUN SERPL-MCNC: 26 MG/DL (ref 8–23)
BUN/CREAT SERPL: 29.9 (ref 7–25)
C PNEUM DNA NPH QL NAA+NON-PROBE: NOT DETECTED
CALCIUM SPEC-SCNC: 9.2 MG/DL (ref 8.6–10.5)
CHLORIDE SERPL-SCNC: 101 MMOL/L (ref 98–107)
CLARITY UR: CLEAR
CLUMPED PLATELETS: PRESENT
CO2 BLDA-SCNC: 31.8 MMOL/L (ref 22–33)
CO2 SERPL-SCNC: 21.2 MMOL/L (ref 22–29)
COHGB MFR BLD: 1.3 % (ref 0–5)
COLOR UR: YELLOW
CREAT SERPL-MCNC: 0.87 MG/DL (ref 0.76–1.27)
CRP SERPL-MCNC: 4.11 MG/DL (ref 0–0.5)
D-LACTATE SERPL-SCNC: 1.5 MMOL/L (ref 0.5–2)
DEPRECATED RDW RBC AUTO: 49 FL (ref 37–54)
EGFRCR SERPLBLD CKD-EPI 2021: 84.6 ML/MIN/1.73
EOSINOPHIL # BLD AUTO: 0.24 10*3/MM3 (ref 0–0.4)
EOSINOPHIL NFR BLD AUTO: 1.6 % (ref 0.3–6.2)
ERYTHROCYTE [DISTWIDTH] IN BLOOD BY AUTOMATED COUNT: 15.1 % (ref 12.3–15.4)
FLUAV SUBTYP SPEC NAA+PROBE: NOT DETECTED
FLUAV SUBTYP SPEC NAA+PROBE: NOT DETECTED
FLUBV RNA ISLT QL NAA+PROBE: NOT DETECTED
FLUBV RNA ISLT QL NAA+PROBE: NOT DETECTED
GAS FLOW AIRWAY: 4 LPM
GLOBULIN UR ELPH-MCNC: 3.4 GM/DL
GLUCOSE SERPL-MCNC: 106 MG/DL (ref 65–99)
GLUCOSE UR STRIP-MCNC: NEGATIVE MG/DL
HADV DNA SPEC NAA+PROBE: NOT DETECTED
HCO3 BLDA-SCNC: 30.1 MMOL/L (ref 20–26)
HCOV 229E RNA SPEC QL NAA+PROBE: NOT DETECTED
HCOV HKU1 RNA SPEC QL NAA+PROBE: NOT DETECTED
HCOV NL63 RNA SPEC QL NAA+PROBE: NOT DETECTED
HCOV OC43 RNA SPEC QL NAA+PROBE: NOT DETECTED
HCT VFR BLD AUTO: 48.7 % (ref 37.5–51)
HCT VFR BLD CALC: 46.5 % (ref 38–51)
HGB BLD-MCNC: 14.9 G/DL (ref 13–17.7)
HGB BLDA-MCNC: 15.2 G/DL (ref 14–18)
HGB UR QL STRIP.AUTO: NEGATIVE
HMPV RNA NPH QL NAA+NON-PROBE: NOT DETECTED
HPIV1 RNA ISLT QL NAA+PROBE: NOT DETECTED
HPIV2 RNA SPEC QL NAA+PROBE: NOT DETECTED
HPIV3 RNA NPH QL NAA+PROBE: NOT DETECTED
HPIV4 P GENE NPH QL NAA+PROBE: NOT DETECTED
IMM GRANULOCYTES # BLD AUTO: 0.11 10*3/MM3 (ref 0–0.05)
IMM GRANULOCYTES NFR BLD AUTO: 0.7 % (ref 0–0.5)
INHALED O2 CONCENTRATION: 36 %
INR PPP: 0.97 (ref 0.9–1.1)
INR PPP: 1.09 (ref 0.9–1.1)
KETONES UR QL STRIP: NEGATIVE
LEUKOCYTE ESTERASE UR QL STRIP.AUTO: NEGATIVE
LYMPHOCYTES # BLD AUTO: 1.16 10*3/MM3 (ref 0.7–3.1)
LYMPHOCYTES NFR BLD AUTO: 7.7 % (ref 19.6–45.3)
Lab: ABNORMAL
M PNEUMO IGG SER IA-ACNC: NOT DETECTED
MCH RBC QN AUTO: 26.8 PG (ref 26.6–33)
MCHC RBC AUTO-ENTMCNC: 30.6 G/DL (ref 31.5–35.7)
MCV RBC AUTO: 87.6 FL (ref 79–97)
METHGB BLD QL: 0.4 % (ref 0–3)
MODALITY: ABNORMAL
MONOCYTES # BLD AUTO: 0.69 10*3/MM3 (ref 0.1–0.9)
MONOCYTES NFR BLD AUTO: 4.6 % (ref 5–12)
NEUTROPHILS NFR BLD AUTO: 12.7 10*3/MM3 (ref 1.7–7)
NEUTROPHILS NFR BLD AUTO: 84.9 % (ref 42.7–76)
NITRITE UR QL STRIP: NEGATIVE
NOTE: ABNORMAL
NRBC BLD AUTO-RTO: 0 /100 WBC (ref 0–0.2)
OXYHGB MFR BLDV: 93.9 % (ref 94–99)
PCO2 BLDA: 55.4 MM HG (ref 35–45)
PCO2 TEMP ADJ BLD: ABNORMAL MM[HG]
PH BLDA: 7.34 PH UNITS (ref 7.35–7.45)
PH UR STRIP.AUTO: <=5 [PH] (ref 5–8)
PH, TEMP CORRECTED: ABNORMAL
PLATELET # BLD AUTO: 194 10*3/MM3 (ref 140–450)
PMV BLD AUTO: 10.8 FL (ref 6–12)
PO2 BLDA: 78.1 MM HG (ref 83–108)
PO2 TEMP ADJ BLD: ABNORMAL MM[HG]
POTASSIUM SERPL-SCNC: 5.1 MMOL/L (ref 3.5–5.2)
PROT SERPL-MCNC: 6.9 G/DL (ref 6–8.5)
PROT UR QL STRIP: NEGATIVE
PROTHROMBIN TIME: 13 SECONDS (ref 12.1–14.7)
PROTHROMBIN TIME: 14.4 SECONDS (ref 12.1–14.7)
RBC # BLD AUTO: 5.56 10*6/MM3 (ref 4.14–5.8)
RBC MORPH BLD: NORMAL
RH BLD: POSITIVE
RH BLD: POSITIVE
RHINOVIRUS RNA SPEC NAA+PROBE: NOT DETECTED
RSV RNA NPH QL NAA+NON-PROBE: NOT DETECTED
SAO2 % BLDCOA: 95.5 % (ref 94–99)
SARS-COV-2 RNA NPH QL NAA+NON-PROBE: NOT DETECTED
SARS-COV-2 RNA PNL SPEC NAA+PROBE: NOT DETECTED
SODIUM SERPL-SCNC: 137 MMOL/L (ref 136–145)
SP GR UR STRIP: 1.02 (ref 1–1.03)
T&S EXPIRATION DATE: NORMAL
UROBILINOGEN UR QL STRIP: NORMAL
VENTILATOR MODE: ABNORMAL
WBC NRBC COR # BLD: 14.97 10*3/MM3 (ref 3.4–10.8)

## 2022-12-13 PROCEDURE — 82375 ASSAY CARBOXYHB QUANT: CPT

## 2022-12-13 PROCEDURE — 87040 BLOOD CULTURE FOR BACTERIA: CPT | Performed by: GENERAL PRACTICE

## 2022-12-13 PROCEDURE — 25010000002 ONDANSETRON PER 1 MG: Performed by: NURSE ANESTHETIST, CERTIFIED REGISTERED

## 2022-12-13 PROCEDURE — 87636 SARSCOV2 & INF A&B AMP PRB: CPT | Performed by: STUDENT IN AN ORGANIZED HEALTH CARE EDUCATION/TRAINING PROGRAM

## 2022-12-13 PROCEDURE — 81003 URINALYSIS AUTO W/O SCOPE: CPT | Performed by: INTERNAL MEDICINE

## 2022-12-13 PROCEDURE — 83050 HGB METHEMOGLOBIN QUAN: CPT

## 2022-12-13 PROCEDURE — 85007 BL SMEAR W/DIFF WBC COUNT: CPT | Performed by: STUDENT IN AN ORGANIZED HEALTH CARE EDUCATION/TRAINING PROGRAM

## 2022-12-13 PROCEDURE — 72170 X-RAY EXAM OF PELVIS: CPT | Performed by: RADIOLOGY

## 2022-12-13 PROCEDURE — 82805 BLOOD GASES W/O2 SATURATION: CPT

## 2022-12-13 PROCEDURE — 25010000002 ONDANSETRON PER 1 MG: Performed by: STUDENT IN AN ORGANIZED HEALTH CARE EDUCATION/TRAINING PROGRAM

## 2022-12-13 PROCEDURE — 94761 N-INVAS EAR/PLS OXIMETRY MLT: CPT

## 2022-12-13 PROCEDURE — 25010000002 MORPHINE PER 10 MG: Performed by: STUDENT IN AN ORGANIZED HEALTH CARE EDUCATION/TRAINING PROGRAM

## 2022-12-13 PROCEDURE — C1713 ANCHOR/SCREW BN/BN,TIS/BN: HCPCS | Performed by: GENERAL PRACTICE

## 2022-12-13 PROCEDURE — 94799 UNLISTED PULMONARY SVC/PX: CPT

## 2022-12-13 PROCEDURE — C1776 JOINT DEVICE (IMPLANTABLE): HCPCS | Performed by: GENERAL PRACTICE

## 2022-12-13 PROCEDURE — 86900 BLOOD TYPING SEROLOGIC ABO: CPT | Performed by: NURSE PRACTITIONER

## 2022-12-13 PROCEDURE — 80053 COMPREHEN METABOLIC PANEL: CPT | Performed by: STUDENT IN AN ORGANIZED HEALTH CARE EDUCATION/TRAINING PROGRAM

## 2022-12-13 PROCEDURE — 94640 AIRWAY INHALATION TREATMENT: CPT

## 2022-12-13 PROCEDURE — 25010000002 KETOROLAC TROMETHAMINE PER 15 MG: Performed by: STUDENT IN AN ORGANIZED HEALTH CARE EDUCATION/TRAINING PROGRAM

## 2022-12-13 PROCEDURE — 25010000002 LEVOFLOXACIN PER 250 MG: Performed by: INTERNAL MEDICINE

## 2022-12-13 PROCEDURE — 85730 THROMBOPLASTIN TIME PARTIAL: CPT | Performed by: NURSE PRACTITIONER

## 2022-12-13 PROCEDURE — 72170 X-RAY EXAM OF PELVIS: CPT

## 2022-12-13 PROCEDURE — 71045 X-RAY EXAM CHEST 1 VIEW: CPT

## 2022-12-13 PROCEDURE — 0SRS0J9 REPLACEMENT OF LEFT HIP JOINT, FEMORAL SURFACE WITH SYNTHETIC SUBSTITUTE, CEMENTED, OPEN APPROACH: ICD-10-PCS | Performed by: GENERAL PRACTICE

## 2022-12-13 PROCEDURE — 36600 WITHDRAWAL OF ARTERIAL BLOOD: CPT

## 2022-12-13 PROCEDURE — 86140 C-REACTIVE PROTEIN: CPT | Performed by: GENERAL PRACTICE

## 2022-12-13 PROCEDURE — 86850 RBC ANTIBODY SCREEN: CPT | Performed by: NURSE PRACTITIONER

## 2022-12-13 PROCEDURE — 85610 PROTHROMBIN TIME: CPT | Performed by: NURSE PRACTITIONER

## 2022-12-13 PROCEDURE — 83605 ASSAY OF LACTIC ACID: CPT

## 2022-12-13 PROCEDURE — 25010000002 ENOXAPARIN PER 10 MG: Performed by: GENERAL PRACTICE

## 2022-12-13 PROCEDURE — 25010000002 PROPOFOL 200 MG/20ML EMULSION: Performed by: NURSE ANESTHETIST, CERTIFIED REGISTERED

## 2022-12-13 PROCEDURE — 36415 COLL VENOUS BLD VENIPUNCTURE: CPT

## 2022-12-13 PROCEDURE — 93010 ELECTROCARDIOGRAM REPORT: CPT | Performed by: INTERNAL MEDICINE

## 2022-12-13 PROCEDURE — 73552 X-RAY EXAM OF FEMUR 2/>: CPT

## 2022-12-13 PROCEDURE — 25010000002 FENTANYL CITRATE (PF) 50 MCG/ML SOLUTION: Performed by: NURSE ANESTHETIST, CERTIFIED REGISTERED

## 2022-12-13 PROCEDURE — 71045 X-RAY EXAM CHEST 1 VIEW: CPT | Performed by: RADIOLOGY

## 2022-12-13 PROCEDURE — 85610 PROTHROMBIN TIME: CPT | Performed by: STUDENT IN AN ORGANIZED HEALTH CARE EDUCATION/TRAINING PROGRAM

## 2022-12-13 PROCEDURE — 86901 BLOOD TYPING SEROLOGIC RH(D): CPT

## 2022-12-13 PROCEDURE — 73552 X-RAY EXAM OF FEMUR 2/>: CPT | Performed by: RADIOLOGY

## 2022-12-13 PROCEDURE — 85025 COMPLETE CBC W/AUTO DIFF WBC: CPT | Performed by: STUDENT IN AN ORGANIZED HEALTH CARE EDUCATION/TRAINING PROGRAM

## 2022-12-13 PROCEDURE — 86900 BLOOD TYPING SEROLOGIC ABO: CPT

## 2022-12-13 PROCEDURE — 0202U NFCT DS 22 TRGT SARS-COV-2: CPT | Performed by: GENERAL PRACTICE

## 2022-12-13 PROCEDURE — 25010000002 NEOSTIGMINE 10 MG/10ML SOLUTION: Performed by: NURSE ANESTHETIST, CERTIFIED REGISTERED

## 2022-12-13 PROCEDURE — 86901 BLOOD TYPING SEROLOGIC RH(D): CPT | Performed by: NURSE PRACTITIONER

## 2022-12-13 DEVICE — IMPLANTABLE DEVICE
Type: IMPLANTABLE DEVICE | Site: HIP | Status: FUNCTIONAL
Brand: ECHO FX™ HIP SYSTEM

## 2022-12-13 DEVICE — IMPLANTABLE DEVICE
Type: IMPLANTABLE DEVICE | Site: HIP | Status: FUNCTIONAL
Brand: BIO-MOORE ENDO II HIP SYSTEM

## 2022-12-13 DEVICE — VIOLET ANTIBACTERIAL POLYDIOXANONE, KNOTLESS TISSUE CONTROL DEVICE
Type: IMPLANTABLE DEVICE | Site: HIP | Status: FUNCTIONAL
Brand: STRATAFIX

## 2022-12-13 DEVICE — SMARTSET HIGH PERFORMANCE MV MEDIUM VISCOSITY BONE CEMENT 40G
Type: IMPLANTABLE DEVICE | Site: HIP | Status: FUNCTIONAL
Brand: SMARTSET

## 2022-12-13 DEVICE — CAP PRT HIP UNIPOLAR: Type: IMPLANTABLE DEVICE | Site: HIP | Status: FUNCTIONAL

## 2022-12-13 RX ORDER — ONDANSETRON 2 MG/ML
4 INJECTION INTRAMUSCULAR; INTRAVENOUS AS NEEDED
Status: DISCONTINUED | OUTPATIENT
Start: 2022-12-13 | End: 2022-12-13 | Stop reason: HOSPADM

## 2022-12-13 RX ORDER — LEVOFLOXACIN 5 MG/ML
750 INJECTION, SOLUTION INTRAVENOUS
Status: DISCONTINUED | OUTPATIENT
Start: 2022-12-13 | End: 2022-12-16

## 2022-12-13 RX ORDER — IPRATROPIUM BROMIDE AND ALBUTEROL SULFATE 2.5; .5 MG/3ML; MG/3ML
3 SOLUTION RESPIRATORY (INHALATION) ONCE AS NEEDED
Status: DISCONTINUED | OUTPATIENT
Start: 2022-12-13 | End: 2022-12-13 | Stop reason: HOSPADM

## 2022-12-13 RX ORDER — MIDAZOLAM HYDROCHLORIDE 1 MG/ML
0.5 INJECTION INTRAMUSCULAR; INTRAVENOUS
Status: DISCONTINUED | OUTPATIENT
Start: 2022-12-13 | End: 2022-12-13

## 2022-12-13 RX ORDER — SODIUM CHLORIDE, SODIUM LACTATE, POTASSIUM CHLORIDE, CALCIUM CHLORIDE 600; 310; 30; 20 MG/100ML; MG/100ML; MG/100ML; MG/100ML
100 INJECTION, SOLUTION INTRAVENOUS ONCE AS NEEDED
Status: DISCONTINUED | OUTPATIENT
Start: 2022-12-13 | End: 2022-12-13 | Stop reason: HOSPADM

## 2022-12-13 RX ORDER — LEVOTHYROXINE SODIUM 0.05 MG/1
50 TABLET ORAL DAILY
Status: CANCELLED | OUTPATIENT
Start: 2022-12-13

## 2022-12-13 RX ORDER — SODIUM CHLORIDE 9 MG/ML
40 INJECTION, SOLUTION INTRAVENOUS AS NEEDED
Status: DISCONTINUED | OUTPATIENT
Start: 2022-12-13 | End: 2022-12-20 | Stop reason: HOSPADM

## 2022-12-13 RX ORDER — LORAZEPAM 0.5 MG/1
0.5 TABLET ORAL DAILY PRN
Status: DISCONTINUED | OUTPATIENT
Start: 2022-12-13 | End: 2022-12-20 | Stop reason: HOSPADM

## 2022-12-13 RX ORDER — CLINDAMYCIN PHOSPHATE 600 MG/50ML
600 INJECTION INTRAVENOUS EVERY 8 HOURS
Status: COMPLETED | OUTPATIENT
Start: 2022-12-13 | End: 2022-12-14

## 2022-12-13 RX ORDER — MAGNESIUM HYDROXIDE 1200 MG/15ML
LIQUID ORAL AS NEEDED
Status: DISCONTINUED | OUTPATIENT
Start: 2022-12-13 | End: 2022-12-13 | Stop reason: HOSPADM

## 2022-12-13 RX ORDER — SODIUM CHLORIDE, SODIUM LACTATE, POTASSIUM CHLORIDE, CALCIUM CHLORIDE 600; 310; 30; 20 MG/100ML; MG/100ML; MG/100ML; MG/100ML
100 INJECTION, SOLUTION INTRAVENOUS CONTINUOUS
Status: DISCONTINUED | OUTPATIENT
Start: 2022-12-13 | End: 2022-12-15

## 2022-12-13 RX ORDER — LEVOTHYROXINE SODIUM 0.05 MG/1
50 TABLET ORAL DAILY
Status: DISCONTINUED | OUTPATIENT
Start: 2022-12-14 | End: 2022-12-20 | Stop reason: HOSPADM

## 2022-12-13 RX ORDER — BUDESONIDE AND FORMOTEROL FUMARATE DIHYDRATE 160; 4.5 UG/1; UG/1
2 AEROSOL RESPIRATORY (INHALATION)
Status: DISCONTINUED | OUTPATIENT
Start: 2022-12-13 | End: 2022-12-20 | Stop reason: HOSPADM

## 2022-12-13 RX ORDER — AMITRIPTYLINE HYDROCHLORIDE 25 MG/1
25 TABLET, FILM COATED ORAL NIGHTLY
Status: DISCONTINUED | OUTPATIENT
Start: 2022-12-13 | End: 2022-12-20 | Stop reason: HOSPADM

## 2022-12-13 RX ORDER — GLYCOPYRROLATE 0.2 MG/ML
INJECTION INTRAMUSCULAR; INTRAVENOUS AS NEEDED
Status: DISCONTINUED | OUTPATIENT
Start: 2022-12-13 | End: 2022-12-13 | Stop reason: SURG

## 2022-12-13 RX ORDER — TAMSULOSIN HYDROCHLORIDE 0.4 MG/1
0.4 CAPSULE ORAL NIGHTLY
Status: DISCONTINUED | OUTPATIENT
Start: 2022-12-13 | End: 2022-12-20 | Stop reason: HOSPADM

## 2022-12-13 RX ORDER — FINASTERIDE 5 MG/1
5 TABLET, FILM COATED ORAL DAILY
Status: DISCONTINUED | OUTPATIENT
Start: 2022-12-14 | End: 2022-12-20 | Stop reason: HOSPADM

## 2022-12-13 RX ORDER — FINASTERIDE 5 MG/1
5 TABLET, FILM COATED ORAL DAILY
Status: CANCELLED | OUTPATIENT
Start: 2022-12-13

## 2022-12-13 RX ORDER — TAMSULOSIN HYDROCHLORIDE 0.4 MG/1
0.4 CAPSULE ORAL NIGHTLY
Status: CANCELLED | OUTPATIENT
Start: 2022-12-13

## 2022-12-13 RX ORDER — ONDANSETRON 2 MG/ML
4 INJECTION INTRAMUSCULAR; INTRAVENOUS ONCE
Status: COMPLETED | OUTPATIENT
Start: 2022-12-13 | End: 2022-12-13

## 2022-12-13 RX ORDER — NEOSTIGMINE METHYLSULFATE 1 MG/ML
INJECTION, SOLUTION INTRAVENOUS AS NEEDED
Status: DISCONTINUED | OUTPATIENT
Start: 2022-12-13 | End: 2022-12-13 | Stop reason: SURG

## 2022-12-13 RX ORDER — SODIUM CHLORIDE 0.9 % (FLUSH) 0.9 %
3-10 SYRINGE (ML) INJECTION AS NEEDED
Status: DISCONTINUED | OUTPATIENT
Start: 2022-12-13 | End: 2022-12-20 | Stop reason: HOSPADM

## 2022-12-13 RX ORDER — LIDOCAINE HYDROCHLORIDE 20 MG/ML
INJECTION, SOLUTION EPIDURAL; INFILTRATION; INTRACAUDAL; PERINEURAL AS NEEDED
Status: DISCONTINUED | OUTPATIENT
Start: 2022-12-13 | End: 2022-12-13 | Stop reason: SURG

## 2022-12-13 RX ORDER — PROPOFOL 10 MG/ML
INJECTION, EMULSION INTRAVENOUS AS NEEDED
Status: DISCONTINUED | OUTPATIENT
Start: 2022-12-13 | End: 2022-12-13 | Stop reason: SURG

## 2022-12-13 RX ORDER — ACETAMINOPHEN 500 MG
1000 TABLET ORAL 3 TIMES DAILY
Status: DISCONTINUED | OUTPATIENT
Start: 2022-12-13 | End: 2022-12-20 | Stop reason: HOSPADM

## 2022-12-13 RX ORDER — SODIUM CHLORIDE 0.9 % (FLUSH) 0.9 %
10 SYRINGE (ML) INJECTION AS NEEDED
Status: DISCONTINUED | OUTPATIENT
Start: 2022-12-13 | End: 2022-12-20 | Stop reason: HOSPADM

## 2022-12-13 RX ORDER — SODIUM CHLORIDE 0.9 % (FLUSH) 0.9 %
10 SYRINGE (ML) INJECTION EVERY 12 HOURS SCHEDULED
Status: DISCONTINUED | OUTPATIENT
Start: 2022-12-13 | End: 2022-12-20 | Stop reason: HOSPADM

## 2022-12-13 RX ORDER — FENTANYL CITRATE 50 UG/ML
INJECTION, SOLUTION INTRAMUSCULAR; INTRAVENOUS AS NEEDED
Status: DISCONTINUED | OUTPATIENT
Start: 2022-12-13 | End: 2022-12-13 | Stop reason: SURG

## 2022-12-13 RX ORDER — IPRATROPIUM BROMIDE AND ALBUTEROL SULFATE 2.5; .5 MG/3ML; MG/3ML
3 SOLUTION RESPIRATORY (INHALATION)
Status: DISCONTINUED | OUTPATIENT
Start: 2022-12-13 | End: 2022-12-20 | Stop reason: HOSPADM

## 2022-12-13 RX ORDER — ONDANSETRON 2 MG/ML
INJECTION INTRAMUSCULAR; INTRAVENOUS AS NEEDED
Status: DISCONTINUED | OUTPATIENT
Start: 2022-12-13 | End: 2022-12-13 | Stop reason: SURG

## 2022-12-13 RX ORDER — FENTANYL CITRATE 50 UG/ML
50 INJECTION, SOLUTION INTRAMUSCULAR; INTRAVENOUS
Status: DISCONTINUED | OUTPATIENT
Start: 2022-12-13 | End: 2022-12-13

## 2022-12-13 RX ORDER — ALBUTEROL SULFATE 90 UG/1
2 AEROSOL, METERED RESPIRATORY (INHALATION) EVERY 6 HOURS PRN
Status: CANCELLED | OUTPATIENT
Start: 2022-12-13

## 2022-12-13 RX ORDER — SODIUM CHLORIDE 0.9 % (FLUSH) 0.9 %
3 SYRINGE (ML) INJECTION EVERY 12 HOURS SCHEDULED
Status: DISCONTINUED | OUTPATIENT
Start: 2022-12-13 | End: 2022-12-20 | Stop reason: HOSPADM

## 2022-12-13 RX ORDER — NITROGLYCERIN 0.4 MG/1
0.4 TABLET SUBLINGUAL
Status: DISCONTINUED | OUTPATIENT
Start: 2022-12-13 | End: 2022-12-20 | Stop reason: HOSPADM

## 2022-12-13 RX ORDER — FAMOTIDINE 10 MG/ML
INJECTION, SOLUTION INTRAVENOUS AS NEEDED
Status: DISCONTINUED | OUTPATIENT
Start: 2022-12-13 | End: 2022-12-13 | Stop reason: SURG

## 2022-12-13 RX ORDER — SODIUM CHLORIDE, SODIUM LACTATE, POTASSIUM CHLORIDE, CALCIUM CHLORIDE 600; 310; 30; 20 MG/100ML; MG/100ML; MG/100ML; MG/100ML
INJECTION, SOLUTION INTRAVENOUS CONTINUOUS PRN
Status: DISCONTINUED | OUTPATIENT
Start: 2022-12-13 | End: 2022-12-13 | Stop reason: SURG

## 2022-12-13 RX ORDER — PHENYLEPHRINE HCL IN 0.9% NACL 1 MG/10 ML
SYRINGE (ML) INTRAVENOUS AS NEEDED
Status: DISCONTINUED | OUTPATIENT
Start: 2022-12-13 | End: 2022-12-13 | Stop reason: SURG

## 2022-12-13 RX ORDER — AMITRIPTYLINE HYDROCHLORIDE 25 MG/1
25 TABLET, FILM COATED ORAL NIGHTLY
Status: CANCELLED | OUTPATIENT
Start: 2022-12-13

## 2022-12-13 RX ORDER — LORAZEPAM 0.5 MG/1
0.5 TABLET ORAL DAILY PRN
Status: CANCELLED | OUTPATIENT
Start: 2022-12-13

## 2022-12-13 RX ORDER — KETOROLAC TROMETHAMINE 30 MG/ML
15 INJECTION, SOLUTION INTRAMUSCULAR; INTRAVENOUS ONCE
Status: COMPLETED | OUTPATIENT
Start: 2022-12-13 | End: 2022-12-13

## 2022-12-13 RX ORDER — CLINDAMYCIN PHOSPHATE 900 MG/50ML
900 INJECTION INTRAVENOUS ONCE
Status: COMPLETED | OUTPATIENT
Start: 2022-12-13 | End: 2022-12-13

## 2022-12-13 RX ORDER — SODIUM CHLORIDE, SODIUM LACTATE, POTASSIUM CHLORIDE, CALCIUM CHLORIDE 600; 310; 30; 20 MG/100ML; MG/100ML; MG/100ML; MG/100ML
125 INJECTION, SOLUTION INTRAVENOUS ONCE
Status: COMPLETED | OUTPATIENT
Start: 2022-12-13 | End: 2022-12-13

## 2022-12-13 RX ORDER — OXYCODONE HYDROCHLORIDE AND ACETAMINOPHEN 5; 325 MG/1; MG/1
1 TABLET ORAL ONCE AS NEEDED
Status: DISCONTINUED | OUTPATIENT
Start: 2022-12-13 | End: 2022-12-13 | Stop reason: HOSPADM

## 2022-12-13 RX ORDER — ROCURONIUM BROMIDE 10 MG/ML
INJECTION, SOLUTION INTRAVENOUS AS NEEDED
Status: DISCONTINUED | OUTPATIENT
Start: 2022-12-13 | End: 2022-12-13 | Stop reason: SURG

## 2022-12-13 RX ORDER — ENOXAPARIN SODIUM 100 MG/ML
40 INJECTION SUBCUTANEOUS EVERY 24 HOURS
Status: DISCONTINUED | OUTPATIENT
Start: 2022-12-13 | End: 2022-12-18

## 2022-12-13 RX ADMIN — Medication 100 MCG: at 14:48

## 2022-12-13 RX ADMIN — FAMOTIDINE 20 MG: 10 INJECTION, SOLUTION INTRAVENOUS at 14:07

## 2022-12-13 RX ADMIN — CLINDAMYCIN IN 5 PERCENT DEXTROSE 600 MG: 12 INJECTION, SOLUTION INTRAVENOUS at 21:04

## 2022-12-13 RX ADMIN — SODIUM CHLORIDE, POTASSIUM CHLORIDE, SODIUM LACTATE AND CALCIUM CHLORIDE 125 ML/HR: 600; 310; 30; 20 INJECTION, SOLUTION INTRAVENOUS at 12:40

## 2022-12-13 RX ADMIN — SODIUM CHLORIDE, POTASSIUM CHLORIDE, SODIUM LACTATE AND CALCIUM CHLORIDE 100 ML/HR: 600; 310; 30; 20 INJECTION, SOLUTION INTRAVENOUS at 23:59

## 2022-12-13 RX ADMIN — FENTANYL CITRATE 50 MCG: 50 INJECTION INTRAMUSCULAR; INTRAVENOUS at 14:02

## 2022-12-13 RX ADMIN — KETOROLAC TROMETHAMINE 15 MG: 30 INJECTION, SOLUTION INTRAMUSCULAR; INTRAVENOUS at 02:52

## 2022-12-13 RX ADMIN — LIDOCAINE HYDROCHLORIDE 60 MG: 20 INJECTION, SOLUTION EPIDURAL; INFILTRATION; INTRACAUDAL; PERINEURAL at 14:07

## 2022-12-13 RX ADMIN — SUGAMMADEX 200 MG: 100 INJECTION, SOLUTION INTRAVENOUS at 15:27

## 2022-12-13 RX ADMIN — Medication 100 MCG: at 14:21

## 2022-12-13 RX ADMIN — Medication 10 ML: at 21:17

## 2022-12-13 RX ADMIN — CLINDAMYCIN PHOSPHATE 900 MG: 900 INJECTION, SOLUTION INTRAVENOUS at 14:02

## 2022-12-13 RX ADMIN — Medication 3 ML: at 21:18

## 2022-12-13 RX ADMIN — GLYCOPYRROLATE 0.4 MCG: 0.2 INJECTION INTRAMUSCULAR; INTRAVENOUS at 15:11

## 2022-12-13 RX ADMIN — MORPHINE SULFATE 4 MG: 4 INJECTION, SOLUTION INTRAMUSCULAR; INTRAVENOUS at 02:53

## 2022-12-13 RX ADMIN — ONDANSETRON 4 MG: 2 INJECTION INTRAMUSCULAR; INTRAVENOUS at 14:07

## 2022-12-13 RX ADMIN — LEVOFLOXACIN 750 MG: 750 INJECTION, SOLUTION INTRAVENOUS at 18:21

## 2022-12-13 RX ADMIN — ENOXAPARIN SODIUM 40 MG: 40 INJECTION SUBCUTANEOUS at 21:05

## 2022-12-13 RX ADMIN — SODIUM CHLORIDE, POTASSIUM CHLORIDE, SODIUM LACTATE AND CALCIUM CHLORIDE 100 ML/HR: 600; 310; 30; 20 INJECTION, SOLUTION INTRAVENOUS at 07:30

## 2022-12-13 RX ADMIN — PROPOFOL 120 MG: 10 INJECTION, EMULSION INTRAVENOUS at 14:07

## 2022-12-13 RX ADMIN — NEOSTIGMINE METHYLSULFATE 3 MG: 0.5 INJECTION INTRAVENOUS at 15:11

## 2022-12-13 RX ADMIN — ONDANSETRON 4 MG: 2 INJECTION INTRAMUSCULAR; INTRAVENOUS at 02:52

## 2022-12-13 RX ADMIN — ACETAMINOPHEN 1000 MG: 500 TABLET ORAL at 21:17

## 2022-12-13 RX ADMIN — SODIUM CHLORIDE, POTASSIUM CHLORIDE, SODIUM LACTATE AND CALCIUM CHLORIDE: 600; 310; 30; 20 INJECTION, SOLUTION INTRAVENOUS at 14:02

## 2022-12-13 RX ADMIN — ROCURONIUM BROMIDE 20 MG: 10 INJECTION, SOLUTION INTRAVENOUS at 14:07

## 2022-12-13 RX ADMIN — FENTANYL CITRATE 50 MCG: 50 INJECTION INTRAMUSCULAR; INTRAVENOUS at 14:07

## 2022-12-13 RX ADMIN — AMITRIPTYLINE HYDROCHLORIDE 25 MG: 25 TABLET, FILM COATED ORAL at 22:29

## 2022-12-13 RX ADMIN — IPRATROPIUM BROMIDE AND ALBUTEROL SULFATE 3 ML: 2.5; .5 SOLUTION RESPIRATORY (INHALATION) at 17:29

## 2022-12-13 RX ADMIN — TAMSULOSIN HYDROCHLORIDE 0.4 MG: 0.4 CAPSULE ORAL at 22:29

## 2022-12-13 NOTE — ED PROVIDER NOTES
Subjective   History of Present Illness  87-year-old man with past medical history of COPD, arthritis, and BPH presents to the ER with primary concern of left hip pain and headache after a fall at home.  Patient noted he stumbled when attempting to ambulate to his bed.  Patient noted he woke up on the floor.  Patient denied any concerns for syncopal symptoms or chest pain.  Patient did strike the left side of his head against his oxygen tank.  Patient has a small superficial laceration to the left temple.  No significant cervical spine pain.  Minimal headache.  Patient confirms severe left hip pain.  Concerns for left lower extremity shortening as well.  Vitals stable.  GCS 15        Review of Systems   Musculoskeletal:        Left hip pain   Skin: Positive for wound.   Neurological: Positive for headaches.   All other systems reviewed and are negative.      Past Medical History:   Diagnosis Date   • Angiodysplasia of the colon    • Arthritis    • Bacteremia due to Escherichia coli 06/28/2017    Same time as the common bile duct stone   • Benign prostatic hyperplasia without lower urinary tract symptoms 06/30/2021   • Bile duct calculus 06/24/2017   • 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HCC)     Centrilobular   • Diverticulosis    • GERD (gastroesophageal reflux disease)    • Heart failure with preserved ejection fraction (HCC)    • Left upper lobe pulmonary nodule 01/30/2019    15 mm and spiculated in 2018; FNA was negative   • Moderate pulmonary hypertension (HCC)    • Myocardial infarction (HCC)    • Status post laparoscopic cholecystectomy 06/15/2017       Allergies   Allergen Reactions   • Penicillins Anaphylaxis       Past Surgical History:   Procedure Laterality Date   • BRONCHOSCOPY N/A 8/23/2018    Procedure: BRONCHOSCOPY WITH ENDOBRONCHIAL ULTRASOUND;  Surgeon: Saravanan Méndez MD;  Location: [facility];  Service: Pulmonary   • CHOLECYSTECTOMY N/A 6/16/2017    Procedure:  CHOLECYSTECTOMY LAPAROSCOPIC;  Surgeon: Jose Velazquez MD;  Location: [facility];  Service:    • EYE SURGERY Bilateral    • INGUINAL HERNIA REPAIR Left     left   • GA ERCP DX COLLECTION SPECIMEN BRUSHING/WASHING N/A 2017    Procedure: ENDOSCOPIC RETROGRADE CHOLANGIOPANCREATOGRAPHY;  Surgeon: Rod Francis MD;  Location: [facility] OR;  Service: Gastroenterology   • GA ERCP DX COLLECTION SPECIMEN BRUSHING/WASHING N/A 10/26/2017    Procedure: ENDOSCOPIC RETROGRADE CHOLANGIOPANCREATOGRAPHY;  Surgeon: Rod Francis MD;  Location: [facility] OR;  Service: Gastroenterology       Family History   Problem Relation Age of Onset   • No Known Problems Mother    • Diabetes Father    • Hypertension Father    • Stroke Father    • Diabetes Sister        Social History     Socioeconomic History   • Marital status:    Tobacco Use   • Smoking status: Former     Packs/day: 3.00     Years: 10.00     Pack years: 30.00     Types: Cigarettes     Quit date:      Years since quittin.9   • Smokeless tobacco: Former     Quit date: 1995   Vaping Use   • Vaping Use: Never used   Substance and Sexual Activity   • Alcohol use: No   • Drug use: No   • Sexual activity: Defer           Objective   Physical Exam  Constitutional:       General: He is not in acute distress.     Appearance: He is well-developed. He is not ill-appearing.   HENT:      Head: Normocephalic and atraumatic.      Comments: Superficial laceration to the left temporal scalp  Eyes:      Extraocular Movements: Extraocular movements intact.      Pupils: Pupils are equal, round, and reactive to light.   Neck:      Vascular: No JVD.   Cardiovascular:      Rate and Rhythm: Normal rate and regular rhythm.      Heart sounds: Normal heart sounds. No murmur heard.  Pulmonary:      Effort: No tachypnea, accessory muscle usage or respiratory distress.      Breath sounds: Normal breath sounds. No stridor. No decreased breath sounds, wheezing, rhonchi  or rales.   Chest:      Chest wall: No deformity, tenderness or crepitus.   Abdominal:      General: Bowel sounds are normal.      Palpations: Abdomen is soft.      Tenderness: There is no abdominal tenderness. There is no guarding or rebound.   Musculoskeletal:         General: Normal range of motion.      Cervical back: Normal range of motion and neck supple.      Right lower leg: No tenderness. No edema.      Left lower leg: Tenderness present. No edema.        Legs:       Comments: Left lower extremity shortening.  Tenderness palpation of left greater trochanter   Lymphadenopathy:      Cervical: No cervical adenopathy.   Skin:     General: Skin is warm and dry.      Coloration: Skin is not cyanotic.      Findings: No ecchymosis or erythema.   Neurological:      General: No focal deficit present.      Mental Status: He is alert and oriented to person, place, and time.      Cranial Nerves: No cranial nerve deficit.      Motor: No weakness.   Psychiatric:         Mood and Affect: Mood normal. Mood is not anxious.         Behavior: Behavior normal. Behavior is not agitated.         Procedures       Results for orders placed or performed during the hospital encounter of 12/12/22   COVID-19 and FLU A/B PCR - Swab, Nasopharynx    Specimen: Nasopharynx; Swab   Result Value Ref Range    COVID19 Not Detected Not Detected - Ref. Range    Influenza A PCR Not Detected Not Detected    Influenza B PCR Not Detected Not Detected   Blood Culture - Blood, Arm, Right    Specimen: Arm, Right; Blood   Result Value Ref Range    Blood Culture No growth at 3 days    Blood Culture - Blood, Hand, Right    Specimen: Hand, Right; Blood   Result Value Ref Range    Blood Culture No growth at 3 days    Respiratory Panel PCR w/COVID-19(SARS-CoV-2) NURY/DOMINGA/KATHRYN/PAD/COR/MAD/ANA In-House, NP Swab in UTM/VTM, 3-4 HR TAT - Swab, Nasopharynx    Specimen: Nasopharynx; Swab   Result Value Ref Range    ADENOVIRUS, PCR Not Detected Not Detected     Coronavirus 229E Not Detected Not Detected    Coronavirus HKU1 Not Detected Not Detected    Coronavirus NL63 Not Detected Not Detected    Coronavirus OC43 Not Detected Not Detected    COVID19 Not Detected Not Detected - Ref. Range    Human Metapneumovirus Not Detected Not Detected    Human Rhinovirus/Enterovirus Not Detected Not Detected    Influenza A PCR Not Detected Not Detected    Influenza B PCR Not Detected Not Detected    Parainfluenza Virus 1 Not Detected Not Detected    Parainfluenza Virus 2 Not Detected Not Detected    Parainfluenza Virus 3 Not Detected Not Detected    Parainfluenza Virus 4 Not Detected Not Detected    RSV, PCR Not Detected Not Detected    Bordetella pertussis pcr Not Detected Not Detected    Bordetella parapertussis PCR Not Detected Not Detected    Chlamydophila pneumoniae PCR Not Detected Not Detected    Mycoplasma pneumo by PCR Not Detected Not Detected   S. Pneumo Ag Urine or CSF - Urine, Urine, Clean Catch    Specimen: Urine, Clean Catch   Result Value Ref Range    Strep Pneumo Ag Negative Negative   Comprehensive Metabolic Panel    Specimen: Blood   Result Value Ref Range    Glucose 106 (H) 65 - 99 mg/dL    BUN 26 (H) 8 - 23 mg/dL    Creatinine 0.87 0.76 - 1.27 mg/dL    Sodium 137 136 - 145 mmol/L    Potassium 5.1 3.5 - 5.2 mmol/L    Chloride 101 98 - 107 mmol/L    CO2 21.2 (L) 22.0 - 29.0 mmol/L    Calcium 9.2 8.6 - 10.5 mg/dL    Total Protein 6.9 6.0 - 8.5 g/dL    Albumin 3.49 (L) 3.50 - 5.20 g/dL    ALT (SGPT) 15 1 - 41 U/L    AST (SGOT) 21 1 - 40 U/L    Alkaline Phosphatase 72 39 - 117 U/L    Total Bilirubin 0.5 0.0 - 1.2 mg/dL    Globulin 3.4 gm/dL    A/G Ratio 1.0 g/dL    BUN/Creatinine Ratio 29.9 (H) 7.0 - 25.0    Anion Gap 14.8 5.0 - 15.0 mmol/L    eGFR 84.6 >60.0 mL/min/1.73   Protime-INR    Specimen: Blood   Result Value Ref Range    Protime 13.0 12.1 - 14.7 Seconds    INR 0.97 0.90 - 1.10   CBC Auto Differential    Specimen: Blood   Result Value Ref Range    WBC 14.97 (H)  3.40 - 10.80 10*3/mm3    RBC 5.56 4.14 - 5.80 10*6/mm3    Hemoglobin 14.9 13.0 - 17.7 g/dL    Hematocrit 48.7 37.5 - 51.0 %    MCV 87.6 79.0 - 97.0 fL    MCH 26.8 26.6 - 33.0 pg    MCHC 30.6 (L) 31.5 - 35.7 g/dL    RDW 15.1 12.3 - 15.4 %    RDW-SD 49.0 37.0 - 54.0 fl    MPV 10.8 6.0 - 12.0 fL    Platelets 194 140 - 450 10*3/mm3    Neutrophil % 84.9 (H) 42.7 - 76.0 %    Lymphocyte % 7.7 (L) 19.6 - 45.3 %    Monocyte % 4.6 (L) 5.0 - 12.0 %    Eosinophil % 1.6 0.3 - 6.2 %    Basophil % 0.5 0.0 - 1.5 %    Immature Grans % 0.7 (H) 0.0 - 0.5 %    Neutrophils, Absolute 12.70 (H) 1.70 - 7.00 10*3/mm3    Lymphocytes, Absolute 1.16 0.70 - 3.10 10*3/mm3    Monocytes, Absolute 0.69 0.10 - 0.90 10*3/mm3    Eosinophils, Absolute 0.24 0.00 - 0.40 10*3/mm3    Basophils, Absolute 0.07 0.00 - 0.20 10*3/mm3    Immature Grans, Absolute 0.11 (H) 0.00 - 0.05 10*3/mm3    nRBC 0.0 0.0 - 0.2 /100 WBC   Scan Slide    Specimen: Blood   Result Value Ref Range    RBC Morphology Normal Normal    Clumped Platelets Present None Seen   Blood Gas, Arterial With Co-Ox    Specimen: Arterial Blood   Result Value Ref Range    Site Left Brachial     David's Test N/A     pH, Arterial 7.344 (L) 7.350 - 7.450 pH units    pCO2, Arterial 55.4 (H) 35.0 - 45.0 mm Hg    pO2, Arterial 78.1 (L) 83.0 - 108.0 mm Hg    HCO3, Arterial 30.1 (H) 20.0 - 26.0 mmol/L    Base Excess, Arterial 2.9 (H) 0.0 - 2.0 mmol/L    O2 Saturation, Arterial 95.5 94.0 - 99.0 %    Hemoglobin, Blood Gas 15.2 14 - 18 g/dL    Hematocrit, Blood Gas 46.5 38.0 - 51.0 %    Oxyhemoglobin 93.9 (L) 94 - 99 %    Methemoglobin 0.40 0.00 - 3.00 %    Carboxyhemoglobin 1.3 0 - 5 %    A-a DO2 106.7 0.0 - 300.0 mmHg    CO2 Content 31.8 22 - 33 mmol/L    Temperature 0.0 C    Barometric Pressure for Blood Gas 730 mmHg    Modality Nasal Cannula     FIO2 36 %    Flow Rate 4.0 lpm    Ventilator Mode NA     Note      Collected by [id number]     pH, Temp Corrected      pCO2, Temperature Corrected      pO2,  Temperature Corrected     Protime-INR    Specimen: Hand, Left; Blood   Result Value Ref Range    Protime 14.4 12.1 - 14.7 Seconds    INR 1.09 0.90 - 1.10   aPTT    Specimen: Hand, Left; Blood   Result Value Ref Range    PTT 31.6 26.5 - 34.5 seconds   Urinalysis With Culture If Indicated - Urine, Clean Catch    Specimen: Urine, Clean Catch   Result Value Ref Range    Color, UA Yellow Yellow, Straw    Appearance, UA Clear Clear    pH, UA <=5.0 5.0 - 8.0    Specific Gravity, UA 1.020 1.005 - 1.030    Glucose, UA Negative Negative    Ketones, UA Negative Negative    Bilirubin, UA Negative Negative    Blood, UA Negative Negative    Protein, UA Negative Negative    Leuk Esterase, UA Negative Negative    Nitrite, UA Negative Negative    Urobilinogen, UA 0.2 E.U./dL 0.2 - 1.0 E.U./dL   Lactic Acid, Plasma    Specimen: Blood   Result Value Ref Range    Lactate 1.5 0.5 - 2.0 mmol/L   C-reactive Protein    Specimen: Blood   Result Value Ref Range    C-Reactive Protein 4.11 (H) 0.00 - 0.50 mg/dL   Phosphorus    Specimen: Blood   Result Value Ref Range    Phosphorus 2.7 2.5 - 4.5 mg/dL   Magnesium    Specimen: Blood   Result Value Ref Range    Magnesium 1.6 1.6 - 2.4 mg/dL   Basic Metabolic Panel    Specimen: Blood   Result Value Ref Range    Glucose 94 65 - 99 mg/dL    BUN 31 (H) 8 - 23 mg/dL    Creatinine 0.85 0.76 - 1.27 mg/dL    Sodium 134 (L) 136 - 145 mmol/L    Potassium 4.4 3.5 - 5.2 mmol/L    Chloride 99 98 - 107 mmol/L    CO2 25.6 22.0 - 29.0 mmol/L    Calcium 8.2 (L) 8.6 - 10.5 mg/dL    BUN/Creatinine Ratio 36.5 (H) 7.0 - 25.0    Anion Gap 9.4 5.0 - 15.0 mmol/L    eGFR 85.2 >60.0 mL/min/1.73   Procalcitonin    Specimen: Blood   Result Value Ref Range    Procalcitonin 0.13 0.00 - 0.25 ng/mL   CBC Auto Differential    Specimen: Blood   Result Value Ref Range    WBC 13.55 (H) 3.40 - 10.80 10*3/mm3    RBC 4.63 4.14 - 5.80 10*6/mm3    Hemoglobin 12.6 (L) 13.0 - 17.7 g/dL    Hematocrit 40.7 37.5 - 51.0 %    MCV 87.9 79.0 -  97.0 fL    MCH 27.2 26.6 - 33.0 pg    MCHC 31.0 (L) 31.5 - 35.7 g/dL    RDW 15.1 12.3 - 15.4 %    RDW-SD 48.9 37.0 - 54.0 fl    MPV 11.5 6.0 - 12.0 fL    Platelets 155 140 - 450 10*3/mm3    Neutrophil % 82.5 (H) 42.7 - 76.0 %    Lymphocyte % 7.9 (L) 19.6 - 45.3 %    Monocyte % 7.7 5.0 - 12.0 %    Eosinophil % 0.7 0.3 - 6.2 %    Basophil % 0.4 0.0 - 1.5 %    Immature Grans % 0.8 (H) 0.0 - 0.5 %    Neutrophils, Absolute 11.17 (H) 1.70 - 7.00 10*3/mm3    Lymphocytes, Absolute 1.07 0.70 - 3.10 10*3/mm3    Monocytes, Absolute 1.05 (H) 0.10 - 0.90 10*3/mm3    Eosinophils, Absolute 0.10 0.00 - 0.40 10*3/mm3    Basophils, Absolute 0.05 0.00 - 0.20 10*3/mm3    Immature Grans, Absolute 0.11 (H) 0.00 - 0.05 10*3/mm3    nRBC 0.0 0.0 - 0.2 /100 WBC   Blood Gas, Venous With Co-Ox    Specimen: Venous Blood   Result Value Ref Range    Site Lab     pH, Venous 7.352 7.320 - 7.420 pH Units    pCO2, Venous 54.9 (H) 41.0 - 51.0 mm Hg    pO2, Venous 18.6 (L) 27.0 - 53.0 mm Hg    HCO3, Venous 30.5 (H) 22.0 - 28.0 mmol/L    Base Excess, Venous 3.6 (H) 0.0 - 2.0 mmol/L    O2 Saturation, Venous 29.3 (L) 45.0 - 75.0 %    Hemoglobin, Blood Gas 13.0 (L) 14 - 18 g/dL    CO2 Content 32.1 22 - 33 mmol/L    Temperature 37.0 C    Barometric Pressure for Blood Gas 725 mmHg    Modality Nasal Cannula     FIO2 24 %    Flow Rate 1.0 lpm    Ventilator Mode NA     Collected by [id number]     Oxyhemoglobin Venous 28.7 (L) 45.0 - 75.0 %    Carboxyhemoglobin Venous 1.8 0.0 - 5.0 %    Methemoglobin Venous 0.4 0.0 - 3.0 %   Magnesium    Specimen: Blood   Result Value Ref Range    Magnesium 2.2 1.6 - 2.4 mg/dL   Basic Metabolic Panel    Specimen: Blood   Result Value Ref Range    Glucose 115 (H) 65 - 99 mg/dL    BUN 19 8 - 23 mg/dL    Creatinine 0.52 (L) 0.76 - 1.27 mg/dL    Sodium 137 136 - 145 mmol/L    Potassium 4.1 3.5 - 5.2 mmol/L    Chloride 104 98 - 107 mmol/L    CO2 26.7 22.0 - 29.0 mmol/L    Calcium 7.7 (L) 8.6 - 10.5 mg/dL    BUN/Creatinine Ratio 36.5 (H)  7.0 - 25.0    Anion Gap 6.3 5.0 - 15.0 mmol/L    eGFR 98.8 >60.0 mL/min/1.73   C-reactive Protein    Specimen: Blood   Result Value Ref Range    C-Reactive Protein 16.03 (H) 0.00 - 0.50 mg/dL   Phosphorus    Specimen: Blood   Result Value Ref Range    Phosphorus 2.1 (L) 2.5 - 4.5 mg/dL   Magnesium    Specimen: Blood   Result Value Ref Range    Magnesium 1.9 1.6 - 2.4 mg/dL   CBC Auto Differential    Specimen: Blood   Result Value Ref Range    WBC 8.30 3.40 - 10.80 10*3/mm3    RBC 3.46 (L) 4.14 - 5.80 10*6/mm3    Hemoglobin 9.3 (L) 13.0 - 17.7 g/dL    Hematocrit 30.2 (L) 37.5 - 51.0 %    MCV 87.3 79.0 - 97.0 fL    MCH 26.9 26.6 - 33.0 pg    MCHC 30.8 (L) 31.5 - 35.7 g/dL    RDW 15.1 12.3 - 15.4 %    RDW-SD 48.4 37.0 - 54.0 fl    MPV 11.1 6.0 - 12.0 fL    Platelets 103 (L) 140 - 450 10*3/mm3    Neutrophil % 74.0 42.7 - 76.0 %    Lymphocyte % 12.3 (L) 19.6 - 45.3 %    Monocyte % 8.1 5.0 - 12.0 %    Eosinophil % 4.5 0.3 - 6.2 %    Basophil % 0.4 0.0 - 1.5 %    Immature Grans % 0.7 (H) 0.0 - 0.5 %    Neutrophils, Absolute 6.15 1.70 - 7.00 10*3/mm3    Lymphocytes, Absolute 1.02 0.70 - 3.10 10*3/mm3    Monocytes, Absolute 0.67 0.10 - 0.90 10*3/mm3    Eosinophils, Absolute 0.37 0.00 - 0.40 10*3/mm3    Basophils, Absolute 0.03 0.00 - 0.20 10*3/mm3    Immature Grans, Absolute 0.06 (H) 0.00 - 0.05 10*3/mm3    nRBC 0.0 0.0 - 0.2 /100 WBC   CBC (No Diff)    Specimen: Blood   Result Value Ref Range    WBC 7.60 3.40 - 10.80 10*3/mm3    RBC 3.44 (L) 4.14 - 5.80 10*6/mm3    Hemoglobin 9.4 (L) 13.0 - 17.7 g/dL    Hematocrit 30.4 (L) 37.5 - 51.0 %    MCV 88.4 79.0 - 97.0 fL    MCH 27.3 26.6 - 33.0 pg    MCHC 30.9 (L) 31.5 - 35.7 g/dL    RDW 15.2 12.3 - 15.4 %    RDW-SD 49.1 37.0 - 54.0 fl    MPV 10.8 6.0 - 12.0 fL    Platelets 125 (L) 140 - 450 10*3/mm3   Basic Metabolic Panel    Specimen: Blood   Result Value Ref Range    Glucose 106 (H) 65 - 99 mg/dL    BUN 15 8 - 23 mg/dL    Creatinine 0.58 (L) 0.76 - 1.27 mg/dL    Sodium 136 136 -  145 mmol/L    Potassium 4.0 3.5 - 5.2 mmol/L    Chloride 104 98 - 107 mmol/L    CO2 25.1 22.0 - 29.0 mmol/L    Calcium 7.9 (L) 8.6 - 10.5 mg/dL    BUN/Creatinine Ratio 25.9 (H) 7.0 - 25.0    Anion Gap 6.9 5.0 - 15.0 mmol/L    eGFR 95.6 >60.0 mL/min/1.73   Phosphorus    Specimen: Blood   Result Value Ref Range    Phosphorus 2.5 2.5 - 4.5 mg/dL   Magnesium    Specimen: Blood   Result Value Ref Range    Magnesium 1.7 1.6 - 2.4 mg/dL   POC Glucose Once    Specimen: Blood   Result Value Ref Range    Glucose 109 70 - 130 mg/dL   POC Glucose Once    Specimen: Blood   Result Value Ref Range    Glucose 100 70 - 130 mg/dL   POC Glucose Once    Specimen: Blood   Result Value Ref Range    Glucose 133 (H) 70 - 130 mg/dL   ECG 12 Lead Pre-Op / Pre-Procedure   Result Value Ref Range    QT Interval 324 ms    QTC Interval 402 ms   Type & Screen    Specimen: Hand, Left; Blood   Result Value Ref Range    ABO Type A     RH type Positive     Antibody Screen Negative     T&S Expiration Date 12/16/2022 11:59:59 PM    ABO RH Specimen Verification    Specimen: Blood   Result Value Ref Range    ABO Type A     RH type Positive      XR Femur 2 View Left    Result Date: 12/13/2022  Narrative: EXAM:   XR Left Femur, 2 Views  EXAM DATE:   12/13/2022 7:28 AM  CLINICAL HISTORY:   femur fracture; S72.002A-Fracture of unspecified part of neck of left femur, initial encounter for closed fracture  TECHNIQUE:   Frontal and lateral views of the left femur.  COMPARISON:   12/12/2022  FINDINGS:   Bones/joints:  Left femoral neck fracture again noted.  No distal femoral fracture is seen.  No dislocation.   Soft tissues:  Unremarkable.      Impression: 1.  Left femoral neck fracture again noted. 2.  No distal femoral fracture is seen.  This report was finalized on 12/13/2022 8:10 AM by Dr. Barrie Guzman MD.      CT Head Without Contrast    Result Date: 12/13/2022  Narrative: CT Head WO HISTORY: Head injury. Fell and hit left side of head. TECHNIQUE: Axial  unenhanced head CT with multiplanar reformats. Radiation dose reduction techniques included automated exposure control or exposure modulation based on body size. Count of known CT and cardiac nuc med studies performed in previous 12 months: 1. COMPARISON: None. FINDINGS: Ventricular size and configuration are normal. No acute infarct or hemorrhage is identified. There are no masses. There is no skull fracture.  There is atrophy with chronic small vessel ischemic disease in the white matter. Atherosclerotic calcifications are present in the carotid siphons. There is mild injury to the left temporal scalp.     Impression: Left temporal scalp injury. No other acute findings. Signer Name: Rommel Umanzor MD  Signed: 12/13/2022 12:23 AM  Workstation Name: [facility]  [facility]    CT Cervical Spine Without Contrast    Result Date: 12/13/2022  Narrative: CT Spine Cervical WO INDICATION: Neck trauma. Fall. TECHNIQUE: CT of the cervical spine without IV contrast. Coronal and sagittal reconstructions were obtained.  Radiation dose reduction techniques included automated exposure control or exposure modulation based on body size. Count of known CT and cardiac nuc med studies performed in previous 12 months: 1. COMPARISON: None available. FINDINGS: Alignment is normal with no subluxation identified. There is no acute cervical spine fracture. Note is made of carotid atherosclerotic disease. There is mild multilevel degenerative disc disease. No large disc herniations are seen. There is no significant central canal or foraminal stenosis at any level. COPD and granulomatous calcifications are noted in the lung apices. Partially visible is the known left upper lobe mass that was evaluated with chest CT on 11/1/2022. This remains concerning for malignancy. PET CT is once again recommended if this has not been performed elsewhere.     Impression: 1. No acute cervical spine fracture or malalignment. 2.  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 Please see chest CT report of 11/1/2022. Signer Name: Rommel Umanzor MD  Signed: 12/13/2022 12:19 AM  Workstation Name: [facility]  [facility]    XR Chest 1 View    Result Date: 12/13/2022  Narrative: XR CHEST 1 VW-  CLINICAL INDICATION: Pre Op; S72.002A-Fracture of unspecified part of neck of left femur, initial encounter for closed fracture   COMPARISON: 10/31/2022  TECHNIQUE: Single frontal view of the chest.  FINDINGS:  LUNGS: Minimal left basilar consolidation  HEART AND MEDIASTINUM: Heart and mediastinal contours are unremarkable   SKELETON: Bony and soft tissue structures are unremarkable.        Impression: Minimal left basilar consolidation. COPD.  This report was finalized on 12/13/2022 8:37 AM by Dr. Barrie Guzman MD.      XR Pelvis 1 or 2 View    Result Date: 12/13/2022  Narrative: EXAMINATION: XR PELVIS 1 OR 2 VW-  CLINICAL INDICATION: AP pelvis, status post left hip hemiarthroplasty; S72.002A-Fracture of unspecified part of neck of left femur, initial encounter for closed fracture   COMPARISON: None available  FINDINGS: 2 views of the pelvis  Left hip prosthesis  Alignment is anatomic  Overlying skin clips are present.  Moderate to large volume stool  This report was finalized on 12/13/2022 4:27 PM by Dr. Barrie Guzman MD.      XR Hip With or Without Pelvis 2 - 3 View Left    Result Date: 12/13/2022  Narrative: CR Hip Uni Comp Min 2 Vws LT INDICATION: Hip pain after fall. COMPARISON: None available. FINDINGS: AP pelvis and AP and frog-leg view(s) of the left hip.  There is an acute left femoral neck fracture. No hip dislocation on either side. There is degenerative disease in both hips. No additional fractures. Note is made of atherosclerotic disease.      Impression: Acute left femoral neck fracture. No dislocation. Signer Name: Rommel Umanzor MD  Signed: 12/13/2022 12:15 AM  Workstation Name:  [email or web address]  [facility]        ED Course  ED Course as of 12/17/22 1609   Tue Dec 13, 2022   0130 Left more than fracture noted on imaging.  CT head and cervical spine noted no acute abnormality.  Orthopedics made aware.  Dr. Reyez agreeable to surgery.  Awaiting for bed availability [SF]   0554 EKG noted sinus rhythm.  First-degree AV block.  93 bpm.  QTc 402.  No acute ST elevation    Electronically signed by Yung Gracia DO, 12/13/22, 5:30 AM EST.   [SF]      ED Course User Index  [SF] Yung Gracia DO                                           [facility]    Final diagnoses:   Closed fracture of left hip, initial encounter (HCC)       ED Disposition  ED Disposition     ED Disposition   Decision to Admit    Condition   --    Comment   --             No follow-up provider specified.       Medication List      No changes were made to your prescriptions during this visit.          Micah Fernandez MD  12/17/22 0801       Micah Fernandez MD  12/17/22 [id number]

## 2022-12-13 NOTE — PROGRESS NOTES
ORTHOPEDIC NOTE    Full consult to follow     A/P Left displaced femoral neck fracture    -Anticipate to OR today 12/13 following medical evaluation  -NPO/IVF now  -NP will see to place pre op orders  -Surgeon will discuss with family and patient prior to surgery  -Please call for any questions    Adrian Reyez MD

## 2022-12-13 NOTE — OP NOTE
Sarbjit Martin  12/13/2022      Operative Note:    Surgeon: KRYSTAL Reyez MD    Assistant: ZAINAB Mensah    Pre-Operative Diagnosis:   Left femoral neck fracture, displaced    Post-Operative Diagnosis:   Same    Procedure(s):    1.  Left hip hemiarthroplasty for fracture, CPT code 44654    Anesthesia: General     Estimated Blood Loss: 100 cc    Specimen Obtained:  None    Complication(s):  None apparent.     Implants: Biomet cemented Echo stem, 9 mm, standard offset, -3 neck, 52 Endo head    Operative Indication:     The patient is a 87-year-old male who fell from standing height sustained the above injury.  He is a community ambulator without any assistive devices.  He does use a cane on occasion however mainly does not use any assistive devices.  Due to the displaced nature of the fracture the patient went under the above operation once hearing the risk benefits alternatives and complications to the procedure stated above.    Operative Details:    The patient was met in the preoperative suite where the correct operative site was marked. The patient was brought to the operating room where anesthesia was initiated. The patient was positioned appropriately with all bony prominences well padded. The patient was prepped and draped in the usual sterile fashion and prior to incision a timeout was observed to verify the correct operative site, procedure and antibiotics.    A longitudinal incision was taken on the lateral aspect of the proximal femur. This was taken down to the IT band. Hemastasis was achieved with electrocautery. The IT band was incised in line with the incision. The abductors were then released on the anterior two-thirds and tagged for later repair. Mariajose retractors were placed around the capsular and an H capsulatomy was performed. The leg was then externally rotated and the foot was placed on the side of the table.     An oscilating saw was utilized to create a femoral neck osteotomy and the bone was removed.  Attention was brought back to the acetabulum where a cork-screw was used to remove the femoral head. The femoral head was then sized.    Attention was brought back to the proximal femur where sequential broaching was performed until an appropriate fit. A trail implant was then placed. No subluxation was noted with passive range of motion and a minimal shuck was noted. Limb lengths appeared equal. Trial implants were removed.     The canal was then prepped and irrigated. The stem was cemented into position and the final implant impacted into position. The final implants were reduced which showed a similar exam compared to the trial implants.     The wound was irrigated thoroughly with saline. The capsule was repaired along with the abductors and IT band.The remainder of the wound was closed in standard layer fashion and a soft dressing was applied. The patient was extubated, transferred to the hospital bed and into PACU in stable condition. The patient tolerated the procedure well without any complications.     Post-operative Plan:    Transfer to the floor   Dressing-maintain dressing for now, okay to reinforce as needed saturation  Weight Bear/Lifting Status-as tolerated, anterior hip precautions  DVT PPx-Lovenox 40 mg daily  Follow up-2 weeks in the office    Electronically Signed by: Adrian Reyez MD

## 2022-12-13 NOTE — H&P
[facility] Medicine Services  History & Physical    Patient Identification:  Name:  Sarbjit Martin  Age:  87 y.o.  Sex:  male  :  1935  MRN:  [id number]   Visit Number:  [id number]  Admit Date: 2022   Primary Care Physician:  Goldie Steward APRN    Subjective     Chief complaint: Left hip pain s/p mechanical fall at home    History of presenting illness:      Sarbjit Martin is a 87 y.o. male with past medical history significant for BPH, arthritis, bile duct calculus s/p ERCP, COPD, chronic hypoxic respiratory failure, chronic HFpEF, GERD, moderate pulmonary hypertension, and advanced age.  Patient presents to [facility] emergency department on 2022 for further evaluation of left hip pain s/p mechanical fall at home.  Patient states that he was cleaning his home and before he knew it, he was on the ground.  Denies any chest pain, dizziness, palpitations, nausea, visual disturbances, unilateral weakness, or headache associated with the fall.  Denies loss of consciousness.  Imaging obtained in the ED revealed left femoral fracture.  No abnormal CT head findings.  Patient is alert and oriented on my exam.  Has been n.p.o. in preparation for surgical fixation of the left hip.  Patient states that he currently lives at home alone.  Utilizes 2 L nasal cannula as needed on a regular basis.  Reports former tobacco abuse.  Denies any alcohol abuse.  Denies any left hip pain while lying still.  Does report some acid reflux symptoms.  States that he was unable to take his Prilosec this morning because of the upcoming surgery.  Currently denies any shortness of breath above baseline.  Reports chronic cough with productive sputum at times.  Denies any current chest pain.  EKG reviewed, without ischemic changes.  Have discussed with attending physician, Dr. Pastrana.  Patient may be taken to the OR for surgical fixation of the left hip; considered low risk at this time.   Discussed plan with patient; voiced agreement with no further questions or concerns at this time.      Upon arrival to the ED, vital signs were temperature 98.6, pulse 89, respirations 16, blood pressure 164/88 sitting, SPO2 saturation 90% on 2 L nasal cannula.  ABG with pH 7.344, PCO2 55.4, PO2 78.1, HCO3 30.1, O2 saturation 95.5% on 4 L nasal cannula.  CMP with glucose 106, CO2 21.2, BUN 26, BUN/creatinine ratio 29.9, albumin 3.49, otherwise unremarkable.  PT, INR within normal limits.  CBC with WBCs 14.97, MCHC 30.6, otherwise unremarkable.  Chest x-ray with minimal left basilar consolidation; COPD.  X-ray of the left femur with left femoral neck fracture, no distal femoral fracture seen.  X-ray of the left hip with acute left femoral neck fracture, no dislocation.  CT of the cervical spine without contrast with no acute cervical spine fracture or malalignment; partially visible is a known malignant appearing left upper lobe mass.  CT of the head without contrast with left temporal scalp injury; no other acute findings.    Known Emergency Department medications received prior to my evaluation included 15 mg IV Toradol, LR at 125 mL an hour, 4 mg IV morphine, 4 mg IV Zofran. Room location at the time of my evaluation was Pre-op, Bed #7. Discussed with attending physician, Rah Harrison MD.      ---------------------------------------------------------------------------------------------------------------------   Review of Systems   Constitutional: Negative for chills and fever.   HENT: Negative for congestion and trouble swallowing.    Eyes: Negative for visual disturbance.   Respiratory: Positive for cough (chronic). Negative for choking, shortness of breath and wheezing.    Gastrointestinal: Negative for abdominal pain, constipation, diarrhea, nausea and vomiting.   Genitourinary: Negative for difficulty urinating, dysuria and flank pain.   Musculoskeletal: Positive for arthralgias (left hip) and gait  problem. Negative for neck pain.   Neurological: Negative for dizziness, tremors, seizures, syncope, facial asymmetry, speech difficulty, light-headedness, numbness and headaches.   Psychiatric/Behavioral: Negative for confusion.      ---------------------------------------------------------------------------------------------------------------------   Past Medical History:   Diagnosis Date   • Angiodysplasia of the colon    • Arthritis    • Bacteremia due to Escherichia coli 06/28/2017    Same time as the common bile duct stone   • Benign prostatic hyperplasia without lower urinary tract symptoms 06/30/2021   • Bile duct calculus 06/24/2017   • 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HCC)     Centrilobular   • Diverticulosis    • GERD (gastroesophageal reflux disease)    • Heart failure with preserved ejection fraction (HCC)    • Left upper lobe pulmonary nodule 01/30/2019    15 mm and spiculated in 2018; FNA was negative   • Moderate pulmonary hypertension (HCC)    • Myocardial infarction (HCC)    • Status post laparoscopic cholecystectomy 06/15/2017     Past Surgical History:   Procedure Laterality Date   • BRONCHOSCOPY N/A 8/23/2018    Procedure: BRONCHOSCOPY WITH ENDOBRONCHIAL ULTRASOUND;  Surgeon: Saravanan Méndez MD;  Location: [facility];  Service: Pulmonary   • CHOLECYSTECTOMY N/A 6/16/2017    Procedure: CHOLECYSTECTOMY LAPAROSCOPIC;  Surgeon: Jose Velazquez MD;  Location: [facility];  Service:    • EYE SURGERY Bilateral 2013   • INGUINAL HERNIA REPAIR Left 2013    left   • DE ERCP DX COLLECTION SPECIMEN BRUSHING/WASHING N/A 6/26/2017    Procedure: ENDOSCOPIC RETROGRADE CHOLANGIOPANCREATOGRAPHY;  Surgeon: Rod Francis MD;  Location: [facility];  Service: Gastroenterology   • DE ERCP DX COLLECTION SPECIMEN BRUSHING/WASHING N/A 10/26/2017    Procedure: ENDOSCOPIC RETROGRADE CHOLANGIOPANCREATOGRAPHY;  Surgeon: Rod Francis MD;  Location: [facility];  Service: Gastroenterology      Family History   Problem Relation Age of Onset   • No Known Problems Mother    • Diabetes Father    • Hypertension Father    • Stroke Father    • Diabetes Sister      Social History     Socioeconomic History   • Marital status:    Tobacco Use   • Smoking status: Former     Packs/day: 3.00     Years: 10.00     Pack years: 30.00     Types: Cigarettes     Quit date:      Years since quittin.   • Smokeless tobacco: Former     Quit date: 1995   Vaping Use   • Vaping Use: Never used   Substance and Sexual Activity   • Alcohol use: No   • Drug use: No   • Sexual activity: Defer     ---------------------------------------------------------------------------------------------------------------------   Allergies:  Penicillins  ---------------------------------------------------------------------------------------------------------------------   Home medications:    Medications below are reported home medications pulling from within the system; at this time, these medications have not been reconciled unless otherwise specified and are in the verification process for further verifcation as current home medications.  Medications Prior to Admission   Medication Sig Dispense Refill Last Dose   • Budeson-Glycopyrrol-Formoterol (BREZTRI) 160-9-4.8 MCG/ACT aerosol inhaler Inhale 2 puffs 2 (Two) Times a Day.   2022   • finasteride (Proscar) 5 MG tablet Take 1 tablet by mouth Daily. 90 tablet 3 2022   • levothyroxine (SYNTHROID, LEVOTHROID) 50 MCG tablet Take 1 tablet by mouth Daily.   2022   • amitriptyline (ELAVIL) 25 MG tablet Take 25 mg by mouth Every Night.   2022   • LORazepam (ATIVAN) 0.5 MG tablet Take 1 tablet by mouth Daily As Needed for Anxiety.   Unknown   • tamsulosin (Flomax) 0.4 MG capsule 24 hr capsule Take 1 capsule by mouth Every Night. 90 capsule 3 2022   • VENTOLIN  (90 BASE) MCG/ACT inhaler Inhale 2 puffs Every 6 (Six) Hours As Needed for Wheezing or  Shortness of Air.   Unknown       Hospital Scheduled Meds:  clindamycin, 900 mg, Intravenous, Once  sodium chloride, 10 mL, Intravenous, Q12H      lactated ringers, 100 mL/hr, Last Rate: 100 mL/hr (12/13/22 1156)        Current listed hospital scheduled medications may not yet reflect those currently placed in orders that are signed and held awaiting patient's arrival to floor.   ---------------------------------------------------------------------------------------------------------------------     Objective     Vital Signs:  Temp:  [98.2 °F (36.8 °C)-98.6 °F (37 °C)] 98.2 °F (36.8 °C)  Heart Rate:  [] 82  Resp:  [16-20] 20  BP: (116-164)/(62-95) 134/69      12/12/22  2328   Weight: 46.7 kg (103 lb)     Body mass index is 16.62 kg/m².  ---------------------------------------------------------------------------------------------------------------------       Physical Exam  Vitals and nursing note reviewed.   Constitutional:       General: He is awake. He is not in acute distress.     Appearance: He is ill-appearing (chronically). He is not diaphoretic.      Interventions: Nasal cannula in place.      Comments: Currently on 4L NC.   HENT:      Head: Normocephalic.      Mouth/Throat:      Mouth: Mucous membranes are moist.      Pharynx: Oropharynx is clear.   Eyes:      Extraocular Movements: Extraocular movements intact.   Cardiovascular:      Pulses: Normal pulses.   Pulmonary:      Effort: Accessory muscle usage present. No respiratory distress or retractions.      Breath sounds: Examination of the left-upper field reveals decreased breath sounds. Examination of the left-lower field reveals decreased breath sounds. Decreased breath sounds, wheezing (HOWIE, expiratory, mild) and rales (RLL) present.   Abdominal:      General: Abdomen is flat. Bowel sounds are normal. There is no distension.      Palpations: Abdomen is soft.      Tenderness: There is no abdominal tenderness. There is no guarding or rebound.    Musculoskeletal:      Cervical back: Neck supple. No rigidity.      Right lower leg: No edema.      Left lower leg: No edema.   Skin:     General: Skin is warm and dry.      Capillary Refill: Capillary refill takes 2 to 3 seconds.      Coloration: Skin is pale.   Neurological:      Mental Status: He is alert and oriented to person, place, and time.      Sensory: Sensation is intact.      Motor: No tremor.      Comments: LLE remaining neurovascularly intact.    Psychiatric:         Attention and Perception: Attention normal.         Mood and Affect: Mood normal.         Behavior: Behavior normal. Behavior is cooperative.       ---------------------------------------------------------------------------------------------------------------------  EKG: Pending cardiology interpretation. Per my review, appears normal sinus rhythm 90s with 1st degree AV block. No evidence of acute ischemia.        ---------------------------------------------------------------------------------------------------------------------   Results from last 7 days   Lab Units 12/13/22  0850 12/13/22  0022   WBC 10*3/mm3  --  14.97*   HEMOGLOBIN g/dL  --  14.9   HEMATOCRIT %  --  48.7   MCV fL  --  87.6   MCHC g/dL  --  30.6*   PLATELETS 10*3/mm3  --  194   INR  1.09 0.97     Results from last 7 days   Lab Units 12/13/22  0747   PH, ARTERIAL pH units 7.344*   PO2 ART mm Hg 78.1*   PCO2, ARTERIAL mm Hg 55.4*   HCO3 ART mmol/L 30.1*     Results from last 7 days   Lab Units 12/13/22  0022   SODIUM mmol/L 137   POTASSIUM mmol/L 5.1   CHLORIDE mmol/L 101   CO2 mmol/L 21.2*   BUN mg/dL 26*   CREATININE mg/dL 0.87   CALCIUM mg/dL 9.2   GLUCOSE mg/dL 106*   ALBUMIN g/dL 3.49*   BILIRUBIN mg/dL 0.5   ALK PHOS U/L 72   AST (SGOT) U/L 21   ALT (SGPT) U/L 15   Estimated Creatinine Clearance: 41 mL/min (by C-G formula based on SCr of 0.87 mg/dL).  No results found for: AMMONIA          Lab Results   Component Value Date    HGBA1C 6.00 (H) 10/31/2022     Lab  Results   Component Value Date    TSH 0.664 11/01/2022     No results found for: PREGTESTUR, PREGSERUM, HCG, HCGQUANT  Pain Management Panel    There is no flowsheet data to display.           ---------------------------------------------------------------------------------------------------------------------  Imaging Results (Last 7 Days)     Procedure Component Value Units Date/Time    XR Chest 1 View [[id number]] Collected: 12/13/22 0832     Updated: 12/13/22 0839    Narrative:      XR CHEST 1 VW-     CLINICAL INDICATION: Pre Op; S72.002A-Fracture of unspecified part of  neck of left femur, initial encounter for closed fracture        COMPARISON: 10/31/2022      TECHNIQUE: Single frontal view of the chest.     FINDINGS:      LUNGS: Minimal left basilar consolidation      HEART AND MEDIASTINUM: Heart and mediastinal contours are unremarkable        SKELETON: Bony and soft tissue structures are unremarkable.             Impression:      Minimal left basilar consolidation. COPD.     This report was finalized on 12/13/2022 8:37 AM by Dr. Barrie Guzman MD.       XR Femur 2 View Left [[id number]] Collected: 12/13/22 0810     Updated: 12/13/22 0812    Narrative:      EXAM:    XR Left Femur, 2 Views     EXAM DATE:    12/13/2022 7:28 AM     CLINICAL HISTORY:    femur fracture; S72.002A-Fracture of unspecified part of neck of left  femur, initial encounter for closed fracture     TECHNIQUE:    Frontal and lateral views of the left femur.     COMPARISON:    12/12/2022     FINDINGS:    Bones/joints:  Left femoral neck fracture again noted.  No distal  femoral fracture is seen.  No dislocation.    Soft tissues:  Unremarkable.       Impression:      1.  Left femoral neck fracture again noted.  2.  No distal femoral fracture is seen.     This report was finalized on 12/13/2022 8:10 AM by Dr. Barrie Guzman MD.       CT Head Without Contrast [[id number]] Collected: 12/13/22 0023     Updated: 12/13/22 0025    Narrative:      CT Head  WO    HISTORY:   Head injury. Fell and hit left side of head.    TECHNIQUE:   Axial unenhanced head CT with multiplanar reformats. Radiation dose reduction techniques included automated exposure control or exposure modulation based on body size. Count of known CT and cardiac nuc med studies performed in previous 12 months: 1.     COMPARISON:   None.    FINDINGS:   Ventricular size and configuration are normal. No acute infarct or hemorrhage is identified. There are no masses. There is no skull fracture.  There is atrophy with chronic small vessel ischemic disease in the white matter. Atherosclerotic calcifications  are present in the carotid siphons. There is mild injury to the left temporal scalp.      Impression:      Left temporal scalp injury. No other acute findings.    Signer Name: Rommel Umanzor MD   Signed: 12/13/2022 12:23 AM   Workstation Name: [email or web address]    [facility]    CT Cervical Spine Without Contrast [[id number]] Collected: 12/13/22 0019     Updated: 12/13/22 0022    Narrative:      CT Spine Cervical WO    INDICATION:   Neck trauma. Fall.    TECHNIQUE:   CT of the cervical spine without IV contrast. Coronal and sagittal reconstructions were obtained.  Radiation dose reduction techniques included automated exposure control or exposure modulation based on body size. Count of known CT and cardiac nuc med  studies performed in previous 12 months: 1.     COMPARISON:  None available.    FINDINGS:  Alignment is normal with no subluxation identified. There is no acute cervical spine fracture. Note is made of carotid atherosclerotic disease. There is mild multilevel degenerative disc disease. No large disc herniations are seen. There is no  significant central canal or foraminal stenosis at any level. COPD and granulomatous calcifications are noted in the lung apices. Partially visible is the known left upper lobe mass that was evaluated with chest CT on 11/1/2022. This remains  concerning  for malignancy. PET CT is once again recommended if this has not been performed elsewhere.      Impression:        1. No acute cervical spine fracture or malalignment.  2. 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 Please see chest CT report of 11/1/2022.    Signer Name: Rommel Umanzor MD   Signed: 12/13/2022 12:19 AM   Workstation Name: [facility]    [facility]    XR Hip With or Without Pelvis 2 - 3 View Left [[id number]] Collected: 12/13/22 0015     Updated: 12/13/22 0017    Narrative:      CR Hip Uni Comp Min 2 Vws LT    INDICATION:   Hip pain after fall.    COMPARISON:   None available.    FINDINGS:  AP pelvis and AP and frog-leg view(s) of the left hip.  There is an acute left femoral neck fracture. No hip dislocation on either side. There is degenerative disease in both hips. No additional fractures. Note is made of atherosclerotic disease.        Impression:      Acute left femoral neck fracture. No dislocation.    Signer Name: Rommel Umanzor MD   Signed: 12/13/2022 12:15 AM   Workstation Name: [facility]    [facility]            Last echocardiogram:  Results for orders placed during the hospital encounter of 10/31/22    Adult Transthoracic Echo Complete With Contrast if Necessary Per Protocol    Interpretation Summary  •  Normal left ventricular cavity size and wall thickness noted. All left ventricular wall segments contract normally.  •  Left ventricular ejection fraction appears to be 61 - 65%.  •  Estimated right ventricular systolic pressure from tricuspid regurgitation is mildly elevated (35-45 mmHg).  •  The aortic valve exhibits sclerosis. There is mild calcification of the aortic valve. The aortic valve appears trileaflet. Mild aortic valve regurgitation is present. No hemodynamically significant aortic valve stenosis is present.  •  The mitral valve is structurally normal with no significant stenosis  present. Mild mitral valve regurgitation is present.  •  Mild tricuspid valve regurgitation is present. Estimated right ventricular systolic pressure from tricuspid regurgitation is mildly elevated (35-45 mmHg).  •  There is no evidence of pericardial effusion. .          I have personally reviewed the above radiology images and read the final radiology report on 12/13/22  ---------------------------------------------------------------------------------------------------------------------  Assessment / Plan     Active Hospital Problems    Diagnosis  POA   • **Closed fracture of left hip (HCC) [S72.002A]  Unknown     Added automatically from request for surgery [id number]         ASSESSMENT/PLAN:  -Acute left femoral neck fracture POA s/p mechanical fall at home  -Radiological images reviewed.   -Orthopedic surgery consulted, input/assistance is much appreciated.   -PRN IV/PO pain medication available with holding parameters to prevent hypotension, oversedation, and/or respiratory depression.   -Neurovascular checks Q 4 hours.   -NPO pending surgical evaluation/intervention.  -Fall precautions.  -PT/OT consults for postop eval.     -Chronic HFpEF  -Appearing euvolemic on exam.   -Monitor strict I's and O's, daily weights.  -Echo from 11/1/2022 reviewed; normal EF (61-65%).   -Review home meds once reconciled.     -Acute on chronic hypoxic respiratory failure, POA  -COPD and chronic hypoxic respiratory failure on 2L PRN at baseline  -History of HOWIE pulmonary nodule  -Moderate pulmonary hypertension  -SIRS criteria on admission with HR>90 and WBCs>12.   -Chest xray revealed minimal left basilar consolidation.  -ABG obtained on 4L NC; PO2 78.1%, PCO2 55.4, and O2 saturation 95.5%.   -Continue to titrate supplemental O2 to maintain SpO2 saturations > 90%.  -Continuous pulse oximetry.   -Encourage incentive spirometer and turn/cough/deep breathing exercises.  -Will order Symbicort and DuoNebs.   -Obtain sputum  culture.  -Obtain full respiratory panel.  -Follows with pulm in the outpatient setting for known pulmonary nodule/mass.   -Obtain UA.   -Repeat CBC in the a.m.  -Add peripheral blood cultures x2.   -Check lactate and C-RP.     -GERD  -Aspiration and reflux precautions.  -PPI.    -History of BPH  -Monitor urine output closely.  -Review home meds once reconciled.    -Essential hypertension  -BP appears well controlled.  -Plan to resume home antihypertensive regimen once reconciled per pharmacy with appropriate holding parameters to prevent hypotension and/or bradycardia.   -Closely monitor BP per hospital protocol, titrate medications as necessary.    -Arthritis  -Supportive care.     -History of CBD calculus s/p ERCP  -History of E. Coli bacteremia      ----------  -DVT prophylaxis: SCDs; will likely be placed on Eliquis postop for VTE prophylaxis.   -Activity: Bedrest  -Expected length of stay:   INPATIENT status due to the need for care which can only be reasonably provided in an hospital setting such as aggressive/expedited ancillary services and/or consultation services, the necessity for IV medications, close physician monitoring and/or the possible need for procedures.  In such, I feel patient's risk for adverse outcomes and need for care warrant INPATIENT evaluation and predict the patient's care encounter to likely last beyond 2 midnights.  -Disposition pending clinical course.    High risk secondary to acute left hip fracture s/p mechanical fall at home.       CODE STATUS: FULL CODE      Jenni Chong, DAVID   12/13/22  13:48 EST  Pager #[phone]  ---------------------------------------------------------------------------------------------------------------------

## 2022-12-13 NOTE — PLAN OF CARE
Goal Outcome Evaluation:           Progress: no change  Outcome Evaluation: Received patient to room 328 from PACU s/p Left total hip arthroplasty. Pt drowsy, oriented to person only.  Assessment complete.  Continue current plan of care.

## 2022-12-13 NOTE — CONSULTS
Consults    Patient Identification:  Name:  Sarbjit Martin  Age:  87 y.o.  Sex:  male  :  1935  MRN:  [id number]  Visit Number:  [id number]  Primary care provider:  Goldie Steward APRN    History of presenting illness:  This is an 87 year old male who presented to the emergency department after sustaining a fall from standing height last night, at home.  The patient notes that he got his feet caught up in grocery bags causing him to slip and fall, landing on his left side.  He notes having pain to the left side of his head and his left hip following the fall, with inability to bear weight to the left side due to pain.  He denies loss of consciousness.  In the emergency department, an X-ray revealed a left femoral neck fracture.  The patient notes that he uses a cane to assist with mobility but was not using it at the time of his fall.  He denies any prior injury or surgery to the left hip.  He denies the use of blood thinners.  He lives at home alone.  He notes a history of COPD and is on oxygen @ 2lpm/nc, intermittently.  He does not work outside the home.      ---------------------------------------------------------------------------------------------------------------------    Review of Systems   Constitutional: Positive for activity change and fatigue.   HENT: Negative.    Eyes: Negative.    Respiratory: Positive for cough, shortness of breath and wheezing.         History of COPD   Cardiovascular: Positive for chest pain.        History of COPD   Gastrointestinal: Negative.    Endocrine: Negative.    Genitourinary: Negative.    Musculoskeletal: Positive for arthralgias, gait problem and joint swelling.        Left hip pain and weakness following the fall.   Skin: Positive for wound.        Left temporal area following the fall.   Allergic/Immunologic: Negative.    Hematological: Negative.    Psychiatric/Behavioral: Negative.        ---------------------------------------------------------------------------------------------------------------------   Past History:  Family History   Problem Relation Age of Onset   • No Known Problems Mother    • Diabetes Father    • Hypertension Father    • Stroke Father    • Diabetes Sister      Past Medical History:   Diagnosis Date   • Angiodysplasia of the colon    • Arthritis    • Bacteremia due to Escherichia coli 06/28/2017    Same time as the common bile duct stone   • Benign prostatic hyperplasia without lower urinary tract symptoms 06/30/2021   • Bile duct calculus 06/24/2017   • 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HCC)     Centrilobular   • Diverticulosis    • GERD (gastroesophageal reflux disease)    • Heart failure with preserved ejection fraction (HCC)    • Left upper lobe pulmonary nodule 01/30/2019    15 mm and spiculated in 2018; FNA was negative   • Moderate pulmonary hypertension (HCC)    • Myocardial infarction (HCC)    • Status post laparoscopic cholecystectomy 06/15/2017     Past Surgical History:   Procedure Laterality Date   • BRONCHOSCOPY N/A 8/23/2018    Procedure: BRONCHOSCOPY WITH ENDOBRONCHIAL ULTRASOUND;  Surgeon: Saravanan Méndez MD;  Location: [facility];  Service: Pulmonary   • CHOLECYSTECTOMY N/A 6/16/2017    Procedure: CHOLECYSTECTOMY LAPAROSCOPIC;  Surgeon: Jose Velazquez MD;  Location: [facility];  Service:    • EYE SURGERY Bilateral 2013   • INGUINAL HERNIA REPAIR Left 2013    left   • UT ERCP DX COLLECTION SPECIMEN BRUSHING/WASHING N/A 6/26/2017    Procedure: ENDOSCOPIC RETROGRADE CHOLANGIOPANCREATOGRAPHY;  Surgeon: Rod Francis MD;  Location: [facility];  Service: Gastroenterology   • UT ERCP DX COLLECTION SPECIMEN BRUSHING/WASHING N/A 10/26/2017    Procedure: ENDOSCOPIC RETROGRADE CHOLANGIOPANCREATOGRAPHY;  Surgeon: Rod Francis MD;  Location: [facility];  Service: Gastroenterology     Social History     Socioeconomic History   •  Marital status:    Tobacco Use   • Smoking status: Former     Packs/day: 3.00     Years: 10.00     Pack years: 30.00     Types: Cigarettes     Quit date:      Years since quittin.9   • Smokeless tobacco: Former     Quit date: 1995   Substance and Sexual Activity   • Alcohol use: No   • Drug use: No   • Sexual activity: Defer     ---------------------------------------------------------------------------------------------------------------------   Allergies:  Penicillins  ---------------------------------------------------------------------------------------------------------------------   Prior to Admission Medications     Prescriptions Last Dose Informant Patient Reported? Taking?    amitriptyline (ELAVIL) 25 MG tablet  Pharmacy Yes No    Take 1 tablet by mouth At Night As Needed for Sleep.    aspirin 325 MG tablet  Self Yes No    Take 325 mg by mouth Daily.    atorvastatin (LIPITOR) 40 MG tablet   No No    Take 1 tablet by mouth Every Night.    budesonide-formoterol (SYMBICORT) 160-4.5 MCG/ACT inhaler   No No    Inhale 2 puffs by mouth 2 (Two) Times a Day.    dicyclomine (BENTYL) 10 MG capsule  Pharmacy Yes No    Take 1 capsule by mouth 4 (Four) Times a Day Before Meals & at Bedtime.    finasteride (Proscar) 5 MG tablet  Pharmacy No No    Take 1 tablet by mouth Daily.    ipratropium-albuterol (DUO-NEB) 0.5-2.5 mg/3 ml nebulizer  Pharmacy Yes No    Take 3 mL by nebulization 4 (Four) Times a Day As Needed for Wheezing.    levothyroxine (SYNTHROID, LEVOTHROID) 50 MCG tablet  Pharmacy Yes No    Take 1 tablet by mouth Daily.    LORazepam (ATIVAN) 0.5 MG tablet  Pharmacy Yes No    Take 1 tablet by mouth Daily As Needed for Anxiety.    meloxicam (MOBIC) 15 MG tablet  Pharmacy Yes No    Take 1 tablet by mouth Daily.    omeprazole (priLOSEC) 40 MG capsule  Pharmacy Yes No    Take 1 capsule by mouth Daily.    pantoprazole (PROTONIX) 40 MG EC tablet   Yes No    Take 40 mg by mouth Daily.    tamsulosin  (Flomax) 0.4 MG capsule 24 hr capsule  Pharmacy No No    Take 1 capsule by mouth Every Night.    VENTOLIN  (90 BASE) MCG/ACT inhaler  Self, Pharmacy Yes No    Inhale 2 puffs Every 6 (Six) Hours As Needed for Wheezing or Shortness of Air.        [facility] Meds:  clindamycin, 900 mg, Intravenous, Once      lactated ringers, 100 mL/hr, Last Rate: 100 mL/hr (12/13/22 0730)      ---------------------------------------------------------------------------------------------------------------------   Vital Signs:  Temp:  [98.6 °F (37 °C)] 98.6 °F (37 °C)  Heart Rate:  [] 97  Resp:  [16] 16  BP: (121-164)/(62-88) 127/76      12/12/22  2328   Weight: 46.7 kg (103 lb)     Body mass index is 16.62 kg/m².  ---------------------------------------------------------------------------------------------------------------------     Physical exam:  Constitutional:  Thin, elderly male.  No acute distress.      HENT:  Head: Normocephalic.  Mouth:  Moist mucous membranes.    Eyes:  Conjunctivae are normal.  Pupils are equal, round, and reactive to light.  No scleral icterus.  Neck:  Neck supple.  Trachea midline.  Cardiovascular:  Normal rate and regular rhythm.  Pulmonary/Chest:  No respiratory distress and good air movement.  02 @ 2lpm/nc in use.  Abdominal:  Soft.  No distension and no tenderness.   Musculoskeletal:  Upon examination of the left hip, there is no edema, no erythema, no ecchymosis, and no obvious deformity.  Tenderness notes to the lateral hip.  Femoral pulse 3/4.  (+) DF/PF at the ankle.  Capillary refill is brisk and light touch sensation is intact, distally.  (+) DP pulse.    Neurological:  Alert and oriented to person, place, and time.     Skin:  Skin is warm and dry.  No rash noted.  No ecchymosis.  Small abrasion noted to the left temporal area.   Psychiatric:  Normal mood and affect.  Behavior is normal.  Judgment and thought content normal.   Peripheral vascular:  No pitting edema and strong pulses on  all 4 extremities.     ---------------------------------------------------------------------------------------------------------------------   .  ---------------------------------------------------------------------------------------------------------------------   Results from last 7 days   Lab Units 12/13/22  0022   WBC 10*3/mm3 14.97*   HEMOGLOBIN g/dL 14.9   HEMATOCRIT % 48.7   MCV fL 87.6   MCHC g/dL 30.6*   PLATELETS 10*3/mm3 194   INR  0.97     Results from last 7 days   Lab Units 12/13/22  0747   PH, ARTERIAL pH units 7.344*   PO2 ART mm Hg 78.1*   PCO2, ARTERIAL mm Hg 55.4*   HCO3 ART mmol/L 30.1*     Results from last 7 days   Lab Units 12/13/22  0022   SODIUM mmol/L 137   POTASSIUM mmol/L 5.1   CHLORIDE mmol/L 101   CO2 mmol/L 21.2*   BUN mg/dL 26*   CREATININE mg/dL 0.87   CALCIUM mg/dL 9.2   GLUCOSE mg/dL 106*   ALBUMIN g/dL 3.49*   BILIRUBIN mg/dL 0.5   ALK PHOS U/L 72   AST (SGOT) U/L 21   ALT (SGPT) U/L 15   Estimated Creatinine Clearance: 41 mL/min (by C-G formula based on SCr of 0.87 mg/dL).  No results found for: AMMONIA          Lab Results   Component Value Date    HGBA1C 6.00 (H) 10/31/2022     Lab Results   Component Value Date    TSH 0.664 11/01/2022     No results found for: PREGTESTUR, PREGSERUM, HCG, HCGQUANT  Pain Management Panel    There is no flowsheet data to display.       No results found for: BLOODCX  No results found for: URINECX  No results found for: WOUNDCX  No results found for: STOOLCX      ---------------------------------------------------------------------------------------------------------------------   Imaging Results (Last 7 Days)     Procedure Component Value Units Date/Time    XR Femur 2 View Left [[id number]] Collected: 12/13/22 0810     Updated: 12/13/22 0812    Narrative:      EXAM:    XR Left Femur, 2 Views     EXAM DATE:    12/13/2022 7:28 AM     CLINICAL HISTORY:    femur fracture; S72.002A-Fracture of unspecified part of neck of left  femur, initial encounter  for closed fracture     TECHNIQUE:    Frontal and lateral views of the left femur.     COMPARISON:    12/12/2022     FINDINGS:    Bones/joints:  Left femoral neck fracture again noted.  No distal  femoral fracture is seen.  No dislocation.    Soft tissues:  Unremarkable.       Impression:      1.  Left femoral neck fracture again noted.  2.  No distal femoral fracture is seen.     This report was finalized on 12/13/2022 8:10 AM by Dr. Barrie Guzman MD.       CT Head Without Contrast [[id number]] Collected: 12/13/22 0023     Updated: 12/13/22 0025    Narrative:      CT Head WO    HISTORY:   Head injury. Fell and hit left side of head.    TECHNIQUE:   Axial unenhanced head CT with multiplanar reformats. Radiation dose reduction techniques included automated exposure control or exposure modulation based on body size. Count of known CT and cardiac nuc med studies performed in previous 12 months: 1.     COMPARISON:   None.    FINDINGS:   Ventricular size and configuration are normal. No acute infarct or hemorrhage is identified. There are no masses. There is no skull fracture.  There is atrophy with chronic small vessel ischemic disease in the white matter. Atherosclerotic calcifications  are present in the carotid siphons. There is mild injury to the left temporal scalp.      Impression:      Left temporal scalp injury. No other acute findings.    Signer Name: Rommel Umanzor MD   Signed: 12/13/2022 12:23 AM   Workstation Name: [facility]    [facility]    CT Cervical Spine Without Contrast [[id number]] Collected: 12/13/22 0019     Updated: 12/13/22 0022    Narrative:      CT Spine Cervical WO    INDICATION:   Neck trauma. Fall.    TECHNIQUE:   CT of the cervical spine without IV contrast. Coronal and sagittal reconstructions were obtained.  Radiation dose reduction techniques included automated exposure control or exposure modulation based on body size. Count of known CT and cardiac nuc  med  studies performed in previous 12 months: 1.     COMPARISON:  None available.    FINDINGS:  Alignment is normal with no subluxation identified. There is no acute cervical spine fracture. Note is made of carotid atherosclerotic disease. There is mild multilevel degenerative disc disease. No large disc herniations are seen. There is no  significant central canal or foraminal stenosis at any level. COPD and granulomatous calcifications are noted in the lung apices. Partially visible is the known left upper lobe mass that was evaluated with chest CT on 11/1/2022. This remains concerning  for malignancy. PET CT is once again recommended if this has not been performed elsewhere.      Impression:        1. No acute cervical spine fracture or malalignment.  2. 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 Please see chest CT report of 11/1/2022.    Signer Name: Rommel Umanzor MD   Signed: 12/13/2022 12:19 AM   Workstation Name: [email or web address]    [facility]    XR Hip With or Without Pelvis 2 - 3 View Left [[id number]] Collected: 12/13/22 0015     Updated: 12/13/22 0017    Narrative:      CR Hip Uni Comp Min 2 Vws LT    INDICATION:   Hip pain after fall.    COMPARISON:   None available.    FINDINGS:  AP pelvis and AP and frog-leg view(s) of the left hip.  There is an acute left femoral neck fracture. No hip dislocation on either side. There is degenerative disease in both hips. No additional fractures. Note is made of atherosclerotic disease.        Impression:      Acute left femoral neck fracture. No dislocation.    Signer Name: Rommel Umanzor MD   Signed: 12/13/2022 12:15 AM   Workstation Name: [email or web address]    [facility]        ----------------------------------------------------------------------------------------------------------------------  Assessment and Plan:   1. Left femoral neck fracture, DOI: 12/12/22.    This patient has been discussed  with Dr. Reyez.  He has reviewed the patient's history and imaging and recommends a left hip hemiarthroplasty.  The patient will remain NPO for surgery today.  He will continue non weight bearing to the left lower extremity until time of surgery.  We will obtain pre operative labs, UA, and chest X-ray.  Further treatment planning per Dr. Reyez.    Thank you for the consult.    DAVID Gomez  12/13/22  08:15 EST

## 2022-12-13 NOTE — ANESTHESIA POSTPROCEDURE EVALUATION
Patient: Sarbjit Martin    Procedure Summary     Date: 12/13/22 Room / Location: [facility] OR  /  [facility] OR    Anesthesia Start: 1401 Anesthesia Stop: 1530    Procedure: Left hip hemiarthroplasty, cemented, lateral, biomet (rep aware), request 1p (Left: Hip) Diagnosis:       Closed fracture of left hip, initial encounter ([facility])      (Closed fracture of left hip, initial encounter ([facility]) [S72.002A])    Surgeons: Adrian Reyez MD Provider: Lowell Rocha MD    Anesthesia Type: general ASA Status: 3          Anesthesia Type: general    Vitals  Vitals Value Taken Time   /73 12/13/22 1605   Temp 97.9 °F (36.6 °C) 12/13/22 1530   Pulse 86 12/13/22 1605   Resp 17 12/13/22 1605   SpO2 96 % 12/13/22 1605           Post Anesthesia Care and Evaluation    Patient location during evaluation: PACU  Patient participation: complete - patient participated  Level of consciousness: awake  Pain score: 0  Pain management: adequate    Airway patency: patent  Anesthetic complications: No anesthetic complications  PONV Status: none  Cardiovascular status: acceptable  Respiratory status: acceptable  Hydration status: acceptable

## 2022-12-13 NOTE — BRIEF OP NOTE
HIP BIPOLAR CEMENTED  Progress Note    Sarbjit Martin  12/13/2022    Pre-op Diagnosis:   Closed fracture of left hip, initial encounter ([facility]) [S72.002A]       Post-Op Diagnosis Codes:     * Closed fracture of left hip, initial encounter ([facility]) [S72.002A]    Procedure/CPT® Codes:  1.  Left hip hemiarthroplasty for fracture, CPT code 52512      Procedure(s):  Left hip hemiarthroplasty, cemented, lateral, biomet (rep aware), request 1p    Surgical Approach: Anterolateral    Surgeon(s):  Adrian Reyez MD    Anesthesia: Choice    Staff:   Circulator: Ciera Sánchez RN; Charline Lama RN  Scrub Person: Barrie Mckee  Assistant: Dorene Mensah CSA  Assistant: Dorene Mensah CSA      Estimated Blood Loss: 100ml    Urine Voided: * No values recorded between 12/13/2022  2:01 PM and 12/13/2022  3:18 PM *    Specimens:                None          Drains: * No LDAs found *    Findings: See operative note        Complications: None apparent    Assistant: Dorene Mensah CSA  was responsible for performing the following activities: Retraction, Suction, Irrigation, Suturing, Closing and Placing Dressing and their skilled assistance was necessary for the success of this case.    Adrian Reyez MD     Date: 12/13/2022  Time: 15:26 EST

## 2022-12-13 NOTE — ANESTHESIA PREPROCEDURE EVALUATION
Anesthesia Evaluation     Patient summary reviewed and Nursing notes reviewed   no history of anesthetic complications:  NPO Solid Status: > 8 hours  NPO Liquid Status: > 8 hours           Airway   Mallampati: II  TM distance: >3 FB  Neck ROM: full  Dental    (+) edentulous, lower dentures and upper dentures    Pulmonary    (+) a smoker Former, COPD, decreased breath sounds,   (-) asthma    ROS comment: Mild Pulmonary Hypertension  Cardiovascular - normal exam  Exercise tolerance: good (4-7 METS)    NYHA Classification: II  ECG reviewed    (+) valvular problems/murmurs AI and TI, CHF ,   (-) hypertension, past MI, dysrhythmias, angina, hyperlipidemia    ROS comment: 11/1/21 Echo  Interpretation Summary       •  Normal left ventricular cavity size and wall thickness noted. All left ventricular wall segments contract normally.  •  Left ventricular ejection fraction appears to be 61 - 65%.  •  Estimated right ventricular systolic pressure from tricuspid regurgitation is mildly elevated (35-45 mmHg).  •  The aortic valve exhibits sclerosis. There is mild calcification of the aortic valve. The aortic valve appears trileaflet. Mild aortic valve regurgitation is present. No hemodynamically significant aortic valve stenosis is present.  •  The mitral valve is structurally normal with no significant stenosis present. Mild mitral valve regurgitation is present.  •  Mild tricuspid valve regurgitation is present. Estimated right ventricular systolic pressure from tricuspid regurgitation is mildly elevated (35-45 mmHg).  •  There is no evidence of pericardial effusion. .         Neuro/Psych- negative ROS  (-) seizures, CVA  GI/Hepatic/Renal/Endo    (+)  GERD well controlled,    (-) diabetes    Musculoskeletal     Abdominal  - normal exam    Bowel sounds: normal.   Substance History - negative use     OB/GYN negative ob/gyn ROS         Other   arthritis,                      Anesthesia Plan    ASA 3     general     (States  slipped on bag and fell on the way to the bathroom)  intravenous induction     Anesthetic plan, risks, benefits, and alternatives have been provided, discussed and informed consent has been obtained with: patient.    Use of blood products discussed with patient  Consented to blood products.       Lab Results   Component Value Date    WBC 14.97 (H) 12/13/2022    HGB 14.9 12/13/2022    HCT 48.7 12/13/2022    MCV 87.6 12/13/2022     12/13/2022         Lab Results   Component Value Date    GLUCOSE 106 (H) 12/13/2022    BUN 26 (H) 12/13/2022    CREATININE 0.87 12/13/2022    EGFRIFNONA 70 02/02/2019    BCR 29.9 (H) 12/13/2022    K 5.1 12/13/2022    CO2 21.2 (L) 12/13/2022    CALCIUM 9.2 12/13/2022    ALBUMIN 3.49 (L) 12/13/2022    LABIL2 1.6 05/10/2015    AST 21 12/13/2022    ALT 15 12/13/2022

## 2022-12-14 LAB
ANION GAP SERPL CALCULATED.3IONS-SCNC: 9.4 MMOL/L (ref 5–15)
ATMOSPHERIC PRESS: 725 MMHG
BASE EXCESS BLDV CALC-SCNC: 3.6 MMOL/L (ref 0–2)
BASOPHILS # BLD AUTO: 0.05 10*3/MM3 (ref 0–0.2)
BASOPHILS NFR BLD AUTO: 0.4 % (ref 0–1.5)
BDY SITE: ABNORMAL
BODY TEMPERATURE: 37 C
BUN SERPL-MCNC: 31 MG/DL (ref 8–23)
BUN/CREAT SERPL: 36.5 (ref 7–25)
CALCIUM SPEC-SCNC: 8.2 MG/DL (ref 8.6–10.5)
CHLORIDE SERPL-SCNC: 99 MMOL/L (ref 98–107)
CO2 BLDA-SCNC: 32.1 MMOL/L (ref 22–33)
CO2 SERPL-SCNC: 25.6 MMOL/L (ref 22–29)
COHGB MFR BLD: 1.8 % (ref 0–5)
CREAT SERPL-MCNC: 0.85 MG/DL (ref 0.76–1.27)
DEPRECATED RDW RBC AUTO: 48.9 FL (ref 37–54)
EGFRCR SERPLBLD CKD-EPI 2021: 85.2 ML/MIN/1.73
EOSINOPHIL # BLD AUTO: 0.1 10*3/MM3 (ref 0–0.4)
EOSINOPHIL NFR BLD AUTO: 0.7 % (ref 0.3–6.2)
ERYTHROCYTE [DISTWIDTH] IN BLOOD BY AUTOMATED COUNT: 15.1 % (ref 12.3–15.4)
GAS FLOW AIRWAY: 1 LPM
GLUCOSE BLDC GLUCOMTR-MCNC: 109 MG/DL (ref 70–130)
GLUCOSE SERPL-MCNC: 94 MG/DL (ref 65–99)
HCO3 BLDV-SCNC: 30.5 MMOL/L (ref 22–28)
HCT VFR BLD AUTO: 40.7 % (ref 37.5–51)
HGB BLD-MCNC: 12.6 G/DL (ref 13–17.7)
HGB BLDA-MCNC: 13 G/DL (ref 14–18)
IMM GRANULOCYTES # BLD AUTO: 0.11 10*3/MM3 (ref 0–0.05)
IMM GRANULOCYTES NFR BLD AUTO: 0.8 % (ref 0–0.5)
INHALED O2 CONCENTRATION: 24 %
LYMPHOCYTES # BLD AUTO: 1.07 10*3/MM3 (ref 0.7–3.1)
LYMPHOCYTES NFR BLD AUTO: 7.9 % (ref 19.6–45.3)
Lab: ABNORMAL
MAGNESIUM SERPL-MCNC: 1.6 MG/DL (ref 1.6–2.4)
MCH RBC QN AUTO: 27.2 PG (ref 26.6–33)
MCHC RBC AUTO-ENTMCNC: 31 G/DL (ref 31.5–35.7)
MCV RBC AUTO: 87.9 FL (ref 79–97)
METHGB BLD QL: 0.4 % (ref 0–3)
MODALITY: ABNORMAL
MONOCYTES # BLD AUTO: 1.05 10*3/MM3 (ref 0.1–0.9)
MONOCYTES NFR BLD AUTO: 7.7 % (ref 5–12)
NEUTROPHILS NFR BLD AUTO: 11.17 10*3/MM3 (ref 1.7–7)
NEUTROPHILS NFR BLD AUTO: 82.5 % (ref 42.7–76)
NRBC BLD AUTO-RTO: 0 /100 WBC (ref 0–0.2)
OXYHGB MFR BLDV: 28.7 % (ref 45–75)
PCO2 BLDV: 54.9 MM HG (ref 41–51)
PH BLDV: 7.35 PH UNITS (ref 7.32–7.42)
PHOSPHATE SERPL-MCNC: 2.7 MG/DL (ref 2.5–4.5)
PLATELET # BLD AUTO: 155 10*3/MM3 (ref 140–450)
PMV BLD AUTO: 11.5 FL (ref 6–12)
PO2 BLDV: 18.6 MM HG (ref 27–53)
POTASSIUM SERPL-SCNC: 4.4 MMOL/L (ref 3.5–5.2)
PROCALCITONIN SERPL-MCNC: 0.13 NG/ML (ref 0–0.25)
QT INTERVAL: 324 MS
QTC INTERVAL: 402 MS
RBC # BLD AUTO: 4.63 10*6/MM3 (ref 4.14–5.8)
S PNEUM AG SPEC QL LA: NEGATIVE
SAO2 % BLDCOV: 29.3 % (ref 45–75)
SODIUM SERPL-SCNC: 134 MMOL/L (ref 136–145)
VENTILATOR MODE: ABNORMAL
WBC NRBC COR # BLD: 13.55 10*3/MM3 (ref 3.4–10.8)

## 2022-12-14 PROCEDURE — 85025 COMPLETE CBC W/AUTO DIFF WBC: CPT | Performed by: GENERAL PRACTICE

## 2022-12-14 PROCEDURE — 94799 UNLISTED PULMONARY SVC/PX: CPT

## 2022-12-14 PROCEDURE — 84100 ASSAY OF PHOSPHORUS: CPT | Performed by: GENERAL PRACTICE

## 2022-12-14 PROCEDURE — 80048 BASIC METABOLIC PNL TOTAL CA: CPT | Performed by: GENERAL PRACTICE

## 2022-12-14 PROCEDURE — 82962 GLUCOSE BLOOD TEST: CPT

## 2022-12-14 PROCEDURE — 99233 SBSQ HOSP IP/OBS HIGH 50: CPT

## 2022-12-14 PROCEDURE — 0 MAGNESIUM SULFATE 4 GM/100ML SOLUTION

## 2022-12-14 PROCEDURE — 84145 PROCALCITONIN (PCT): CPT | Performed by: GENERAL PRACTICE

## 2022-12-14 PROCEDURE — 97167 OT EVAL HIGH COMPLEX 60 MIN: CPT

## 2022-12-14 PROCEDURE — 25010000002 ENOXAPARIN PER 10 MG: Performed by: GENERAL PRACTICE

## 2022-12-14 PROCEDURE — 82805 BLOOD GASES W/O2 SATURATION: CPT

## 2022-12-14 PROCEDURE — 83735 ASSAY OF MAGNESIUM: CPT | Performed by: GENERAL PRACTICE

## 2022-12-14 PROCEDURE — 97162 PT EVAL MOD COMPLEX 30 MIN: CPT

## 2022-12-14 PROCEDURE — 82820 HEMOGLOBIN-OXYGEN AFFINITY: CPT

## 2022-12-14 PROCEDURE — 94761 N-INVAS EAR/PLS OXIMETRY MLT: CPT

## 2022-12-14 RX ORDER — MAGNESIUM SULFATE HEPTAHYDRATE 40 MG/ML
2 INJECTION, SOLUTION INTRAVENOUS AS NEEDED
Status: DISCONTINUED | OUTPATIENT
Start: 2022-12-14 | End: 2022-12-20 | Stop reason: HOSPADM

## 2022-12-14 RX ORDER — HYDROCODONE BITARTRATE AND ACETAMINOPHEN 5; 325 MG/1; MG/1
1 TABLET ORAL ONCE AS NEEDED
Status: COMPLETED | OUTPATIENT
Start: 2022-12-14 | End: 2022-12-14

## 2022-12-14 RX ORDER — ALBUTEROL SULFATE 2.5 MG/3ML
2.5 SOLUTION RESPIRATORY (INHALATION) EVERY 6 HOURS PRN
Status: CANCELLED | OUTPATIENT
Start: 2022-12-14

## 2022-12-14 RX ORDER — MAGNESIUM SULFATE HEPTAHYDRATE 40 MG/ML
4 INJECTION, SOLUTION INTRAVENOUS AS NEEDED
Status: DISCONTINUED | OUTPATIENT
Start: 2022-12-14 | End: 2022-12-20 | Stop reason: HOSPADM

## 2022-12-14 RX ORDER — MAGNESIUM SULFATE HEPTAHYDRATE 40 MG/ML
4 INJECTION, SOLUTION INTRAVENOUS ONCE
Status: COMPLETED | OUTPATIENT
Start: 2022-12-14 | End: 2022-12-14

## 2022-12-14 RX ADMIN — Medication 3 ML: at 08:11

## 2022-12-14 RX ADMIN — TAMSULOSIN HYDROCHLORIDE 0.4 MG: 0.4 CAPSULE ORAL at 21:04

## 2022-12-14 RX ADMIN — Medication 10 ML: at 08:11

## 2022-12-14 RX ADMIN — HYDROCODONE BITARTRATE AND ACETAMINOPHEN 1 TABLET: 5; 325 TABLET ORAL at 02:56

## 2022-12-14 RX ADMIN — SODIUM CHLORIDE, POTASSIUM CHLORIDE, SODIUM LACTATE AND CALCIUM CHLORIDE 100 ML/HR: 600; 310; 30; 20 INJECTION, SOLUTION INTRAVENOUS at 11:42

## 2022-12-14 RX ADMIN — Medication 3 ML: at 21:07

## 2022-12-14 RX ADMIN — IPRATROPIUM BROMIDE AND ALBUTEROL SULFATE 3 ML: 2.5; .5 SOLUTION RESPIRATORY (INHALATION) at 00:17

## 2022-12-14 RX ADMIN — MAGNESIUM SULFATE HEPTAHYDRATE 4 G: 40 INJECTION, SOLUTION INTRAVENOUS at 09:58

## 2022-12-14 RX ADMIN — IPRATROPIUM BROMIDE AND ALBUTEROL SULFATE 3 ML: 2.5; .5 SOLUTION RESPIRATORY (INHALATION) at 07:25

## 2022-12-14 RX ADMIN — FINASTERIDE 5 MG: 5 TABLET, FILM COATED ORAL at 08:11

## 2022-12-14 RX ADMIN — CLINDAMYCIN IN 5 PERCENT DEXTROSE 600 MG: 12 INJECTION, SOLUTION INTRAVENOUS at 05:21

## 2022-12-14 RX ADMIN — SODIUM CHLORIDE, POTASSIUM CHLORIDE, SODIUM LACTATE AND CALCIUM CHLORIDE 100 ML/HR: 600; 310; 30; 20 INJECTION, SOLUTION INTRAVENOUS at 23:04

## 2022-12-14 RX ADMIN — IPRATROPIUM BROMIDE AND ALBUTEROL SULFATE 3 ML: 2.5; .5 SOLUTION RESPIRATORY (INHALATION) at 13:34

## 2022-12-14 RX ADMIN — BUDESONIDE AND FORMOTEROL FUMARATE DIHYDRATE 2 PUFF: 160; 4.5 AEROSOL RESPIRATORY (INHALATION) at 19:01

## 2022-12-14 RX ADMIN — BUDESONIDE AND FORMOTEROL FUMARATE DIHYDRATE 2 PUFF: 160; 4.5 AEROSOL RESPIRATORY (INHALATION) at 07:24

## 2022-12-14 RX ADMIN — AMITRIPTYLINE HYDROCHLORIDE 25 MG: 25 TABLET, FILM COATED ORAL at 21:05

## 2022-12-14 RX ADMIN — ACETAMINOPHEN 1000 MG: 500 TABLET ORAL at 08:11

## 2022-12-14 RX ADMIN — CLINDAMYCIN IN 5 PERCENT DEXTROSE 600 MG: 12 INJECTION, SOLUTION INTRAVENOUS at 13:37

## 2022-12-14 RX ADMIN — ACETAMINOPHEN 1000 MG: 500 TABLET ORAL at 15:10

## 2022-12-14 RX ADMIN — LEVOTHYROXINE SODIUM 50 MCG: 0.05 TABLET ORAL at 08:11

## 2022-12-14 RX ADMIN — IPRATROPIUM BROMIDE AND ALBUTEROL SULFATE 3 ML: 2.5; .5 SOLUTION RESPIRATORY (INHALATION) at 19:01

## 2022-12-14 RX ADMIN — Medication 10 ML: at 21:05

## 2022-12-14 NOTE — THERAPY EVALUATION
Acute Care - Physical Therapy Initial Evaluation  AARON Jean     Patient Name: Sarbjit Martin  : 1935  MRN: [id number]  Today's Date: 2022   Onset of Illness/Injury or Date of Surgery: 22 (admission date)  Visit Dx:     ICD-10-CM ICD-9-CM   1. Closed fracture of left hip, initial encounter ([facility])  S72.002A 820.8     Patient Active Problem List   Diagnosis   • Status post laparoscopic cholecystectomy   • COPD (chronic obstructive pulmonary disease) ([facility])   • Abnormal CT of the chest   • Pulmonary nodule   • Benign prostatic hyperplasia without lower urinary tract symptoms   • NSTEMI (non-ST elevated myocardial infarction) ([facility])   • COPD exacerbation ([facility])   • Underweight   • Severe malnutrition ([facility])   • Aortic aneurysm ([facility])   • Closed fracture of left hip ([facility])   • Closed fracture of left hip, initial encounter ([facility])     Past Medical History:   Diagnosis Date   • Angiodysplasia of the colon    • Arthritis    • Bacteremia due to Escherichia coli 2017    Same time as the common bile duct stone   • Benign prostatic hyperplasia without lower urinary tract symptoms 2021   • Bile duct calculus 2017   • 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[facility])     Centrilobular   • Diverticulosis    • GERD (gastroesophageal reflux disease)    • Heart failure with preserved ejection fraction ([facility])    • Left upper lobe pulmonary nodule 2019    15 mm and spiculated in 2018; FNA was negative   • Moderate pulmonary hypertension ([facility])    • Myocardial infarction ([facility])    • Status post laparoscopic cholecystectomy 06/15/2017     Past Surgical History:   Procedure Laterality Date   • BRONCHOSCOPY N/A 2018    Procedure: BRONCHOSCOPY WITH ENDOBRONCHIAL ULTRASOUND;  Surgeon: Saravanan Méndez MD;  Location: [facility] OR;  Service: Pulmonary   • CHOLECYSTECTOMY N/A 2017    Procedure: CHOLECYSTECTOMY LAPAROSCOPIC;  Surgeon: Jose Velazquez MD;  Location: [facility] OR;  Service:    •  EYE SURGERY Bilateral 2013   • INGUINAL HERNIA REPAIR Left 2013    left   • NM ERCP DX COLLECTION SPECIMEN BRUSHING/WASHING N/A 6/26/2017    Procedure: ENDOSCOPIC RETROGRADE CHOLANGIOPANCREATOGRAPHY;  Surgeon: Rod Francis MD;  Location: [facility] OR;  Service: Gastroenterology   • NM ERCP DX COLLECTION SPECIMEN BRUSHING/WASHING N/A 10/26/2017    Procedure: ENDOSCOPIC RETROGRADE CHOLANGIOPANCREATOGRAPHY;  Surgeon: Rod Francis MD;  Location: [facility] OR;  Service: Gastroenterology     PT Assessment (last 12 hours)     PT Evaluation and Treatment     Row Name 12/14/22 1026          Physical Therapy Time and Intention    Document Type evaluation  -     Mode of Treatment physical therapy  -     Patient Effort adequate  -     Comment Pt seen for evaluation this date, pleasant. Pt states he lives alone and was intermitently using a cane at home otherwise he seemed to walk without AD. Pt voiced he wants to get a quad cane.  -     Row Name 12/14/22 1026          General Information    Patient Profile Reviewed yes  -     Onset of Illness/Injury or Date of Surgery 12/12/22  admission date  -     General Observations of Patient Pt seen supine in bed with abductor pillow  -     Equipment Currently Used at Home other (see comments)  cane  -     Existing Precautions/Restrictions hip, anterior;weight bearing;fall  -     Equipment Issued to Patient gait belt  -     Row Name 12/14/22 1026          Previous Level of Function/Home Environm    Household Ambulation, Premorbid Functional Level independent;partial assistance;uses device or equipment  -     Row Name 12/14/22 1026          Living Environment    Current Living Arrangements home  -     People in Home alone  -     Row Name 12/14/22 1026          Cognition    Orientation Status (Cognition) oriented to;person;place  -     Row Name 12/14/22 1026          Range of Motion Comprehensive    Comment, General Range of Motion PF/DF A/PROM grossly  WFL RLE grossly WFL hip abd limited/pt did not like abd -grossly WFL for age; AROM L LE limited 2/2/ pain knee flex neutral pt did not want PROM any further. Otlerates grossly 25% PROM of L hip abd  -     [facility] Name 12/14/22 1026          Strength Comprehensive (MMT)    Comment, General Manual Muscle Testing (MMT) Assessment PF grossly 5/5 BL; DF 2/5 to 3 BL..; hip flex and knee flex 3/5, knee ext 4 to 4+, hip abd 2/5, hip add 3/5. L LE hip flex and knee ext <3/5, hip add/abd 1/5, knee flex 1 to 2/5;  -     [facility] Name 12/14/22 1026          Mobility    Extremity Weight-bearing Status left lower extremity  -     Left Lower Extremity (Weight-bearing Status) weight-bearing as tolerated (WBAT)  -     [facility] Name 12/14/22 1026          Bed Mobility    Bed Mobility supine-sit;sit-supine  -     Supine-Sit [place] (Bed Mobility) maximum assist (25% patient effort);dependent (less than 25% patient effort)  -     Sit-Supine [place] (Bed Mobility) maximum assist (25% patient effort);dependent (less than 25% patient effort)  -     [facility] Name 12/14/22 1026          Transfers    Transfers sit-stand transfer;stand-sit transfer  -     [facility] Name 12/14/22 1026          Sit-Stand Transfer    Sit-Stand [place] (Transfers) moderate assist (50% patient effort);maximum assist (25% patient effort)  -     Assistive Device (Sit-Stand Transfers) walker, front-wheeled  -     [facility] Name 12/14/22 1026          Stand-Sit Transfer    Stand-Sit [place] (Transfers) contact guard;minimum assist (75% patient effort)  -     Assistive Device (Stand-Sit Transfers) walker, front-wheeled  -     [facility] Name 12/14/22 1026          Gait/Stairs (Locomotion)    Comment, (Gait/Stairs) Declined to perform/deferred  -     [facility] Name             Wound 12/12/22 2333 Left upper scalp Skin Tear    Wound - Properties Group Placement Date: 12/12/22  -KM Placement Time: 2333  -KM Present on Hospital Admission: Y  -KM Side: Left  -KM  Orientation: upper  -KM Location: scalp  -KM Primary Wound Type: Skin tear  -KM    Retired Wound - Properties Group Placement Date: 12/12/22  -KM Placement Time: 2333  -KM Present on Hospital Admission: Y  -KM Side: Left  -KM Orientation: upper  -KM Location: scalp  -KM Primary Wound Type: Skin tear  -KM    Retired Wound - Properties Group Date first assessed: 12/12/22  -KM Time first assessed: 2333  -KM Present on Hospital Admission: Y  -KM Side: Left  -KM Location: scalp  -KM Primary Wound Type: Skin tear  -KM    Row Name             Wound 12/13/22 1421 Left greater trochanter Incision    Wound - Properties Group Placement Date: 12/13/22  - Placement Time: 1421  -MC Present on Hospital Admission: N  -MC Side: Left  -MC Location: greater trochanter  -MC Primary Wound Type: Incision  -MC    Retired Wound - Properties Group Placement Date: 12/13/22  -MC Placement Time: 1421  -MC Present on Hospital Admission: N  -MC Side: Left  -MC Location: greater trochanter  -MC Primary Wound Type: Incision  -MC    Retired Wound - Properties Group Date first assessed: 12/13/22  - Time first assessed: 1421  -MC Present on Hospital Admission: N  -MC Side: Left  -MC Location: greater trochanter  -MC Primary Wound Type: Incision  -MC    Row Name 12/14/22 1026          Plan of Care Review    Outcome Evaluation Pt seen for evaluation this date, grossly mod/maxA with some mobility. D/C recommendation for SNF/subacute for rehabilitaiton, pending home with home health if pt has supervision/assist, possible 24/7 sup/assist. Pt does not demonstrate mobility to return home alone safely at this time.  -KH     Row Name 12/14/22 1026          Positioning and Restraints    Pre-Treatment Position in bed  -KH     Post Treatment Position bed  -KH     In Bed supine;call light within reach;exit alarm on;with family/caregiver  -     Row Name 12/14/22 1026          Therapy Assessment/Plan (PT)    Functional Level at Time of Evaluation (PT)  mod/maxA  -     PT Diagnosis (PT) s/p L hemiarthroplasty  -     Rehab Potential (PT) other (see comments)  fair/guarded  -     Criteria for Skilled Interventions Met (PT) yes  -     Therapy Frequency (PT) 6 times/wk  -     Predicted Duration of Therapy Intervention (PT) length of stay  -     Row Name 12/14/22 1026          Physical Therapy Goals    Bed Mobility Goal Selection (PT) bed mobility, PT goal 1  -     Transfer Goal Selection (PT) transfer, PT goal 1  -     Gait Training Goal Selection (PT) gait training, PT goal 1  -[facility] Name 12/14/22 1026          Bed Mobility Goal 1 (PT)    Activity/Assistive Device (Bed Mobility Goal 1, PT) sit to supine;supine to sit  -     [place] Level/Cues Needed (Bed Mobility Goal 1, PT) contact guard required;minimum assist (75% or more patient effort)  -     Time Frame (Bed Mobility Goal 1, PT) long term goal (LTG)  -[facility] Name 12/14/22 1026          Transfer Goal 1 (PT)    Activity/Assistive Device (Transfer Goal 1, PT) sit-to-stand/stand-to-sit  -     [place] Level/Cues Needed (Transfer Goal 1, PT) contact guard required  -     Time Frame (Transfer Goal 1, PT) long term goal (LTG)  -[facility] Name 12/14/22 1026          Gait Training Goal 1 (PT)    Activity/Assistive Device (Gait Training Goal 1, PT) gait (walking locomotion);assistive device use;walker, rolling  -     [place] Level (Gait Training Goal 1, PT) contact guard required  -     Distance (Gait Training Goal 1, PT) 10'  -           User Key  (r) = Recorded By, (t) = Taken By, (c) = Cosigned By    Initials Name Provider Type     Charline Lama, RN Registered Nurse    Sarah Mcdonald, PT Physical Therapist    Kayley Rivera, RN Registered Nurse                  PT Recommendation and Plan  Anticipated Discharge Disposition (PT): skilled nursing facility, sub acute care setting, home with 24/7 care, home with assist, home with supervision, home with  home health  Planned Therapy Interventions (PT): bed mobility training, gait training, strengthening, transfer training  Therapy Frequency (PT): 6 times/wk  Outcome Evaluation: Pt seen for evaluation this date, grossly mod/maxA with some mobility. D/C recommendation for SNF/subacute for rehabilitaiton, pending home with home health if pt has supervision/assist, possible 24/7 sup/assist. Pt does not demonstrate mobility to return home alone safely at this time.       Time Calculation:    PT Charges     Row Name 12/14/22 4150             Time Calculation    Start Time 1030  -KH      PT Received On 12/14/22  -      PT Goal Re-Cert Due Date 12/28/22 -KH            User Key  (r) = Recorded By, (t) = Taken By, (c) = Cosigned By    Initials Name Provider Type    Sarah Mcdonald, MC Physical Therapist              Therapy Charges for Today     Code Description Service Date Service Provider Modifiers Qty    85874371907 HC PT EVAL MOD COMPLEXITY 4 12/14/2022 Sarah Almeida, PT GP 1          PT G-Codes  AM-PAC 6 Clicks Score (PT): 7    Sarah Almeida PT  12/14/2022

## 2022-12-14 NOTE — THERAPY EVALUATION
Acute Care - Occupational Therapy Initial Evaluation  AARON Jean     Patient Name: Sarbjit Martin  : 1935  MRN: [id number]  Today's Date: 2022             Admit Date: 2022       ICD-10-CM ICD-9-CM   1. Closed fracture of left hip, initial encounter ([facility])  S72.002A 820.8     Patient Active Problem List   Diagnosis   • Status post laparoscopic cholecystectomy   • COPD (chronic obstructive pulmonary disease) ([facility])   • Abnormal CT of the chest   • Pulmonary nodule   • Benign prostatic hyperplasia without lower urinary tract symptoms   • NSTEMI (non-ST elevated myocardial infarction) ([facility])   • COPD exacerbation ([facility])   • Underweight   • Severe malnutrition ([facility])   • Aortic aneurysm ([facility])   • Closed fracture of left hip ([facility])   • Closed fracture of left hip, initial encounter ([facility])     Past Medical History:   Diagnosis Date   • Angiodysplasia of the colon    • Arthritis    • Bacteremia due to Escherichia coli 2017    Same time as the common bile duct stone   • Benign prostatic hyperplasia without lower urinary tract symptoms 2021   • Bile duct calculus 2017   • 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[facility])     Centrilobular   • Diverticulosis    • GERD (gastroesophageal reflux disease)    • Heart failure with preserved ejection fraction ([facility])    • Left upper lobe pulmonary nodule 2019    15 mm and spiculated in 2018; FNA was negative   • Moderate pulmonary hypertension ([facility])    • Myocardial infarction ([facility])    • Status post laparoscopic cholecystectomy 06/15/2017     Past Surgical History:   Procedure Laterality Date   • BRONCHOSCOPY N/A 2018    Procedure: BRONCHOSCOPY WITH ENDOBRONCHIAL ULTRASOUND;  Surgeon: Saravanan Méndez MD;  Location: [facility] OR;  Service: Pulmonary   • CHOLECYSTECTOMY N/A 2017    Procedure: CHOLECYSTECTOMY LAPAROSCOPIC;  Surgeon: Jose Velazquez MD;  Location: [facility] OR;  Service:    • EYE SURGERY Bilateral    • INGUINAL  HERNIA REPAIR Left 2013    left   • [place] ERCP DX COLLECTION SPECIMEN BRUSHING/WASHING N/A 6/26/2017    Procedure: ENDOSCOPIC RETROGRADE CHOLANGIOPANCREATOGRAPHY;  Surgeon: Rod Francis MD;  Location: [facility] OR;  Service: Gastroenterology   • VT ERCP DX COLLECTION SPECIMEN BRUSHING/WASHING N/A 10/26/2017    Procedure: ENDOSCOPIC RETROGRADE CHOLANGIOPANCREATOGRAPHY;  Surgeon: Rod Francis MD;  Location: [facility] OR;  Service: Gastroenterology         OT ASSESSMENT FLOWSHEET (last 12 hours)     OT Evaluation and Treatment     Row Name 12/14/22 1032                   OT Time and Intention    Document Type evaluation  -KR        Mode of Treatment occupational therapy  -KR        Patient Effort good  -KR           General Information    General Observations of Patient alert/cooperative  -KR        Prior Level of Function independent:  -KR           Living Environment    Current Living Arrangements home  -KR        People in Home alone  -KR        Primary Care Provided by self  -KR           Cognition    Affect/Mental Status (Cognition) WFL  -KR        Orientation Status (Cognition) oriented to;person;place;situation  -KR        Follows Commands (Cognition) WFL  -KR           Range of Motion Comprehensive    Comment, General Range of Motion BUE WFL  -KR           Strength Comprehensive (MMT)    Comment, General Manual Muscle Testing (MMT) Assessment BUE 4/5  -KR           Activities of Daily Living    BADL Assessment/Intervention bathing;upper body dressing;lower body dressing;grooming;feeding;toileting  -KR           Bathing Assessment/Intervention    [place] Level (Bathing) bathing skills;maximum assist (25% patient effort)  -KR           Upper Body Dressing Assessment/Training    [place] Level (Upper Body Dressing) upper body dressing skills;moderate assist (50% patient effort)  -KR           Lower Body Dressing Assessment/Training    [place] Level (Lower Body Dressing) lower body dressing  skills;dependent (less than 25% patient effort)  -KR           Grooming Assessment/Training    [place] Level (Grooming) grooming skills;minimum assist (75% patient effort)  -KR           Self-Feeding Assessment/Training    [place] Level (Feeding) feeding skills;set up  -KR           Toileting Assessment/Training    [place] Level (Toileting) toileting skills;dependent (less than 25% patient effort)  -KR           Wound 12/12/22 2333 Left upper scalp Skin Tear    Wound - Properties Group Placement Date: 12/12/22  -KM Placement Time: 2333  -KM Present on Hospital Admission: Y  -KM Side: Left  -KM Orientation: upper  -KM Location: scalp  -KM Primary Wound Type: Skin tear  -KM    Retired Wound - Properties Group Placement Date: 12/12/22  -KM Placement Time: 2333  -KM Present on Hospital Admission: Y  -KM Side: Left  -KM Orientation: upper  -KM Location: scalp  -KM Primary Wound Type: Skin tear  -KM    Retired Wound - Properties Group Date first assessed: 12/12/22  -KM Time first assessed: 2333  -KM Present on Hospital Admission: Y  -KM Side: Left  -KM Location: scalp  -KM Primary Wound Type: Skin tear  -KM       Wound 12/13/22 1421 Left greater trochanter Incision    Wound - Properties Group Placement Date: 12/13/22  - Placement Time: 1421  -MC Present on Hospital Admission: N  -MC Side: Left  -MC Location: greater trochanter  -MC Primary Wound Type: Incision  -MC    Retired Wound - Properties Group Placement Date: 12/13/22  -MC Placement Time: 1421  -MC Present on Hospital Admission: N  -MC Side: Left  -MC Location: greater trochanter  -MC Primary Wound Type: Incision  -MC    Retired Wound - Properties Group Date first assessed: 12/13/22  - Time first assessed: 1421  -MC Present on Hospital Admission: N  -MC Side: Left  -MC Location: greater trochanter  -MC Primary Wound Type: Incision  -MC       Plan of Care Review    Plan of Care Reviewed With patient  -KR           Therapy Assessment/Plan (OT)     Rehab Potential (OT) good, to achieve stated therapy goals  -KR        Criteria for Skilled Therapeutic Interventions Met (OT) yes;skilled treatment is necessary  -KR        Planned Therapy Interventions (OT) activity tolerance training;adaptive equipment training;BADL retraining;strengthening exercise  -KR           Therapy Plan Review/Discharge Plan (OT)    Anticipated Discharge Disposition (OT) inpatient rehabilitation facility  -KR           OT Goals    Strength Goal Selection (OT) strength, OT goal 1  -KR           Strength Goal 1 (OT)    Strength Goal 1 (OT) BUE increase x 1 to enhance performance of self care/mobility  -KR        Time Frame (Strength Goal 1, OT) by discharge  -KR           Patient Education Goal (OT)    Activity (Patient Education Goal, OT) AE/DME training to enhance safety/independence in home environment  -KR        [place]/Cues/Accuracy (Memory Goal 2, OT) verbalizes understanding  -KR        Time Frame (Patient Education Goal, OT) by discharge  -KR              User Key  (r) = Recorded By, (t) = Taken By, (c) = Cosigned By    Initials Name Effective Dates    Adrian George OT 06/16/21 -     [facility]     Kayley Rivera RN 06/06/22 -                        OT Recommendation and Plan  Planned Therapy Interventions (OT): activity tolerance training, adaptive equipment training, BADL retraining, strengthening exercise  Plan of Care Review  Plan of Care Reviewed With: patient  Plan of Care Reviewed With: patient        Time Calculation:     Therapy Charges for Today     Code Description Service Date Service Provider Modifiers Qty    [id number]  OT EVAL HIGH COMPLEXITY 4 12/14/2022 Adrian Mensah OT GO 1               Adrian Mensah OT  12/14/2022

## 2022-12-14 NOTE — PROGRESS NOTES
Inpatient Progress Note  Sarbjit Martin  Date: 12/14/22  MRN: [id number]      Subjective:  Resting in bed watching television.  Notes minimal pain to the left hip with rest.  Denies chest pain or shortness of air.  (+) passing gas.  No family at bedside.      Objective:      Vitals:    12/14/22 0254 12/14/22 0300 12/14/22 0500 12/14/22 0623   BP: 120/52   104/59   BP Location: Right arm   Right arm   Patient Position: Lying   Lying   Pulse: 98   119   Resp: 18   18   Temp: 97.3 °F (36.3 °C)   97.5 °F (36.4 °C)   TempSrc: Axillary   Oral   SpO2: 92% 95%     Weight:   50.6 kg (111 lb 8 oz)    Height:              Physical Exam:  Constitutional:  Thin, elderly, male.  No acute distress.        Musculoskeletal:  Upon examination of the left hip, the surgical dressing has minimal, dry drainage.  There is moderate edema to the lateral hip.  No induration.  No ecchymosis, erythema, or warmth.  Minimal tenderness.  (+) DF/PF at the ankle.  Capillary refill is brisk and light touch sensation is intact, distally.  DP pulse 3/4.      Labs:    Results from last 7 days   Lab Units 12/14/22  0406 12/13/22  1911 12/13/22  0850 12/13/22  0022   CRP mg/dL  --  4.11*  --   --    LACTATE mmol/L  --  1.5  --   --    WBC 10*3/mm3 13.55*  --   --  14.97*   HEMOGLOBIN g/dL 12.6*  --   --  14.9   HEMATOCRIT % 40.7  --   --  48.7   MCV fL 87.9  --   --  87.6   MCHC g/dL 31.0*  --   --  30.6*   PLATELETS 10*3/mm3 155  --   --  194   INR   --   --  1.09 0.97     Results from last 7 days   Lab Units 12/13/22  0747   PH, ARTERIAL pH units 7.344*   PO2 ART mm Hg 78.1*   PCO2, ARTERIAL mm Hg 55.4*   HCO3 ART mmol/L 30.1*     Results from last 7 days   Lab Units 12/14/22  0406 12/13/22  0022   SODIUM mmol/L 134* 137   POTASSIUM mmol/L 4.4 5.1   MAGNESIUM mg/dL 1.6  --    CHLORIDE mmol/L 99 101   CO2 mmol/L 25.6 21.2*   BUN mg/dL 31* 26*   CREATININE mg/dL 0.85 0.87   CALCIUM mg/dL 8.2* 9.2   GLUCOSE mg/dL 94 106*   ALBUMIN g/dL  --  3.49*    BILIRUBIN mg/dL  --  0.5   ALK PHOS U/L  --  72   AST (SGOT) U/L  --  21   ALT (SGPT) U/L  --  15   Estimated Creatinine Clearance: 45.5 mL/min (by C-G formula based on SCr of 0.85 mg/dL).  No results found for: AMMONIA          No results found for: HGBA1C  Lab Results   Component Value Date    TSH 0.664 11/01/2022         Pain Management Panel    There is no flowsheet data to display.         No results found for: BLOODCX  No results found for: URINECX  No results found for: WOUNDCX  No results found for: STOOLCX              Radiology:  Imaging Results (Last 72 Hours)     Procedure Component Value Units Date/Time    XR Pelvis 1 or 2 View [[id number]] Collected: 12/13/22 1627     Updated: 12/13/22 1629    Narrative:      EXAMINATION: XR PELVIS 1 OR 2 VW-      CLINICAL INDICATION: AP pelvis, status post left hip hemiarthroplasty;  S72.002A-Fracture of unspecified part of neck of left femur, initial  encounter for closed fracture        COMPARISON: None available     FINDINGS: 2 views of the pelvis     Left hip prosthesis     Alignment is anatomic     Overlying skin clips are present.     Moderate to large volume stool      This report was finalized on 12/13/2022 4:27 PM by Dr. Barrie Guzman MD.       XR Chest 1 View [[id number]] Collected: 12/13/22 0832     Updated: 12/13/22 0839    Narrative:      XR CHEST 1 VW-     CLINICAL INDICATION: Pre Op; S72.002A-Fracture of unspecified part of  neck of left femur, initial encounter for closed fracture        COMPARISON: 10/31/2022      TECHNIQUE: Single frontal view of the chest.     FINDINGS:      LUNGS: Minimal left basilar consolidation      HEART AND MEDIASTINUM: Heart and mediastinal contours are unremarkable        SKELETON: Bony and soft tissue structures are unremarkable.             Impression:      Minimal left basilar consolidation. COPD.     This report was finalized on 12/13/2022 8:37 AM by Dr. Barrie Guzman MD.       XR Femur 2 View Left [[id number]]  Collected: 12/13/22 0810     Updated: 12/13/22 0812    Narrative:      EXAM:    XR Left Femur, 2 Views     EXAM DATE:    12/13/2022 7:28 AM     CLINICAL HISTORY:    femur fracture; S72.002A-Fracture of unspecified part of neck of left  femur, initial encounter for closed fracture     TECHNIQUE:    Frontal and lateral views of the left femur.     COMPARISON:    12/12/2022     FINDINGS:    Bones/joints:  Left femoral neck fracture again noted.  No distal  femoral fracture is seen.  No dislocation.    Soft tissues:  Unremarkable.       Impression:      1.  Left femoral neck fracture again noted.  2.  No distal femoral fracture is seen.     This report was finalized on 12/13/2022 8:10 AM by Dr. Barrie Guzman MD.       CT Head Without Contrast [[id number]] Collected: 12/13/22 0023     Updated: 12/13/22 0025    Narrative:      CT Head WO    HISTORY:   Head injury. Fell and hit left side of head.    TECHNIQUE:   Axial unenhanced head CT with multiplanar reformats. Radiation dose reduction techniques included automated exposure control or exposure modulation based on body size. Count of known CT and cardiac nuc med studies performed in previous 12 months: 1.     COMPARISON:   None.    FINDINGS:   Ventricular size and configuration are normal. No acute infarct or hemorrhage is identified. There are no masses. There is no skull fracture.  There is atrophy with chronic small vessel ischemic disease in the white matter. Atherosclerotic calcifications  are present in the carotid siphons. There is mild injury to the left temporal scalp.      Impression:      Left temporal scalp injury. No other acute findings.    Signer Name: Rommel Umanzor MD   Signed: 12/13/2022 12:23 AM   Workstation Name: [facility]    [facility]    CT Cervical Spine Without Contrast [[id number]] Collected: 12/13/22 0019     Updated: 12/13/22 0022    Narrative:      CT Spine Cervical WO    INDICATION:   Neck trauma. Fall.    TECHNIQUE:    CT of the cervical spine without IV contrast. Coronal and sagittal reconstructions were obtained.  Radiation dose reduction techniques included automated exposure control or exposure modulation based on body size. Count of known CT and cardiac nuc med  studies performed in previous 12 months: 1.     COMPARISON:  None available.    FINDINGS:  Alignment is normal with no subluxation identified. There is no acute cervical spine fracture. Note is made of carotid atherosclerotic disease. There is mild multilevel degenerative disc disease. No large disc herniations are seen. There is no  significant central canal or foraminal stenosis at any level. COPD and granulomatous calcifications are noted in the lung apices. Partially visible is the known left upper lobe mass that was evaluated with chest CT on 11/1/2022. This remains concerning  for malignancy. PET CT is once again recommended if this has not been performed elsewhere.      Impression:        1. No acute cervical spine fracture or malalignment.  2. 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 Please see chest CT report of 11/1/2022.    Signer Name: Rommel Umanzor MD   Signed: 12/13/2022 12:19 AM   Workstation Name: [facility]    [facility]    XR Hip With or Without Pelvis 2 - 3 View Left [[id number]] Collected: 12/13/22 0015     Updated: 12/13/22 0017    Narrative:      CR Hip Uni Comp Min 2 Vws LT    INDICATION:   Hip pain after fall.    COMPARISON:   None available.    FINDINGS:  AP pelvis and AP and frog-leg view(s) of the left hip.  There is an acute left femoral neck fracture. No hip dislocation on either side. There is degenerative disease in both hips. No additional fractures. Note is made of atherosclerotic disease.        Impression:      Acute left femoral neck fracture. No dislocation.    Signer Name: Rommel Umanzor MD   Signed: 12/13/2022 12:15 AM   [email or web address]    [facility] [place]            Assessment:  Status post left hip hemiarthroplasty for fracture, POD #1.          Plan:    Weight bear as tolerated to the left lower extremity with a walker and assistance, as needed.  PT/OT for mobility and ambulation.  On Lovenox for DVT prophylaxis.  Maintain the dressing to the left hip incision, may change as needed for saturation.  Orthopedic surgery following.        DAVID Gomez December 14, 2022 08:10 EST

## 2022-12-14 NOTE — PLAN OF CARE
Goal Outcome Evaluation:              Outcome Evaluation: Patient has rested in bed, alert and oriented x4, 1L NC in place, PRN medication given for pain, no other complaints or request at this time, Will continue plan of care.

## 2022-12-14 NOTE — CASE MANAGEMENT/SOCIAL WORK
Discharge Planning Assessment   Ted     Patient Name: Sarbjit Martin  MRN: [id number]  Today's Date: 12/14/2022    Admit Date: 12/12/2022    Plan: SS received a consult for discharge planning, post surgery. SS spoke with pt and significant other at bedside. Pt lives at home alone significant other plans to stay with pt when he is discharged home. Pt does not utilize home health services at this time. Pt lives in [facility] and has no preference if HH is ordered at discharge. Pt has quad cane, oxygen at 2 Liters with portable tanks via [employer]. PCP is Goldie Steward. Pt does not have a POA/Living will. Pt plans to return home at discharge with significant other to provide transportation. SS to follow and assist with discharge planning.   Discharge Needs Assessment     Row Name 12/14/22 4834       Living Environment    People in Home alone    Current Living Arrangements home    Primary Care Provided by self    Provides Primary Care For no one    Family Caregiver if Needed significant other    Family Caregiver Names Significant other- Antonieta Linares    Quality of Family Relationships supportive;helpful;involved    Able to Return to Prior Arrangements yes       Resource/Environmental Concerns    Resource/Environmental Concerns none    Transportation Concerns none       Transition Planning    Patient/Family Anticipates Transition to home with family    Patient/Family Anticipated Services at Transition none    Transportation Anticipated family or friend will provide       Discharge Needs Assessment    Equipment Currently Used at Home cane, quad;oxygen  O2@2L w/ portablt tanks Via [employer]    Anticipated Changes Related to Illness none    Equipment Needed After Discharge none               Discharge Plan     Row Name 12/14/22 2778       Plan    Plan SS received a consult for discharge planning, post surgery. SS spoke with pt and significant other at bedside. Pt lives at home alone significant other plans to stay  with pt when he is discharged home. Pt does not utilize home health services at this time. Pt lives in [facility] and has no preference if HH is ordered at discharge. Pt has quad cane, oxygen at 2 Liters with portable tanks via [employer]. PCP is Goldei Steward. Pt does not have a POA/Living will. Pt plans to return home at discharge with significant other to provide transportation. SS to follow and assist with discharge planning.              Continued Care and Services - Admitted Since 12/12/2022    Coordination has not been started for this encounter.       Expected Discharge Date and Time     Expected Discharge Date Expected Discharge Time    Dec 16, 2022          Demographic Summary     Row Name 12/14/22 5274       General Information    Admission Type inpatient    Referral Source nursing    Reason for Consult discharge planning  SS receieved a consult for Discharge planning, post surgery.                  TOM Beaver

## 2022-12-14 NOTE — PAYOR COMM NOTE
CONTACT:  Tanya Self, RIO    Utilization Management Dept.   [place] [address]    Phone:[phone]  Fax: [phone]    REQUEST FOR INPATIENT AUTHORIZATION  REF # [id number]  ICD 10:[id number]  ATTENDING MD:  Rah Pastrana  NPI:[id number]  FACILITY NPI: [id number]  ADM DATE: 12/13/22      Sarbjit Lockett (87 y.o. Male)     Date of Birth   1935    Social Security Number       Address   [address] [address]    Home Phone   [phone]    MRN   [id number]       Pentecostalism   Confucianist    Marital Status                               Admission Date   12/12/22    Admission Type   Emergency    Admitting Provider   Rah Pastrana MD    Attending Provider   Rah Pastrana MD    Department, Room/Bed   [address], [address]/       Discharge Date       Discharge Disposition       Discharge Destination                               Attending Provider: Rah Pastrana MD    Allergies: Penicillins    Isolation: None   Infection: None   Code Status: CPR    Ht: 167.6 cm (66\")   Wt: 50.6 kg (111 lb 8 oz)    Admission Cmt: None   Principal Problem: Closed fracture of left hip (HCC) [S72.002A]                 Active Insurance as of 12/12/2022     Primary Coverage     Payor Plan Insurance Group Employer/Plan Group    ANTHEM MEDICARE REPLACEMENT ANTHEM MEDICARE ADVANTAGE [id number]     Payor Plan Address Payor Plan Phone Number Payor Plan Fax Number Effective Dates    [address] [phone]  4/1/2022 - None Entered    [facility] [address]       Subscriber Name Subscriber Birth Date Member ID       SARBJIT LOCKETT 1935 [id number]                 Emergency Contacts      (Rel.) Home Phone Work Phone Mobile Phone    Jesse Reyes (Relative) -- -- [phone]    jaskaran cuello (Significant Other) -- -- [phone]               History & Physical      Jenni Chong APRN at 12/13/22 1347     Attestation  signed by Rah Pastrana MD at 22 [phone] (Updated)    I have reviewed this documentation and agree.  I have also independently seen the patient.  I have also reviewed the EKG and the labs with ADELE Arteaga; I have discussed and formulated the assessment and plan with Jenni as well.  At bedside in preop room 7 with the patient was his nephew.  The patient has some substernal heartburn but he denies chest pain, trouble breathing, nausea, vomiting, and diarrhea.  The only extremity pain that he has is in the left hip.  He endorses good sensation in both feet when I perform my exam.  Heart and lungs are unremarkable, except for I do hear right-sided crackles.  Patient is currently low to moderate risk for hip repair.  I have informed Dr. Reyez.  When the patient comes back from the OR, we will treat him for potential right-sided pneumonia.  Upon review of his medical records and chest CT from 2022, the patient has an enlarging left upper lobe nodule, increasing from 15 mm in 2018 to 3.9 x 3.4 x 6.7 cm now.  I have contacted nurse practitioner Irma Hernandez with the lung nodule clinic and she will try to work the patient in soon as possible.  I anticipate that the patient will need postoperative PT; we have ordered PT, OT, and  for help with disposition.                    [facility] Medicine Services  History & Physical    Patient Identification:  Name:  Sarbjit Martin  Age:  87 y.o.  Sex:  male  :  1935  MRN:  [id number]   Visit Number:  [id number]  Admit Date: 2022   Primary Care Physician:  Goldie Steward APRN    Subjective     Chief complaint: Left hip pain s/p mechanical fall at home    History of presenting illness:      Sarbjit Martin is a 87 y.o. male with past medical history significant for BPH, arthritis, bile duct calculus s/p ERCP, COPD, chronic hypoxic respiratory failure, chronic HFpEF, GERD, moderate pulmonary hypertension, and advanced age.   Patient presents to [facility] emergency department on 12/12/2022 for further evaluation of left hip pain s/p mechanical fall at home.  Patient states that he was cleaning his home and before he knew it, he was on the ground.  Denies any chest pain, dizziness, palpitations, nausea, visual disturbances, unilateral weakness, or headache associated with the fall.  Denies loss of consciousness.  Imaging obtained in the ED revealed left femoral fracture.  No abnormal CT head findings.  Patient is alert and oriented on my exam.  Has been n.p.o. in preparation for surgical fixation of the left hip.  Patient states that he currently lives at home alone.  Utilizes 2 L nasal cannula as needed on a regular basis.  Reports former tobacco abuse.  Denies any alcohol abuse.  Denies any left hip pain while lying still.  Does report some acid reflux symptoms.  States that he was unable to take his Prilosec this morning because of the upcoming surgery.  Currently denies any shortness of breath above baseline.  Reports chronic cough with productive sputum at times.  Denies any current chest pain.  EKG reviewed, without ischemic changes.  Have discussed with attending physician, Dr. Pastrana.  Patient may be taken to the OR for surgical fixation of the left hip; considered low risk at this time.  Discussed plan with patient; voiced agreement with no further questions or concerns at this time.      Upon arrival to the ED, vital signs were temperature 98.6, pulse 89, respirations 16, blood pressure 164/88 sitting, SPO2 saturation 90% on 2 L nasal cannula.  ABG with pH 7.344, PCO2 55.4, PO2 78.1, HCO3 30.1, O2 saturation 95.5% on 4 L nasal cannula.  CMP with glucose 106, CO2 21.2, BUN 26, BUN/creatinine ratio 29.9, albumin 3.49, otherwise unremarkable.  PT, INR within normal limits.  CBC with WBCs 14.97, MCHC 30.6, otherwise unremarkable.  Chest x-ray with minimal left basilar consolidation; COPD.  X-ray of the left femur with  left femoral neck fracture, no distal femoral fracture seen.  X-ray of the left hip with acute left femoral neck fracture, no dislocation.  CT of the cervical spine without contrast with no acute cervical spine fracture or malalignment; partially visible is a known malignant appearing left upper lobe mass.  CT of the head without contrast with left temporal scalp injury; no other acute findings.    Known Emergency Department medications received prior to my evaluation included 15 mg IV Toradol, LR at 125 mL an hour, 4 mg IV morphine, 4 mg IV Zofran. Room location at the time of my evaluation was Pre-op, Bed #7. Discussed with attending physician, Rah Harrison MD.      ---------------------------------------------------------------------------------------------------------------------   Review of Systems   Constitutional: Negative for chills and fever.   HENT: Negative for congestion and trouble swallowing.    Eyes: Negative for visual disturbance.   Respiratory: Positive for cough (chronic). Negative for choking, shortness of breath and wheezing.    Gastrointestinal: Negative for abdominal pain, constipation, diarrhea, nausea and vomiting.   Genitourinary: Negative for difficulty urinating, dysuria and flank pain.   Musculoskeletal: Positive for arthralgias (left hip) and gait problem. Negative for neck pain.   Neurological: Negative for dizziness, tremors, seizures, syncope, facial asymmetry, speech difficulty, light-headedness, numbness and headaches.   Psychiatric/Behavioral: Negative for confusion.      ---------------------------------------------------------------------------------------------------------------------   Past Medical History:   Diagnosis Date   • Angiodysplasia of the colon    • Arthritis    • Bacteremia due to Escherichia coli 06/28/2017    Same time as the common bile duct stone   • Benign prostatic hyperplasia without lower urinary tract symptoms 06/30/2021   • Bile duct calculus  2017   • 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HCC)     Centrilobular   • Diverticulosis    • GERD (gastroesophageal reflux disease)    • Heart failure with preserved ejection fraction (HCC)    • Left upper lobe pulmonary nodule 2019    15 mm and spiculated in 2018; FNA was negative   • Moderate pulmonary hypertension (HCC)    • Myocardial infarction (HCC)    • Status post laparoscopic cholecystectomy 06/15/2017     Past Surgical History:   Procedure Laterality Date   • BRONCHOSCOPY N/A 2018    Procedure: BRONCHOSCOPY WITH ENDOBRONCHIAL ULTRASOUND;  Surgeon: Saravanan Méndez MD;  Location: [facility] OR;  Service: Pulmonary   • CHOLECYSTECTOMY N/A 2017    Procedure: CHOLECYSTECTOMY LAPAROSCOPIC;  Surgeon: Jose Velazquez MD;  Location: [facility] OR;  Service:    • EYE SURGERY Bilateral    • INGUINAL HERNIA REPAIR Left     left   • [place] ERCP DX COLLECTION SPECIMEN BRUSHING/WASHING N/A 2017    Procedure: ENDOSCOPIC RETROGRADE CHOLANGIOPANCREATOGRAPHY;  Surgeon: Rod Francis MD;  Location: [facility] OR;  Service: Gastroenterology   • [place] ERCP DX COLLECTION SPECIMEN BRUSHING/WASHING N/A 10/26/2017    Procedure: ENDOSCOPIC RETROGRADE CHOLANGIOPANCREATOGRAPHY;  Surgeon: Rod Francis MD;  Location: [facility] OR;  Service: Gastroenterology     Family History   Problem Relation Age of Onset   • No Known Problems Mother    • Diabetes Father    • Hypertension Father    • Stroke Father    • Diabetes Sister      Social History     Socioeconomic History   • Marital status:    Tobacco Use   • Smoking status: Former     Packs/day: 3.00     Years: 10.00     Pack years: 30.00     Types: Cigarettes     Quit date:      Years since quittin.9   • Smokeless tobacco: Former     Quit date: 1995   Vaping Use   • Vaping Use: Never used   Substance and Sexual Activity   • Alcohol use: No   • Drug use: No   • Sexual activity: Defer      ---------------------------------------------------------------------------------------------------------------------   Allergies:  Penicillins  ---------------------------------------------------------------------------------------------------------------------   Home medications:    Medications below are reported home medications pulling from within the system; at this time, these medications have not been reconciled unless otherwise specified and are in the verification process for further verifcation as current home medications.  Medications Prior to Admission   Medication Sig Dispense Refill Last Dose   • Budeson-Glycopyrrol-Formoterol (BREZTRI) 160-9-4.8 MCG/ACT aerosol inhaler Inhale 2 puffs 2 (Two) Times a Day.   12/12/2022   • finasteride (Proscar) 5 MG tablet Take 1 tablet by mouth Daily. 90 tablet 3 12/12/2022   • levothyroxine (SYNTHROID, LEVOTHROID) 50 MCG tablet Take 1 tablet by mouth Daily.   12/12/2022   • amitriptyline (ELAVIL) 25 MG tablet Take 25 mg by mouth Every Night.   12/11/2022   • LORazepam (ATIVAN) 0.5 MG tablet Take 1 tablet by mouth Daily As Needed for Anxiety.   Unknown   • tamsulosin (Flomax) 0.4 MG capsule 24 hr capsule Take 1 capsule by mouth Every Night. 90 capsule 3 12/11/2022   • VENTOLIN  (90 BASE) MCG/ACT inhaler Inhale 2 puffs Every 6 (Six) Hours As Needed for Wheezing or Shortness of Air.   Unknown       Hospital Scheduled Meds:  clindamycin, 900 mg, Intravenous, Once  sodium chloride, 10 mL, Intravenous, Q12H      lactated ringers, 100 mL/hr, Last Rate: 100 mL/hr (12/13/22 2374)        Current listed hospital scheduled medications may not yet reflect those currently placed in orders that are signed and held awaiting patient's arrival to floor.   ---------------------------------------------------------------------------------------------------------------------     Objective     Vital Signs:  Temp:  [98.2 °F (36.8 °C)-98.6 °F (37 °C)] 98.2 °F (36.8 °C)  Heart Rate:   [] 82  Resp:  [16-20] 20  BP: (116-164)/(62-95) 134/69      12/12/22  2328   Weight: 46.7 kg (103 lb)     Body mass index is 16.62 kg/m².  ---------------------------------------------------------------------------------------------------------------------       Physical Exam  Vitals and nursing note reviewed.   Constitutional:       General: He is awake. He is not in acute distress.     Appearance: He is ill-appearing (chronically). He is not diaphoretic.      Interventions: Nasal cannula in place.      Comments: Currently on 4L NC.   HENT:      Head: Normocephalic.      Mouth/Throat:      Mouth: Mucous membranes are moist.      Pharynx: Oropharynx is clear.   Eyes:      Extraocular Movements: Extraocular movements intact.   Cardiovascular:      Pulses: Normal pulses.   Pulmonary:      Effort: Accessory muscle usage present. No respiratory distress or retractions.      Breath sounds: Examination of the left-upper field reveals decreased breath sounds. Examination of the left-lower field reveals decreased breath sounds. Decreased breath sounds, wheezing (HOWIE, expiratory, mild) and rales (RLL) present.   Abdominal:      General: Abdomen is flat. Bowel sounds are normal. There is no distension.      Palpations: Abdomen is soft.      Tenderness: There is no abdominal tenderness. There is no guarding or rebound.   Musculoskeletal:      Cervical back: Neck supple. No rigidity.      Right lower leg: No edema.      Left lower leg: No edema.   Skin:     General: Skin is warm and dry.      Capillary Refill: Capillary refill takes 2 to 3 seconds.      Coloration: Skin is pale.   Neurological:      Mental Status: He is alert and oriented to person, place, and time.      Sensory: Sensation is intact.      Motor: No tremor.      Comments: LLE remaining neurovascularly intact.    Psychiatric:         Attention and Perception: Attention normal.         Mood and Affect: Mood normal.         Behavior: Behavior normal.  Behavior is cooperative.       ---------------------------------------------------------------------------------------------------------------------  EKG: Pending cardiology interpretation. Per my review, appears normal sinus rhythm 90s with 1st degree AV block. No evidence of acute ischemia.        ---------------------------------------------------------------------------------------------------------------------   Results from last 7 days   Lab Units 12/13/22  0850 12/13/22  0022   WBC 10*3/mm3  --  14.97*   HEMOGLOBIN g/dL  --  14.9   HEMATOCRIT %  --  48.7   MCV fL  --  87.6   MCHC g/dL  --  30.6*   PLATELETS 10*3/mm3  --  194   INR  1.09 0.97     Results from last 7 days   Lab Units 12/13/22  0747   PH, ARTERIAL pH units 7.344*   PO2 ART mm Hg 78.1*   PCO2, ARTERIAL mm Hg 55.4*   HCO3 ART mmol/L 30.1*     Results from last 7 days   Lab Units 12/13/22  0022   SODIUM mmol/L 137   POTASSIUM mmol/L 5.1   CHLORIDE mmol/L 101   CO2 mmol/L 21.2*   BUN mg/dL 26*   CREATININE mg/dL 0.87   CALCIUM mg/dL 9.2   GLUCOSE mg/dL 106*   ALBUMIN g/dL 3.49*   BILIRUBIN mg/dL 0.5   ALK PHOS U/L 72   AST (SGOT) U/L 21   ALT (SGPT) U/L 15   Estimated Creatinine Clearance: 41 mL/min (by C-G formula based on SCr of 0.87 mg/dL).  No results found for: AMMONIA          Lab Results   Component Value Date    HGBA1C 6.00 (H) 10/31/2022     Lab Results   Component Value Date    TSH 0.664 11/01/2022     No results found for: PREGTESTUR, PREGSERUM, HCG, HCGQUANT  Pain Management Panel    There is no flowsheet data to display.           ---------------------------------------------------------------------------------------------------------------------  Imaging Results (Last 7 Days)     Procedure Component Value Units Date/Time    XR Chest 1 View [[id number]] Collected: 12/13/22 0832     Updated: 12/13/22 0839    Narrative:      XR CHEST 1 VW-     CLINICAL INDICATION: Pre Op; S72.002A-Fracture of unspecified part of  neck of left femur,  initial encounter for closed fracture        COMPARISON: 10/31/2022      TECHNIQUE: Single frontal view of the chest.     FINDINGS:      LUNGS: Minimal left basilar consolidation      HEART AND MEDIASTINUM: Heart and mediastinal contours are unremarkable        SKELETON: Bony and soft tissue structures are unremarkable.             Impression:      Minimal left basilar consolidation. COPD.     This report was finalized on 12/13/2022 8:37 AM by Dr. Barrie Guzman MD.       XR Femur 2 View Left [[id number]] Collected: 12/13/22 0810     Updated: 12/13/22 0812    Narrative:      EXAM:    XR Left Femur, 2 Views     EXAM DATE:    12/13/2022 7:28 AM     CLINICAL HISTORY:    femur fracture; S72.002A-Fracture of unspecified part of neck of left  femur, initial encounter for closed fracture     TECHNIQUE:    Frontal and lateral views of the left femur.     COMPARISON:    12/12/2022     FINDINGS:    Bones/joints:  Left femoral neck fracture again noted.  No distal  femoral fracture is seen.  No dislocation.    Soft tissues:  Unremarkable.       Impression:      1.  Left femoral neck fracture again noted.  2.  No distal femoral fracture is seen.     This report was finalized on 12/13/2022 8:10 AM by Dr. Barrie Guzman MD.       CT Head Without Contrast [[id number]] Collected: 12/13/22 0023     Updated: 12/13/22 0025    Narrative:      CT Head WO    HISTORY:   Head injury. Fell and hit left side of head.    TECHNIQUE:   Axial unenhanced head CT with multiplanar reformats. Radiation dose reduction techniques included automated exposure control or exposure modulation based on body size. Count of known CT and cardiac nuc med studies performed in previous 12 months: 1.     COMPARISON:   None.    FINDINGS:   Ventricular size and configuration are normal. No acute infarct or hemorrhage is identified. There are no masses. There is no skull fracture.  There is atrophy with chronic small vessel ischemic disease in the white matter.  Atherosclerotic calcifications  are present in the carotid siphons. There is mild injury to the left temporal scalp.      Impression:      Left temporal scalp injury. No other acute findings.    Signer Name: Rommel Umanzor MD   Signed: 12/13/2022 12:23 AM   Workstation Name: [facility]    Radiology [facility]    CT Cervical Spine Without Contrast [[id number]] Collected: 12/13/22 0019     Updated: 12/13/22 0022    Narrative:      CT Spine Cervical WO    INDICATION:   Neck trauma. Fall.    TECHNIQUE:   CT of the cervical spine without IV contrast. Coronal and sagittal reconstructions were obtained.  Radiation dose reduction techniques included automated exposure control or exposure modulation based on body size. Count of known CT and cardiac nuc med  studies performed in previous 12 months: 1.     COMPARISON:  None available.    FINDINGS:  Alignment is normal with no subluxation identified. There is no acute cervical spine fracture. Note is made of carotid atherosclerotic disease. There is mild multilevel degenerative disc disease. No large disc herniations are seen. There is no  significant central canal or foraminal stenosis at any level. COPD and granulomatous calcifications are noted in the lung apices. Partially visible is the known left upper lobe mass that was evaluated with chest CT on 11/1/2022. This remains concerning  for malignancy. PET CT is once again recommended if this has not been performed elsewhere.      Impression:        1. No acute cervical spine fracture or malalignment.  2. 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 Please see chest CT report of 11/1/2022.    Signer Name: Rommel Umanzor MD   Signed: 12/13/2022 12:19 AM   Workstation Name: [facility]    Radiology [facility]    XR Hip With or Without Pelvis 2 - 3 View Left [[id number]] Collected: 12/13/22 0015     Updated: 12/13/22 0017    Narrative:      CR Hip Uni Comp Min 2 Vws  LT    INDICATION:   Hip pain after fall.    COMPARISON:   None available.    FINDINGS:  AP pelvis and AP and frog-leg view(s) of the left hip.  There is an acute left femoral neck fracture. No hip dislocation on either side. There is degenerative disease in both hips. No additional fractures. Note is made of atherosclerotic disease.        Impression:      Acute left femoral neck fracture. No dislocation.    Signer Name: Rommel Umanzor MD   Signed: 12/13/2022 12:15 AM   Workstation Name: [facility]    [facility]            Last echocardiogram:  Results for orders placed during the hospital encounter of 10/31/22    Adult Transthoracic Echo Complete With Contrast if Necessary Per Protocol    Interpretation Summary  •  Normal left ventricular cavity size and wall thickness noted. All left ventricular wall segments contract normally.  •  Left ventricular ejection fraction appears to be 61 - 65%.  •  Estimated right ventricular systolic pressure from tricuspid regurgitation is mildly elevated (35-45 mmHg).  •  The aortic valve exhibits sclerosis. There is mild calcification of the aortic valve. The aortic valve appears trileaflet. Mild aortic valve regurgitation is present. No hemodynamically significant aortic valve stenosis is present.  •  The mitral valve is structurally normal with no significant stenosis present. Mild mitral valve regurgitation is present.  •  Mild tricuspid valve regurgitation is present. Estimated right ventricular systolic pressure from tricuspid regurgitation is mildly elevated (35-45 mmHg).  •  There is no evidence of pericardial effusion. .          I have personally reviewed the above radiology images and read the final radiology report on 12/13/22  ---------------------------------------------------------------------------------------------------------------------  Assessment / Plan     Active Hospital Problems    Diagnosis  POA   • **Closed fracture of left hip  ([facility]) [S72.002A]  Unknown     Added automatically from request for surgery [id number]         ASSESSMENT/PLAN:  -Acute left femoral neck fracture POA s/p mechanical fall at home  -Radiological images reviewed.   -Orthopedic surgery consulted, input/assistance is much appreciated.   -PRN IV/PO pain medication available with holding parameters to prevent hypotension, oversedation, and/or respiratory depression.   -Neurovascular checks Q 4 hours.   -NPO pending surgical evaluation/intervention.  -Fall precautions.  -PT/OT consults for postop eval.     -Chronic HFpEF  -Appearing euvolemic on exam.   -Monitor strict I's and O's, daily weights.  -Echo from 11/1/2022 reviewed; normal EF (61-65%).   -Review home meds once reconciled.     -Acute on chronic hypoxic respiratory failure, POA  -COPD and chronic hypoxic respiratory failure on 2L PRN at baseline  -History of HOWIE pulmonary nodule  -Moderate pulmonary hypertension  -SIRS criteria on admission with HR>90 and WBCs>12.   -Chest xray revealed minimal left basilar consolidation.  -ABG obtained on 4L NC; PO2 78.1%, PCO2 55.4, and O2 saturation 95.5%.   -Continue to titrate supplemental O2 to maintain SpO2 saturations > 90%.  -Continuous pulse oximetry.   -Encourage incentive spirometer and turn/cough/deep breathing exercises.  -Will order Symbicort and DuoNebs.   -Obtain sputum culture.  -Obtain full respiratory panel.  -Follows with pulm in the outpatient setting for known pulmonary nodule/mass.   -Obtain UA.   -Repeat CBC in the a.m.  -Add peripheral blood cultures x2.   -Check lactate and C-RP.     -GERD  -Aspiration and reflux precautions.  -PPI.    -History of BPH  -Monitor urine output closely.  -Review home meds once reconciled.    -Essential hypertension  -BP appears well controlled.  -Plan to resume home antihypertensive regimen once reconciled per pharmacy with appropriate holding parameters to prevent hypotension and/or bradycardia.   -Closely monitor BP per  hospital protocol, titrate medications as necessary.    -Arthritis  -Supportive care.     -History of CBD calculus s/p ERCP  -History of E. Coli bacteremia      ----------  -DVT prophylaxis: SCDs; will likely be placed on Eliquis postop for VTE prophylaxis.   -Activity: Bedrest  -Expected length of stay:   INPATIENT status due to the need for care which can only be reasonably provided in an hospital setting such as aggressive/expedited ancillary services and/or consultation services, the necessity for IV medications, close physician monitoring and/or the possible need for procedures.  In such, I feel patient's risk for adverse outcomes and need for care warrant INPATIENT evaluation and predict the patient's care encounter to likely last beyond 2 midnights.  -Disposition pending clinical course.    High risk secondary to acute left hip fracture s/p mechanical fall at home.       CODE STATUS: FULL CODE      Jenni Chong, DAVID   12/13/22  13:48 EST  Pager #[phone]  ---------------------------------------------------------------------------------------------------------------------       Electronically signed by Rah Pastrana MD at 12/13/22 1517          Emergency Department Notes      Kayley Dobbins, RN at 12/13/22 0227        Pt requesting pain medicine, Dr. Gracia made aware and to place orders.    Electronically signed by Kayley Dobbins RN at 12/13/22 3955     Kayley Dobbins RN at 12/13/22 0300        Cleaned and bandaged pt's wound on head.    Electronically signed by Kayley Dobbins, RN at 12/13/22 0639       Vital Signs (last day)     Date/Time Temp Temp src Pulse Resp BP Patient Position SpO2    12/14/22 0623 97.5 (36.4) Oral 119 18 104/59 Lying --    12/14/22 0300 -- -- -- -- -- -- 95    12/14/22 0254 97.3 (36.3) Axillary 98 18 120/52 Lying 92    12/14/22 0116 -- -- -- -- -- -- 99    12/14/22 0029 -- -- 114 18 -- -- 99    12/14/22 0017 -- -- 96 16 -- -- 90    12/13/22 2300 98.7 (37.1) Oral 96 20 112/58  Lying --    12/13/22 2230 -- -- -- -- -- -- 99    12/13/22 2131 98.9 (37.2) Oral 91 20 104/56 Lying --    12/13/22 2100 99.5 (37.5) Oral -- -- -- -- 99    12/13/22 1959 98.7 (37.1) Oral 94 18 109/63 Lying --    12/13/22 1830 98.3 (36.8) Oral 107 18 139/73 Lying 99    12/13/22 1730 98.3 (36.8) Axillary 99 18 109/50 Lying --    12/13/22 1729 -- -- -- -- -- -- 94    12/13/22 1700 98.3 (36.8) Axillary 94 18 112/69 Lying --    12/13/22 1645 98 (36.7) Axillary 96 18 115/71 Lying --    12/13/22 1630 98.3 (36.8) Axillary 89 18 133/66 Lying --    12/13/22 1615 98.3 (36.8) Axillary 91 18 134/64 Lying --    12/13/22 1605 -- -- 86 17 128/73 Lying 96    12/13/22 1600 -- -- 87 16 124/72 Lying 97    12/13/22 1555 -- -- 86 15 128/72 Lying 96    12/13/22 1550 -- -- 90 15 114/88 Lying 97    12/13/22 1545 -- -- 84 16 121/77 Lying 96    12/13/22 1540 -- -- 81 15 126/86 Lying 97    12/13/22 1535 -- -- 82 15 136/73 Lying 97    12/13/22 1530 97.9 (36.6) Temporal 83 14 136/66 Lying 97    12/13/22 1228 98.2 (36.8) Oral 82 20 134/69 Lying 94    12/13/22 1157 -- -- 78 16 123/71 Sitting 98    12/13/22 1001 -- -- 77 -- 123/71 -- 97    12/13/22 0941 -- -- 76 -- 122/68 -- 96    12/13/22 0921 -- -- 76 -- 116/95 -- 97    12/13/22 0901 -- -- -- -- 125/71 -- --    12/13/22 0841 -- -- 83 -- 120/68 -- 97    12/13/22 0801 -- -- 89 -- 128/74 -- 99    12/13/22 0744 -- -- 97 -- -- -- 93    12/13/22 0741 -- -- 99 -- 135/78 -- --    12/13/22 0739 -- -- 125 -- -- -- 81    12/13/22 0731 -- -- 80 -- 128/71 -- 96    12/13/22 0716 -- -- 86 -- 128/68 -- 93    12/13/22 0701 -- -- 89 -- 127/76 -- 94    12/13/22 0646 -- -- 85 -- 129/83 -- 92    12/13/22 0631 -- -- 90 -- 127/70 -- 93    12/13/22 0616 -- -- 87 -- 121/69 -- 92    12/13/22 0601 -- -- 91 -- 138/69 -- 91    12/13/22 0546 -- -- 101 -- 135/75 -- 92    12/13/22 0531 -- -- 92 -- 139/75 -- 91    12/13/22 0521 -- -- 93 -- 135/74 -- --    12/13/22 0518 -- -- -- -- -- -- 93    12/13/22 0446 -- -- 101 -- 121/62 --  88    12/13/22 0431 -- -- 92 -- 137/78 -- 91    12/13/22 0416 -- -- 93 -- 141/77 -- 94    12/13/22 0401 -- -- 101 -- 148/78 -- 91    12/13/22 0346 -- -- 101 -- 145/79 -- 92    12/13/22 0331 -- -- 103 -- 138/79 -- 91    12/13/22 0316 -- -- 105 -- 132/74 -- 96    12/13/22 0301 -- -- 101 -- 135/73 -- 92    12/13/22 0246 -- -- 106 -- 138/81 -- 94    12/13/22 0237 -- -- 105 -- 127/80 -- 92            Current Facility-Administered Medications   Medication Dose Route Frequency Provider Last Rate Last Admin   • acetaminophen (TYLENOL) tablet 1,000 mg  1,000 mg Oral TID Adrian Reyez MD   1,000 mg at 12/14/22 0811   • amitriptyline (ELAVIL) tablet 25 mg  25 mg Oral Nightly Adrian Reyez MD   25 mg at 12/13/22 2229   • budesonide-formoterol (SYMBICORT) 160-4.5 MCG/ACT inhaler 2 puff  2 puff Inhalation BID - RT Adrian Reyez MD   2 puff at 12/14/22 0724   • clindamycin (CLEOCIN) 600 mg in dextrose 5% 50 mL IVPB (premix)  600 mg Intravenous Q8H Adrian Reyez MD   600 mg at 12/14/22 0521   • Enoxaparin Sodium (LOVENOX) syringe 40 mg  40 mg Subcutaneous Q24H Adrian Reyez MD   40 mg at 12/13/22 2105   • finasteride (PROSCAR) tablet 5 mg  5 mg Oral Daily Adrian Reyez MD   5 mg at 12/14/22 0811   • ipratropium-albuterol (DUO-NEB) nebulizer solution 3 mL  3 mL Nebulization 4x Daily - RT Adrian Reyez MD   3 mL at 12/14/22 0725   • lactated ringers infusion  100 mL/hr Intravenous Continuous Adrian Reyez  mL/hr at 12/13/22 2359 100 mL/hr at 12/13/22 2359   • levoFLOXacin (LEVAQUIN) 750 mg/150 mL D5W (premix) (LEVAQUIN) 750 mg  750 mg Intravenous Q48H Rah Pastrana MD   750 mg at 12/13/22 1821   • levothyroxine (SYNTHROID, LEVOTHROID) tablet 50 mcg  50 mcg Oral Daily Adrian Reyez MD   50 mcg at 12/14/22 0811   • LORazepam (ATIVAN) tablet 0.5 mg  0.5 mg Oral Daily PRN Adrian Reyez MD       • Magnesium Sulfate 2 gram Bolus, followed by 8 gram infusion (total Mg dose 10 grams)- Mg less than or equal to 1mg/dL  2 g  Intravenous PRN Chambers, [place], APRN        Or   • Magnesium Sulfate 2 gram / 50mL Infusion (GIVE X 3 BAGS TO EQUAL 6GM TOTAL DOSE) - Mg 1.1 - 1.5 mg/dl  2 g Intravenous PRN Chambers, [place], APRN        Or   • Magnesium Sulfate 4 gram infusion- Mg 1.6-1.9 mg/dL  4 g Intravenous PRN Chambers, Jenni, APRN       • Magnesium Sulfate 4 gram infusion- Mg 1.6-1.9 mg/dL  4 g Intravenous Once Chambers, Jenni, APRN       • nitroglycerin (NITROSTAT) SL tablet 0.4 mg  0.4 mg Sublingual Q5 Min PRN Adrian Reyez MD       • Pharmacy Consult   Does not apply Continuous PRN Rah Pastrana MD       • sodium chloride 0.9 % flush 10 mL  10 mL Intravenous Q12H Adrian Reyez MD   10 mL at 12/14/22 0811   • sodium chloride 0.9 % flush 10 mL  10 mL Intravenous PRN Adrian Reyez MD       • sodium chloride 0.9 % flush 3 mL  3 mL Intravenous Q12H Adrian Reyez MD   3 mL at 12/14/22 0811   • sodium chloride 0.9 % flush 3-10 mL  3-10 mL Intravenous PRN Adrian Reyez MD       • sodium chloride 0.9 % infusion 40 mL  40 mL Intravenous PRN Adrian Reyez MD       • sodium chloride 0.9 % infusion 40 mL  40 mL Intravenous PRN Adrian Reyez MD       • tamsulosin (FLOMAX) 24 hr capsule 0.4 mg  0.4 mg Oral Nightly Adrian Reyez MD   0.4 mg at 12/13/22 2225       Lab Results (last 24 hours)     Procedure Component Value Units Date/Time    POC Glucose Once [[id number]]  (Normal) Collected: 12/14/22 0627    Specimen: Blood Updated: 12/14/22 0634     Glucose 109 mg/dL      Comment: Meter: [id number] : [id number] Renetta Wise       Basic Metabolic Panel [[id number]]  (Abnormal) Collected: 12/14/22 0406    Specimen: Blood Updated: 12/14/22 0542     Glucose 94 mg/dL      BUN 31 mg/dL      Creatinine 0.85 mg/dL      Sodium 134 mmol/L      Potassium 4.4 mmol/L      Chloride 99 mmol/L      CO2 25.6 mmol/L      Calcium 8.2 mg/dL      BUN/Creatinine Ratio 36.5     Anion Gap 9.4 mmol/L      eGFR 85.2 mL/min/1.73      Comment: National Kidney  Foundation and American Society of Nephrology (ASN) Task Force recommended calculation based on the Chronic Kidney Disease Epidemiology Collaboration (CKD-EPI) equation refit without adjustment for race.       Narrative:      GFR Normal >60  Chronic Kidney Disease <60  Kidney Failure <15    The GFR formula is only valid for adults with stable renal function between ages 18 and 70.    Phosphorus [[id number]]  (Normal) Collected: 12/14/22 0406    Specimen: Blood Updated: 12/14/22 0542     Phosphorus 2.7 mg/dL     Magnesium [[id number]]  (Normal) Collected: 12/14/22 0406    Specimen: Blood Updated: 12/14/22 0542     Magnesium 1.6 mg/dL     Procalcitonin [[id number]]  (Normal) Collected: 12/14/22 0406    Specimen: Blood Updated: 12/14/22 0531     Procalcitonin 0.13 ng/mL     Narrative:      As a Marker for Sepsis (Non-Neonates):    1. <0.5 ng/mL represents a low risk of severe sepsis and/or septic shock.  2. >2 ng/mL represents a high risk of severe sepsis and/or septic shock.    As a Marker for Lower Respiratory Tract Infections that require antibiotic therapy:    PCT on Admission    Antibiotic Therapy       6-12 Hrs later    >0.5                Strongly Recommended  >0.25 - <0.5        Recommended   0.1 - 0.25          Discouraged              Remeasure/reassess PCT  <0.1                Strongly Discouraged     Remeasure/reassess PCT    As 28 day mortality risk marker: \"Change in Procalcitonin Result\" (>80% or <=80%) if Day 0 (or Day 1) and Day 4 values are available. Refer to http://www.Presss-pct-calculator.com    Change in PCT <=80%  A decrease of PCT levels below or equal to 80% defines a positive change in PCT test result representing a higher risk for 28-day all-cause mortality of patients diagnosed with severe sepsis for septic shock.    Change in PCT >80%  A decrease of PCT levels of more than 80% defines a negative change in PCT result representing a lower risk for 28-day all-cause mortality of patients  diagnosed with severe sepsis or septic shock.       CBC & Differential [[id number]]  (Abnormal) Collected: 12/14/22 0406    Specimen: Blood Updated: 12/14/22 0519    Narrative:      The following orders were created for panel order CBC & Differential.  Procedure                               Abnormality         Status                     ---------                               -----------         ------                     CBC Auto Differential[[id number]]        Abnormal            Final result                 Please view results for these tests on the individual orders.    CBC Auto Differential [[id number]]  (Abnormal) Collected: 12/14/22 0406    Specimen: Blood Updated: 12/14/22 0519     WBC 13.55 10*3/mm3      RBC 4.63 10*6/mm3      Hemoglobin 12.6 g/dL      Hematocrit 40.7 %      MCV 87.9 fL      MCH 27.2 pg      MCHC 31.0 g/dL      RDW 15.1 %      RDW-SD 48.9 fl      MPV 11.5 fL      Platelets 155 10*3/mm3      Neutrophil % 82.5 %      Lymphocyte % 7.9 %      Monocyte % 7.7 %      Eosinophil % 0.7 %      Basophil % 0.4 %      Immature Grans % 0.8 %      Neutrophils, Absolute 11.17 10*3/mm3      Lymphocytes, Absolute 1.07 10*3/mm3      Monocytes, Absolute 1.05 10*3/mm3      Eosinophils, Absolute 0.10 10*3/mm3      Basophils, Absolute 0.05 10*3/mm3      Immature Grans, Absolute 0.11 10*3/mm3      nRBC 0.0 /100 WBC     Blood Gas, Venous With Co-Ox [[id number]]  (Abnormal) Collected: 12/14/22 0409    Specimen: Venous Blood Updated: 12/14/22 0417     Site Lab     pH, Venous 7.352 pH Units      pCO2, Venous 54.9 mm Hg      Comment: 83 Value above reference range        pO2, Venous 18.6 mm Hg      Comment: 84 Value below reference range        HCO3, Venous 30.5 mmol/L      Comment: 83 Value above reference range        Base Excess, Venous 3.6 mmol/L      O2 Saturation, Venous 29.3 %      Comment: 84 Value below reference range        Hemoglobin, Blood Gas 13.0 g/dL      CO2 Content 32.1 mmol/L      Temperature 37.0 C       Barometric Pressure for Blood Gas 725 mmHg      Modality Nasal Cannula     FIO2 24 %      Flow Rate 1.0 lpm      Ventilator Mode NA     Collected by [id number]     Comment: Meter: [id number]     :  [id number]        Oxyhemoglobin Venous 28.7 %      Comment: 84 Value below reference range        Carboxyhemoglobin Venous 1.8 %      Methemoglobin Venous 0.4 %     S. Pneumo Ag Urine or CSF - Urine, Urine, Clean Catch [[id number]] Collected: 12/13/22 2356    Specimen: Urine, Clean Catch Updated: 12/14/22 0002    C-reactive Protein [[id number]]  (Abnormal) Collected: 12/13/22 1911    Specimen: Blood Updated: 12/13/22 1948     C-Reactive Protein 4.11 mg/dL     Lactic Acid, Plasma [[id number]]  (Normal) Collected: 12/13/22 1911    Specimen: Blood Updated: 12/13/22 1944     Lactate 1.5 mmol/L     Blood Culture - Blood, Arm, Right [[id number]] Collected: 12/13/22 1912    Specimen: Blood from Arm, Right Updated: 12/13/22 1926    Blood Culture - Blood, Hand, Right [[id number]] Collected: 12/13/22 1911    Specimen: Blood from Hand, Right Updated: 12/13/22 1926    Respiratory Panel PCR w/COVID-19(SARS-CoV-2) NURY/DOMINGA/KATHRYN/PAD/COR/MAD/ANA In-House, NP Swab in UTM/VTM, 3-4 HR TAT - Swab, Nasopharynx [[id number]]  (Normal) Collected: 12/13/22 1635    Specimen: Swab from Nasopharynx Updated: 12/13/22 1728     ADENOVIRUS, PCR Not Detected     Coronavirus 229E Not Detected     Coronavirus HKU1 Not Detected     Coronavirus NL63 Not Detected     Coronavirus OC43 Not Detected     COVID19 Not Detected     Human Metapneumovirus Not Detected     Human Rhinovirus/Enterovirus Not Detected     Influenza A PCR Not Detected     Influenza B PCR Not Detected     Parainfluenza Virus 1 Not Detected     Parainfluenza Virus 2 Not Detected     Parainfluenza Virus 3 Not Detected     Parainfluenza Virus 4 Not Detected     RSV, PCR Not Detected     Bordetella pertussis pcr Not Detected     Bordetella parapertussis PCR Not Detected     Chlamydophila  pneumoniae PCR Not Detected     Mycoplasma pneumo by PCR Not Detected    Narrative:      In the setting of a positive respiratory panel with a viral infection PLUS a negative procalcitonin without other underlying concern for bacterial infection, consider observing off antibiotics or discontinuation of antibiotics and continue supportive care. If the respiratory panel is positive for atypical bacterial infection (Bordetella pertussis, Chlamydophila pneumoniae, or Mycoplasma pneumoniae), consider antibiotic de-escalation to target atypical bacterial infection.    Urinalysis With Culture If Indicated - Urine, Clean Catch [[id number]]  (Normal) Collected: 12/13/22 1203    Specimen: Urine, Clean Catch Updated: 12/13/22 1527     Color, UA Yellow     Appearance, UA Clear     pH, UA <=5.0     Specific Gravity, UA 1.020     Glucose, UA Negative     Ketones, UA Negative     Bilirubin, UA Negative     Blood, UA Negative     Protein, UA Negative     Leuk Esterase, UA Negative     Nitrite, UA Negative     Urobilinogen, UA 0.2 E.U./dL    Narrative:      In absence of clinical symptoms, the presence of pyuria, bacteria, and/or nitrites on the urinalysis result does not correlate with infection.  Urine microscopic not indicated.        Imaging Results (Last 24 Hours)     Procedure Component Value Units Date/Time    XR Pelvis 1 or 2 View [[id number]] Collected: 12/13/22 1627     Updated: 12/13/22 1629    Narrative:      EXAMINATION: XR PELVIS 1 OR 2 VW-      CLINICAL INDICATION: AP pelvis, status post left hip hemiarthroplasty;  S72.002A-Fracture of unspecified part of neck of left femur, initial  encounter for closed fracture        COMPARISON: None available     FINDINGS: 2 views of the pelvis     Left hip prosthesis     Alignment is anatomic     Overlying skin clips are present.     Moderate to large volume stool      This report was finalized on 12/13/2022 4:27 PM by Dr. Barrie Guzman MD.           ECG/EMG Results (last 24 hours)      ** No results found for the last 24 hours. **           Physician Progress Notes (last 24 hours)      Ceballos DAVID Mccain at 12/14/22 0810     Summary:Status post left hip hemiarthroplasty for fracture, POD #1.               Inpatient Progress Note  Sarbjit Martin  Date: 12/14/22  MRN: [id number]      Subjective:  Resting in bed watching television.  Notes minimal pain to the left hip with rest.  Denies chest pain or shortness of air.  (+) passing gas.  No family at bedside.      Objective:      Vitals:    12/14/22 0254 12/14/22 0300 12/14/22 0500 12/14/22 0623   BP: 120/52   104/59   BP Location: Right arm   Right arm   Patient Position: Lying   Lying   Pulse: 98   119   Resp: 18   18   Temp: 97.3 °F (36.3 °C)   97.5 °F (36.4 °C)   TempSrc: Axillary   Oral   SpO2: 92% 95%     Weight:   50.6 kg (111 lb 8 oz)    Height:              Physical Exam:  Constitutional:  Thin, elderly, male.  No acute distress.        Musculoskeletal:  Upon examination of the left hip, the surgical dressing has minimal, dry drainage.  There is moderate edema to the lateral hip.  No induration.  No ecchymosis, erythema, or warmth.  Minimal tenderness.  (+) DF/PF at the ankle.  Capillary refill is brisk and light touch sensation is intact, distally.  DP pulse 3/4.      Labs:    Results from last 7 days   Lab Units 12/14/22  0406 12/13/22  1911 12/13/22  0850 12/13/22  0022   CRP mg/dL  --  4.11*  --   --    LACTATE mmol/L  --  1.5  --   --    WBC 10*3/mm3 13.55*  --   --  14.97*   HEMOGLOBIN g/dL 12.6*  --   --  14.9   HEMATOCRIT % 40.7  --   --  48.7   MCV fL 87.9  --   --  87.6   MCHC g/dL 31.0*  --   --  30.6*   PLATELETS 10*3/mm3 155  --   --  194   INR   --   --  1.09 0.97     Results from last 7 days   Lab Units 12/13/22  0747   PH, ARTERIAL pH units 7.344*   PO2 ART mm Hg 78.1*   PCO2, ARTERIAL mm Hg 55.4*   HCO3 ART mmol/L 30.1*     Results from last 7 days   Lab Units 12/14/22  0406 12/13/22  0022   SODIUM mmol/L 134* 137    POTASSIUM mmol/L 4.4 5.1   MAGNESIUM mg/dL 1.6  --    CHLORIDE mmol/L 99 101   CO2 mmol/L 25.6 21.2*   BUN mg/dL 31* 26*   CREATININE mg/dL 0.85 0.87   CALCIUM mg/dL 8.2* 9.2   GLUCOSE mg/dL 94 106*   ALBUMIN g/dL  --  3.49*   BILIRUBIN mg/dL  --  0.5   ALK PHOS U/L  --  72   AST (SGOT) U/L  --  21   ALT (SGPT) U/L  --  15   Estimated Creatinine Clearance: 45.5 mL/min (by C-G formula based on SCr of 0.85 mg/dL).  No results found for: AMMONIA          No results found for: HGBA1C  Lab Results   Component Value Date    TSH 0.664 11/01/2022         Pain Management Panel    There is no flowsheet data to display.         No results found for: BLOODCX  No results found for: URINECX  No results found for: WOUNDCX  No results found for: STOOLCX              Radiology:  Imaging Results (Last 72 Hours)     Procedure Component Value Units Date/Time    XR Pelvis 1 or 2 View [[id number]] Collected: 12/13/22 1627     Updated: 12/13/22 1629    Narrative:      EXAMINATION: XR PELVIS 1 OR 2 VW-      CLINICAL INDICATION: AP pelvis, status post left hip hemiarthroplasty;  S72.002A-Fracture of unspecified part of neck of left femur, initial  encounter for closed fracture        COMPARISON: None available     FINDINGS: 2 views of the pelvis     Left hip prosthesis     Alignment is anatomic     Overlying skin clips are present.     Moderate to large volume stool      This report was finalized on 12/13/2022 4:27 PM by Dr. Barrie Guzman MD.       XR Chest 1 View [[id number]] Collected: 12/13/22 0832     Updated: 12/13/22 0839    Narrative:      XR CHEST 1 VW-     CLINICAL INDICATION: Pre Op; S72.002A-Fracture of unspecified part of  neck of left femur, initial encounter for closed fracture        COMPARISON: 10/31/2022      TECHNIQUE: Single frontal view of the chest.     FINDINGS:      LUNGS: Minimal left basilar consolidation      HEART AND MEDIASTINUM: Heart and mediastinal contours are unremarkable        SKELETON: Bony and soft  tissue structures are unremarkable.             Impression:      Minimal left basilar consolidation. COPD.     This report was finalized on 12/13/2022 8:37 AM by Dr. Barrie Guzman MD.       XR Femur 2 View Left [[id number]] Collected: 12/13/22 0810     Updated: 12/13/22 0812    Narrative:      EXAM:    XR Left Femur, 2 Views     EXAM DATE:    12/13/2022 7:28 AM     CLINICAL HISTORY:    femur fracture; S72.002A-Fracture of unspecified part of neck of left  femur, initial encounter for closed fracture     TECHNIQUE:    Frontal and lateral views of the left femur.     COMPARISON:    12/12/2022     FINDINGS:    Bones/joints:  Left femoral neck fracture again noted.  No distal  femoral fracture is seen.  No dislocation.    Soft tissues:  Unremarkable.       Impression:      1.  Left femoral neck fracture again noted.  2.  No distal femoral fracture is seen.     This report was finalized on 12/13/2022 8:10 AM by Dr. Barrie Guzman MD.       CT Head Without Contrast [[id number]] Collected: 12/13/22 0023     Updated: 12/13/22 0025    Narrative:      CT Head WO    HISTORY:   Head injury. Fell and hit left side of head.    TECHNIQUE:   Axial unenhanced head CT with multiplanar reformats. Radiation dose reduction techniques included automated exposure control or exposure modulation based on body size. Count of known CT and cardiac nuc med studies performed in previous 12 months: 1.     COMPARISON:   None.    FINDINGS:   Ventricular size and configuration are normal. No acute infarct or hemorrhage is identified. There are no masses. There is no skull fracture.  There is atrophy with chronic small vessel ischemic disease in the white matter. Atherosclerotic calcifications  are present in the carotid siphons. There is mild injury to the left temporal scalp.      Impression:      Left temporal scalp injury. No other acute findings.    Signer Name: Rommel Umanzor MD   Signed: 12/13/2022 12:23 AM   Workstation Name: [email or web address]     [facility]    CT Cervical Spine Without Contrast [[id number]] Collected: 12/13/22 0019     Updated: 12/13/22 0022    Narrative:      CT Spine Cervical WO    INDICATION:   Neck trauma. Fall.    TECHNIQUE:   CT of the cervical spine without IV contrast. Coronal and sagittal reconstructions were obtained.  Radiation dose reduction techniques included automated exposure control or exposure modulation based on body size. Count of known CT and cardiac nuc med  studies performed in previous 12 months: 1.     COMPARISON:  None available.    FINDINGS:  Alignment is normal with no subluxation identified. There is no acute cervical spine fracture. Note is made of carotid atherosclerotic disease. There is mild multilevel degenerative disc disease. No large disc herniations are seen. There is no  significant central canal or foraminal stenosis at any level. COPD and granulomatous calcifications are noted in the lung apices. Partially visible is the known left upper lobe mass that was evaluated with chest CT on 11/1/2022. This remains concerning  for malignancy. PET CT is once again recommended if this has not been performed elsewhere.      Impression:        1. No acute cervical spine fracture or malalignment.  2. 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 Please see chest CT report of 11/1/2022.    Signer Name: Rommel Umanzor MD   Signed: 12/13/2022 12:19 AM   Workstation Name: [email or web address]    [facility]    XR Hip With or Without Pelvis 2 - 3 View Left [[id number]] Collected: 12/13/22 0015     Updated: 12/13/22 0017    Narrative:      CR Hip Uni Comp Min 2 Vws LT    INDICATION:   Hip pain after fall.    COMPARISON:   None available.    FINDINGS:  AP pelvis and AP and frog-leg view(s) of the left hip.  There is an acute left femoral neck fracture. No hip dislocation on either side. There is degenerative disease in both hips. No additional fractures.  Note is made of atherosclerotic disease.        Impression:      Acute left femoral neck fracture. No dislocation.    Signer Name: Rommel Umanzor MD   Signed: 12/13/2022 12:15 AM   Workstation Name: [email or web address]    [facility]            Assessment:  Status post left hip hemiarthroplasty for fracture, POD #1.          Plan:    Weight bear as tolerated to the left lower extremity with a walker and assistance, as needed.  PT/OT for mobility and ambulation.  On Lovenox for DVT prophylaxis.  Maintain the dressing to the left hip incision, may change as needed for saturation.  Orthopedic surgery following.        DAVID Gomez December 14, 2022 08:10 EST    Electronically signed by Adán Ceballos APRN at 12/14/22 0815

## 2022-12-14 NOTE — PROGRESS NOTES
Patient Identification:  Name:  Sarbjit Martin  Age:  87 y.o.  Sex:  male  :  1935  MRN:  [id number]  Visit Number:  [id number]  Primary Care Provider:  Goldie Steward APRN    Length of stay:  1    Subjective/Interval History/Consultants/Procedures     Chief complaint: Follow-up, acute left femoral fracture s/p left hemiarthroplasty, POD #1    Subjective/Interval History:  Sarbjit Martin is our 87 y.o. male patient admitted on 2022 for acute left hip fracture s/p mechanical fall at home. He has a pMH significant for BPH, arthritis, bile duct calculus s/p ERCP, COPD, chronic hypoxic respiratory failure, chronic HFpEF, GERD, moderate pulmonary hypertension, and advanced age. For further and complete admitting details, please see admission H&P.    He was admitted to the [facility] floor for further monitoring, evaluation, and treatment. Patient had presented to the ED with complaints of left hip pain s/p mechanical fall at home from standing height. Imaging obtained in the ED revealed acute left femoral fracture. He ultimately underwent left hip hemiarthroplasty on  by Dr. Reyez and tolerated procedure well. PT/OT consults placed for postop eval. CM consulted for assistance with discharge placement. On initial exam, patient had increased supplemental O2 requirement from baseline. Usually uses 2L at home; was on 4L. Lungs coarse/diminished bilaterally with RLL crackles. Chest imaging suggestive of LLL infiltrate. Patient was started on Levaquin for empiric treatment. Also receiving Symbicort and DuoNebs. Incentive spirometer use and turn/cough/deep breathing exercises encouraged. Continuous pulse oximetry in place. Respiratory panel negative. He has known left pulmonary nodule, has increased in size since previous exam. Attending discussed with lung nodule clinic Irma HERNANDEZ to set up appointment for follow-up in the outpatient setting.     Procedures/Imaging:  · Xray of the left pelvis  x2  · Xray of the left femur  · Chest xray  · CT head without contrast  · CT cervical spine without contrast  · Left hip hemiarthroplasty on 12/13 by Dr. Reyez.     Consults:  · Orthopedic Surgery  · PT/OT  · Case management    On today's exam, patient was resting in bed with no s/s acute distress noted. He was working with PT. He was on 2L NC, SpO2 saturation in the 80's. Increased supplemental O2 to 3L. Will continue to titrate to maintain SpO2 sats > 90%. Patient denies any acute complaints such as chest pain, SOA, or palpitations. Does report mild pain in the left hip currently. Dressing CDI. LLE remaining neurovascularly intact. Discussed plan with patient. Closely monitor labs and progress with PT. He voiced agreement with no further questions or concerns at this time.     Discussed with AM RN Ed with no acute events overnight. Room location at the time of evaluation was [facility]. Discussed with attending physician, Rah Harrison MD.   ----------------------------------------------------------------------------------------------------------------------  Current Hospital Meds:  acetaminophen, 1,000 mg, Oral, TID  amitriptyline, 25 mg, Oral, Nightly  budesonide-formoterol, 2 puff, Inhalation, BID - RT  clindamycin, 600 mg, Intravenous, Q8H  enoxaparin, 40 mg, Subcutaneous, Q24H  finasteride, 5 mg, Oral, Daily  ipratropium-albuterol, 3 mL, Nebulization, 4x Daily - RT  levoFLOXacin, 750 mg, Intravenous, Q48H  levothyroxine, 50 mcg, Oral, Daily  magnesium sulfate, 4 g, Intravenous, Once  sodium chloride, 10 mL, Intravenous, Q12H  sodium chloride, 3 mL, Intravenous, Q12H  tamsulosin, 0.4 mg, Oral, Nightly      lactated ringers, 100 mL/hr, Last Rate: 100 mL/hr (12/13/22 4180)  Pharmacy Consult,       ----------------------------------------------------------------------------------------------------------------------      Objective     Vital Signs:  Temp:  [97.3 °F (36.3 °C)-99.5 °F (37.5 °C)] 97.5 °F (36.4  °C)  Heart Rate:  [] 119  Resp:  [14-20] 18  BP: (104-139)/(50-88) 104/59      12/12/22  2328 12/14/22  0500   Weight: 46.7 kg (103 lb) 50.6 kg (111 lb 8 oz)     Body mass index is 18 kg/m².    Intake/Output Summary (Last 24 hours) at 12/14/2022 1123  Last data filed at 12/14/2022 0800  Gross per 24 hour   Intake 1270 ml   Output 150 ml   Net 1120 ml     I/O this shift:  In: 240 [P.O.:240]  Out: -   Diet: Regular/House Diet; Texture: Regular Texture (IDDSI 7); Fluid Consistency: Thin (IDDSI 0)  ----------------------------------------------------------------------------------------------------------------------    Physical Exam  Vitals and nursing note reviewed.   Constitutional:       General: He is awake. He is not in acute distress.     Appearance: He is not diaphoretic.      Interventions: Nasal cannula in place.      Comments: On 2L, increased to 3L during exam.    HENT:      Head: Normocephalic.      Mouth/Throat:      Mouth: Mucous membranes are moist.      Pharynx: Oropharynx is clear.   Eyes:      Extraocular Movements: Extraocular movements intact.   Cardiovascular:      Rate and Rhythm: Normal rate and regular rhythm.      Pulses: Normal pulses.      Heart sounds:     No friction rub.   Pulmonary:      Effort: Pulmonary effort is normal. No respiratory distress or retractions.      Breath sounds: Rales (Right-side) present. No wheezing or rhonchi.      Comments: Lungs coarse/diminshed bilaterally with right-sided inspiratory crackles.   Abdominal:      General: Bowel sounds are normal. There is no distension.      Palpations: Abdomen is soft.      Tenderness: There is no abdominal tenderness. There is no guarding.   Musculoskeletal:      Cervical back: Neck supple. No rigidity.      Right lower leg: No edema.      Left lower leg: No edema.   Skin:     General: Skin is warm and dry.      Capillary Refill: Capillary refill takes 2 to 3 seconds.   Neurological:      Mental Status: He is alert and  oriented to person, place, and time. Mental status is at baseline.      Sensory: Sensation is intact.      Motor: Weakness (generalized) present. No tremor.   Psychiatric:         Attention and Perception: Attention normal.         Mood and Affect: Mood normal.         Speech: Speech normal.         Behavior: Behavior normal. Behavior is cooperative.         Thought Content: Thought content normal.         ----------------------------------------------------------------------------------------------------------------------  Tele:  Appearing normal sinus rhythm 80s on my exam.   ----------------------------------------------------------------------------------------------------------------------      Results from last 7 days   Lab Units 12/14/22  0406 12/13/22  1911 12/13/22  0850 12/13/22  0022   CRP mg/dL  --  4.11*  --   --    LACTATE mmol/L  --  1.5  --   --    WBC 10*3/mm3 13.55*  --   --  14.97*   HEMOGLOBIN g/dL 12.6*  --   --  14.9   HEMATOCRIT % 40.7  --   --  48.7   MCV fL 87.9  --   --  87.6   MCHC g/dL 31.0*  --   --  30.6*   PLATELETS 10*3/mm3 155  --   --  194   INR   --   --  1.09 0.97     Results from last 7 days   Lab Units 12/13/22  0747   PH, ARTERIAL pH units 7.344*   PO2 ART mm Hg 78.1*   PCO2, ARTERIAL mm Hg 55.4*   HCO3 ART mmol/L 30.1*     Results from last 7 days   Lab Units 12/14/22  0406 12/13/22  0022   SODIUM mmol/L 134* 137   POTASSIUM mmol/L 4.4 5.1   MAGNESIUM mg/dL 1.6  --    CHLORIDE mmol/L 99 101   CO2 mmol/L 25.6 21.2*   BUN mg/dL 31* 26*   CREATININE mg/dL 0.85 0.87   CALCIUM mg/dL 8.2* 9.2   GLUCOSE mg/dL 94 106*   ALBUMIN g/dL  --  3.49*   BILIRUBIN mg/dL  --  0.5   ALK PHOS U/L  --  72   AST (SGOT) U/L  --  21   ALT (SGPT) U/L  --  15   Estimated Creatinine Clearance: 45.5 mL/min (by C-G formula based on SCr of 0.85 mg/dL).  No results found for: AMMONIA         ----------------------------------------------------------------------------------------------------------------------  Imaging Results (Last 24 Hours)     Procedure Component Value Units Date/Time    XR Pelvis 1 or 2 View [[id number]] Collected: 12/13/22 1627     Updated: 12/13/22 1629    Narrative:      EXAMINATION: XR PELVIS 1 OR 2 VW-      CLINICAL INDICATION: AP pelvis, status post left hip hemiarthroplasty;  S72.002A-Fracture of unspecified part of neck of left femur, initial  encounter for closed fracture        COMPARISON: None available     FINDINGS: 2 views of the pelvis     Left hip prosthesis     Alignment is anatomic     Overlying skin clips are present.     Moderate to large volume stool      This report was finalized on 12/13/2022 4:27 PM by Dr. Barrie Guzman MD.           ----------------------------------------------------------------------------------------------------------------------   I have reviewed the above laboratory values for 12/14/22    Assessment/Plan     Active Hospital Problems    Diagnosis  POA   • **Closed fracture of left hip ([facility]) [S72.002A]  Unknown     Added automatically from request for surgery [id number]     • Closed fracture of left hip, initial encounter ([facility]) [S72.002A]  Yes         ASSESSMENT/PLAN:  -Acute left femoral neck fracture POA s/p mechanical fall at home  -S/P left hip hemiarthroplasty on 12/13. Currently POD #1.   -Radiological images reviewed.   -Orthopedic surgery following; greatly appreciate their assistance.   -PRN IV/PO pain medication available with holding parameters to prevent hypotension, oversedation, and/or respiratory depression.   -Neurovascular checks Q 4 hours.   -Fall precautions.  -Now WBAT.  -Dressing changes per ortho recommendations.  -Lovenox for VTE prophylaxis.   -PT/OT consults for postop eval.   -CM for discharge planning.      -Chronic HFpEF  -Appearing euvolemic on exam.   -Monitor strict I's and O's, daily weights.  -Echo from  11/1/2022 reviewed; normal EF (61-65%).      -Acute on chronic hypoxic respiratory failure, POA  -Moderate pulmonary hypertension  -Evidence of left basilar consolidation on imaging obtained in ED  -SIRS/sepsis criteria on admission with HR>90 and WBCs>12.   -Chest xray revealed minimal left basilar consolidation.  -ABG obtained on 4L NC; PO2 78.1%, PCO2 55.4, and O2 saturation 95.5%.   -Continue to titrate supplemental O2 to maintain SpO2 saturations > 90%.  -Continuous pulse oximetry.   -Encourage incentive spirometer and turn/cough/deep breathing exercises.  -Continue Symbicort and DuoNebs.   -Continue Levaquin for empiric coverage.   -Obtain sputum culture if able.   -Respiratory panel negative.   -Continue follow-up with pulmonology in the outpatient setting for known pulmonary nodule/mass.    -Repeat CBC in the a.m.  -Peripheral blood cultures x2 obtained, pending.   -Lactate non elevated. Procal negative. C-RP 4.11.       -GERD  -Aspiration and reflux precautions.  -PPI.     -History of BPH  -Monitor urine output closely.  -Continue Proscar and Flomax.   -No reports of urinary retention in postop setting.      -Essential hypertension  -BP appears well controlled.   -Closely monitor BP per hospital protocol, titrate medications as necessary.     -Arthritis  -Supportive care.      -History of CBD calculus s/p ERCP  -History of E. Coli bacteremia       -----------  -DVT prophylaxis: Lovenox  -GI prophylaxis: PPI  -Activity: WBAT, fall precautions.   -Disposition plans/anticipated needs: pending clinical course and PT recommendations.   -Diet: Regular  -Home supplemental O2: Utilizes 2L at home. Currently on 3-4 L.       The patient is high risk due to the following diagnoses/reasons:  Acute left femoral fracture s/p left hip hemiarthroplasty, advanced age.         DAVID Wilks  12/14/22  11:23 EST  Pager #[phone]

## 2022-12-14 NOTE — PLAN OF CARE
Goal Outcome Evaluation:              Outcome Evaluation: Pt seen for evaluation this date, grossly mod/maxA with some mobility. D/C recommendation for SNF/subacute for rehabilitaiton, pending home with home health if pt has supervision/assist, possible 24/7 sup/assist. Pt does not demonstrate mobility to return home alone safely at this time.

## 2022-12-14 NOTE — PROGRESS NOTES
Malnutrition Severity Assessment      Patient meets criteria for : Severe Malnutrition  Malnutrition Type (last 8 hours)     Malnutrition Severity Assessment     Row Name 12/14/22 1559       Malnutrition Severity Assessment    Malnutrition Type Chronic Disease - Related Malnutrition    Row Name 12/14/22 1559       Insufficient Energy Intake     Insufficient Energy Intake Findings Severe    Insufficient Energy Intake  <75% of est. energy requirement for > or equal to 3 months    Row Name 12/14/22 1559       Unintentional Weight Loss     Unintentional Weight Loss Findings None    Row Name 12/14/22 1559       Muscle Loss    Loss of Muscle Mass Findings Severe    [place] Severe - deep hollowing/scooping, lack of muscle to touch, facial bones well defined    Clavicle Bone Region Severe - protruding prominent bone    Acromion Bone Region Severe - squared shoulders, bones, and acromion process protrusion prominent    Scapular Bone Region Severe - prominent bones, depressions easily visible between ribs, scapula, spine, shoulders    Dorsal Hand Region Severe - prominent depression    Patellar Region Severe - prominent bone, square looking, very little muscle definition    Anterior Thigh Region Severe - line/depression along thigh, obviously thin    Posterior Calf Region Severe - thin with very little definition/firmness    Row Name 12/14/22 1559       Fat Loss    Subcutaneous Fat Loss Findings Severe    Orbital Region  Severe - pronounced hollowness/depression, dark circles, loose saggy skin    Upper Arm Region Severe - mostly skin, very little space between folds, fingers touch    Thoracic & Lumbar Region Severe - ribs visible with prominent depressions, iliac crest very prominent    Row Name 12/14/22 1553       Criteria Met (Must meet criteria for severity in at least 2 of these categories: M Wasting, Fat Loss, Fluid, Secondary Signs, Wt. Status, Intake)    Patient meets criteria for  Severe Malnutrition

## 2022-12-14 NOTE — PLAN OF CARE
Goal Outcome Evaluation:  Pt resting in bed. Pt alert and oriented x4, with intermittent confusion to situation. No complaints at this time. VSS. Will continue plan of care.

## 2022-12-15 LAB
GLUCOSE BLDC GLUCOMTR-MCNC: 100 MG/DL (ref 70–130)
MAGNESIUM SERPL-MCNC: 2.2 MG/DL (ref 1.6–2.4)

## 2022-12-15 PROCEDURE — 94761 N-INVAS EAR/PLS OXIMETRY MLT: CPT

## 2022-12-15 PROCEDURE — 94799 UNLISTED PULMONARY SVC/PX: CPT

## 2022-12-15 PROCEDURE — 25010000002 LEVOFLOXACIN PER 250 MG: Performed by: INTERNAL MEDICINE

## 2022-12-15 PROCEDURE — 97110 THERAPEUTIC EXERCISES: CPT

## 2022-12-15 PROCEDURE — 83735 ASSAY OF MAGNESIUM: CPT | Performed by: INTERNAL MEDICINE

## 2022-12-15 PROCEDURE — 99232 SBSQ HOSP IP/OBS MODERATE 35: CPT | Performed by: INTERNAL MEDICINE

## 2022-12-15 PROCEDURE — 25010000002 ENOXAPARIN PER 10 MG: Performed by: GENERAL PRACTICE

## 2022-12-15 PROCEDURE — 97116 GAIT TRAINING THERAPY: CPT

## 2022-12-15 PROCEDURE — 97530 THERAPEUTIC ACTIVITIES: CPT

## 2022-12-15 PROCEDURE — 82962 GLUCOSE BLOOD TEST: CPT

## 2022-12-15 RX ORDER — DIPHENOXYLATE HYDROCHLORIDE AND ATROPINE SULFATE 2.5; .025 MG/1; MG/1
1 TABLET ORAL DAILY
Status: DISCONTINUED | OUTPATIENT
Start: 2022-12-15 | End: 2022-12-20 | Stop reason: HOSPADM

## 2022-12-15 RX ORDER — ALBUTEROL SULFATE 2.5 MG/3ML
2.5 SOLUTION RESPIRATORY (INHALATION) EVERY 6 HOURS PRN
Status: DISCONTINUED | OUTPATIENT
Start: 2022-12-15 | End: 2022-12-20 | Stop reason: HOSPADM

## 2022-12-15 RX ORDER — HYDROCODONE BITARTRATE AND ACETAMINOPHEN 7.5; 325 MG/1; MG/1
1 TABLET ORAL EVERY 6 HOURS PRN
Status: DISCONTINUED | OUTPATIENT
Start: 2022-12-15 | End: 2022-12-20 | Stop reason: HOSPADM

## 2022-12-15 RX ADMIN — ACETAMINOPHEN 1000 MG: 500 TABLET ORAL at 16:57

## 2022-12-15 RX ADMIN — TAMSULOSIN HYDROCHLORIDE 0.4 MG: 0.4 CAPSULE ORAL at 20:52

## 2022-12-15 RX ADMIN — AMITRIPTYLINE HYDROCHLORIDE 25 MG: 25 TABLET, FILM COATED ORAL at 20:52

## 2022-12-15 RX ADMIN — Medication 1 TABLET: at 20:52

## 2022-12-15 RX ADMIN — IPRATROPIUM BROMIDE AND ALBUTEROL SULFATE 3 ML: 2.5; .5 SOLUTION RESPIRATORY (INHALATION) at 00:34

## 2022-12-15 RX ADMIN — FINASTERIDE 5 MG: 5 TABLET, FILM COATED ORAL at 07:39

## 2022-12-15 RX ADMIN — IPRATROPIUM BROMIDE AND ALBUTEROL SULFATE 3 ML: 2.5; .5 SOLUTION RESPIRATORY (INHALATION) at 19:05

## 2022-12-15 RX ADMIN — SODIUM CHLORIDE, POTASSIUM CHLORIDE, SODIUM LACTATE AND CALCIUM CHLORIDE 100 ML/HR: 600; 310; 30; 20 INJECTION, SOLUTION INTRAVENOUS at 16:57

## 2022-12-15 RX ADMIN — BUDESONIDE AND FORMOTEROL FUMARATE DIHYDRATE 2 PUFF: 160; 4.5 AEROSOL RESPIRATORY (INHALATION) at 06:55

## 2022-12-15 RX ADMIN — ENOXAPARIN SODIUM 40 MG: 40 INJECTION SUBCUTANEOUS at 20:52

## 2022-12-15 RX ADMIN — SODIUM CHLORIDE, POTASSIUM CHLORIDE, SODIUM LACTATE AND CALCIUM CHLORIDE 100 ML/HR: 600; 310; 30; 20 INJECTION, SOLUTION INTRAVENOUS at 07:39

## 2022-12-15 RX ADMIN — IPRATROPIUM BROMIDE AND ALBUTEROL SULFATE 3 ML: 2.5; .5 SOLUTION RESPIRATORY (INHALATION) at 13:35

## 2022-12-15 RX ADMIN — LEVOFLOXACIN 750 MG: 750 INJECTION, SOLUTION INTRAVENOUS at 16:57

## 2022-12-15 RX ADMIN — IPRATROPIUM BROMIDE AND ALBUTEROL SULFATE 3 ML: 2.5; .5 SOLUTION RESPIRATORY (INHALATION) at 06:55

## 2022-12-15 RX ADMIN — BUDESONIDE AND FORMOTEROL FUMARATE DIHYDRATE 2 PUFF: 160; 4.5 AEROSOL RESPIRATORY (INHALATION) at 19:05

## 2022-12-15 RX ADMIN — Medication 3 ML: at 20:52

## 2022-12-15 RX ADMIN — Medication 10 ML: at 20:52

## 2022-12-15 RX ADMIN — ACETAMINOPHEN 1000 MG: 500 TABLET ORAL at 07:39

## 2022-12-15 RX ADMIN — LEVOTHYROXINE SODIUM 50 MCG: 0.05 TABLET ORAL at 07:39

## 2022-12-15 RX ADMIN — ACETAMINOPHEN 1000 MG: 500 TABLET ORAL at 20:52

## 2022-12-15 NOTE — THERAPY TREATMENT NOTE
Acute Care - Physical Therapy Treatment Note   [place]     Patient Name: Sarbjit Martin  : 1935  MRN: [id number]  Today's Date: 12/15/2022   Onset of Illness/Injury or Date of Surgery: 22 (admission date)  Visit Dx:     ICD-10-CM ICD-9-CM   1. Closed fracture of left hip, initial encounter ([facility])  S72.002A 820.8     Patient Active Problem List   Diagnosis   • Status post laparoscopic cholecystectomy   • COPD (chronic obstructive pulmonary disease) ([facility])   • Abnormal CT of the chest   • Pulmonary nodule   • Benign prostatic hyperplasia without lower urinary tract symptoms   • NSTEMI (non-ST elevated myocardial infarction) ([facility])   • COPD exacerbation ([facility])   • Underweight   • Severe malnutrition ([facility])   • Aortic aneurysm ([facility])   • Closed fracture of left hip ([facility])   • Closed fracture of left hip, initial encounter ([facility])     Past Medical History:   Diagnosis Date   • Angiodysplasia of the colon    • Arthritis    • Bacteremia due to Escherichia coli 2017    Same time as the common bile duct stone   • Benign prostatic hyperplasia without lower urinary tract symptoms 2021   • Bile duct calculus 2017   • 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[facility])     Centrilobular   • Diverticulosis    • GERD (gastroesophageal reflux disease)    • Heart failure with preserved ejection fraction ([facility])    • Left upper lobe pulmonary nodule 2019    15 mm and spiculated in 2018; FNA was negative   • Moderate pulmonary hypertension ([facility])    • Myocardial infarction ([facility])    • Status post laparoscopic cholecystectomy 06/15/2017     Past Surgical History:   Procedure Laterality Date   • BRONCHOSCOPY N/A 2018    Procedure: BRONCHOSCOPY WITH ENDOBRONCHIAL ULTRASOUND;  Surgeon: Saravanan Méndez MD;  Location: [facility] OR;  Service: Pulmonary   • CHOLECYSTECTOMY N/A 2017    Procedure: CHOLECYSTECTOMY LAPAROSCOPIC;  Surgeon: Jose Velazquez MD;  Location: [facility] OR;  Service:    • EYE  SURGERY Bilateral 2013   • INGUINAL HERNIA REPAIR Left 2013    left   • CO ERCP DX COLLECTION SPECIMEN BRUSHING/WASHING N/A 6/26/2017    Procedure: ENDOSCOPIC RETROGRADE CHOLANGIOPANCREATOGRAPHY;  Surgeon: Rod Francis MD;  Location: [facility] OR;  Service: Gastroenterology   • CO ERCP DX COLLECTION SPECIMEN BRUSHING/WASHING N/A 10/26/2017    Procedure: ENDOSCOPIC RETROGRADE CHOLANGIOPANCREATOGRAPHY;  Surgeon: Rod Francis MD;  Location: [facility] OR;  Service: Gastroenterology     PT Assessment (last 12 hours)     PT Evaluation and Treatment     Row Name 12/15/22 1506          Physical Therapy Time and Intention    Subjective Information no complaints  -RG     Document Type therapy note (daily note)  -RG     Mode of Treatment physical therapy  -RG     Patient Effort adequate  -RG     Comment Pt and nursing in agreement for skilled PT on this date. Pt was pleasant and cooperative with theapist.  He was able to sit EOB and perform LE strengthening exercises and ambulated with RW Mod-Max assist~ 4' forward and backward and side steps from the foot of the bed to the head of the bed.  Pt reluctant to wb on L LE, weight shifting decreased.  -RG     Row Name 12/15/22 1500          General Information    Patient Profile Reviewed yes  -RG     Equipment Currently Used at Home other (see comments)  cane  -RG     Existing Precautions/Restrictions hip, anterior;weight bearing;fall  -RG     Equipment Issued to Patient gait belt  -RG     Row Name 12/15/22 150          Cognition    Orientation Status (Cognition) oriented to;person;place  -RG     Personal Safety Interventions gait belt;nonskid shoes/slippers when out of bed;fall prevention program maintained  -RG     Row Name 12/15/22 1502          Mobility    Extremity Weight-bearing Status left lower extremity  -RG     Left Lower Extremity (Weight-bearing Status) weight-bearing as tolerated (WBAT)  -RG     Row Name 12/15/22 1500          Bed Mobility    Bed Mobility  supine-sit;sit-supine  -     Supine-Sit Payne (Bed Mobility) maximum assist (25% patient effort);dependent (less than 25% patient effort)  -     Sit-Supine Payne (Bed Mobility) maximum assist (25% patient effort);dependent (less than 25% patient effort)  -     Row Name 12/15/22 1506          Transfers    Transfers sit-stand transfer;stand-sit transfer  -     Row Name 12/15/22 1506          Sit-Stand Transfer    Sit-Stand Payne (Transfers) moderate assist (50% patient effort);maximum assist (25% patient effort)  -     Assistive Device (Sit-Stand Transfers) walker, front-wheeled  -     Row Name 12/15/22 1506          Stand-Sit Transfer    Stand-Sit Payne (Transfers) contact guard;minimum assist (75% patient effort);verbal cues;nonverbal cues (demo/gesture)  -     Assistive Device (Stand-Sit Transfers) walker, front-wheeled  -     Row Name 12/15/22 1506          Gait/Stairs (Locomotion)    Distance in Feet (Gait) ~ 4' forward and backwards, side steps from foot of bed to the head of the bed  -RG     Comment, (Gait/Stairs) reluctant to wb on L LE, ws decreased  -     Row Name 12/15/22 1506          Balance    Balance Interventions sitting;static  -RG     Comment, Balance static sitting balance EOB  -     Row Name 12/15/22 1506          Motor Skills    Therapeutic Exercise hip;knee;ankle  -     Row Name 12/15/22 1506          Hip (Therapeutic Exercise)    Hip (Therapeutic Exercise) strengthening exercise  -     Hip Strengthening (Therapeutic Exercise) bilateral;flexion;aBduction;aDduction;sitting;marching while standing;10 repetitions;2 sets  -     Row Name 12/15/22 1506          Knee (Therapeutic Exercise)    Knee (Therapeutic Exercise) strengthening exercise  -     Knee Strengthening (Therapeutic Exercise) bilateral;extension;flexion;marching while seated;LAQ (long arc quad);sitting;10 repetitions;2 sets  -     Row Name 12/15/22 1506          Ankle (Therapeutic  Exercise)    Ankle (Therapeutic Exercise) strengthening exercise  -RG     Ankle Strengthening (Therapeutic Exercise) bilateral;dorsiflexion;plantarflexion;sitting;10 repetitions;2 sets  -RG     Row Name             Wound 12/12/22 2333 Left upper scalp Skin Tear    Wound - Properties Group Placement Date: 12/12/22  -KM Placement Time: 2333  -KM Present on Hospital Admission: Y  -KM Side: Left  -KM Orientation: upper  -KM Location: scalp  -KM Primary Wound Type: Skin tear  -KM    Retired Wound - Properties Group Placement Date: 12/12/22  -KM Placement Time: 2333  -KM Present on Hospital Admission: Y  -KM Side: Left  -KM Orientation: upper  -KM Location: scalp  -KM Primary Wound Type: Skin tear  -KM    Retired Wound - Properties Group Date first assessed: 12/12/22  -KM Time first assessed: 2333  -KM Present on Hospital Admission: Y  -KM Side: Left  -KM Location: scalp  -KM Primary Wound Type: Skin tear  -KM    Row Name             Wound 12/13/22 1421 Left greater trochanter Incision    Wound - Properties Group Placement Date: 12/13/22  - Placement Time: 1421  -MC Present on Hospital Admission: N  -MC Side: Left  -MC Location: greater trochanter  -MC Primary Wound Type: Incision  -MC    Retired Wound - Properties Group Placement Date: 12/13/22  - Placement Time: 1421  -MC Present on Hospital Admission: N  -MC Side: Left  -MC Location: greater trochanter  -MC Primary Wound Type: Incision  -MC    Retired Wound - Properties Group Date first assessed: 12/13/22  - Time first assessed: 1421  -MC Present on Hospital Admission: N  -MC Side: Left  -MC Location: greater trochanter  -MC Primary Wound Type: Incision  -MC    Row Name 12/15/22 1506          Positioning and Restraints    Pre-Treatment Position in bed  -RG     Post Treatment Position bed  -RG     In Bed notified nsg;supine;call light within reach;encouraged to call for assist;with family/caregiver;legs elevated;ABD pillow  -RG     Row Name 12/15/22 1508           Therapy Assessment/Plan (PT)    Rehab Potential (PT) other (see comments)  fair/guarded  -RG     Criteria for Skilled Interventions Met (PT) yes  -RG     Therapy Frequency (PT) 6 times/wk  -RG     Row Name 12/15/22 1506          Physical Therapy Goals    Bed Mobility Goal Selection (PT) bed mobility, PT goal 1  -RG     Transfer Goal Selection (PT) transfer, PT goal 1  -RG     Gait Training Goal Selection (PT) gait training, PT goal 1  -RG     Row Name 12/15/22 1506          Bed Mobility Goal 1 (PT)    Activity/Assistive Device (Bed Mobility Goal 1, PT) sit to supine;supine to sit  -RG     Irion Level/Cues Needed (Bed Mobility Goal 1, PT) contact guard required;minimum assist (75% or more patient effort)  -RG     Time Frame (Bed Mobility Goal 1, PT) long term goal (LTG)  -RG     Row Name 12/15/22 1506          Transfer Goal 1 (PT)    Activity/Assistive Device (Transfer Goal 1, PT) sit-to-stand/stand-to-sit  -RG     Irion Level/Cues Needed (Transfer Goal 1, PT) contact guard required  -RG     Time Frame (Transfer Goal 1, PT) long term goal (LTG)  -RG     Row Name 12/15/22 1506          Gait Training Goal 1 (PT)    Activity/Assistive Device (Gait Training Goal 1, PT) gait (walking locomotion);assistive device use;walker, rolling  -RG     Irion Level (Gait Training Goal 1, PT) contact guard required  -RG     Distance (Gait Training Goal 1, PT) 10'  -RG           User Key  (r) = Recorded By, (t) = Taken By, (c) = Cosigned By    Initials Name Provider Type     Charline Lama, RN Registered Nurse    Lexx Perrin PTA Physical Therapist Assistant    Kayley Rivera RN Registered Nurse                Physical Therapy Education     Title: PT OT SLP Therapies (Done)     Topic: Physical Therapy (Done)     Point: Mobility training (Done)     Learning Progress Summary           Patient Acceptance, E,D, VU,NR by ESTHER at 12/15/2022 1523                   Point: Home exercise program (Done)      Learning Progress Summary           Patient Acceptance, E,D, VU,NR by  at 12/15/2022 1523                   Point: Body mechanics (Done)     Learning Progress Summary           Patient Acceptance, E,D, VU,NR by  at 12/15/2022 1523                   Point: Precautions (Done)     Learning Progress Summary           Patient Acceptance, E,D, VU,NR by  at 12/15/2022 1523                               User Key     Initials Effective Dates Name Provider Type Discipline     06/16/21 -  Lexx Michaels PTA Physical Therapist Assistant PT              PT Recommendation and Plan  Anticipated Discharge Disposition (PT): skilled nursing facility, sub acute care setting, home with 24/7 care, home with assist, home with supervision, home with home health  Therapy Frequency (PT): 6 times/wk          Time Calculation:    PT Charges     Row Name 12/15/22 1523             Time Calculation    PT Received On 12/15/22  -         Time Calculation- PT    Total Timed Code Minutes- PT 40 minute(s)  -            User Key  (r) = Recorded By, (t) = Taken By, (c) = Cosigned By    Initials Name Provider Type     Lexx Michaels PTA Physical Therapist Assistant              Therapy Charges for Today     Code Description Service Date Service Provider Modifiers Qty    09868373673 HC GAIT TRAINING EA 15 MIN 12/15/2022 Lexx Michaels PTA GP, CQ 1    44773559233 HC PT THERAPEUTIC ACT EA 15 MIN 12/15/2022 Lexx Michaels PTA GP, CQ 1    31819327042 HC PT THER PROC EA 15 MIN 12/15/2022 Lexx Michaels PTA GP, CQ 1          PT G-Codes  AM-PAC 6 Clicks Score (PT): 7    Lexx Michaels PTA  12/15/2022

## 2022-12-15 NOTE — PLAN OF CARE
Goal Outcome Evaluation:  Plan of Care Reviewed With: patient        Progress: no change  Outcome Evaluation: Pt resting in bed. Vital signs stable. Pt disoriented to place and situation but easily reoriented. No aucte changes. Will continue plan of care.

## 2022-12-15 NOTE — PROGRESS NOTES
Inpatient Progress Note  Sarbjit Martin  Date: 12/15/22  MRN: [id number]      Subjective:  Resting in bed watching television.  Notes minimal pain to the left hip.  Denies chest pain or shortness of air.  (+) passing gas.  Notes plan at discharge is home with Home Health PT, his significant other is supposed to stay in his home to assist in his care.  No family at bedside this morning.        Objective:      Vitals:    12/15/22 0042 12/15/22 0255 12/15/22 0500 12/15/22 0622   BP:  127/52  116/55   BP Location:  Right arm  Right arm   Patient Position:  Lying  Lying   Pulse: 96 93  93   Resp: 18 18  16   Temp:  98.3 °F (36.8 °C)  97.7 °F (36.5 °C)   TempSrc:  Axillary  Oral   SpO2: 99%   91%   Weight:   50.7 kg (111 lb 12.8 oz)    Height:              Physical Exam:  Constitutional:  Thin, elderly male.  No acute distress.  02 @ 2lpm/nc in use.        Musculoskeletal:   Upon examination of the left hip, there is dry, serous drainage noted on approximately 1/2 of the surgical dressing.  There is minimal edema to the lateral hip.  No induration.  No ecchymosis, erythema, or warmth.  Minimal tenderness.  (+) DF/PF at the ankle.  Capillary refill is brisk and light touch sensation is intact, distally.  DP pulse 3/4.       Labs:    Results from last 7 days   Lab Units 12/14/22  0406 12/13/22  1911 12/13/22  0850 12/13/22  0022   CRP mg/dL  --  4.11*  --   --    LACTATE mmol/L  --  1.5  --   --    WBC 10*3/mm3 13.55*  --   --  14.97*   HEMOGLOBIN g/dL 12.6*  --   --  14.9   HEMATOCRIT % 40.7  --   --  48.7   MCV fL 87.9  --   --  87.6   MCHC g/dL 31.0*  --   --  30.6*   PLATELETS 10*3/mm3 155  --   --  194   INR   --   --  1.09 0.97     Results from last 7 days   Lab Units 12/13/22  0747   PH, ARTERIAL pH units 7.344*   PO2 ART mm Hg 78.1*   PCO2, ARTERIAL mm Hg 55.4*   HCO3 ART mmol/L 30.1*     Results from last 7 days   Lab Units 12/14/22  0406 12/13/22  0022   SODIUM mmol/L 134* 137   POTASSIUM mmol/L 4.4 5.1   MAGNESIUM  mg/dL 1.6  --    CHLORIDE mmol/L 99 101   CO2 mmol/L 25.6 21.2*   BUN mg/dL 31* 26*   CREATININE mg/dL 0.85 0.87   CALCIUM mg/dL 8.2* 9.2   GLUCOSE mg/dL 94 106*   ALBUMIN g/dL  --  3.49*   BILIRUBIN mg/dL  --  0.5   ALK PHOS U/L  --  72   AST (SGOT) U/L  --  21   ALT (SGPT) U/L  --  15   Estimated Creatinine Clearance: 45.6 mL/min (by C-G formula based on SCr of 0.85 mg/dL).  No results found for: AMMONIA          No results found for: HGBA1C  Lab Results   Component Value Date    TSH 0.664 11/01/2022         Pain Management Panel    There is no flowsheet data to display.         Blood Culture   Date Value Ref Range Status   12/13/2022 No growth at 24 hours  Preliminary   12/13/2022 No growth at 24 hours  Preliminary     No results found for: URINECX  No results found for: WOUNDCX  No results found for: STOOLCX              Radiology:  Imaging Results (Last 72 Hours)     Procedure Component Value Units Date/Time    XR Pelvis 1 or 2 View [[id number]] Collected: 12/13/22 1627     Updated: 12/13/22 1629    Narrative:      EXAMINATION: XR PELVIS 1 OR 2 VW-      CLINICAL INDICATION: AP pelvis, status post left hip hemiarthroplasty;  S72.002A-Fracture of unspecified part of neck of left femur, initial  encounter for closed fracture        COMPARISON: None available     FINDINGS: 2 views of the pelvis     Left hip prosthesis     Alignment is anatomic     Overlying skin clips are present.     Moderate to large volume stool      This report was finalized on 12/13/2022 4:27 PM by Dr. Barrie Guzman MD.       XR Chest 1 View [[id number]] Collected: 12/13/22 0832     Updated: 12/13/22 0839    Narrative:      XR CHEST 1 VW-     CLINICAL INDICATION: Pre Op; S72.002A-Fracture of unspecified part of  neck of left femur, initial encounter for closed fracture        COMPARISON: 10/31/2022      TECHNIQUE: Single frontal view of the chest.     FINDINGS:      LUNGS: Minimal left basilar consolidation      HEART AND MEDIASTINUM: Heart and  mediastinal contours are unremarkable        SKELETON: Bony and soft tissue structures are unremarkable.             Impression:      Minimal left basilar consolidation. COPD.     This report was finalized on 12/13/2022 8:37 AM by Dr. Barrie Guzman MD.       XR Femur 2 View Left [[id number]] Collected: 12/13/22 0810     Updated: 12/13/22 0812    Narrative:      EXAM:    XR Left Femur, 2 Views     EXAM DATE:    12/13/2022 7:28 AM     CLINICAL HISTORY:    femur fracture; S72.002A-Fracture of unspecified part of neck of left  femur, initial encounter for closed fracture     TECHNIQUE:    Frontal and lateral views of the left femur.     COMPARISON:    12/12/2022     FINDINGS:    Bones/joints:  Left femoral neck fracture again noted.  No distal  femoral fracture is seen.  No dislocation.    Soft tissues:  Unremarkable.       Impression:      1.  Left femoral neck fracture again noted.  2.  No distal femoral fracture is seen.     This report was finalized on 12/13/2022 8:10 AM by Dr. Barrie Guzman MD.       CT Head Without Contrast [[id number]] Collected: 12/13/22 0023     Updated: 12/13/22 0025    Narrative:      CT Head WO    HISTORY:   Head injury. Fell and hit left side of head.    TECHNIQUE:   Axial unenhanced head CT with multiplanar reformats. Radiation dose reduction techniques included automated exposure control or exposure modulation based on body size. Count of known CT and cardiac nuc med studies performed in previous 12 months: 1.     COMPARISON:   None.    FINDINGS:   Ventricular size and configuration are normal. No acute infarct or hemorrhage is identified. There are no masses. There is no skull fracture.  There is atrophy with chronic small vessel ischemic disease in the white matter. Atherosclerotic calcifications  are present in the carotid siphons. There is mild injury to the left temporal scalp.      Impression:      Left temporal scalp injury. No other acute findings.    Signer Name: Rommel Umanzor,  MD   Signed: 12/13/2022 12:23 AM   Workstation Name: [facility]    [facility]    CT Cervical Spine Without Contrast [[id number]] Collected: 12/13/22 0019     Updated: 12/13/22 0022    Narrative:      CT Spine Cervical WO    INDICATION:   Neck trauma. Fall.    TECHNIQUE:   CT of the cervical spine without IV contrast. Coronal and sagittal reconstructions were obtained.  Radiation dose reduction techniques included automated exposure control or exposure modulation based on body size. Count of known CT and cardiac nuc med  studies performed in previous 12 months: 1.     COMPARISON:  None available.    FINDINGS:  Alignment is normal with no subluxation identified. There is no acute cervical spine fracture. Note is made of carotid atherosclerotic disease. There is mild multilevel degenerative disc disease. No large disc herniations are seen. There is no  significant central canal or foraminal stenosis at any level. COPD and granulomatous calcifications are noted in the lung apices. Partially visible is the known left upper lobe mass that was evaluated with chest CT on 11/1/2022. This remains concerning  for malignancy. PET CT is once again recommended if this has not been performed elsewhere.      Impression:        1. No acute cervical spine fracture or malalignment.  2. 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 Please see chest CT report of 11/1/2022.    Signer Name: Rommel Umanzor MD   Signed: 12/13/2022 12:19 AM   Workstation Name: [facility]    [facility]    XR Hip With or Without Pelvis 2 - 3 View Left [[id number]] Collected: 12/13/22 0015     Updated: 12/13/22 0017    Narrative:      CR Hip Uni Comp Min 2 Vws LT    INDICATION:   Hip pain after fall.    COMPARISON:   None available.    FINDINGS:  AP pelvis and AP and frog-leg view(s) of the left hip.  There is an acute left femoral neck fracture. No hip dislocation on either side. There  is degenerative disease in both hips. No additional fractures. Note is made of atherosclerotic disease.        Impression:      Acute left femoral neck fracture. No dislocation.    Signer Name: Rommel Umanzor MD   Signed: 12/13/2022 12:15 AM   Workstation Name: [email or web address]    [facility]            Assessment:  Status post left hip hemiarthroplasty for fracture, POD #2.           Plan:    Encourage mobility.    Weight bear as tolerated to the left lower extremity with a walker and assistance, as needed.  PT/OT for mobility and ambulation.  On Lovenox for DVT prophylaxis.  Recommend x 28 days post op.  Maintain the dressing to the left hip incision, may change as needed for saturation.  Will change today.  Discharge disposition per Hospitalist service.  Will plan to have the patient follow up in the office at 2 weeks post op with Dr. Reyez.  [facility] in the morning.  Orthopedic surgery following.      DAVID Gomez December 15, 2022 08:15 EST

## 2022-12-15 NOTE — PLAN OF CARE
Goal Outcome Evaluation:               Pt resting with no complaints at this time. Wound care performed this shift.

## 2022-12-15 NOTE — THERAPY TREATMENT NOTE
Acute Care - Physical Therapy Treatment Note       [facility]     Patient Name: Sarbjit Martin  : 1935  MRN: [id number]    Today's Date: 12/15/2022   Onset of Illness/Injury or Date of Surgery: 22 (admission date)                Admit Date: 2022      Visit Dx:     ICD-10-CM ICD-9-CM   1. Closed fracture of left hip, initial encounter ([facility])  S72.002A 820.8       Patient Active Problem List   Diagnosis   • Status post laparoscopic cholecystectomy   • COPD (chronic obstructive pulmonary disease) ([facility])   • Abnormal CT of the chest   • Pulmonary nodule   • Benign prostatic hyperplasia without lower urinary tract symptoms   • NSTEMI (non-ST elevated myocardial infarction) ([facility])   • COPD exacerbation ([facility])   • Underweight   • Severe malnutrition ([facility])   • Aortic aneurysm ([facility])   • Closed fracture of left hip ([facility])   • Closed fracture of left hip, initial encounter ([facility])       Past Medical History:   Diagnosis Date   • Angiodysplasia of the colon    • Arthritis    • Bacteremia due to Escherichia coli 2017    Same time as the common bile duct stone   • Benign prostatic hyperplasia without lower urinary tract symptoms 2021   • Bile duct calculus 2017   • 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[facility])     Centrilobular   • Diverticulosis    • GERD (gastroesophageal reflux disease)    • Heart failure with preserved ejection fraction ([facility])    • Left upper lobe pulmonary nodule 2019    15 mm and spiculated in 2018; FNA was negative   • Moderate pulmonary hypertension ([facility])    • Myocardial infarction ([facility])    • Status post laparoscopic cholecystectomy 06/15/2017       Past Surgical History:   Procedure Laterality Date   • BRONCHOSCOPY N/A 2018    Procedure: BRONCHOSCOPY WITH ENDOBRONCHIAL ULTRASOUND;  Surgeon: Saravanan Méndez MD;  Location: [facility];  Service: Pulmonary   • CHOLECYSTECTOMY N/A 2017    Procedure: CHOLECYSTECTOMY LAPAROSCOPIC;  Surgeon: Jose MATHEW  MD Marcus;  Location: [facility];  Service:    • EYE SURGERY Bilateral 2013   • INGUINAL HERNIA REPAIR Left 2013    left   • PA ERCP DX COLLECTION SPECIMEN BRUSHING/WASHING N/A 6/26/2017    Procedure: ENDOSCOPIC RETROGRADE CHOLANGIOPANCREATOGRAPHY;  Surgeon: Rod Francis MD;  Location: [facility];  Service: Gastroenterology   • PA ERCP DX COLLECTION SPECIMEN BRUSHING/WASHING N/A 10/26/2017    Procedure: ENDOSCOPIC RETROGRADE CHOLANGIOPANCREATOGRAPHY;  Surgeon: Rod Francis MD;  Location: [facility] OR;  Service: Gastroenterology       PT ASSESSMENT (last 12 hours)     IRF PT Evaluation and Treatment    No documentation.               Wound 12/12/22 2333 Left upper scalp Skin Tear (Active)   Dressing Appearance open to air 12/14/22 2030   Closure None 12/14/22 2030   Base dry 12/14/22 2030   Periwound Temperature warm 12/14/22 2030   Periwound Skin Turgor firm 12/14/22 2030   Drainage Amount none 12/14/22 2030   Dressing Care open to air 12/15/22 0750   Periwound Care dry periwound area maintained 12/14/22 2030       Wound 12/13/22 1421 Left greater trochanter Incision (Active)   Dressing Appearance dry;intact 12/14/22 2030   Closure LINDA 12/14/22 2030   Base dressing in place, unable to visualize 12/14/22 2030   Drainage Amount none 12/14/22 2030   Dressing Care dressing changed 12/15/22 0750     Physical Therapy Education     Title: PT OT SLP Therapies (Done)     Topic: Physical Therapy (Done)     Point: Mobility training (Done)     Learning Progress Summary           Patient Acceptance, E,D, VU,NR by RG at 12/15/2022 1523                   Point: Home exercise program (Done)     Learning Progress Summary           Patient Acceptance, E,D, VU,NR by RG at 12/15/2022 1523                   Point: Body mechanics (Done)     Learning Progress Summary           Patient Acceptance, E,D, VU,NR by RG at 12/15/2022 1523                   Point: Precautions (Done)     Learning Progress Summary           Patient  Acceptance, E,D, VU,NR by  at 12/15/2022 1523                               User Key     Initials Effective Dates Name Provider Type Discipline     06/16/21 -  Lexx Michaels PTA Physical Therapist Assistant PT                PT Recommendation and Plan    Anticipated Equipment Needs at Discharge (PT): front wheeled walker, bedside commode  Anticipated Discharge Disposition (PT): skilled nursing facility, sub acute care setting, home with 24/7 care, home with assist, home with supervision, home with home health  Therapy Frequency (PT): 6 times/wk                     Time Calculation:      PT Charges     Row Name 12/15/22 1523             Time Calculation    PT Received On 12/15/22  -         Time Calculation- PT    Total Timed Code Minutes- PT 40 minute(s)  -            User Key  (r) = Recorded By, (t) = Taken By, (c) = Cosigned By    Initials Name Provider Type    RG Lexx Michaels PTA Physical Therapist Assistant                Therapy Charges for Today     Code Description Service Date Service Provider Modifiers Qty    98561070790 HC GAIT TRAINING EA 15 MIN 12/15/2022 Lexx Michaels PTA GP, CQ 1    01440827321 HC PT THERAPEUTIC ACT EA 15 MIN 12/15/2022 Lexx Michaels PTA GP, CQ 1    89707143298 HC PT THER PROC EA 15 MIN 12/15/2022 Lexx Michaels PTA GP, CQ 1            PT G-Codes  AM-PAC 6 Clicks Score (PT): 7      Lexx Michaels PTA  12/15/2022

## 2022-12-15 NOTE — PROGRESS NOTES
Patient Identification:  Name:  Sarbjit Martin  Age:  87 y.o.  Sex:  male  :  1935  MRN:  [id number]  Visit Number:  [id number]  Primary Care Provider:  Goldie Steward APRN    Length of stay:  2    Subjective/Interval History/Consultants/Procedures     Chief complaint: Follow-up, acute left femoral fracture s/p left hemiarthroplasty, POD #2     Subjective/Interval History:  Sarbjit Martin is our 87 y.o. male patient admitted on 2022 for acute left hip fracture s/p mechanical fall at home. He has a pMH significant for BPH, arthritis, bile duct calculus s/p ERCP, COPD, chronic hypoxic respiratory failure, chronic HFpEF, GERD, moderate pulmonary hypertension, and advanced age. For further and complete admitting details, please see admission H&P.     He was admitted to the [facility] floor for further monitoring, evaluation, and treatment. Patient had presented to the ED with complaints of left hip pain s/p mechanical fall at home from standing height. Imaging obtained in the ED revealed acute left femoral fracture. He ultimately underwent left hip hemiarthroplasty on  by Dr. Reyez and tolerated procedure well. PT/OT consults placed for postop eval. CM consulted for assistance with discharge placement. On initial exam, patient had increased supplemental O2 requirement from baseline. Usually uses 2L at home; was on 4L. Lungs coarse/diminished bilaterally with RLL crackles. Chest imaging suggestive of LLL infiltrate. Patient was started on Levaquin for empiric treatment. Also receiving Symbicort and DuoNebs. Incentive spirometer use and turn/cough/deep breathing exercises encouraged. Continuous pulse oximetry in place. Respiratory panel negative. He has known left pulmonary nodule, has increased in size since previous exam. Attending discussed with lung nodule clinic Irma HERNANDEZ to set up appointment for follow-up in the outpatient setting. Patient is now on baseline O2 requirement of 2L and  stable. Continue to monitor progress with PT to determine DC disposition.      Procedures/Imaging:  • Xray of the left pelvis x2  • Xray of the left femur  • Chest xray  • CT head without contrast  • CT cervical spine without contrast  • Left hip hemiarthroplasty on 12/13 by Dr. Reyez.      Consults:  • Orthopedic Surgery  • PT/OT  • Case management     On today's exam, patient was lying in bed with no s/s acute distress noted. States that he worked with PT in the bed yesterday, but was unable to stand. States that left hip pain is minimal while a rest. Reports moderate pain with movement/activity. Left hip dressing appearing C/D/I without evidence of evolving or developing hematoma. LLE remaining neurovascularly intact. Patient is currently on 2L and states that breathing seems to be better. Lungs mildly coarse bilaterally with RLL inspiratory crackles remaining. Overall air movement does seem improved from previous exam. Patient currently denies any acute symptoms such as chest pain, SOA, palpitations. Discussed plan with patient. Voiced agreement with no further questions or concerns at this time.      No acute events reported overnight. Room location at the time of evaluation was [facility]. Discussed with attending physician, Rah Harrison MD.   ----------------------------------------------------------------------------------------------------------------------  Current Hospital Meds:  acetaminophen, 1,000 mg, Oral, TID  amitriptyline, 25 mg, Oral, Nightly  budesonide-formoterol, 2 puff, Inhalation, BID - RT  enoxaparin, 40 mg, Subcutaneous, Q24H  finasteride, 5 mg, Oral, Daily  ipratropium-albuterol, 3 mL, Nebulization, 4x Daily - RT  levoFLOXacin, 750 mg, Intravenous, Q48H  levothyroxine, 50 mcg, Oral, Daily  sodium chloride, 10 mL, Intravenous, Q12H  sodium chloride, 3 mL, Intravenous, Q12H  tamsulosin, 0.4 mg, Oral, Nightly      lactated ringers, 100 mL/hr, Last Rate: 100 mL/hr (12/15/22 0739)  Pharmacy  Consult,       ----------------------------------------------------------------------------------------------------------------------      Objective     Vital Signs:  Temp:  [97.3 °F (36.3 °C)-98.3 °F (36.8 °C)] 97.7 °F (36.5 °C)  Heart Rate:  [86-96] 93  Resp:  [16-18] 16  BP: (112-127)/(51-61) 116/55      12/12/22  2328 12/14/22  0500 12/15/22  0500   Weight: 46.7 kg (103 lb) 50.6 kg (111 lb 8 oz) 50.7 kg (111 lb 12.8 oz)     Body mass index is 18.04 kg/m².    Intake/Output Summary (Last 24 hours) at 12/15/2022 1106  Last data filed at 12/15/2022 0900  Gross per 24 hour   Intake 320 ml   Output 200 ml   Net 120 ml     I/O this shift:  In: -   Out: 200 [Urine:200]  Diet: Regular/House Diet; Texture: Regular Texture (IDDSI 7); Fluid Consistency: Thin (IDDSI 0)  ----------------------------------------------------------------------------------------------------------------------    Physical Exam  Vitals and nursing note reviewed.   Constitutional:       General: He is awake. He is not in acute distress.     Appearance: He is not diaphoretic.      Interventions: Nasal cannula in place.      Comments: Currently on 2L NC.   HENT:      Head: Normocephalic and atraumatic.      Mouth/Throat:      Mouth: Mucous membranes are moist.      Pharynx: Oropharynx is clear.   Eyes:      Extraocular Movements: Extraocular movements intact.   Cardiovascular:      Rate and Rhythm: Normal rate and regular rhythm.      Pulses: Normal pulses.           Dorsalis pedis pulses are 2+ on the right side and 2+ on the left side.      Heart sounds: No murmur heard.    No friction rub.   Pulmonary:      Effort: Pulmonary effort is normal. No accessory muscle usage, respiratory distress or retractions.      Breath sounds: Rales (RLL) present. No wheezing or rhonchi.      Comments: Lungs mildly coarse bilaterally with RLL inspiratory crackles. Air movement overall has improved.   Abdominal:      General: Bowel sounds are normal. There is no  distension.      Palpations: Abdomen is soft.      Tenderness: There is no abdominal tenderness. There is no guarding.   Genitourinary:     Comments: External urinary catheter in use.  Musculoskeletal:      Cervical back: Neck supple. No rigidity.      Right lower leg: No edema.      Left lower leg: No edema.   Skin:     General: Skin is warm and dry.      Capillary Refill: Capillary refill takes 2 to 3 seconds.      Comments: Left hip dressing CDI without drainage. No surrounding erythema, edema, or evidence of evolving/developing hematoma.    Neurological:      Mental Status: He is alert and oriented to person, place, and time. Mental status is at baseline.      Cranial Nerves: No facial asymmetry.      Sensory: Sensation is intact.      Motor: Weakness (generalized) present. No tremor.   Psychiatric:         Attention and Perception: Attention normal.         Mood and Affect: Mood normal.         Speech: Speech normal.         Behavior: Behavior normal. Behavior is cooperative.         Thought Content: Thought content normal.       ----------------------------------------------------------------------------------------------------------------------  Tele:  Appearing normal sinus rhythm 80s on my exam.   ----------------------------------------------------------------------------------------------------------------------      Results from last 7 days   Lab Units 12/14/22  0406 12/13/22  1911 12/13/22  0850 12/13/22  0022   CRP mg/dL  --  4.11*  --   --    LACTATE mmol/L  --  1.5  --   --    WBC 10*3/mm3 13.55*  --   --  14.97*   HEMOGLOBIN g/dL 12.6*  --   --  14.9   HEMATOCRIT % 40.7  --   --  48.7   MCV fL 87.9  --   --  87.6   MCHC g/dL 31.0*  --   --  30.6*   PLATELETS 10*3/mm3 155  --   --  194   INR   --   --  1.09 0.97     Results from last 7 days   Lab Units 12/13/22  0747   PH, ARTERIAL pH units 7.344*   PO2 ART mm Hg 78.1*   PCO2, ARTERIAL mm Hg 55.4*   HCO3 ART mmol/L 30.1*     Results from last 7 days    Lab Units 12/15/22  0955 12/14/22  0406 12/13/22  0022   SODIUM mmol/L  --  134* 137   POTASSIUM mmol/L  --  4.4 5.1   MAGNESIUM mg/dL 2.2 1.6  --    CHLORIDE mmol/L  --  99 101   CO2 mmol/L  --  25.6 21.2*   BUN mg/dL  --  31* 26*   CREATININE mg/dL  --  0.85 0.87   CALCIUM mg/dL  --  8.2* 9.2   GLUCOSE mg/dL  --  94 106*   ALBUMIN g/dL  --   --  3.49*   BILIRUBIN mg/dL  --   --  0.5   ALK PHOS U/L  --   --  72   AST (SGOT) U/L  --   --  21   ALT (SGPT) U/L  --   --  15   Estimated Creatinine Clearance: 45.6 mL/min (by C-G formula based on SCr of 0.85 mg/dL).  No results found for: AMMONIA      Blood Culture   Date Value Ref Range Status   12/13/2022 No growth at 24 hours  Preliminary   12/13/2022 No growth at 24 hours  Preliminary     ----------------------------------------------------------------------------------------------------------------------  Imaging Results (Last 24 Hours)     ** No results found for the last 24 hours. **        ----------------------------------------------------------------------------------------------------------------------   I have reviewed the above laboratory values for 12/15/22    Assessment/Plan     Active Hospital Problems    Diagnosis  POA   • **Closed fracture of left hip ([facility]) [S72.002A]  Unknown     Added automatically from request for surgery [id number]     • Closed fracture of left hip, initial encounter ([facility]) [S72.002A]  Yes         ASSESSMENT/PLAN:  -Acute left femoral neck fracture POA s/p mechanical fall at home  -S/P left hip hemiarthroplasty on 12/13. Currently POD #2.   -Radiological images reviewed on admission.   -Orthopedic surgery following; greatly appreciate their assistance.   -PRN Tylenol for mild pain.   -Added as needed Norco for moderate pain.   -Neurovascular checks Q 4 hours.   -Fall precautions.  -WBAT.   -Dressing changes per ortho recommendations.  -Lovenox for VTE prophylaxis x 28 days postop.   -PT/OT consults for postop eval.   -CM for discharge  planning.   -Will need outpatient follow-up with ortho on discharge.      -Chronic HFpEF  -Appearing euvolemic on exam.   -Monitor strict I's and O's, daily weights.  -Echo from 11/1/2022 reviewed; normal EF (61-65%).      -Acute on chronic hypoxic respiratory failure, POA  -Moderate pulmonary hypertension  -Evidence of left basilar consolidation on imaging obtained in ED  -SIRS/sepsis criteria on admission with HR>90 and WBCs>12.   -Chest xray revealed minimal left basilar consolidation.  -ABG obtained on 4L NC; PO2 78.1%, PCO2 55.4, and O2 saturation 95.5%.   -Continue to titrate supplemental O2 to maintain SpO2 saturations > 90%.  -Now stable on 2L (baseline requirement).   -Continuous pulse oximetry.   -Encourage incentive spirometer and turn/cough/deep breathing exercises.  -Continue Symbicort and DuoNebs.   -Continue Levaquin for empiric coverage.   -Obtain sputum culture if able.   -Respiratory panel negative.   -Continue follow-up with pulmonology in the outpatient setting for known pulmonary nodule/mass.    -Repeat CBC and C-RP in the a.m.  -Peripheral blood cultures x2 obtained, pending. NGTD.   -Lactate non elevated. Procal negative. C-RP 4.11.       -GERD  -Aspiration and reflux precautions.  -PPI.     -History of BPH  -Monitor urine output closely.  -Continue Proscar and Flomax.   -So far no issues with urinary retention in the postop setting.      -Essential hypertension  -BP appears well controlled.   -Closely monitor BP per hospital protocol, titrate medications as necessary.     -Arthritis  -Supportive care.      -History of CBD calculus s/p ERCP  -History of E. Coli bacteremia        -----------  -DVT prophylaxis: Lovenox  -GI prophylaxis: PPI  -Activity: WBAT, fall precautions.   -Disposition plans/anticipated needs: pending clinical course and PT recommendations.   -Diet: Regular  -Home supplemental O2: Utilizes 2L at home. Currently stable on baseline requirement.         The patient is high  risk due to the following diagnoses/reasons:  Acute left femoral fracture s/p left hip hemiarthroplasty, advanced age.         DAVID Wilks  12/15/22  11:06 EST  Pager #[phone]

## 2022-12-16 LAB
ANION GAP SERPL CALCULATED.3IONS-SCNC: 6.3 MMOL/L (ref 5–15)
BASOPHILS # BLD AUTO: 0.03 10*3/MM3 (ref 0–0.2)
BASOPHILS NFR BLD AUTO: 0.4 % (ref 0–1.5)
BUN SERPL-MCNC: 19 MG/DL (ref 8–23)
BUN/CREAT SERPL: 36.5 (ref 7–25)
CALCIUM SPEC-SCNC: 7.7 MG/DL (ref 8.6–10.5)
CHLORIDE SERPL-SCNC: 104 MMOL/L (ref 98–107)
CO2 SERPL-SCNC: 26.7 MMOL/L (ref 22–29)
CREAT SERPL-MCNC: 0.52 MG/DL (ref 0.76–1.27)
CRP SERPL-MCNC: 16.03 MG/DL (ref 0–0.5)
DEPRECATED RDW RBC AUTO: 48.4 FL (ref 37–54)
EGFRCR SERPLBLD CKD-EPI 2021: 98.8 ML/MIN/1.73
EOSINOPHIL # BLD AUTO: 0.37 10*3/MM3 (ref 0–0.4)
EOSINOPHIL NFR BLD AUTO: 4.5 % (ref 0.3–6.2)
ERYTHROCYTE [DISTWIDTH] IN BLOOD BY AUTOMATED COUNT: 15.1 % (ref 12.3–15.4)
GLUCOSE SERPL-MCNC: 115 MG/DL (ref 65–99)
HCT VFR BLD AUTO: 30.2 % (ref 37.5–51)
HGB BLD-MCNC: 9.3 G/DL (ref 13–17.7)
IMM GRANULOCYTES # BLD AUTO: 0.06 10*3/MM3 (ref 0–0.05)
IMM GRANULOCYTES NFR BLD AUTO: 0.7 % (ref 0–0.5)
LYMPHOCYTES # BLD AUTO: 1.02 10*3/MM3 (ref 0.7–3.1)
LYMPHOCYTES NFR BLD AUTO: 12.3 % (ref 19.6–45.3)
MAGNESIUM SERPL-MCNC: 1.9 MG/DL (ref 1.6–2.4)
MCH RBC QN AUTO: 26.9 PG (ref 26.6–33)
MCHC RBC AUTO-ENTMCNC: 30.8 G/DL (ref 31.5–35.7)
MCV RBC AUTO: 87.3 FL (ref 79–97)
MONOCYTES # BLD AUTO: 0.67 10*3/MM3 (ref 0.1–0.9)
MONOCYTES NFR BLD AUTO: 8.1 % (ref 5–12)
NEUTROPHILS NFR BLD AUTO: 6.15 10*3/MM3 (ref 1.7–7)
NEUTROPHILS NFR BLD AUTO: 74 % (ref 42.7–76)
NRBC BLD AUTO-RTO: 0 /100 WBC (ref 0–0.2)
PHOSPHATE SERPL-MCNC: 2.1 MG/DL (ref 2.5–4.5)
PLATELET # BLD AUTO: 103 10*3/MM3 (ref 140–450)
PMV BLD AUTO: 11.1 FL (ref 6–12)
POTASSIUM SERPL-SCNC: 4.1 MMOL/L (ref 3.5–5.2)
RBC # BLD AUTO: 3.46 10*6/MM3 (ref 4.14–5.8)
SODIUM SERPL-SCNC: 137 MMOL/L (ref 136–145)
WBC NRBC COR # BLD: 8.3 10*3/MM3 (ref 3.4–10.8)

## 2022-12-16 PROCEDURE — 80048 BASIC METABOLIC PNL TOTAL CA: CPT

## 2022-12-16 PROCEDURE — 97530 THERAPEUTIC ACTIVITIES: CPT

## 2022-12-16 PROCEDURE — 97116 GAIT TRAINING THERAPY: CPT

## 2022-12-16 PROCEDURE — 97110 THERAPEUTIC EXERCISES: CPT

## 2022-12-16 PROCEDURE — 84100 ASSAY OF PHOSPHORUS: CPT | Performed by: INTERNAL MEDICINE

## 2022-12-16 PROCEDURE — 86140 C-REACTIVE PROTEIN: CPT

## 2022-12-16 PROCEDURE — 99232 SBSQ HOSP IP/OBS MODERATE 35: CPT

## 2022-12-16 PROCEDURE — 94799 UNLISTED PULMONARY SVC/PX: CPT

## 2022-12-16 PROCEDURE — 83735 ASSAY OF MAGNESIUM: CPT | Performed by: INTERNAL MEDICINE

## 2022-12-16 PROCEDURE — 85025 COMPLETE CBC W/AUTO DIFF WBC: CPT | Performed by: NURSE PRACTITIONER

## 2022-12-16 PROCEDURE — 25010000002 ENOXAPARIN PER 10 MG: Performed by: GENERAL PRACTICE

## 2022-12-16 RX ORDER — LEVOFLOXACIN 750 MG/1
750 TABLET ORAL EVERY 24 HOURS
Status: COMPLETED | OUTPATIENT
Start: 2022-12-16 | End: 2022-12-18

## 2022-12-16 RX ADMIN — TAMSULOSIN HYDROCHLORIDE 0.4 MG: 0.4 CAPSULE ORAL at 21:12

## 2022-12-16 RX ADMIN — ENOXAPARIN SODIUM 40 MG: 40 INJECTION SUBCUTANEOUS at 21:11

## 2022-12-16 RX ADMIN — LEVOTHYROXINE SODIUM 50 MCG: 0.05 TABLET ORAL at 07:24

## 2022-12-16 RX ADMIN — IPRATROPIUM BROMIDE AND ALBUTEROL SULFATE 3 ML: 2.5; .5 SOLUTION RESPIRATORY (INHALATION) at 00:23

## 2022-12-16 RX ADMIN — AMITRIPTYLINE HYDROCHLORIDE 25 MG: 25 TABLET, FILM COATED ORAL at 21:12

## 2022-12-16 RX ADMIN — IPRATROPIUM BROMIDE AND ALBUTEROL SULFATE 3 ML: 2.5; .5 SOLUTION RESPIRATORY (INHALATION) at 06:57

## 2022-12-16 RX ADMIN — FINASTERIDE 5 MG: 5 TABLET, FILM COATED ORAL at 07:24

## 2022-12-16 RX ADMIN — Medication 1 TABLET: at 07:24

## 2022-12-16 RX ADMIN — LEVOFLOXACIN 750 MG: 750 TABLET, FILM COATED ORAL at 09:32

## 2022-12-16 RX ADMIN — IPRATROPIUM BROMIDE AND ALBUTEROL SULFATE 3 ML: 2.5; .5 SOLUTION RESPIRATORY (INHALATION) at 18:47

## 2022-12-16 RX ADMIN — ACETAMINOPHEN 1000 MG: 500 TABLET ORAL at 07:23

## 2022-12-16 RX ADMIN — ACETAMINOPHEN 1000 MG: 500 TABLET ORAL at 21:15

## 2022-12-16 RX ADMIN — BUDESONIDE AND FORMOTEROL FUMARATE DIHYDRATE 2 PUFF: 160; 4.5 AEROSOL RESPIRATORY (INHALATION) at 18:47

## 2022-12-16 RX ADMIN — Medication 10 ML: at 21:11

## 2022-12-16 RX ADMIN — Medication 3 ML: at 21:11

## 2022-12-16 RX ADMIN — BUDESONIDE AND FORMOTEROL FUMARATE DIHYDRATE 2 PUFF: 160; 4.5 AEROSOL RESPIRATORY (INHALATION) at 07:10

## 2022-12-16 RX ADMIN — IPRATROPIUM BROMIDE AND ALBUTEROL SULFATE 3 ML: 2.5; .5 SOLUTION RESPIRATORY (INHALATION) at 12:24

## 2022-12-16 NOTE — PROGRESS NOTES
Patient Identification:  Name:  Sarbjit Martin  Age:  87 y.o.  Sex:  male  :  1935  MRN:  [id number]  Visit Number:  [id number]  Primary Care Provider:  Goldie Steward APRN    Length of stay:  3    Subjective/Interval History/Consultants/Procedures     Chief complaint: Follow-up, acute left femoral fracture s/p left hemiarthroplasty, POD #3     Subjective/Interval History:  Sarbjit Martin is our 87 y.o. male patient admitted on 2022 for acute left hip fracture s/p mechanical fall at home. He has a pMH significant for BPH, arthritis, bile duct calculus s/p ERCP, COPD, chronic hypoxic respiratory failure, chronic HFpEF, GERD, moderate pulmonary hypertension, and advanced age. For further and complete admitting details, please see admission H&P.     He was admitted to the [facility] floor for further monitoring, evaluation, and treatment. Patient had presented to the ED with complaints of left hip pain s/p mechanical fall at home from standing height. Imaging obtained in the ED revealed acute left femoral fracture. He ultimately underwent left hip hemiarthroplasty on  by Dr. Reyez and tolerated procedure well. PT/OT consults placed for postop eval. CM consulted for assistance with discharge placement. On initial exam, patient had increased supplemental O2 requirement from baseline. Usually uses 2L at home; was on 4L. Lungs coarse/diminished bilaterally with RLL crackles. Chest imaging suggestive of LLL infiltrate. Patient was started on Levaquin for empiric treatment. Also receiving Symbicort and DuoNebs. Incentive spirometer use and turn/cough/deep breathing exercises encouraged. Continuous pulse oximetry in place. Respiratory panel negative. He has known left pulmonary nodule, has increased in size since previous exam. Attending discussed with lung nodule clinic Irma HERNANDEZ to set up appointment for follow-up in the outpatient setting. Patient is now on baseline O2 requirement of 2L and  stable. IPR consult placed and pending.      Procedures/Imaging:  • Xray of the left pelvis x2  • Xray of the left femur  • Chest xray  • CT head without contrast  • CT cervical spine without contrast  • Left hip hemiarthroplasty on 12/13 by Dr. Reyez.      Consults:  • Orthopedic Surgery  • PT/OT  • Case management     On today's exam, patient was lying in bed with no s/s acute distress noted. States that he worked with PT in the bed yesterday and states that he was able to stand and take some steps. He currently denies any pain in the left hip; states that pain with movement has improved. Left hip dressing with moderate amount of serosanguineous drainage present; dressing is dry and intact. No surrounding erythema, ecchymosis or evidence of developing hematoma. Hgb has decreased from 12 to 9 today. Will continue to closely monitor. Patient states that breathing has improved with current treatment regimen. Denies any chest pain, SOA above baseline, palpitations, or any other acute complaints. Discussed plan with patient; voiced agreement with no further questions or concerns at this time.      No acute events reported overnight. Room location at the time of evaluation was [facility]. Discussed with attending physician, Rah Harrison MD.   ----------------------------------------------------------------------------------------------------------------------  Current Hospital Meds:  acetaminophen, 1,000 mg, Oral, TID  amitriptyline, 25 mg, Oral, Nightly  budesonide-formoterol, 2 puff, Inhalation, BID - RT  enoxaparin, 40 mg, Subcutaneous, Q24H  finasteride, 5 mg, Oral, Daily  ipratropium-albuterol, 3 mL, Nebulization, 4x Daily - RT  levoFLOXacin, 750 mg, Oral, Q24H  levothyroxine, 50 mcg, Oral, Daily  multivitamin, 1 tablet, Oral, Daily  sodium chloride, 10 mL, Intravenous, Q12H  sodium chloride, 3 mL, Intravenous, Q12H  tamsulosin, 0.4 mg, Oral, Nightly      Pharmacy Consult,        ----------------------------------------------------------------------------------------------------------------------      Objective     Vital Signs:  Temp:  [97.4 °F (36.3 °C)-98 °F (36.7 °C)] 97.4 °F (36.3 °C)  Heart Rate:  [] 89  Resp:  [14-20] 16  BP: (100-133)/(53-60) 128/60      12/14/22  0500 12/15/22  0500 12/16/22  0500   Weight: 50.6 kg (111 lb 8 oz) 50.7 kg (111 lb 12.8 oz) 50.4 kg (111 lb 3.2 oz)     Body mass index is 17.95 kg/m².    Intake/Output Summary (Last 24 hours) at 12/16/2022 1132  Last data filed at 12/16/2022 0750  Gross per 24 hour   Intake 600 ml   Output 1000 ml   Net -400 ml     I/O this shift:  In: -   Out: 150 [Urine:150]  Diet: Regular/House Diet; Texture: Regular Texture (IDDSI 7); Fluid Consistency: Thin (IDDSI 0)  ----------------------------------------------------------------------------------------------------------------------    Physical Exam  Vitals and nursing note reviewed.   Constitutional:       General: He is awake. He is not in acute distress.     Appearance: He is not diaphoretic.      Interventions: Nasal cannula in place.      Comments: Currently on 2L NC.   HENT:      Head: Normocephalic.      Mouth/Throat:      Mouth: Mucous membranes are moist.      Pharynx: Oropharynx is clear.   Eyes:      Extraocular Movements: Extraocular movements intact.   Cardiovascular:      Rate and Rhythm: Normal rate and regular rhythm.      Pulses: Normal pulses.           Dorsalis pedis pulses are 2+ on the right side and 2+ on the left side.      Heart sounds: No murmur heard.    No friction rub.   Pulmonary:      Effort: Pulmonary effort is normal. No accessory muscle usage, respiratory distress or retractions.      Breath sounds: Rales (right-sided, inspiratory crackles) present. No wheezing or rhonchi.   Abdominal:      General: Bowel sounds are normal. There is no distension.      Palpations: Abdomen is soft.      Tenderness: There is no abdominal tenderness. There is  no guarding.   Musculoskeletal:      Cervical back: Neck supple. No rigidity.      Right lower leg: No edema.      Left lower leg: No edema.   Skin:     General: Skin is warm and dry.      Capillary Refill: Capillary refill takes 2 to 3 seconds.      Coloration: Skin is pale.      Comments: Left hip dressing intact with moderate amount of serosanguineous drainage present. No evidence of evolving hematoma.    Neurological:      Mental Status: He is alert and oriented to person, place, and time. Mental status is at baseline.      Sensory: Sensation is intact.      Motor: No tremor.      Comments: LLE remaining neurovascularly intact.    Psychiatric:         Attention and Perception: Attention normal.         Mood and Affect: Mood normal.         Speech: Speech normal.         Behavior: Behavior normal. Behavior is cooperative.         Thought Content: Thought content normal.          ----------------------------------------------------------------------------------------------------------------------  Tele:  Appearing normal sinus rhythm 90s on my exam.   ----------------------------------------------------------------------------------------------------------------------      Results from last 7 days   Lab Units 12/16/22  0145 12/14/22  0406 12/13/22  1911 12/13/22  0850 12/13/22  0022   CRP mg/dL 16.03*  --  4.11*  --   --    LACTATE mmol/L  --   --  1.5  --   --    WBC 10*3/mm3 8.30 13.55*  --   --  14.97*   HEMOGLOBIN g/dL 9.3* 12.6*  --   --  14.9   HEMATOCRIT % 30.2* 40.7  --   --  48.7   MCV fL 87.3 87.9  --   --  87.6   MCHC g/dL 30.8* 31.0*  --   --  30.6*   PLATELETS 10*3/mm3 103* 155  --   --  194   INR   --   --   --  1.09 0.97     Results from last 7 days   Lab Units 12/13/22  0747   PH, ARTERIAL pH units 7.344*   PO2 ART mm Hg 78.1*   PCO2, ARTERIAL mm Hg 55.4*   HCO3 ART mmol/L 30.1*     Results from last 7 days   Lab Units 12/16/22  0145 12/15/22  0955 12/14/22  0406 12/13/22  0022   SODIUM mmol/L 137   --  134* 137   POTASSIUM mmol/L 4.1  --  4.4 5.1   MAGNESIUM mg/dL 1.9 2.2 1.6  --    CHLORIDE mmol/L 104  --  99 101   CO2 mmol/L 26.7  --  25.6 21.2*   BUN mg/dL 19  --  31* 26*   CREATININE mg/dL 0.52*  --  0.85 0.87   CALCIUM mg/dL 7.7*  --  8.2* 9.2   GLUCOSE mg/dL 115*  --  94 106*   ALBUMIN g/dL  --   --   --  3.49*   BILIRUBIN mg/dL  --   --   --  0.5   ALK PHOS U/L  --   --   --  72   AST (SGOT) U/L  --   --   --  21   ALT (SGPT) U/L  --   --   --  15   Estimated Creatinine Clearance: 74 mL/min (A) (by C-G formula based on SCr of 0.52 mg/dL (L)).  No results found for: AMMONIA      Blood Culture   Date Value Ref Range Status   12/13/2022 No growth at 2 days  Preliminary   12/13/2022 No growth at 2 days  Preliminary     ----------------------------------------------------------------------------------------------------------------------  Imaging Results (Last 24 Hours)     ** No results found for the last 24 hours. **        ----------------------------------------------------------------------------------------------------------------------   I have reviewed the above laboratory values for 12/16/22    Assessment/Plan     Active Hospital Problems    Diagnosis  POA   • **Closed fracture of left hip (Formerly [facility]) [S72.002A]  Unknown     Added automatically from request for surgery [id number]     • Closed fracture of left hip, initial encounter (Formerly [facility]) [S72.002A]  Yes       ASSESSMENT/PLAN:  -Acute left femoral neck fracture POA s/p mechanical fall at home  -S/P left hip hemiarthroplasty on 12/13. Currently POD #3.   -Radiological images reviewed on admission.   -Orthopedic surgery following; greatly appreciate their assistance.   -PRN Tylenol for mild pain.   -PRN [place] for moderate pain.   -Neurovascular checks Q 4 hours.   -Fall precautions.  -WBAT.   -Dressing changes per ortho recommendations.  -Lovenox for VTE prophylaxis x 28 days postop.   -PT/OT consults for postop eval.   -CM for discharge planning.   -Will need  outpatient follow-up with ortho on discharge.      -Chronic HFpEF  -Appearing euvolemic on exam.   -Monitor strict I's and O's, daily weights.  -Echo from 11/1/2022 reviewed; normal EF (61-65%).      -Acute on chronic hypoxic respiratory failure, POA  -Moderate pulmonary hypertension  -Evidence of left basilar consolidation on imaging obtained in ED  -SIRS/sepsis criteria on admission with HR>90 and WBCs>12.   -Chest xray revealed minimal left basilar consolidation.  -ABG obtained on 4L NC; PO2 78.1%, PCO2 55.4, and O2 saturation 95.5%.   -Continue to titrate supplemental O2 to maintain SpO2 saturations > 90%.  -Remaining stable on 2L (baseline requirement).   -Continuous pulse oximetry.   -Encourage incentive spirometer and turn/cough/deep breathing exercises.  -Continue Symbicort and DuoNebs.   -Continue Levaquin for empiric coverage.   -Obtain sputum culture if able.   -Respiratory panel negative.   -Continue follow-up with pulmonology in the outpatient setting for known pulmonary nodule/mass.    -Repeat CBC and C-RP in the a.m.  -Peripheral blood cultures x2 obtained, pending. NGTD.   -Lactate non elevated. Procal negative. C-RP 4.11.       -GERD  -Aspiration and reflux precautions.  -PPI.     -History of BPH  -Monitor urine output closely.  -Continue Proscar and Flomax.   -So far no issues with urinary retention in the postop setting.      -Essential hypertension  -BP appears well controlled.   -Closely monitor BP per hospital protocol, titrate medications as necessary.     -Arthritis  -Supportive care.      -History of CBD calculus s/p ERCP  -History of E. Coli bacteremia        -----------  -DVT prophylaxis: Lovenox  -GI prophylaxis: PPI  -Activity: WBAT, fall precautions.   -Disposition plans/anticipated needs: pending clinical course and PT recommendations.   -Diet: Regular  -Home supplemental O2: Utilizes 2L at home. Currently stable on baseline requirement.         The patient is high risk due to the  following diagnoses/reasons:  Acute left femoral fracture s/p left hip hemiarthroplasty, advanced age.         DAVID Wilks  12/16/22  11:32 EST  Pager #[phone]

## 2022-12-16 NOTE — PROGRESS NOTES
Antimicrobial Length of Therapy:    Day 4 of 6 levofloxacin    Thank you.  Gloria Barr, Pharm.D.  12/16/2022  13:50 EST

## 2022-12-16 NOTE — CASE MANAGEMENT/SOCIAL WORK
Discharge Planning Assessment  [facility]     Patient Name: Sarbjit Martin  MRN: [id number]  Today's Date: 12/16/2022    Admit Date: 12/12/2022    Plan: Pt received an inpatient rehab consult on 12/15/22. SS was notified by IPR per Vera via [employer] pt's information has been submitted for pre-auth. Pt lives at home alone significant other plans to stay with pt when he is discharged home. Pt does not utilize home health services at this time. Pt lives in [facility] and has no preference if HH is ordered at discharge. Pt has quad cane, oxygen at 2 Liters with portable tanks via [employer]. PCP is Goldie Steward. Pt does not have a POA/Living will. Pt plans to return home at discharge with significant other to provide transportation. SS to follow.     Discharge Plan     Row Name 12/16/22 1353       Plan    Plan Pt received an inpatient rehab consult on 12/15/22. SS was notified by IPR per Vera via [employer] pt's information has been submitted for pre-auth. Pt lives at home alone significant other plans to stay with pt when he is discharged home. Pt does not utilize home health services at this time. Pt lives in [facility] and has no preference if HH is ordered at discharge. Pt has quad cane, oxygen at 2 Liters with portable tanks via [employer]. PCP is Goldie Steward. Pt does not have a POA/Living will. Pt plans to return home at discharge with significant other to provide transportation. SS to follow.                     TOM Beaver

## 2022-12-16 NOTE — PLAN OF CARE
Goal Outcome Evaluation:              Outcome Evaluation: patient has rested well tonight with no complaints. patient confused at times. no changes during shift.

## 2022-12-16 NOTE — THERAPY TREATMENT NOTE
Acute Care - Physical Therapy Treatment Note       [facility]     Patient Name: Sarbjit Martin  : 1935  MRN: [id number]    Today's Date: 2022   Onset of Illness/Injury or Date of Surgery: 22 (admission date)                Admit Date: 2022      Visit Dx:     ICD-10-CM ICD-9-CM   1. Closed fracture of left hip, initial encounter (Formerly [facility])  S72.002A 820.8       Patient Active Problem List   Diagnosis   • Status post laparoscopic cholecystectomy   • COPD (chronic obstructive pulmonary disease) ([facility]ly [facility])   • Abnormal CT of the chest   • Pulmonary nodule   • Benign prostatic hyperplasia without lower urinary tract symptoms   • NSTEMI (non-ST elevated myocardial infarction) ([facility]ly [facility])   • COPD exacerbation ([facility]ly [facility])   • Underweight   • Severe malnutrition ([facility]ly [facility])   • Aortic aneurysm ([facility])   • Closed fracture of left hip ([facility])   • Closed fracture of left hip, initial encounter ([facility]ly [facility])       Past Medical History:   Diagnosis Date   • Angiodysplasia of the colon    • Arthritis    • Bacteremia due to Escherichia coli 2017    Same time as the common bile duct stone   • Benign prostatic hyperplasia without lower urinary tract symptoms 2021   • Bile duct calculus 2017   • 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[facility])     Centrilobular   • Diverticulosis    • GERD (gastroesophageal reflux disease)    • Heart failure with preserved ejection fraction ([facility])    • Left upper lobe pulmonary nodule 2019    15 mm and spiculated in 2018; FNA was negative   • Moderate pulmonary hypertension ([facility])    • Myocardial infarction ([facility])    • Status post laparoscopic cholecystectomy 06/15/2017       Past Surgical History:   Procedure Laterality Date   • BRONCHOSCOPY N/A 2018    Procedure: BRONCHOSCOPY WITH ENDOBRONCHIAL ULTRASOUND;  Surgeon: Saravanan Méndez MD;  Location: [facility];  Service: Pulmonary   • CHOLECYSTECTOMY N/A 2017    Procedure: CHOLECYSTECTOMY LAPAROSCOPIC;  Surgeon: Jose MATHEW  MD Marcus;  Location: [facility];  Service:    • EYE SURGERY Bilateral 2013   • INGUINAL HERNIA REPAIR Left 2013    left   • RI ERCP DX COLLECTION SPECIMEN BRUSHING/WASHING N/A 6/26/2017    Procedure: ENDOSCOPIC RETROGRADE CHOLANGIOPANCREATOGRAPHY;  Surgeon: Rod Francis MD;  Location: [facility] OR;  Service: Gastroenterology   • RI ERCP DX COLLECTION SPECIMEN BRUSHING/WASHING N/A 10/26/2017    Procedure: ENDOSCOPIC RETROGRADE CHOLANGIOPANCREATOGRAPHY;  Surgeon: Rod Francis MD;  Location: [facility] OR;  Service: Gastroenterology       PT ASSESSMENT (last 12 hours)     IRF PT Evaluation and Treatment    No documentation.               Wound 12/12/22 2333 Left upper scalp Skin Tear (Active)   Dressing Appearance open to air 12/15/22 2055   Dressing Care open to air 12/16/22 0750       Wound 12/13/22 1421 Left greater trochanter Incision (Active)   Dressing Appearance intact;dry 12/15/22 2055   Closure LINDA 12/15/22 2055   Base dressing in place, unable to visualize 12/15/22 2055     Physical Therapy Education     Title: PT OT SLP Therapies (Done)     Topic: Physical Therapy (Done)     Point: Mobility training (Done)     Learning Progress Summary           Patient Acceptance, E,D, VU,NR by RG at 12/16/2022 1448    Acceptance, E,D, VU,NR by RG at 12/15/2022 1523                   Point: Home exercise program (Done)     Learning Progress Summary           Patient Acceptance, E,D, VU,NR by RG at 12/16/2022 1448    Acceptance, E,D, VU,NR by RG at 12/15/2022 1523                   Point: Body mechanics (Done)     Learning Progress Summary           Patient Acceptance, E,D, VU,NR by RG at 12/16/2022 1448    Acceptance, E,D, VU,NR by RG at 12/15/2022 1523                   Point: Precautions (Done)     Learning Progress Summary           Patient Acceptance, E,D, VU,NR by RG at 12/16/2022 1448    Acceptance, E,D, VU,NR by RG at 12/15/2022 1523                               User Key     Initials Effective Dates Name  Provider Type Discipline     06/16/21 -  Lexx Michaels PTA Physical Therapist Assistant PT                PT Recommendation and Plan    Anticipated Equipment Needs at Discharge (PT): front wheeled walker, bedside commode  Anticipated Discharge Disposition (PT): skilled nursing facility, sub acute care setting, home with 24/7 care, home with assist, home with supervision, home with home health  Therapy Frequency (PT): 6 times/wk                     Time Calculation:      PT Charges     Row Name 12/16/22 1449             Time Calculation    PT Received On 12/16/22  -RG         Time Calculation- PT    Total Timed Code Minutes- PT 39 minute(s)  -RG            User Key  (r) = Recorded By, (t) = Taken By, (c) = Cosigned By    Initials Name Provider Type    RG Lexx Michaels PTA Physical Therapist Assistant                Therapy Charges for Today     Code Description Service Date Service Provider Modifiers Qty    96074757557 HC GAIT TRAINING EA 15 MIN 12/15/2022 Lexx Michaels PTA GP, CQ 1    30436576096 HC PT THERAPEUTIC ACT EA 15 MIN 12/15/2022 Lexx Michaels PTA GP, CQ 1    21589355164 HC PT THER PROC EA 15 MIN 12/15/2022 Lexx Michaels PTA GP, CQ 1    02831300641 HC GAIT TRAINING EA 15 MIN 12/16/2022 Lexx Michaels PTA GP, CQ 1    14108851380 HC PT THER PROC EA 15 MIN 12/16/2022 Lexx Michaels PTA GP, CQ 1    88829135949 HC PT THERAPEUTIC ACT EA 15 MIN 12/16/2022 Lexx Michaels PTA GP, CQ 1            PT G-Codes  AM-PAC 6 Clicks Score (PT): 7      Lexx Michaels PTA  12/16/2022

## 2022-12-16 NOTE — PROGRESS NOTES
Inpatient Progress Note  Sarbjit Martin  Date: 12/16/22  MRN: [id number]      Subjective:  Sitting up in bed eating breakfast.  Notes minimal, intermittent pain to the left hip.  He was able to get out of bed yesterday with assist of physical therapy.  Notes increased pain to the hip with mobility but pain is tolerable.  Denies chest pain or shortness of air.  (+) passing gas.  Unsure of discharge plan at this time.  No family at bedside.      Objective:      Vitals:    12/16/22 0620 12/16/22 0657 12/16/22 0709 12/16/22 0710   BP: 128/60      BP Location: Left arm      Patient Position: Lying      Pulse: 88 93 97 89   Resp: 14 16 16 16   Temp: 97.4 °F (36.3 °C)      TempSrc: Oral      SpO2: 90% 93% 97% 97%   Weight:       Height:              Physical Exam:  Constitutional:  Thin, elderly male.  No acute distress.        Musculoskeletal:  Upon examination of the left hip, there is dry, serous drainage noted on approximately 1/2 of the surgical dressing.  There is minimal edema to the lateral hip.  No induration.  No ecchymosis, erythema, or warmth.  Minimal tenderness.  (+) DF/PF at the ankle.  Capillary refill is brisk and light touch sensation is intact, distally.  DP pulse (+).       Labs:    Results from last 7 days   Lab Units 12/16/22  0145 12/14/22  0406 12/13/22  1911 12/13/22  0850 12/13/22  0022   CRP mg/dL 16.03*  --  4.11*  --   --    LACTATE mmol/L  --   --  1.5  --   --    WBC 10*3/mm3 8.30 13.55*  --   --  14.97*   HEMOGLOBIN g/dL 9.3* 12.6*  --   --  14.9   HEMATOCRIT % 30.2* 40.7  --   --  48.7   MCV fL 87.3 87.9  --   --  87.6   MCHC g/dL 30.8* 31.0*  --   --  30.6*   PLATELETS 10*3/mm3 103* 155  --   --  194   INR   --   --   --  1.09 0.97     Results from last 7 days   Lab Units 12/13/22  0747   PH, ARTERIAL pH units 7.344*   PO2 ART mm Hg 78.1*   PCO2, ARTERIAL mm Hg 55.4*   HCO3 ART mmol/L 30.1*     Results from last 7 days   Lab Units 12/16/22  0145 12/15/22  0955 12/14/22  0406 12/13/22  0022    SODIUM mmol/L 137  --  134* 137   POTASSIUM mmol/L 4.1  --  4.4 5.1   MAGNESIUM mg/dL 1.9 2.2 1.6  --    CHLORIDE mmol/L 104  --  99 101   CO2 mmol/L 26.7  --  25.6 21.2*   BUN mg/dL 19  --  31* 26*   CREATININE mg/dL 0.52*  --  0.85 0.87   CALCIUM mg/dL 7.7*  --  8.2* 9.2   GLUCOSE mg/dL 115*  --  94 106*   ALBUMIN g/dL  --   --   --  3.49*   BILIRUBIN mg/dL  --   --   --  0.5   ALK PHOS U/L  --   --   --  72   AST (SGOT) U/L  --   --   --  21   ALT (SGPT) U/L  --   --   --  15   Estimated Creatinine Clearance: 74 mL/min (A) (by C-G formula based on SCr of 0.52 mg/dL (L)).  No results found for: AMMONIA          No results found for: HGBA1C  Lab Results   Component Value Date    TSH 0.664 11/01/2022         Pain Management Panel    There is no flowsheet data to display.         Blood Culture   Date Value Ref Range Status   12/13/2022 No growth at 2 days  Preliminary   12/13/2022 No growth at 2 days  Preliminary     No results found for: URINECX  No results found for: WOUNDCX  No results found for: STOOLCX              Radiology:  Imaging Results (Last 72 Hours)     Procedure Component Value Units Date/Time    XR Pelvis 1 or 2 View [[id number]] Collected: 12/13/22 1627     Updated: 12/13/22 1629    Narrative:      EXAMINATION: XR PELVIS 1 OR 2 VW-      CLINICAL INDICATION: AP pelvis, status post left hip hemiarthroplasty;  S72.002A-Fracture of unspecified part of neck of left femur, initial  encounter for closed fracture        COMPARISON: None available     FINDINGS: 2 views of the pelvis     Left hip prosthesis     Alignment is anatomic     Overlying skin clips are present.     Moderate to large volume stool      This report was finalized on 12/13/2022 4:27 PM by Dr. Barrie Guzman MD.       XR Chest 1 View [[id number]] Collected: 12/13/22 0832     Updated: 12/13/22 0839    Narrative:      XR CHEST 1 VW-     CLINICAL INDICATION: Pre Op; S72.002A-Fracture of unspecified part of  neck of left femur, initial encounter  for closed fracture        COMPARISON: 10/31/2022      TECHNIQUE: Single frontal view of the chest.     FINDINGS:      LUNGS: Minimal left basilar consolidation      HEART AND MEDIASTINUM: Heart and mediastinal contours are unremarkable        SKELETON: Bony and soft tissue structures are unremarkable.             Impression:      Minimal left basilar consolidation. COPD.     This report was finalized on 12/13/2022 8:37 AM by Dr. Barrie Guzman MD.               Assessment:  Status post left hip hemiarthroplasty for fracture, POD #3.           Plan:    Encourage mobility.    Weight bear as tolerated to the left lower extremity with a walker and assistance, as needed.  PT/OT for mobility and ambulation.  On Lovenox for DVT prophylaxis.  Recommend x 28 days post op.  Maintain the dressing to the left hip incision, may change as needed for saturation.  Will change today.  Discharge disposition per Hospitalist service.  Please have the patient follow up in the office at 2 weeks post op with Dr. Reyez.             DAVID Gomez December 16, 2022 08:33 EST

## 2022-12-16 NOTE — NURSING NOTE
Rehab consult received.  PT/OT notes reviewed.  Prior auth request has been submitted to [employer] via [employer] portal with auth# [id number].  Will notify when determination is received.

## 2022-12-17 LAB
ANION GAP SERPL CALCULATED.3IONS-SCNC: 6.9 MMOL/L (ref 5–15)
BUN SERPL-MCNC: 15 MG/DL (ref 8–23)
BUN/CREAT SERPL: 25.9 (ref 7–25)
CALCIUM SPEC-SCNC: 7.9 MG/DL (ref 8.6–10.5)
CHLORIDE SERPL-SCNC: 104 MMOL/L (ref 98–107)
CO2 SERPL-SCNC: 25.1 MMOL/L (ref 22–29)
CREAT SERPL-MCNC: 0.58 MG/DL (ref 0.76–1.27)
DEPRECATED RDW RBC AUTO: 49.1 FL (ref 37–54)
EGFRCR SERPLBLD CKD-EPI 2021: 95.6 ML/MIN/1.73
ERYTHROCYTE [DISTWIDTH] IN BLOOD BY AUTOMATED COUNT: 15.2 % (ref 12.3–15.4)
GLUCOSE BLDC GLUCOMTR-MCNC: 133 MG/DL (ref 70–130)
GLUCOSE SERPL-MCNC: 106 MG/DL (ref 65–99)
HCT VFR BLD AUTO: 30.4 % (ref 37.5–51)
HGB BLD-MCNC: 9.4 G/DL (ref 13–17.7)
MAGNESIUM SERPL-MCNC: 1.7 MG/DL (ref 1.6–2.4)
MCH RBC QN AUTO: 27.3 PG (ref 26.6–33)
MCHC RBC AUTO-ENTMCNC: 30.9 G/DL (ref 31.5–35.7)
MCV RBC AUTO: 88.4 FL (ref 79–97)
PHOSPHATE SERPL-MCNC: 2.5 MG/DL (ref 2.5–4.5)
PLATELET # BLD AUTO: 125 10*3/MM3 (ref 140–450)
PMV BLD AUTO: 10.8 FL (ref 6–12)
POTASSIUM SERPL-SCNC: 4 MMOL/L (ref 3.5–5.2)
RBC # BLD AUTO: 3.44 10*6/MM3 (ref 4.14–5.8)
SODIUM SERPL-SCNC: 136 MMOL/L (ref 136–145)
WBC NRBC COR # BLD: 7.6 10*3/MM3 (ref 3.4–10.8)

## 2022-12-17 PROCEDURE — 94799 UNLISTED PULMONARY SVC/PX: CPT

## 2022-12-17 PROCEDURE — 84100 ASSAY OF PHOSPHORUS: CPT | Performed by: INTERNAL MEDICINE

## 2022-12-17 PROCEDURE — 99232 SBSQ HOSP IP/OBS MODERATE 35: CPT

## 2022-12-17 PROCEDURE — 25010000002 ENOXAPARIN PER 10 MG: Performed by: GENERAL PRACTICE

## 2022-12-17 PROCEDURE — 0 MAGNESIUM SULFATE 4 GM/100ML SOLUTION: Performed by: INTERNAL MEDICINE

## 2022-12-17 PROCEDURE — 97116 GAIT TRAINING THERAPY: CPT

## 2022-12-17 PROCEDURE — 82962 GLUCOSE BLOOD TEST: CPT

## 2022-12-17 PROCEDURE — 80048 BASIC METABOLIC PNL TOTAL CA: CPT

## 2022-12-17 PROCEDURE — 85027 COMPLETE CBC AUTOMATED: CPT

## 2022-12-17 PROCEDURE — 83735 ASSAY OF MAGNESIUM: CPT | Performed by: INTERNAL MEDICINE

## 2022-12-17 PROCEDURE — 97530 THERAPEUTIC ACTIVITIES: CPT

## 2022-12-17 RX ORDER — MAGNESIUM SULFATE HEPTAHYDRATE 40 MG/ML
4 INJECTION, SOLUTION INTRAVENOUS ONCE
Status: COMPLETED | OUTPATIENT
Start: 2022-12-17 | End: 2022-12-17

## 2022-12-17 RX ADMIN — IPRATROPIUM BROMIDE AND ALBUTEROL SULFATE 3 ML: 2.5; .5 SOLUTION RESPIRATORY (INHALATION) at 18:32

## 2022-12-17 RX ADMIN — LEVOFLOXACIN 750 MG: 750 TABLET, FILM COATED ORAL at 08:13

## 2022-12-17 RX ADMIN — ACETAMINOPHEN 1000 MG: 500 TABLET ORAL at 16:11

## 2022-12-17 RX ADMIN — HYDROCODONE BITARTRATE AND ACETAMINOPHEN 1 TABLET: 7.5; 325 TABLET ORAL at 16:11

## 2022-12-17 RX ADMIN — Medication 1 TABLET: at 08:13

## 2022-12-17 RX ADMIN — LEVOTHYROXINE SODIUM 50 MCG: 0.05 TABLET ORAL at 08:13

## 2022-12-17 RX ADMIN — IPRATROPIUM BROMIDE AND ALBUTEROL SULFATE 3 ML: 2.5; .5 SOLUTION RESPIRATORY (INHALATION) at 13:15

## 2022-12-17 RX ADMIN — ENOXAPARIN SODIUM 40 MG: 40 INJECTION SUBCUTANEOUS at 20:14

## 2022-12-17 RX ADMIN — TAMSULOSIN HYDROCHLORIDE 0.4 MG: 0.4 CAPSULE ORAL at 20:14

## 2022-12-17 RX ADMIN — BUDESONIDE AND FORMOTEROL FUMARATE DIHYDRATE 2 PUFF: 160; 4.5 AEROSOL RESPIRATORY (INHALATION) at 07:19

## 2022-12-17 RX ADMIN — IPRATROPIUM BROMIDE AND ALBUTEROL SULFATE 3 ML: 2.5; .5 SOLUTION RESPIRATORY (INHALATION) at 07:05

## 2022-12-17 RX ADMIN — BUDESONIDE AND FORMOTEROL FUMARATE DIHYDRATE 2 PUFF: 160; 4.5 AEROSOL RESPIRATORY (INHALATION) at 18:32

## 2022-12-17 RX ADMIN — AMITRIPTYLINE HYDROCHLORIDE 25 MG: 25 TABLET, FILM COATED ORAL at 20:14

## 2022-12-17 RX ADMIN — MAGNESIUM SULFATE HEPTAHYDRATE 4 G: 40 INJECTION, SOLUTION INTRAVENOUS at 10:00

## 2022-12-17 RX ADMIN — FINASTERIDE 5 MG: 5 TABLET, FILM COATED ORAL at 08:13

## 2022-12-17 RX ADMIN — Medication 3 ML: at 20:18

## 2022-12-17 RX ADMIN — LORAZEPAM 0.5 MG: 0.5 TABLET ORAL at 20:18

## 2022-12-17 RX ADMIN — Medication 10 ML: at 08:14

## 2022-12-17 RX ADMIN — Medication 10 ML: at 20:18

## 2022-12-17 RX ADMIN — IPRATROPIUM BROMIDE AND ALBUTEROL SULFATE 3 ML: 2.5; .5 SOLUTION RESPIRATORY (INHALATION) at 00:03

## 2022-12-17 RX ADMIN — ACETAMINOPHEN 1000 MG: 500 TABLET ORAL at 08:14

## 2022-12-17 RX ADMIN — Medication 3 ML: at 08:14

## 2022-12-17 NOTE — PLAN OF CARE
Goal Outcome Evaluation:           Progress: no change  Outcome Evaluation: Pt has been resting in bed. family was at bedside. no other changes to note at this time; will continue to monitor

## 2022-12-17 NOTE — PLAN OF CARE
Goal Outcome Evaluation:              Outcome Evaluation: patient has rested well tonight with no complaints. patient has had episodes of confusion that people were in his room. reoriented well.

## 2022-12-17 NOTE — PROGRESS NOTES
Orthopedic Surgery Progress Note    Interval History:     No acute events overnight.     Reports no pain to left hip today.  Reports he has been up ambulating with physical therapy.  Denies any chest pain or difficulty breathing.  No other current complaints at this time.    Vital Signs  Temp:  [98.2 °F (36.8 °C)-98.6 °F (37 °C)] 98.4 °F (36.9 °C)  Heart Rate:  [] 86  Resp:  [14-18] 14  BP: (121-150)/(57-70) 129/64    Physical Exam:  General:  Alert. Oriented. No acute distress.      Dressing: Saturated, removed  Neurological: Left lower extremity SILT s/s/dp/sp/t, +DF/PF/EHL  Vascular: Left lower extremity +2 dp/pt  Staples noted over the incision, no erythema or ecchymosis, no drainage with manual expression of the wound at this time     Labs:    Lab Results   Component Value Date    WBC 7.60 12/17/2022    HGB 9.4 (L) 12/17/2022    HCT 30.4 (L) 12/17/2022    MCV 88.4 12/17/2022     (L) 12/17/2022     Lab Results   Component Value Date    GLUCOSE 106 (H) 12/17/2022    CALCIUM 7.9 (L) 12/17/2022     12/17/2022    K 4.0 12/17/2022    CO2 25.1 12/17/2022     12/17/2022    BUN 15 12/17/2022    CREATININE 0.58 (L) 12/17/2022    EGFRIFNONA 70 02/02/2019    BCR 25.9 (H) 12/17/2022    ANIONGAP 6.9 12/17/2022       Images:      None.    Assessment    POD 4 s/p left hip hemiarthroplasty for fracture    Plan    Dressing removed today, may keep open to air if no drainage, may place soft dressing for any drainage    If drainage persists, would recommend incisional wound VAC    Pain Control  PT/OT - postoperative mobilization  Weight Bear/Lifting Status-as tolerated, anterior hip precautions, no active abduction  Dressing -as above  DVT Ppx- SCD's and Lovenox 40 mg subcutaneous once daily x 14 days  Follow up- 14 days in the office  Disposition-TBD    Orthopedics will continue to follow    Adrian Reyez MD  12/17/22  06:41 EST

## 2022-12-17 NOTE — PROGRESS NOTES
Patient Identification:  Name:  Sarbjit Martin  Age:  87 y.o.  Sex:  male  :  1935  MRN:  [id number]  Visit Number:  [id number]  Primary Care Provider:  Goldie Steward APRN    Length of stay:  4    Subjective/Interval History/Consultants/Procedures     Chief complaint: Follow-up, acute left femoral fracture s/p left hemiarthroplasty, POD #4     Subjective/Interval History:  Sarbjit Martin is our 87 y.o. male patient admitted on 2022 for acute left hip fracture s/p mechanical fall at home. He has a pMH significant for BPH, arthritis, bile duct calculus s/p ERCP, COPD, chronic hypoxic respiratory failure, chronic HFpEF, GERD, moderate pulmonary hypertension, and advanced age. For further and complete admitting details, please see admission H&P.     He was admitted to the [facility] floor for further monitoring, evaluation, and treatment. Patient had presented to the ED with complaints of left hip pain s/p mechanical fall at home from standing height. Imaging obtained in the ED revealed acute left femoral fracture. He ultimately underwent left hip hemiarthroplasty on  by Dr. Reyez and tolerated procedure well. PT/OT consults placed for postop eval. CM consulted for assistance with discharge placement. On initial exam, patient had increased supplemental O2 requirement from baseline. Usually uses 2L at home; was on 4L. Lungs were coarse/diminished bilaterally with RLL crackles. Chest imaging suggestive of LLL infiltrate. Patient was started on Levaquin for empiric treatment. Also receiving Symbicort and DuoNebs. Incentive spirometer use and turn/cough/deep breathing exercises encouraged. Continuous pulse oximetry in place. Respiratory panel negative. He has known left pulmonary nodule, has increased in size since previous exam. Attending discussed with lung nodule clinic Irma HERNANDEZ to set up appointment for follow-up in the outpatient setting. Patient is now on baseline O2 requirement of 2L  and stable. Lung sounds have improved although some right basilar crackles remain. Hgb decreased postoperatively, but is stable at this time. He has not required any blood transfusions. IPR consult placed and pending.      Procedures/Imaging:  • Xray of the left pelvis x2  • Xray of the left femur  • Chest xray  • CT head without contrast  • CT cervical spine without contrast  • Left hip hemiarthroplasty on 12/13 by Dr. Reyez.      Consults:  • Orthopedic Surgery  • PT/OT  • Case management     On today's exam, patient was lying in bed with no s/s acute distress noted. Left hip incision CRISTAL at time of exam. Edges appearing well-approximated. Scant amount of bright red bloody drainage around staples. No evidence of evolving hematoma. LLE remaining neurovascularly intact. Patient denies any left hip pain at this time. Also denies chest pain, shortness of breath above baseline, or any other acute complaints. He had eaten a small amount of breakfast. States that he usually can't eat too much at once or he becomes nauseated. Have discussed possibility of adding Ensure or Boost with meals for nutrition. He states that he doesn't like those drinks. Will place nutrition consult for assistance as patient's BMI is 18.00 kg/m2. Encouraged continued oral hydration. He has been working with PT and doing well. Discussed plan with patient. Voiced agreement with no further questions or concerns at this time.      No acute events reported overnight. Room location at the time of evaluation was [facility]. Discussed with attending physician, Rah Harrison MD.   ----------------------------------------------------------------------------------------------------------------------  Current Hospital Meds:  acetaminophen, 1,000 mg, Oral, TID  amitriptyline, 25 mg, Oral, Nightly  budesonide-formoterol, 2 puff, Inhalation, BID - RT  enoxaparin, 40 mg, Subcutaneous, Q24H  finasteride, 5 mg, Oral, Daily  ipratropium-albuterol, 3 mL,  Nebulization, 4x Daily - RT  levoFLOXacin, 750 mg, Oral, Q24H  levothyroxine, 50 mcg, Oral, Daily  magnesium sulfate, 4 g, Intravenous, Once  multivitamin, 1 tablet, Oral, Daily  sodium chloride, 10 mL, Intravenous, Q12H  sodium chloride, 3 mL, Intravenous, Q12H  tamsulosin, 0.4 mg, Oral, Nightly         ----------------------------------------------------------------------------------------------------------------------      Objective     Vital Signs:  Temp:  [98.2 °F (36.8 °C)-98.6 °F (37 °C)] 98.4 °F (36.9 °C)  Heart Rate:  [] 84  Resp:  [14-18] 16  BP: (121-150)/(57-70) 129/64      12/15/22  0500 12/16/22  0500 12/17/22  0512   Weight: 50.7 kg (111 lb 12.8 oz) 50.4 kg (111 lb 3.2 oz) 50.6 kg (111 lb 8 oz)     Body mass index is 18 kg/m².    Intake/Output Summary (Last 24 hours) at 12/17/2022 1047  Last data filed at 12/17/2022 0814  Gross per 24 hour   Intake 720 ml   Output 950 ml   Net -230 ml     I/O this shift:  In: 120 [P.O.:120]  Out: 200 [Urine:200]  Diet: Regular/House Diet; Texture: Regular Texture (IDDSI 7); Fluid Consistency: Thin (IDDSI 0)  ----------------------------------------------------------------------------------------------------------------------    Physical Exam  Vitals and nursing note reviewed.   Constitutional:       General: He is awake. He is not in acute distress.     Appearance: He is not diaphoretic.      Interventions: Nasal cannula in place.      Comments: Remaining on 2L NC.   HENT:      Head: Normocephalic.      Mouth/Throat:      Mouth: Mucous membranes are moist.      Pharynx: Oropharynx is clear.   Eyes:      Extraocular Movements: Extraocular movements intact.   Cardiovascular:      Rate and Rhythm: Normal rate and regular rhythm.      Pulses: Normal pulses.           Dorsalis pedis pulses are 2+ on the right side and 2+ on the left side.      Heart sounds: Normal heart sounds. No murmur heard.    No friction rub.   Pulmonary:      Effort: Pulmonary effort is normal.  No accessory muscle usage, respiratory distress or retractions.      Breath sounds: Rales (right basilar) present. No wheezing or rhonchi.   Abdominal:      General: Bowel sounds are normal. There is no distension.      Palpations: Abdomen is soft.      Tenderness: There is no abdominal tenderness. There is no guarding.   Musculoskeletal:      Cervical back: Neck supple. No rigidity.      Right lower leg: No edema.      Left lower leg: No edema.   Skin:     General: Skin is warm and dry.      Capillary Refill: Capillary refill takes 2 to 3 seconds.      Comments: Left hip incision CRISTAL on today's exam. Edges appearing well-approximated. Scant amount of bright red bloody drainage around staples. No evidence of evolving hematoma. LLE remaining neurovascularly intact. Patient is working well with PT.   Neurological:      Mental Status: He is alert and oriented to person, place, and time. Mental status is at baseline.      Sensory: Sensation is intact.      Motor: No tremor.   Psychiatric:         Attention and Perception: Attention normal.         Mood and Affect: Mood normal.         Speech: Speech normal.         Behavior: Behavior normal. Behavior is cooperative.         Thought Content: Thought content normal.       ----------------------------------------------------------------------------------------------------------------------      Results from last 7 days   Lab Units 12/17/22  0415 12/16/22  0145 12/14/22  0406 12/13/22  1911 12/13/22  0850 12/13/22  0022   CRP mg/dL  --  16.03*  --  4.11*  --   --    LACTATE mmol/L  --   --   --  1.5  --   --    WBC 10*3/mm3 7.60 8.30 13.55*  --   --  14.97*   HEMOGLOBIN g/dL 9.4* 9.3* 12.6*  --   --  14.9   HEMATOCRIT % 30.4* 30.2* 40.7  --   --  48.7   MCV fL 88.4 87.3 87.9  --   --  87.6   MCHC g/dL 30.9* 30.8* 31.0*  --   --  30.6*   PLATELETS 10*3/mm3 125* 103* 155  --   --  194   INR   --   --   --   --  1.09 0.97     Results from last 7 days   Lab Units 12/13/22  0747    PH, ARTERIAL pH units 7.344*   PO2 ART mm Hg 78.1*   PCO2, ARTERIAL mm Hg 55.4*   HCO3 ART mmol/L 30.1*     Results from last 7 days   Lab Units 12/17/22  0415 12/16/22  0145 12/15/22  0955 12/14/22  0406 12/13/22  0022 12/13/22  0022   SODIUM mmol/L 136 137  --  134*  --  137   POTASSIUM mmol/L 4.0 4.1  --  4.4  --  5.1   MAGNESIUM mg/dL 1.7 1.9 2.2 1.6   < >  --    CHLORIDE mmol/L 104 104  --  99  --  101   CO2 mmol/L 25.1 26.7  --  25.6  --  21.2*   BUN mg/dL 15 19  --  31*  --  26*   CREATININE mg/dL 0.58* 0.52*  --  0.85  --  0.87   CALCIUM mg/dL 7.9* 7.7*  --  8.2*  --  9.2   GLUCOSE mg/dL 106* 115*  --  94  --  106*   ALBUMIN g/dL  --   --   --   --   --  3.49*   BILIRUBIN mg/dL  --   --   --   --   --  0.5   ALK PHOS U/L  --   --   --   --   --  72   AST (SGOT) U/L  --   --   --   --   --  21   ALT (SGPT) U/L  --   --   --   --   --  15    < > = values in this interval not displayed.   Estimated Creatinine Clearance: 66.6 mL/min (A) (by C-G formula based on SCr of 0.58 mg/dL (L)).  No results found for: AMMONIA      Blood Culture   Date Value Ref Range Status   12/13/2022 No growth at 3 days  Preliminary   12/13/2022 No growth at 3 days  Preliminary     ----------------------------------------------------------------------------------------------------------------------  Imaging Results (Last 24 Hours)     ** No results found for the last 24 hours. **        ----------------------------------------------------------------------------------------------------------------------   I have reviewed the above laboratory values for 12/17/22    Assessment/Plan     Active Hospital Problems    Diagnosis  POA   • **Closed fracture of left hip ([facility]) [S72.002A]  Unknown     Added automatically from request for surgery [id number]     • Closed fracture of left hip, initial encounter ([facility]) [S72.002A]  Yes         ASSESSMENT/PLAN:  -Acute left femoral neck fracture POA s/p mechanical fall at home  -S/P left hip  hemiarthroplasty on 12/13. Currently POD #4.   -Radiological images reviewed on admission.   -Orthopedic surgery following; greatly appreciate their assistance.   -PRN Tylenol for mild pain.   -PRN Tofte for moderate pain.   -Neurovascular checks Q 4 hours.   -Fall precautions.  -WBAT.   -Dressing changes per ortho recommendations.  -Lovenox for VTE prophylaxis x 14 days postop.   -PT/OT consults for postop eval.   -CM for discharge planning.   -IPR consult pending.   -Will need outpatient follow-up with ortho on discharge.      -Chronic HFpEF  -Appearing euvolemic on exam.   -Monitor strict I's and O's, daily weights.  -Echo from 11/1/2022 reviewed; normal EF (61-65%).      -Acute on chronic hypoxic respiratory failure POA, resolved  -Moderate pulmonary hypertension  -Evidence of left basilar consolidation on imaging obtained in ED  -SIRS/sepsis criteria on admission with HR>90 and WBCs>12.   -Chest xray revealed minimal left basilar consolidation.  -ABG obtained on 4L NC; PO2 78.1%, PCO2 55.4, and O2 saturation 95.5%.   -Continue to titrate supplemental O2 to maintain SpO2 saturations > 90%.  -Remaining stable on 2L (baseline requirement).   -Continuous pulse oximetry.   -Encourage incentive spirometer and turn/cough/deep breathing exercises.  -Continue Symbicort and DuoNebs.   -Continue Levaquin for empiric coverage.   -Respiratory panel negative.   -Continue follow-up with pulmonology in the outpatient setting for known pulmonary nodule/mass.    -Repeat CBC in the a.m.     -GERD  -Aspiration and reflux precautions.  -PPI.     -History of BPH  -Monitor urine output closely.  -Continue Proscar and Flomax.   -So far no issues with urinary retention in the postop setting.      -Essential hypertension  -BP appears well controlled.   -Closely monitor BP per hospital protocol, titrate medications as necessary.     -Arthritis  -Supportive care.      -History of CBD calculus s/p ERCP  -History of E. Coli  bacteremia        -----------  -DVT prophylaxis: Lovenox, SCDs  -GI prophylaxis: PPI  -Activity: WBAT, fall precautions.   -Disposition plans/anticipated needs: IPR consult pending.   -Diet: Regular  -Home supplemental O2: Utilizes 2L at home. Currently stable on baseline requirement.         The patient is high risk due to the following diagnoses/reasons:  Acute left femoral fracture s/p left hip hemiarthroplasty, advanced age.         DAVID Wilks  12/17/22  10:47 EST  Pager #[phone]

## 2022-12-17 NOTE — PROGRESS NOTES
Recommend evaluate for appetite stimulant r/t refuses nutritional supplements with continued inadequate intake

## 2022-12-17 NOTE — THERAPY TREATMENT NOTE
Inpatient Rehabilitation - Physical Therapy Treatment Note        Ted     Patient Name: Sarbjit Martin  : 1935  MRN: [id number]    Today's Date: 2022   Onset of Illness/Injury or Date of Surgery: 22 (admission date)                Admit Date: 2022      Visit Dx:     ICD-10-CM ICD-9-CM   1. Closed fracture of left hip, initial encounter ([facility])  S72.002A 820.8       Patient Active Problem List   Diagnosis   • Status post laparoscopic cholecystectomy   • COPD (chronic obstructive pulmonary disease) ([facility])   • Abnormal CT of the chest   • Pulmonary nodule   • Benign prostatic hyperplasia without lower urinary tract symptoms   • NSTEMI (non-ST elevated myocardial infarction) ([facility])   • COPD exacerbation ([facility])   • Underweight   • Severe malnutrition ([facility])   • Aortic aneurysm ([facility])   • Closed fracture of left hip ([facility])   • Closed fracture of left hip, initial encounter ([facility])       Past Medical History:   Diagnosis Date   • Angiodysplasia of the colon    • Arthritis    • Bacteremia due to Escherichia coli 2017    Same time as the common bile duct stone   • Benign prostatic hyperplasia without lower urinary tract symptoms 2021   • Bile duct calculus 2017   • 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[facility])     Centrilobular   • Diverticulosis    • GERD (gastroesophageal reflux disease)    • Heart failure with preserved ejection fraction ([facility])    • Left upper lobe pulmonary nodule 2019    15 mm and spiculated in 2018; FNA was negative   • Moderate pulmonary hypertension ([facility])    • Myocardial infarction ([facility])    • Status post laparoscopic cholecystectomy 06/15/2017       Past Surgical History:   Procedure Laterality Date   • BRONCHOSCOPY N/A 2018    Procedure: BRONCHOSCOPY WITH ENDOBRONCHIAL ULTRASOUND;  Surgeon: Saravanan Méndez MD;  Location: [facility];  Service: Pulmonary   • CHOLECYSTECTOMY N/A 2017    Procedure: CHOLECYSTECTOMY LAPAROSCOPIC;   Surgeon: Jose Velazquez MD;  Location: [facility] OR;  Service:    • EYE SURGERY Bilateral 2013   • INGUINAL HERNIA REPAIR Left 2013    left   • [place] ERCP DX COLLECTION SPECIMEN BRUSHING/WASHING N/A 6/26/2017    Procedure: ENDOSCOPIC RETROGRADE CHOLANGIOPANCREATOGRAPHY;  Surgeon: Rod Francis MD;  Location: [facility] OR;  Service: Gastroenterology   • [place] ERCP DX COLLECTION SPECIMEN BRUSHING/WASHING N/A 10/26/2017    Procedure: ENDOSCOPIC RETROGRADE CHOLANGIOPANCREATOGRAPHY;  Surgeon: Rod Francis MD;  Location: [facility] OR;  Service: Gastroenterology       PT ASSESSMENT (last 12 hours)     IRF PT Evaluation and Treatment    No documentation.               Wound 12/12/22 2333 Left upper scalp Skin Tear (Active)   Dressing Appearance open to air 12/17/22 0814   Closure None 12/17/22 0814   Base dry 12/17/22 0814       Wound 12/13/22 1421 Left greater trochanter Incision (Active)   Dressing Appearance dry;intact 12/17/22 0814   Closure LINDA 12/17/22 0814   Base dressing in place, unable to visualize 12/17/22 0814     Physical Therapy Education     Title: PT OT SLP Therapies (Done)     Topic: Physical Therapy (Done)     Point: Mobility training (Done)     Learning Progress Summary           Patient Acceptance, TB,E, VU,DU by BD at 12/17/2022 0903    Acceptance, E,D, VU,NR by RG at 12/16/2022 1448    Acceptance, E,D, VU,NR by RG at 12/15/2022 1523                   Point: Home exercise program (Done)     Learning Progress Summary           Patient Acceptance, TB,E, VU,DU by BD at 12/17/2022 0903    Acceptance, E,D, VU,NR by RG at 12/16/2022 1448    Acceptance, E,D, VU,NR by RG at 12/15/2022 1523                   Point: Body mechanics (Done)     Learning Progress Summary           Patient Acceptance, TB,E, VU,DU by BD at 12/17/2022 0903    Acceptance, E,D, VU,NR by RG at 12/16/2022 1448    Acceptance, E,D, VU,NR by RG at 12/15/2022 1523                   Point: Precautions (Done)     Learning Progress Summary            Patient Acceptance, TB,E, VU,DU by BD at 12/17/2022 0903    Acceptance, E,D, VU,NR by RG at 12/16/2022 1448    Acceptance, E,D, VU,NR by RG at 12/15/2022 1523                               User Key     Initials Effective Dates Name Provider Type Discipline     06/16/21 -  Lexx Michaels, ARACELI Physical Therapist Assistant PT     08/05/21 -  Linda Fontenot, RN Registered Nurse Nurse                PT Recommendation and Plan    Anticipated Equipment Needs at Discharge (PT): front wheeled walker, bedside commode  Anticipated Discharge Disposition (PT): skilled nursing facility, sub acute care setting, home with 24/7 care, home with assist, home with supervision, home with home health  Therapy Frequency (PT): 6 times/wk  Progress Toward Functional Goals (PT): progress toward functional goals is good  Progress Summary (PT)  Progress Toward Functional Goals (PT): progress toward functional goals is good                  Time Calculation:      PT Charges     Row Name 12/17/22 1408             Time Calculation    Start Time 1330  -LB      PT Received On 12/17/22  -LB         Time Calculation- PT    Total Timed Code Minutes- PT 23 minute(s)  -LB            User Key  (r) = Recorded By, (t) = Taken By, (c) = Cosigned By    Initials Name Provider Type    LB Noelle Monreal, PT Physical Therapist                Therapy Charges for Today     Code Description Service Date Service Provider Modifiers Qty    24077625851 HC GAIT TRAINING EA 15 MIN 12/17/2022 Noelle Monreal, PT GP 1    98930081060 HC PT THERAPEUTIC ACT EA 15 MIN 12/17/2022 Noelle Monreal, PT GP 1            PT G-Codes  AM-PAC 6 Clicks Score (PT): 7      Noelle Monreal, PT  12/17/2022

## 2022-12-18 LAB
ANION GAP SERPL CALCULATED.3IONS-SCNC: 7.6 MMOL/L (ref 5–15)
BACTERIA SPEC AEROBE CULT: NORMAL
BACTERIA SPEC AEROBE CULT: NORMAL
BASOPHILS # BLD AUTO: 0.03 10*3/MM3 (ref 0–0.2)
BASOPHILS NFR BLD AUTO: 0.4 % (ref 0–1.5)
BUN SERPL-MCNC: 17 MG/DL (ref 8–23)
BUN/CREAT SERPL: 30.4 (ref 7–25)
CALCIUM SPEC-SCNC: 8.2 MG/DL (ref 8.6–10.5)
CHLORIDE SERPL-SCNC: 104 MMOL/L (ref 98–107)
CO2 SERPL-SCNC: 21.4 MMOL/L (ref 22–29)
CREAT SERPL-MCNC: 0.56 MG/DL (ref 0.76–1.27)
DEPRECATED RDW RBC AUTO: 50.2 FL (ref 37–54)
EGFRCR SERPLBLD CKD-EPI 2021: 96.6 ML/MIN/1.73
EOSINOPHIL # BLD AUTO: 0.35 10*3/MM3 (ref 0–0.4)
EOSINOPHIL NFR BLD AUTO: 4.6 % (ref 0.3–6.2)
ERYTHROCYTE [DISTWIDTH] IN BLOOD BY AUTOMATED COUNT: 15.5 % (ref 12.3–15.4)
GLUCOSE SERPL-MCNC: 105 MG/DL (ref 65–99)
HCT VFR BLD AUTO: 33 % (ref 37.5–51)
HGB BLD-MCNC: 9.9 G/DL (ref 13–17.7)
IMM GRANULOCYTES # BLD AUTO: 0.07 10*3/MM3 (ref 0–0.05)
IMM GRANULOCYTES NFR BLD AUTO: 0.9 % (ref 0–0.5)
LYMPHOCYTES # BLD AUTO: 1.02 10*3/MM3 (ref 0.7–3.1)
LYMPHOCYTES NFR BLD AUTO: 13.3 % (ref 19.6–45.3)
MAGNESIUM SERPL-MCNC: 2.2 MG/DL (ref 1.6–2.4)
MCH RBC QN AUTO: 26.4 PG (ref 26.6–33)
MCHC RBC AUTO-ENTMCNC: 30 G/DL (ref 31.5–35.7)
MCV RBC AUTO: 88 FL (ref 79–97)
MONOCYTES # BLD AUTO: 0.71 10*3/MM3 (ref 0.1–0.9)
MONOCYTES NFR BLD AUTO: 9.3 % (ref 5–12)
NEUTROPHILS NFR BLD AUTO: 5.48 10*3/MM3 (ref 1.7–7)
NEUTROPHILS NFR BLD AUTO: 71.5 % (ref 42.7–76)
NRBC BLD AUTO-RTO: 0 /100 WBC (ref 0–0.2)
PLATELET # BLD AUTO: 131 10*3/MM3 (ref 140–450)
PMV BLD AUTO: 11.4 FL (ref 6–12)
POTASSIUM SERPL-SCNC: 4.5 MMOL/L (ref 3.5–5.2)
RBC # BLD AUTO: 3.75 10*6/MM3 (ref 4.14–5.8)
SODIUM SERPL-SCNC: 133 MMOL/L (ref 136–145)
WBC NRBC COR # BLD: 7.66 10*3/MM3 (ref 3.4–10.8)

## 2022-12-18 PROCEDURE — 85025 COMPLETE CBC W/AUTO DIFF WBC: CPT

## 2022-12-18 PROCEDURE — 94799 UNLISTED PULMONARY SVC/PX: CPT

## 2022-12-18 PROCEDURE — 80048 BASIC METABOLIC PNL TOTAL CA: CPT

## 2022-12-18 PROCEDURE — 94761 N-INVAS EAR/PLS OXIMETRY MLT: CPT

## 2022-12-18 PROCEDURE — 99232 SBSQ HOSP IP/OBS MODERATE 35: CPT

## 2022-12-18 PROCEDURE — 83735 ASSAY OF MAGNESIUM: CPT | Performed by: INTERNAL MEDICINE

## 2022-12-18 RX ORDER — MEGESTROL ACETATE 40 MG/1
40 TABLET ORAL DAILY
Status: DISCONTINUED | OUTPATIENT
Start: 2022-12-18 | End: 2022-12-20 | Stop reason: HOSPADM

## 2022-12-18 RX ORDER — ASPIRIN 81 MG/1
81 TABLET ORAL DAILY
Status: DISCONTINUED | OUTPATIENT
Start: 2022-12-18 | End: 2022-12-20 | Stop reason: HOSPADM

## 2022-12-18 RX ADMIN — AMITRIPTYLINE HYDROCHLORIDE 25 MG: 25 TABLET, FILM COATED ORAL at 20:37

## 2022-12-18 RX ADMIN — IPRATROPIUM BROMIDE AND ALBUTEROL SULFATE 3 ML: 2.5; .5 SOLUTION RESPIRATORY (INHALATION) at 12:09

## 2022-12-18 RX ADMIN — Medication 3 ML: at 08:23

## 2022-12-18 RX ADMIN — ACETAMINOPHEN 1000 MG: 500 TABLET ORAL at 08:23

## 2022-12-18 RX ADMIN — LEVOFLOXACIN 750 MG: 750 TABLET, FILM COATED ORAL at 08:23

## 2022-12-18 RX ADMIN — MEGESTROL ACETATE 40 MG: 40 TABLET ORAL at 17:08

## 2022-12-18 RX ADMIN — IPRATROPIUM BROMIDE AND ALBUTEROL SULFATE 3 ML: 2.5; .5 SOLUTION RESPIRATORY (INHALATION) at 07:15

## 2022-12-18 RX ADMIN — Medication 3 ML: at 20:37

## 2022-12-18 RX ADMIN — Medication 10 ML: at 08:23

## 2022-12-18 RX ADMIN — FINASTERIDE 5 MG: 5 TABLET, FILM COATED ORAL at 08:23

## 2022-12-18 RX ADMIN — BUDESONIDE AND FORMOTEROL FUMARATE DIHYDRATE 2 PUFF: 160; 4.5 AEROSOL RESPIRATORY (INHALATION) at 07:27

## 2022-12-18 RX ADMIN — ACETAMINOPHEN 1000 MG: 500 TABLET ORAL at 20:37

## 2022-12-18 RX ADMIN — ASPIRIN 81 MG: 81 TABLET, COATED ORAL at 13:11

## 2022-12-18 RX ADMIN — LEVOTHYROXINE SODIUM 50 MCG: 0.05 TABLET ORAL at 08:23

## 2022-12-18 RX ADMIN — IPRATROPIUM BROMIDE AND ALBUTEROL SULFATE 3 ML: 2.5; .5 SOLUTION RESPIRATORY (INHALATION) at 20:05

## 2022-12-18 RX ADMIN — TAMSULOSIN HYDROCHLORIDE 0.4 MG: 0.4 CAPSULE ORAL at 20:37

## 2022-12-18 RX ADMIN — BUDESONIDE AND FORMOTEROL FUMARATE DIHYDRATE 2 PUFF: 160; 4.5 AEROSOL RESPIRATORY (INHALATION) at 20:05

## 2022-12-18 RX ADMIN — ACETAMINOPHEN 1000 MG: 500 TABLET ORAL at 17:08

## 2022-12-18 RX ADMIN — Medication 1 TABLET: at 08:23

## 2022-12-18 RX ADMIN — Medication 10 ML: at 20:37

## 2022-12-18 NOTE — PROGRESS NOTES
Patient Identification:  Name:  Sarbjit Martin  Age:  87 y.o.  Sex:  male  :  1935  MRN:  [id number]  Visit Number:  [id number]  Primary Care Provider:  Goldie Steward APRN    Length of stay:  5    Subjective/Interval History/Consultants/Procedures     Chief complaint: Follow-up, acute left femoral fracture s/p left hemiarthroplasty, POD #5     Subjective/Interval History:  Sarbjit Martin is our 87 y.o. male patient admitted on 2022 for acute left hip fracture s/p mechanical fall at home. He has a pMH significant for BPH, arthritis, bile duct calculus s/p ERCP, COPD, chronic hypoxic respiratory failure, chronic HFpEF, GERD, moderate pulmonary hypertension, and advanced age. For further and complete admitting details, please see admission H&P.     He was admitted to the [facility] floor for further monitoring, evaluation, and treatment. Patient had presented to the ED with complaints of left hip pain s/p mechanical fall at home from standing height. Imaging obtained in the ED revealed acute left femoral fracture. He ultimately underwent left hip hemiarthroplasty on  by Dr. Reyez and tolerated procedure well. PT/OT consults placed for postop eval. CM consulted for assistance with discharge placement. On initial exam, patient had increased supplemental O2 requirement from baseline. Usually uses 2L at home; was on 4L. Lungs were coarse/diminished bilaterally with RLL crackles. Chest imaging suggestive of LLL infiltrate. Patient was started on Levaquin for empiric treatment. Will complete course as patient has shown clinical improvement. Also receiving Symbicort and DuoNebs. Incentive spirometer use and turn/cough/deep breathing exercises encouraged. Continuous pulse oximetry in place. Respiratory panel negative. He has known left pulmonary nodule, has increased in size since previous exam. Attending discussed with lung nodule clinic Irma HERNANDEZ to set up appointment for follow-up in the  outpatient setting. Patient is now on baseline O2 requirement of 2L and stable. Lung sounds have improved although some right basilar crackles remain. Hgb decreased postoperatively, but is stable at this time. He has not required any blood transfusions. IPR consult placed and pending.      Procedures/Imaging:  • Xray of the left pelvis x2  • Xray of the left femur  • Chest xray  • CT head without contrast  • CT cervical spine without contrast  • Left hip hemiarthroplasty on 12/13 by Dr. Reyez.      Consults:  • Orthopedic Surgery  • PT/OT  • Case management     On today's exam, patient was lying in bed with no s/s acute distress noted. Left hip incision CRISTAL at time of exam. Edges appearing well-approximated. Scant amount of bright red bloody drainage around staples. There is a large, purple ecchymotic area noted just above incision site extending posteriorly. Firm to touch. Patient denies associated pain. Discussed with Dr. Pastrana. Hgb remaining stable postop. Consider scan to evaluate for developing hematoma. LLE remaining neurovascularly intact. Patient denies any left hip pain at this time. Also denies chest pain, shortness of breath above baseline, or any other acute complaints. He had eaten a small amount of breakfast. Will add Megace for appetite stimulation as patient refuses nutrition supplement drinks. He has been working with PT and doing well. Discussed plan with patient. Voiced agreement with no further questions or concerns at this time.      No acute events reported overnight. Room location at the time of evaluation was [facility]. Discussed with attending physician, Rah Harrison MD.   ----------------------------------------------------------------------------------------------------------------------  Current Hospital Meds:  acetaminophen, 1,000 mg, Oral, TID  amitriptyline, 25 mg, Oral, Nightly  budesonide-formoterol, 2 puff, Inhalation, BID - RT  enoxaparin, 40 mg, Subcutaneous,  Q24H  finasteride, 5 mg, Oral, Daily  ipratropium-albuterol, 3 mL, Nebulization, 4x Daily - RT  levothyroxine, 50 mcg, Oral, Daily  multivitamin, 1 tablet, Oral, Daily  sodium chloride, 10 mL, Intravenous, Q12H  sodium chloride, 3 mL, Intravenous, Q12H  tamsulosin, 0.4 mg, Oral, Nightly         ----------------------------------------------------------------------------------------------------------------------      Objective     Vital Signs:  Temp:  [97.7 °F (36.5 °C)-98.9 °F (37.2 °C)] 98.9 °F (37.2 °C)  Heart Rate:  [84-98] 97  Resp:  [18] 18  BP: (115-140)/(60-78) 136/78      12/16/22  0500 12/17/22  0512 12/18/22  0500   Weight: 50.4 kg (111 lb 3.2 oz) 50.6 kg (111 lb 8 oz) 50.3 kg (110 lb 12.8 oz)     Body mass index is 17.88 kg/m².    Intake/Output Summary (Last 24 hours) at 12/18/2022 1053  Last data filed at 12/18/2022 0611  Gross per 24 hour   Intake 460.36 ml   Output 1400 ml   Net -939.64 ml     No intake/output data recorded.  Diet: Regular/House Diet; Texture: Regular Texture (IDDSI 7); Fluid Consistency: Thin (IDDSI 0)  ----------------------------------------------------------------------------------------------------------------------    Physical Exam  Vitals and nursing note reviewed.   Constitutional:       General: He is awake. He is not in acute distress.     Appearance: He is not diaphoretic.      Interventions: Nasal cannula in place.      Comments: Remaining on 2L NC.   HENT:      Head: Normocephalic and atraumatic.      Mouth/Throat:      Mouth: Mucous membranes are moist.      Pharynx: Oropharynx is clear.   Eyes:      Extraocular Movements: Extraocular movements intact.      Pupils: Pupils are equal, round, and reactive to light.   Cardiovascular:      Rate and Rhythm: Normal rate and regular rhythm.      Pulses: Normal pulses.           Dorsalis pedis pulses are 2+ on the right side and 2+ on the left side.      Heart sounds: Normal heart sounds. No murmur heard.    No friction rub.    Pulmonary:      Effort: Pulmonary effort is normal. No accessory muscle usage, respiratory distress or retractions.      Breath sounds: Rales (RLL) present. No wheezing or rhonchi.   Abdominal:      General: Abdomen is flat. Bowel sounds are normal. There is no distension.      Palpations: Abdomen is soft.      Tenderness: There is no abdominal tenderness. There is no guarding.   Musculoskeletal:      Cervical back: Neck supple. No rigidity.      Right lower leg: No edema.      Left lower leg: No edema.   Skin:     General: Skin is warm and dry.      Capillary Refill: Capillary refill takes 2 to 3 seconds.      Coloration: Skin is pale.          Neurological:      Mental Status: He is alert and oriented to person, place, and time.      Cranial Nerves: No facial asymmetry.      Sensory: Sensation is intact.      Motor: Weakness (generalized) present. No tremor.   Psychiatric:         Attention and Perception: Attention normal.         Mood and Affect: Mood normal.         Speech: Speech normal.         Behavior: Behavior normal. Behavior is cooperative.         Thought Content: Thought content normal.       ----------------------------------------------------------------------------------------------------------------------      Results from last 7 days   Lab Units 12/18/22  0306 12/17/22  0415 12/16/22  0145 12/14/22  0406 12/13/22  1911 12/13/22  0850 12/13/22  0022   CRP mg/dL  --   --  16.03*  --  4.11*  --   --    LACTATE mmol/L  --   --   --   --  1.5  --   --    WBC 10*3/mm3 7.66 7.60 8.30   < >  --   --  14.97*   HEMOGLOBIN g/dL 9.9* 9.4* 9.3*   < >  --   --  14.9   HEMATOCRIT % 33.0* 30.4* 30.2*   < >  --   --  48.7   MCV fL 88.0 88.4 87.3   < >  --   --  87.6   MCHC g/dL 30.0* 30.9* 30.8*   < >  --   --  30.6*   PLATELETS 10*3/mm3 131* 125* 103*   < >  --   --  194   INR   --   --   --   --   --  1.09 0.97    < > = values in this interval not displayed.     Results from last 7 days   Lab Units  12/13/22  0747   PH, ARTERIAL pH units 7.344*   PO2 ART mm Hg 78.1*   PCO2, ARTERIAL mm Hg 55.4*   HCO3 ART mmol/L 30.1*     Results from last 7 days   Lab Units 12/18/22  0306 12/17/22  0415 12/16/22  0145 12/14/22  0406 12/13/22  0022   SODIUM mmol/L 133* 136 137   < > 137   POTASSIUM mmol/L 4.5 4.0 4.1   < > 5.1   MAGNESIUM mg/dL 2.2 1.7 1.9   < >  --    CHLORIDE mmol/L 104 104 104   < > 101   CO2 mmol/L 21.4* 25.1 26.7   < > 21.2*   BUN mg/dL 17 15 19   < > 26*   CREATININE mg/dL 0.56* 0.58* 0.52*   < > 0.87   CALCIUM mg/dL 8.2* 7.9* 7.7*   < > 9.2   GLUCOSE mg/dL 105* 106* 115*   < > 106*   ALBUMIN g/dL  --   --   --   --  3.49*   BILIRUBIN mg/dL  --   --   --   --  0.5   ALK PHOS U/L  --   --   --   --  72   AST (SGOT) U/L  --   --   --   --  21   ALT (SGPT) U/L  --   --   --   --  15    < > = values in this interval not displayed.   Estimated Creatinine Clearance: 68.6 mL/min (A) (by C-G formula based on SCr of 0.56 mg/dL (L)).  No results found for: AMMONIA      Blood Culture   Date Value Ref Range Status   12/13/2022 No growth at 4 days  Preliminary   12/13/2022 No growth at 4 days  Preliminary       ----------------------------------------------------------------------------------------------------------------------  Imaging Results (Last 24 Hours)     ** No results found for the last 24 hours. **        ----------------------------------------------------------------------------------------------------------------------   I have reviewed the above laboratory values for 12/18/22    Assessment/Plan     Active Hospital Problems    Diagnosis  POA   • **Closed fracture of left hip ([facility]) [S72.002A]  Unknown     Added automatically from request for surgery [id number]     • Closed fracture of left hip, initial encounter ([facility]) [S72.002A]  Yes       ASSESSMENT/PLAN:  -Acute left femoral neck fracture POA s/p mechanical fall at home  -S/P left hip hemiarthroplasty on 12/13. Currently POD #5.   -Radiological images  reviewed on admission.   -Orthopedic surgery following; greatly appreciate their assistance.   -PRN Tylenol for mild pain.   -PRN [place] for moderate pain.   -Neurovascular checks Q 4 hours.   -Fall precautions.  -WBAT.   -Dressing changes per ortho recommendations.  -Lovenox for VTE prophylaxis x 14 days postop.   -PT/OT consults for postop eval.   -CM for discharge planning.   -IPR consult pending.   -Will need outpatient follow-up with ortho on discharge.      -Chronic HFpEF  -Appearing euvolemic on exam.   -Monitor strict I's and O's, daily weights.  -Echo from 11/1/2022 reviewed; normal EF (61-65%).      -Acute on chronic hypoxic respiratory failure POA, resolved  -Moderate pulmonary hypertension  -Evidence of left basilar consolidation on imaging obtained in ED  -SIRS/sepsis criteria on admission with HR>90 and WBCs>12.   -Chest xray revealed minimal left basilar consolidation.  -ABG obtained on 4L NC; PO2 78.1%, PCO2 55.4, and O2 saturation 95.5%.   -Continue to titrate supplemental O2 to maintain SpO2 saturations > 90%.  -Remaining stable on 2L (baseline requirement).   -Continuous pulse oximetry.   -Encourage incentive spirometer and turn/cough/deep breathing exercises.  -Continue Symbicort and DuoNebs.   -Continue Levaquin for empiric coverage.   -Respiratory panel negative.   -Continue follow-up with pulmonology in the outpatient setting for known pulmonary nodule/mass.    -Repeat CBC in the a.m.     -GERD  -Aspiration and reflux precautions.  -PPI.     -History of BPH  -Monitor urine output closely.  -Continue Proscar and Flomax.   -So far no issues with urinary retention in the postop setting.      -Essential hypertension  -BP appears well controlled.   -Closely monitor BP per hospital protocol, titrate medications as necessary.     -Arthritis  -Supportive care.      -History of CBD calculus s/p ERCP  -History of E. Coli bacteremia        -----------  -DVT prophylaxis: Lovenox, SCDs  -GI  prophylaxis: PPI  -Activity: WBAT, fall precautions.   -Disposition plans/anticipated needs: IPR consult pending.   -Diet: Regular  -Home supplemental O2: Utilizes 2L at home. Currently stable on baseline requirement.         The patient is high risk due to the following diagnoses/reasons:  Acute left femoral fracture s/p left hip hemiarthroplasty, advanced age.         DAVID Wilks  12/18/22  10:53 EST  Pager #[phone]

## 2022-12-18 NOTE — PLAN OF CARE
Goal Outcome Evaluation:           Progress: no change  Outcome Evaluation: pt has been resting in bed. family at bedside. no changes to note at this time; will continue to monitor

## 2022-12-18 NOTE — PLAN OF CARE
Goal Outcome Evaluation:              Outcome Evaluation: patient has rested well tonight. incision has been left open to air but patient has been found to pick at the staples causing it to bleed. encouraged patient to not mess with incision site.

## 2022-12-19 LAB
ANION GAP SERPL CALCULATED.3IONS-SCNC: 8.1 MMOL/L (ref 5–15)
BUN SERPL-MCNC: 17 MG/DL (ref 8–23)
BUN/CREAT SERPL: 25.8 (ref 7–25)
CALCIUM SPEC-SCNC: 8.2 MG/DL (ref 8.6–10.5)
CHLORIDE SERPL-SCNC: 98 MMOL/L (ref 98–107)
CO2 SERPL-SCNC: 24.9 MMOL/L (ref 22–29)
CREAT SERPL-MCNC: 0.66 MG/DL (ref 0.76–1.27)
DEPRECATED RDW RBC AUTO: 47.1 FL (ref 37–54)
EGFRCR SERPLBLD CKD-EPI 2021: 91.9 ML/MIN/1.73
ERYTHROCYTE [DISTWIDTH] IN BLOOD BY AUTOMATED COUNT: 15 % (ref 12.3–15.4)
GLUCOSE SERPL-MCNC: 105 MG/DL (ref 65–99)
HCT VFR BLD AUTO: 31.7 % (ref 37.5–51)
HGB BLD-MCNC: 10 G/DL (ref 13–17.7)
MCH RBC QN AUTO: 26.9 PG (ref 26.6–33)
MCHC RBC AUTO-ENTMCNC: 31.5 G/DL (ref 31.5–35.7)
MCV RBC AUTO: 85.2 FL (ref 79–97)
PLATELET # BLD AUTO: 154 10*3/MM3 (ref 140–450)
PMV BLD AUTO: 11.1 FL (ref 6–12)
POTASSIUM SERPL-SCNC: 4.4 MMOL/L (ref 3.5–5.2)
RBC # BLD AUTO: 3.72 10*6/MM3 (ref 4.14–5.8)
SODIUM SERPL-SCNC: 131 MMOL/L (ref 136–145)
WBC NRBC COR # BLD: 8.88 10*3/MM3 (ref 3.4–10.8)

## 2022-12-19 PROCEDURE — 99232 SBSQ HOSP IP/OBS MODERATE 35: CPT

## 2022-12-19 PROCEDURE — 97110 THERAPEUTIC EXERCISES: CPT

## 2022-12-19 PROCEDURE — 94799 UNLISTED PULMONARY SVC/PX: CPT

## 2022-12-19 PROCEDURE — 97116 GAIT TRAINING THERAPY: CPT

## 2022-12-19 PROCEDURE — 80048 BASIC METABOLIC PNL TOTAL CA: CPT

## 2022-12-19 PROCEDURE — 94761 N-INVAS EAR/PLS OXIMETRY MLT: CPT

## 2022-12-19 PROCEDURE — 85027 COMPLETE CBC AUTOMATED: CPT

## 2022-12-19 RX ORDER — GUAIFENESIN 600 MG/1
1200 TABLET, EXTENDED RELEASE ORAL EVERY 12 HOURS SCHEDULED
Status: DISCONTINUED | OUTPATIENT
Start: 2022-12-19 | End: 2022-12-20 | Stop reason: HOSPADM

## 2022-12-19 RX ADMIN — Medication 1 TABLET: at 08:15

## 2022-12-19 RX ADMIN — TAMSULOSIN HYDROCHLORIDE 0.4 MG: 0.4 CAPSULE ORAL at 20:53

## 2022-12-19 RX ADMIN — BUDESONIDE AND FORMOTEROL FUMARATE DIHYDRATE 2 PUFF: 160; 4.5 AEROSOL RESPIRATORY (INHALATION) at 07:19

## 2022-12-19 RX ADMIN — ACETAMINOPHEN 1000 MG: 500 TABLET ORAL at 20:53

## 2022-12-19 RX ADMIN — ASPIRIN 81 MG: 81 TABLET, COATED ORAL at 08:15

## 2022-12-19 RX ADMIN — MEGESTROL ACETATE 40 MG: 40 TABLET ORAL at 08:15

## 2022-12-19 RX ADMIN — FINASTERIDE 5 MG: 5 TABLET, FILM COATED ORAL at 08:15

## 2022-12-19 RX ADMIN — GUAIFENESIN 1200 MG: 600 TABLET, EXTENDED RELEASE ORAL at 20:53

## 2022-12-19 RX ADMIN — IPRATROPIUM BROMIDE AND ALBUTEROL SULFATE 3 ML: 2.5; .5 SOLUTION RESPIRATORY (INHALATION) at 19:21

## 2022-12-19 RX ADMIN — Medication 3 ML: at 08:15

## 2022-12-19 RX ADMIN — BUDESONIDE AND FORMOTEROL FUMARATE DIHYDRATE 2 PUFF: 160; 4.5 AEROSOL RESPIRATORY (INHALATION) at 19:21

## 2022-12-19 RX ADMIN — AMITRIPTYLINE HYDROCHLORIDE 25 MG: 25 TABLET, FILM COATED ORAL at 20:53

## 2022-12-19 RX ADMIN — LEVOTHYROXINE SODIUM 50 MCG: 0.05 TABLET ORAL at 08:15

## 2022-12-19 RX ADMIN — IPRATROPIUM BROMIDE AND ALBUTEROL SULFATE 3 ML: 2.5; .5 SOLUTION RESPIRATORY (INHALATION) at 07:19

## 2022-12-19 RX ADMIN — Medication 10 ML: at 20:53

## 2022-12-19 RX ADMIN — ACETAMINOPHEN 1000 MG: 500 TABLET ORAL at 15:30

## 2022-12-19 RX ADMIN — Medication 10 ML: at 08:15

## 2022-12-19 RX ADMIN — ACETAMINOPHEN 1000 MG: 500 TABLET ORAL at 08:15

## 2022-12-19 RX ADMIN — GUAIFENESIN 1200 MG: 600 TABLET, EXTENDED RELEASE ORAL at 11:58

## 2022-12-19 NOTE — THERAPY TREATMENT NOTE
Acute Care - Physical Therapy Treatment Note   [place]     Patient Name: Sarbjit Martin  : 1935  MRN: [id number]  Today's Date: 2022   Onset of Illness/Injury or Date of Surgery: 22 (admission date)  Visit Dx:     ICD-10-CM ICD-9-CM   1. Closed fracture of left hip, initial encounter ([facility])  S72.002A 820.8     Patient Active Problem List   Diagnosis   • Status post laparoscopic cholecystectomy   • COPD (chronic obstructive pulmonary disease) ([facility])   • Abnormal CT of the chest   • Pulmonary nodule   • Benign prostatic hyperplasia without lower urinary tract symptoms   • NSTEMI (non-ST elevated myocardial infarction) ([facility])   • COPD exacerbation ([facility])   • Underweight   • Severe malnutrition ([facility])   • Aortic aneurysm ([facility])   • Closed fracture of left hip ([facility])   • Closed fracture of left hip, initial encounter ([facility])     Past Medical History:   Diagnosis Date   • Angiodysplasia of the colon    • Arthritis    • Bacteremia due to Escherichia coli 2017    Same time as the common bile duct stone   • Benign prostatic hyperplasia without lower urinary tract symptoms 2021   • Bile duct calculus 2017   • 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[facility])     Centrilobular   • Diverticulosis    • GERD (gastroesophageal reflux disease)    • Heart failure with preserved ejection fraction ([facility])    • Left upper lobe pulmonary nodule 2019    15 mm and spiculated in 2018; FNA was negative   • Moderate pulmonary hypertension ([facility])    • Myocardial infarction ([facility])    • Status post laparoscopic cholecystectomy 06/15/2017     Past Surgical History:   Procedure Laterality Date   • BRONCHOSCOPY N/A 2018    Procedure: BRONCHOSCOPY WITH ENDOBRONCHIAL ULTRASOUND;  Surgeon: Saravanan Méndez MD;  Location: [facility] OR;  Service: Pulmonary   • CHOLECYSTECTOMY N/A 2017    Procedure: CHOLECYSTECTOMY LAPAROSCOPIC;  Surgeon: Jose Velazquez MD;  Location: [facility] OR;  Service:    • EYE  SURGERY Bilateral 2013   • INGUINAL HERNIA REPAIR Left 2013    left   • MD ERCP DX COLLECTION SPECIMEN BRUSHING/WASHING N/A 6/26/2017    Procedure: ENDOSCOPIC RETROGRADE CHOLANGIOPANCREATOGRAPHY;  Surgeon: Rod Francis MD;  Location: [facility] OR;  Service: Gastroenterology   • MD ERCP DX COLLECTION SPECIMEN BRUSHING/WASHING N/A 10/26/2017    Procedure: ENDOSCOPIC RETROGRADE CHOLANGIOPANCREATOGRAPHY;  Surgeon: Rod Francis MD;  Location: [facility] OR;  Service: Gastroenterology     PT Assessment (last 12 hours)     PT Evaluation and Treatment     Row Name 12/19/22 [facility]6          Physical Therapy Time and Intention    Subjective Information no complaints  -HR     Document Type therapy note (daily note)  -HR     Mode of Treatment individual therapy;physical therapy  -HR     Patient Effort adequate  -HR     Comment Pt and RN in agreement for PT. Pt walked 18' with RW CGA/Kay from bed to chair. Bed mobility min A, transfers min A. Pt completed sitting exercises once up in chair.  -HR     Row Name 12/19/22 1116          General Information    Existing Precautions/Restrictions hip, anterior;weight bearing;fall  -HR     Row Name 12/19/22 1116          Pain Scale: FACES Pre/Post-Treatment    Pain: FACES Scale, Pretreatment 4-->hurts little more  -HR     Posttreatment Pain Rating 4-->hurts little more  -HR     Row Name 12/19/22 1116          Cognition    Affect/Mental Status (Cognition) WFL  -HR     Follows Commands (Cognition) WFL  -HR     Personal Safety Interventions fall prevention program maintained;gait belt;nonskid shoes/slippers when out of bed;supervised activity  -HR     Row Name 12/19/22 1116          Mobility    Left Lower Extremity (Weight-bearing Status) weight-bearing as tolerated (WBAT)  -HR     Row Name 12/19/22 1116          Bed Mobility    Supine-Sit-Supine [place] (Bed Mobility) verbal cues;nonverbal cues (demo/gesture);minimum assist (75% patient effort)  -HR     Assistive Device (Bed  Mobility) bed rails;draw sheet  -HR     Row Name 12/19/22 1116          Sit-Stand Transfer    Sit-Stand [place] (Transfers) minimum assist (75% patient effort)  -HR     Assistive Device (Sit-Stand Transfers) walker, front-wheeled  -HR     Row Name 12/19/22 1116          Stand-Sit Transfer    Stand-Sit [place] (Transfers) minimum assist (75% patient effort)  -HR     Assistive Device (Stand-Sit Transfers) walker, front-wheeled  -HR     Row Name 12/19/22 1116          Stand Pivot/Stand Step Transfer    Stand Pivot/Stand Step [place] (Transfers) minimum assist (75% patient effort);verbal cues;nonverbal cues (demo/gesture)  -HR     Assistive Device (Stand Pivot Stand Step Transfer) walker, front-wheeled  -HR     Row Name 12/19/22 1116          Gait/Stairs (Locomotion)    [place] Level (Gait) minimum assist (75% patient effort);verbal cues;nonverbal cues (demo/gesture);contact guard  -HR     Assistive Device (Gait) walker, front-wheeled  -HR     Distance in Feet (Gait) 18'  -HR     Pattern (Gait) step-to  -HR     Deviations/Abnormal Patterns (Gait) antalgic;mickey decreased;gait speed decreased;stride length decreased  -HR     Bilateral Gait Deviations forward flexed posture  -HR     Row Name 12/19/22 1116          Motor Skills    Therapeutic Exercise other (see comments)  Sitting: AP, LAQ, March, pillow sq.  -HR     Row Name 12/19/22 1116          Breath Sounds    Breath Sounds All Fields  -HR     All Lung Fields Breath Sounds diminished  -HR     Row Name             Wound 12/12/22 2333 Left upper scalp Skin Tear    Wound - Properties Group Placement Date: 12/12/22  -KM Placement Time: 2333  -KM Present on Hospital Admission: Y  -KM Side: Left  -KM Orientation: upper  -KM Location: scalp  -KM Primary Wound Type: Skin tear  -KM    Retired Wound - Properties Group Placement Date: 12/12/22  -KM Placement Time: 2333  -KM Present on Hospital Admission: Y  -KM Side: Left  -KM Orientation: upper  -KM  Location: scalp  -KM Primary Wound Type: Skin tear  -KM    Retired Wound - Properties Group Date first assessed: 12/12/22  -KM Time first assessed: 2333  -KM Present on Hospital Admission: Y  -KM Side: Left  -KM Location: scalp  -KM Primary Wound Type: Skin tear  -KM    Row Name             Wound 12/13/22 1421 Left greater trochanter Incision    Wound - Properties Group Placement Date: 12/13/22  - Placement Time: 1421  -MC Present on Hospital Admission: N  -MC Side: Left  -MC Location: greater trochanter  -MC Primary Wound Type: Incision  -MC    Retired Wound - Properties Group Placement Date: 12/13/22  - Placement Time: 1421  -MC Present on Hospital Admission: N  -MC Side: Left  -MC Location: greater trochanter  -MC Primary Wound Type: Incision  -MC    Retired Wound - Properties Group Date first assessed: 12/13/22  - Time first assessed: 1421  -MC Present on Hospital Admission: N  -MC Side: Left  -MC Location: greater trochanter  -MC Primary Wound Type: Incision  -MC    Row Name 12/19/22 1116          Positioning and Restraints    Pre-Treatment Position in bed  -HR     Post Treatment Position chair  -HR     In Chair sitting;encouraged to call for assist;call light within reach  -HR     Row Name 12/19/22 1116          Therapy Assessment/Plan (PT)    Rehab Potential (PT) other (see comments)  fair/guarded  -HR     Criteria for Skilled Interventions Met (PT) yes  -HR     Therapy Frequency (PT) 6 times/wk  -HR     Row Name 12/19/22 1116          Physical Therapy Goals    Bed Mobility Goal Selection (PT) bed mobility, PT goal 1  -HR     Transfer Goal Selection (PT) transfer, PT goal 1  -HR     Gait Training Goal Selection (PT) gait training, PT goal 1  -HR     Row Name 12/19/22 1116          Bed Mobility Goal 1 (PT)    Activity/Assistive Device (Bed Mobility Goal 1, PT) sit to supine;supine to sit  -HR     [place] Level/Cues Needed (Bed Mobility Goal 1, PT) contact guard required;minimum assist (75% or more  patient effort)  -HR     Time Frame (Bed Mobility Goal 1, PT) long term goal (LTG)  -HR     Row Name 12/19/22 1116          Transfer Goal 1 (PT)    Activity/Assistive Device (Transfer Goal 1, PT) sit-to-stand/stand-to-sit  -HR     [place] Level/Cues Needed (Transfer Goal 1, PT) contact guard required  -HR     Time Frame (Transfer Goal 1, PT) long term goal (LTG)  -HR     Row Name 12/19/22 1116          Gait Training Goal 1 (PT)    Activity/Assistive Device (Gait Training Goal 1, PT) gait (walking locomotion);assistive device use;walker, rolling  -HR     [place] Level (Gait Training Goal 1, PT) contact guard required  -HR     Distance (Gait Training Goal 1, PT) 10'  -HR           User Key  (r) = Recorded By, (t) = Taken By, (c) = Cosigned By    Initials Name Provider Type    Charline Forbes RN Registered Nurse    Kayley Rivera RN Registered Nurse    Brooke Hicks PTA Physical Therapist Assistant                Physical Therapy Education     Title: PT OT SLP Therapies (Done)     Topic: Physical Therapy (Done)     Point: Mobility training (Done)     Learning Progress Summary           Patient Acceptance, TB,E, VU,DU by BD at 12/19/2022 0856    Acceptance, E, VU by EB at 12/19/2022 0342    Acceptance, TB,E, VU,DU by BD at 12/18/2022 0902    Acceptance, TB,E, VU,DU by BD at 12/17/2022 0903    Acceptance, E,D, VU,NR by RG at 12/16/2022 1448    Acceptance, E,D, VU,NR by RG at 12/15/2022 1523                   Point: Home exercise program (Done)     Learning Progress Summary           Patient Acceptance, TB,E, VU,DU by BD at 12/19/2022 0856    Acceptance, E, VU by EB at 12/19/2022 0342    Acceptance, TB,E, VU,DU by BD at 12/18/2022 0902    Acceptance, TB,E, VU,DU by BD at 12/17/2022 0903    Acceptance, E,D, VU,NR by RG at 12/16/2022 1448    Acceptance, E,D, VU,NR by RG at 12/15/2022 1523                   Point: Body mechanics (Done)     Learning Progress Summary           Patient Acceptance,  TB,E, VU,DU by BD at 12/19/2022 0856    Acceptance, E, VU by EB at 12/19/2022 0342    Acceptance, TB,E, VU,DU by BD at 12/18/2022 0902    Acceptance, TB,E, VU,DU by BD at 12/17/2022 0903    Acceptance, E,D, VU,NR by RG at 12/16/2022 1448    Acceptance, E,D, VU,NR by RG at 12/15/2022 1523                   Point: Precautions (Done)     Learning Progress Summary           Patient Acceptance, TB,E, VU,DU by BD at 12/19/2022 0856    Acceptance, E, VU by EB at 12/19/2022 0342    Acceptance, TB,E, VU,DU by BD at 12/18/2022 0902    Acceptance, TB,E, VU,DU by BD at 12/17/2022 0903    Acceptance, E,D, VU,NR by RG at 12/16/2022 1448    Acceptance, E,D, VU,NR by RG at 12/15/2022 1523                               User Key     Initials Effective Dates Name Provider Type Discipline     06/16/21 -  Lexx Michaels PTA Physical Therapist Assistant PT     08/05/21 -  Linda Fontenot, RN Registered Nurse Nurse     09/22/22 -  Saurav Siu, RN Registered Nurse Nurse              PT Recommendation and Plan  Anticipated Discharge Disposition (PT): skilled nursing facility, sub acute care setting, home with 24/7 care, home with assist, home with supervision, home with home health  Therapy Frequency (PT): 6 times/wk          Time Calculation:    PT Charges     Row Name 12/19/22 1122             Time Calculation    Start Time 0956  -HR      PT Received On 12/19/22  -HR         Time Calculation- PT    Total Timed Code Minutes- PT 23 minute(s)  -HR            User Key  (r) = Recorded By, (t) = Taken By, (c) = Cosigned By    Initials Name Provider Type    HR Brooke Ramos PTA Physical Therapist Assistant              Therapy Charges for Today     Code Description Service Date Service Provider Modifiers Qty    79851833302 HC GAIT TRAINING EA 15 MIN 12/19/2022 Brooke Ramos PTA GP, CQ 1    53103527105 HC PT THER PROC EA 15 MIN 12/19/2022 Richard, Brooke, PTA GP, CQ 1          PT G-Codes  AM-PAC 6 Clicks Score (PT): 7    Brooke Ramos,  PTA  12/19/2022

## 2022-12-19 NOTE — PLAN OF CARE
Goal Outcome Evaluation:           Progress: no change  Outcome Evaluation: pt has been resting in bed. Pt worked with PT today. No other changes to note at this time; will continue to monitor

## 2022-12-19 NOTE — NURSING NOTE
Rehab consult follow up:  [employer] called with intent for adverse decision regarding Rehab prior auth request, and [id number] is being offered.  [id number] can be called to [phone] by 12:00CST today.  Left message for Roxann in SS.

## 2022-12-19 NOTE — CASE MANAGEMENT/SOCIAL WORK
Discharge Planning Assessment   Ted     Patient Name: Sarbjit Martin  MRN: [id number]  Today's Date: 12/19/2022    Admit Date: 12/12/2022       Discharge Plan     Row Name 12/19/22 1629       Plan    Plan SS spoke with pt that insurance has denied IPR and to see if pt has other options for rehab. Pt was not interested in SNF. Pt stated he would rather go home and that he has family and a significant other that would be available to help. SS notified Dr. Hardin. SS to follow.                TOM Payne

## 2022-12-19 NOTE — THERAPY TREATMENT NOTE
Acute Care - Physical Therapy Treatment Note   [place]     Patient Name: Sarbjit Martin  : 1935  MRN: [id number]  Today's Date: 2022   Onset of Illness/Injury or Date of Surgery: 22 (admission date)  Visit Dx:     ICD-10-CM ICD-9-CM   1. Closed fracture of left hip, initial encounter ([facility])  S72.002A 820.8     Patient Active Problem List   Diagnosis   • Status post laparoscopic cholecystectomy   • COPD (chronic obstructive pulmonary disease) ([facility])   • Abnormal CT of the chest   • Pulmonary nodule   • Benign prostatic hyperplasia without lower urinary tract symptoms   • NSTEMI (non-ST elevated myocardial infarction) ([facility])   • COPD exacerbation ([facility])   • Underweight   • Severe malnutrition ([facility])   • Aortic aneurysm ([facility])   • Closed fracture of left hip ([facility])   • Closed fracture of left hip, initial encounter ([facility])     Past Medical History:   Diagnosis Date   • Angiodysplasia of the colon    • Arthritis    • Bacteremia due to Escherichia coli 2017    Same time as the common bile duct stone   • Benign prostatic hyperplasia without lower urinary tract symptoms 2021   • Bile duct calculus 2017   • 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[facility])     Centrilobular   • Diverticulosis    • GERD (gastroesophageal reflux disease)    • Heart failure with preserved ejection fraction ([facility])    • Left upper lobe pulmonary nodule 2019    15 mm and spiculated in 2018; FNA was negative   • Moderate pulmonary hypertension ([facility])    • Myocardial infarction ([facility])    • Status post laparoscopic cholecystectomy 06/15/2017     Past Surgical History:   Procedure Laterality Date   • BRONCHOSCOPY N/A 2018    Procedure: BRONCHOSCOPY WITH ENDOBRONCHIAL ULTRASOUND;  Surgeon: Saravanan Méndez MD;  Location: [facility] OR;  Service: Pulmonary   • CHOLECYSTECTOMY N/A 2017    Procedure: CHOLECYSTECTOMY LAPAROSCOPIC;  Surgeon: Jose Velazquez MD;  Location: [facility] OR;  Service:    • EYE  SURGERY Bilateral 2013   • INGUINAL HERNIA REPAIR Left 2013    left   • DC ERCP DX COLLECTION SPECIMEN BRUSHING/WASHING N/A 6/26/2017    Procedure: ENDOSCOPIC RETROGRADE CHOLANGIOPANCREATOGRAPHY;  Surgeon: Rod Francis MD;  Location: [facility] OR;  Service: Gastroenterology   • DC ERCP DX COLLECTION SPECIMEN BRUSHING/WASHING N/A 10/26/2017    Procedure: ENDOSCOPIC RETROGRADE CHOLANGIOPANCREATOGRAPHY;  Surgeon: Rod Francis MD;  Location: [facility] OR;  Service: Gastroenterology     PT Assessment (last 12 hours)     PT Evaluation and Treatment     Row Name 12/19/22 1454 12/19/22 1116       Physical Therapy Time and Intention    Subjective Information no complaints  -HR no complaints  -HR    Document Type therapy note (daily note)  -HR therapy note (daily note)  -HR    Mode of Treatment individual therapy;physical therapy  -HR individual therapy;physical therapy  -HR    Patient Effort adequate  -HR adequate  -HR    Comment PM session, Pt walked 60' with RW CGA, required 2 standing rest breaks secondary to SOB on 2 L.  -HR Pt and RN in agreement for PT. Pt walked 18' with RW CGA/Kay from bed to chair. Bed mobility min A, transfers min A. Pt completed sitting exercises once up in chair.  -HR    Row Name 12/19/22 1454 12/19/22 1116       General Information    Existing Precautions/Restrictions hip, anterior;weight bearing;fall  -HR hip, anterior;weight bearing;fall  -HR    Row Name 12/19/22 1454 12/19/22 1116       Pain Scale: FACES Pre/Post-Treatment    Pain: FACES Scale, Pretreatment 4-->hurts little more  -HR 4-->hurts little more  -HR    Posttreatment Pain Rating 4-->hurts little more  -HR 4-->hurts little more  -HR    Row Name 12/19/22 1454 12/19/22 1116       Cognition    Affect/Mental Status (Cognition) WFL  -HR WFL  -HR    Follows Commands (Cognition) WFL  -HR WFL  -HR    Personal Safety Interventions fall prevention program maintained;gait belt;nonskid shoes/slippers when out of bed;supervised  activity  -HR fall prevention program maintained;gait belt;nonskid shoes/slippers when out of bed;supervised activity  -HR    Row Name 12/19/22 1454 12/19/22 1116       Mobility    Left Lower Extremity (Weight-bearing Status) weight-bearing as tolerated (WBAT)  -HR weight-bearing as tolerated (WBAT)  -HR    Row Name 12/19/22 1454 12/19/22 1116       Bed Mobility    Supine-Sit-Supine [place] (Bed Mobility) verbal cues;nonverbal cues (demo/gesture);minimum assist (75% patient effort)  -HR verbal cues;nonverbal cues (demo/gesture);minimum assist (75% patient effort)  -HR    Assistive Device (Bed Mobility) bed rails;draw sheet  -HR bed rails;draw sheet  -HR    Row Name 12/19/22 1454 12/19/22 1116       Sit-Stand Transfer    Sit-Stand [place] (Transfers) minimum assist (75% patient effort)  -HR minimum assist (75% patient effort)  -HR    Assistive Device (Sit-Stand Transfers) walker, front-wheeled  -HR walker, front-wheeled  -HR    Row Name 12/19/22 1454 12/19/22 1116       Stand-Sit Transfer    Stand-Sit [place] (Transfers) minimum assist (75% patient effort)  -HR minimum assist (75% patient effort)  -HR    Assistive Device (Stand-Sit Transfers) walker, front-wheeled  -HR walker, front-wheeled  -HR    Row Name 12/19/22 1454 12/19/22 1116       Stand Pivot/Stand Step Transfer    Stand Pivot/Stand Step [place] (Transfers) minimum assist (75% patient effort);verbal cues;nonverbal cues (demo/gesture)  -HR minimum assist (75% patient effort);verbal cues;nonverbal cues (demo/gesture)  -HR    Assistive Device (Stand Pivot Stand Step Transfer) walker, front-wheeled  -HR walker, front-wheeled  -HR    Row Name 12/19/22 1454 12/19/22 1116       Gait/Stairs (Locomotion)    [place] Level (Gait) minimum assist (75% patient effort);verbal cues;nonverbal cues (demo/gesture);contact guard  -HR minimum assist (75% patient effort);verbal cues;nonverbal cues (demo/gesture);contact guard  -HR    Assistive Device  (Gait) walker, front-wheeled  -HR walker, front-wheeled  -HR    Distance in Feet (Gait) 60'  -HR 18'  -HR    Pattern (Gait) step-to  -HR step-to  -HR    Deviations/Abnormal Patterns (Gait) antalgic;mickey decreased;gait speed decreased;stride length decreased  -HR antalgic;mickey decreased;gait speed decreased;stride length decreased  -HR    Bilateral Gait Deviations forward flexed posture  -HR forward flexed posture  -HR    Row Name 12/19/22 1116          Motor Skills    Therapeutic Exercise other (see comments)  Sitting: AP, LAQ, March, pillow sq.  -HR     Row Name 12/19/22 1454 12/19/22 1116       Breath Sounds    Breath Sounds All Fields  -HR All Fields  -HR    All Lung Fields Breath Sounds diminished  -HR diminished  -HR    Row Name             Wound 12/12/22 2333 Left upper scalp Skin Tear    Wound - Properties Group Placement Date: 12/12/22  -KM Placement Time: 2333  -KM Present on Hospital Admission: Y  -KM Side: Left  -KM Orientation: upper  -KM Location: scalp  -KM Primary Wound Type: Skin tear  -KM    Retired Wound - Properties Group Placement Date: 12/12/22  -KM Placement Time: 2333  -KM Present on Hospital Admission: Y  -KM Side: Left  -KM Orientation: upper  -KM Location: scalp  -KM Primary Wound Type: Skin tear  -KM    Retired Wound - Properties Group Date first assessed: 12/12/22  -KM Time first assessed: 2333  -KM Present on Hospital Admission: Y  -KM Side: Left  -KM Location: scalp  -KM Primary Wound Type: Skin tear  -KM    Row Name             Wound 12/13/22 1421 Left greater trochanter Incision    Wound - Properties Group Placement Date: 12/13/22  -MC Placement Time: 1421  -MC Present on Hospital Admission: N  -MC Side: Left  -MC Location: greater trochanter  -MC Primary Wound Type: Incision  -MC    Retired Wound - Properties Group Placement Date: 12/13/22  -MC Placement Time: 1421  -MC Present on Hospital Admission: N  -MC Side: Left  -MC Location: greater trochanter  -MC Primary Wound Type:  Incision  -MC    Retired Wound - Properties Group Date first assessed: 12/13/22  -MC Time first assessed: 1421  -MC Present on Hospital Admission: N  -MC Side: Left  -MC Location: greater trochanter  -MC Primary Wound Type: Incision  -MC    Row Name 12/19/22 1454 12/19/22 1116       Positioning and Restraints    Pre-Treatment Position in bed  -HR in bed  -HR    Post Treatment Position bed  -HR chair  -HR    In Bed fowlers;call light within reach;encouraged to call for assist;exit alarm on;with family/caregiver;side rails up x2  -HR --    In Chair -- sitting;encouraged to call for assist;call light within reach  -HR    Row Name 12/19/22 1454 12/19/22 1116       Therapy Assessment/Plan (PT)    Rehab Potential (PT) other (see comments)  fair/guarded  -HR other (see comments)  fair/guarded  -HR    Criteria for Skilled Interventions Met (PT) yes  -HR yes  -HR    Therapy Frequency (PT) 6 times/wk  -HR 6 times/wk  -HR    Row Name 12/19/22 1454 12/19/22 1116       Physical Therapy Goals    Bed Mobility Goal Selection (PT) bed mobility, PT goal 1  -HR bed mobility, PT goal 1  -HR    Transfer Goal Selection (PT) transfer, PT goal 1  -HR transfer, PT goal 1  -HR    Gait Training Goal Selection (PT) gait training, PT goal 1  -HR gait training, PT goal 1  -HR    Row Name 12/19/22 1454 12/19/22 1116       Bed Mobility Goal 1 (PT)    Activity/Assistive Device (Bed Mobility Goal 1, PT) sit to supine;supine to sit  -HR sit to supine;supine to sit  -HR    [place] Level/Cues Needed (Bed Mobility Goal 1, PT) contact guard required;minimum assist (75% or more patient effort)  -HR contact guard required;minimum assist (75% or more patient effort)  -HR    Time Frame (Bed Mobility Goal 1, PT) long term goal (LTG)  -HR long term goal (LTG)  -HR    Row Name 12/19/22 1454 12/19/22 1116       Transfer Goal 1 (PT)    Activity/Assistive Device (Transfer Goal 1, PT) sit-to-stand/stand-to-sit  -HR sit-to-stand/stand-to-sit  -HR     [place] Level/Cues Needed (Transfer Goal 1, PT) contact guard required  -HR contact guard required  -HR    Time Frame (Transfer Goal 1, PT) long term goal (LTG)  -HR long term goal (LTG)  -HR    Row Name 12/19/22 1454 12/19/22 1116       Gait Training Goal 1 (PT)    Activity/Assistive Device (Gait Training Goal 1, PT) gait (walking locomotion);assistive device use;walker, rolling  -HR gait (walking locomotion);assistive device use;walker, rolling  -HR    [place] Level (Gait Training Goal 1, PT) contact guard required  -HR contact guard required  -HR    Distance (Gait Training Goal 1, PT) 10'  -HR 10'  -HR          User Key  (r) = Recorded By, (t) = Taken By, (c) = Cosigned By    Initials Name Provider Type    Charline Forbes, RN Registered Nurse    Kayley Rivera RN Registered Nurse    Brooke Hicks PTA Physical Therapist Assistant                Physical Therapy Education     Title: PT OT SLP Therapies (Done)     Topic: Physical Therapy (Done)     Point: Mobility training (Done)     Learning Progress Summary           Patient Acceptance, TB,E, VU,DU by BD at 12/19/2022 0856    Acceptance, E, VU by EB at 12/19/2022 0342    Acceptance, TB,E, VU,DU by BD at 12/18/2022 0902    Acceptance, TB,E, VU,DU by BD at 12/17/2022 0903    Acceptance, E,D, VU,NR by RG at 12/16/2022 1448    Acceptance, E,D, VU,NR by RG at 12/15/2022 1523                   Point: Home exercise program (Done)     Learning Progress Summary           Patient Acceptance, TB,E, VU,DU by BD at 12/19/2022 0856    Acceptance, E, VU by EB at 12/19/2022 0342    Acceptance, TB,E, VU,DU by BD at 12/18/2022 0902    Acceptance, TB,E, VU,DU by BD at 12/17/2022 0903    Acceptance, E,D, VU,NR by RG at 12/16/2022 1448    Acceptance, E,D, VU,NR by RG at 12/15/2022 1523                   Point: Body mechanics (Done)     Learning Progress Summary           Patient Acceptance, TB,E, VU,DU by BD at 12/19/2022 0856    Acceptance, E, VU by EB at  12/19/2022 0342    Acceptance, TB,E, VU,DU by BD at 12/18/2022 0902    Acceptance, TB,E, VU,DU by BD at 12/17/2022 0903    Acceptance, E,D, VU,NR by RG at 12/16/2022 1448    Acceptance, E,D, VU,NR by RG at 12/15/2022 1523                   Point: Precautions (Done)     Learning Progress Summary           Patient Acceptance, TB,E, VU,DU by BD at 12/19/2022 0856    Acceptance, E, VU by EB at 12/19/2022 0342    Acceptance, TB,E, VU,DU by BD at 12/18/2022 0902    Acceptance, TB,E, VU,DU by BD at 12/17/2022 0903    Acceptance, E,D, VU,NR by RG at 12/16/2022 1448    Acceptance, E,D, VU,NR by RG at 12/15/2022 1523                               User Key     Initials Effective Dates Name Provider Type Discipline     06/16/21 -  Lexx Michaels PTA Physical Therapist Assistant PT     08/05/21 -  Linda Fontenot, RN Registered Nurse Nurse    EB 09/22/22 -  Saurav Siu, RN Registered Nurse Nurse              PT Recommendation and Plan  Anticipated Discharge Disposition (PT): skilled nursing facility, sub acute care setting, home with 24/7 care, home with assist, home with supervision, home with home health  Therapy Frequency (PT): 6 times/wk          Time Calculation:    PT Charges     Row Name 12/19/22 1456 12/19/22 1122          Time Calculation    Start Time 1436  -HR 0956  -HR     PT Received On 12/19/22  -HR 12/19/22  -HR        Time Calculation- PT    Total Timed Code Minutes- PT 10 minute(s)  -HR 23 minute(s)  -HR           User Key  (r) = Recorded By, (t) = Taken By, (c) = Cosigned By    Initials Name Provider Type    HR Brooke Ramos PTA Physical Therapist Assistant              Therapy Charges for Today     Code Description Service Date Service Provider Modifiers Qty    85872171303 HC GAIT TRAINING EA 15 MIN 12/19/2022 Brooke Ramos PTA GP, CQ 1    85660514299 HC PT THER PROC EA 15 MIN 12/19/2022 Brooke Ramos PTA GP, CQ 1    [id number] HC GAIT TRAINING EA 15 MIN 12/19/2022 Brooke Ramos, PTA GP, CQ 1           PT G-Codes  AM-PAC 6 Clicks Score (PT): 7    Brooke Ramos, PTA  12/19/2022

## 2022-12-19 NOTE — PROGRESS NOTES
Patient Identification:  Name:  Sarbjit Martin  Age:  87 y.o.  Sex:  male  :  1935  MRN:  [id number]  Visit Number:  [id number]  Primary Care Provider:  Goldie Steward APRN    Length of stay:  6    Subjective/Interval History/Consultants/Procedures     Chief complaint: Follow-up, acute left femoral fracture s/p left hemiarthroplasty, POD #6     Subjective/Interval History:  Sarbjit Martin is our 87 y.o. male patient admitted on 2022 for acute left hip fracture s/p mechanical fall at home. He has a pMH significant for BPH, arthritis, bile duct calculus s/p ERCP, COPD, chronic hypoxic respiratory failure, chronic HFpEF, GERD, moderate pulmonary hypertension, and advanced age. For further and complete admitting details, please see admission H&P.     He was admitted to the [facility] floor for further monitoring, evaluation, and treatment. Patient had presented to the ED with complaints of left hip pain s/p mechanical fall at home from standing height. Imaging obtained in the ED revealed acute left femoral fracture. He ultimately underwent left hip hemiarthroplasty on  by Dr. Reyez and tolerated procedure well. PT/OT consults placed for postop eval. CM consulted for assistance with discharge placement. On initial exam, patient had increased supplemental O2 requirement from baseline. Usually uses 2L at home; was on 4L. Lungs were coarse/diminished bilaterally with RLL crackles. Chest imaging suggestive of LLL infiltrate. Patient was started on Levaquin for empiric treatment. 5 day course completed. Also receiving scheduled Symbicort and DuoNebs. Incentive spirometer use and turn/cough/deep breathing exercises encouraged. Continuous pulse oximetry in place. Respiratory panel negative. He has known left pulmonary nodule, has increased in size since previous exam. Attending discussed with lung nodule clinic Irma HERNANDEZ to set up appointment for follow-up in the outpatient setting. Patient is now  on baseline O2 requirement of 2L and stable. Lung sounds have improved; clear bilaterally on today's exam. No wheezing, rhonchi or rales. Hgb decreased postoperatively, but is stable at this time and trending back up. He has not required any blood transfusions. IPR denied. Patient is medically stable for discharge, pending safe dispo. CM assisting with discharge plans. Other options to be discussed with patient.      Procedures/Imaging:  • Xray of the left pelvis x2  • Xray of the left femur  • Chest xray  • CT head without contrast  • CT cervical spine without contrast  • Left hip hemiarthroplasty on 12/13 by Dr. Reyez.      Consults:  • Orthopedic Surgery  • PT/OT  • Case management     On today's exam, patient was lying in bed with no s/s acute distress noted. Left hip incision CRISTAL at time of exam. Edges appearing well-approximated. No drainage today. There is a large, purple ecchymotic area noted just above incision site extending posteriorly. Patient denies associated pain. Hgb remaining stable postop. LLE remaining neurovascularly intact. Discussed with Dr. Reyez. Due to evidence of developing postsurgical hematoma, Lovenox has been discontinued and patient placed on daily aspirin for VTE prophylaxis along with SCDs. Patient denies any left hip pain at this time. Also denies chest pain, shortness of breath above baseline, or any other acute complaints. Megace added on 12/18 per nutrition recommendations for appetite stimulation as patient refuses nutritional supplement drinks. BMI currently 17.75 kg/m2. He has been working with PT and doing well. Discussed plan with patient. Voiced agreement with no further questions or concerns at this time.      No acute events reported overnight. Room location at the time of evaluation was 328. Discussed with attending physician, Eren Peterson DO.    ----------------------------------------------------------------------------------------------------------------------  Current Hospital Meds:  acetaminophen, 1,000 mg, Oral, TID  amitriptyline, 25 mg, Oral, Nightly  aspirin, 81 mg, Oral, Daily  budesonide-formoterol, 2 puff, Inhalation, BID - RT  finasteride, 5 mg, Oral, Daily  guaiFENesin, 1,200 mg, Oral, Q12H  ipratropium-albuterol, 3 mL, Nebulization, 4x Daily - RT  levothyroxine, 50 mcg, Oral, Daily  megestrol, 40 mg, Oral, Daily  multivitamin, 1 tablet, Oral, Daily  sodium chloride, 10 mL, Intravenous, Q12H  sodium chloride, 3 mL, Intravenous, Q12H  tamsulosin, 0.4 mg, Oral, Nightly         ----------------------------------------------------------------------------------------------------------------------      Objective     Vital Signs:  Temp:  [97.1 °F (36.2 °C)-98 °F (36.7 °C)] 97.7 °F (36.5 °C)  Heart Rate:  [] 100  Resp:  [18-20] 20  BP: (110-133)/(55-64) 133/64      12/18/22  0500 12/19/22  0300 12/19/22  0524   Weight: 50.3 kg (110 lb 12.8 oz) 49.9 kg (110 lb) 49.9 kg (110 lb)     Body mass index is 17.75 kg/m².    Intake/Output Summary (Last 24 hours) at 12/19/2022 1205  Last data filed at 12/19/2022 0500  Gross per 24 hour   Intake 240 ml   Output 2050 ml   Net -1810 ml     No intake/output data recorded.  Diet: Regular/House Diet; Texture: Regular Texture (IDDSI 7); Fluid Consistency: Thin (IDDSI 0)  ----------------------------------------------------------------------------------------------------------------------    Physical Exam  Vitals and nursing note reviewed.   Constitutional:       General: He is awake. He is not in acute distress.     Appearance: He is not diaphoretic.      Interventions: Nasal cannula in place.      Comments: Remaining on 2L NC.   HENT:      Head: Normocephalic and atraumatic.      Mouth/Throat:      Mouth: Mucous membranes are moist.      Pharynx: Oropharynx is clear.   Eyes:      Extraocular Movements:  Extraocular movements intact.      Pupils: Pupils are equal, round, and reactive to light.   Cardiovascular:      Rate and Rhythm: Normal rate and regular rhythm.      Pulses: Normal pulses.           Dorsalis pedis pulses are 2+ on the right side and 2+ on the left side.      Heart sounds: Normal heart sounds. No murmur heard.    No friction rub.   Pulmonary:      Effort: Pulmonary effort is normal. No accessory muscle usage, respiratory distress or retractions.      Breath sounds: No wheezing, rhonchi or rales.   Abdominal:      General: Abdomen is flat. Bowel sounds are normal. There is no distension.      Palpations: Abdomen is soft.      Tenderness: There is no abdominal tenderness. There is no guarding.   Musculoskeletal:      Cervical back: Neck supple. No rigidity.      Right lower leg: No edema.      Left lower leg: No edema.   Skin:     General: Skin is warm and dry.      Capillary Refill: Capillary refill takes 2 to 3 seconds.      Coloration: Skin is pale.          Neurological:      Mental Status: He is alert and oriented to person, place, and time.      Cranial Nerves: No facial asymmetry.      Sensory: Sensation is intact.      Motor: Weakness (generalized) present. No tremor.   Psychiatric:         Attention and Perception: Attention normal.         Mood and Affect: Mood normal.         Speech: Speech normal.         Behavior: Behavior normal. Behavior is cooperative.         Thought Content: Thought content normal.       ----------------------------------------------------------------------------------------------------------------------      Results from last 7 days   Lab Units 12/19/22  0205 12/18/22  0306 12/17/22  0415 12/16/22  0145 12/14/22  0406 12/13/22  1911 12/13/22  0850 12/13/22  0022   CRP mg/dL  --   --   --  16.03*  --  4.11*  --   --    LACTATE mmol/L  --   --   --   --   --  1.5  --   --    WBC 10*3/mm3 8.88 7.66 7.60 8.30   < >  --   --  14.97*   HEMOGLOBIN g/dL 10.0* 9.9* 9.4*  9.3*   < >  --   --  14.9   HEMATOCRIT % 31.7* 33.0* 30.4* 30.2*   < >  --   --  48.7   MCV fL 85.2 88.0 88.4 87.3   < >  --   --  87.6   MCHC g/dL 31.5 30.0* 30.9* 30.8*   < >  --   --  30.6*   PLATELETS 10*3/mm3 154 131* 125* 103*   < >  --   --  194   INR   --   --   --   --   --   --  1.09 0.97    < > = values in this interval not displayed.     Results from last 7 days   Lab Units 12/13/22  0747   PH, ARTERIAL pH units 7.344*   PO2 ART mm Hg 78.1*   PCO2, ARTERIAL mm Hg 55.4*   HCO3 ART mmol/L 30.1*     Results from last 7 days   Lab Units 12/19/22  0205 12/18/22  0306 12/17/22  0415 12/16/22  0145 12/14/22  0406 12/13/22  0022   SODIUM mmol/L 131* 133* 136 137   < > 137   POTASSIUM mmol/L 4.4 4.5 4.0 4.1   < > 5.1   MAGNESIUM mg/dL  --  2.2 1.7 1.9   < >  --    CHLORIDE mmol/L 98 104 104 104   < > 101   CO2 mmol/L 24.9 21.4* 25.1 26.7   < > 21.2*   BUN mg/dL 17 17 15 19   < > 26*   CREATININE mg/dL 0.66* 0.56* 0.58* 0.52*   < > 0.87   CALCIUM mg/dL 8.2* 8.2* 7.9* 7.7*   < > 9.2   GLUCOSE mg/dL 105* 105* 106* 115*   < > 106*   ALBUMIN g/dL  --   --   --   --   --  3.49*   BILIRUBIN mg/dL  --   --   --   --   --  0.5   ALK PHOS U/L  --   --   --   --   --  72   AST (SGOT) U/L  --   --   --   --   --  21   ALT (SGPT) U/L  --   --   --   --   --  15    < > = values in this interval not displayed.   Estimated Creatinine Clearance: 57.8 mL/min (A) (by C-G formula based on SCr of 0.66 mg/dL (L)).  No results found for: AMMONIA      Blood Culture   Date Value Ref Range Status   12/13/2022 No growth at 4 days  Preliminary   12/13/2022 No growth at 4 days  Preliminary       ----------------------------------------------------------------------------------------------------------------------  Imaging Results (Last 24 Hours)     ** No results found for the last 24 hours. **        ----------------------------------------------------------------------------------------------------------------------   I have reviewed the  above laboratory values for 12/19/22    Assessment/Plan     Active Hospital Problems    Diagnosis  POA   • **Closed fracture of left hip ([facility]) [S72.002A]  Unknown     Added automatically from request for surgery [id number]     • Closed fracture of left hip, initial encounter ([facility]) [S72.002A]  Yes       ASSESSMENT/PLAN:  -Acute left femoral neck fracture POA s/p mechanical fall at home  -S/P left hip hemiarthroplasty on 12/13. Currently POD #6.   -Radiological images reviewed on admission.   -Orthopedic surgery following; greatly appreciate their assistance.   -PRN Tylenol for mild pain.   -PRN [place] for moderate pain.   -Neurovascular checks Q 4 hours.   -Fall precautions.  -WBAT.   -Incision CRISTAL per ortho recommendations.   -Lovenox discontinued on 12/18 due to appearance of developing hematoma superior to left hip incision site. Placed on daily aspirin for VTE prophylaxis per ortho recommendations. Discussed with Dr. Reyez.   -PT/OT consults for postop eval.   -CM for discharge planning.   -IPR denied. CM to discuss other options (SNF versus swing bed versus home with HH/PT).   -Will need outpatient follow-up with ortho on discharge.      -Chronic HFpEF  -Appearing euvolemic on exam.   -Monitor strict I's and O's, daily weights.  -Echo from 11/1/2022 reviewed; normal EF (61-65%).      -Acute on chronic hypoxic respiratory failure POA, resolved  -Moderate pulmonary hypertension  -Evidence of left basilar consolidation on imaging obtained in ED  -SIRS/sepsis criteria on admission with HR>90 and WBCs>12.   -Chest xray revealed minimal left basilar consolidation.  -ABG obtained on 4L NC; PO2 78.1%, PCO2 55.4, and O2 saturation 95.5%.   -Continue to titrate supplemental O2 to maintain SpO2 saturations > 90%.  -Remaining stable on 2L (baseline requirement).   -Continuous pulse oximetry.   -Encourage incentive spirometer and turn/cough/deep breathing exercises.  -Continue Symbicort and DuoNebs.   -Completed 5 days of  Levaquin, has shown clinical improvement.   -Respiratory panel negative.   -Continue follow-up with pulmonology in the outpatient setting for known pulmonary nodule/mass.    -Repeat CBC in the a.m.     -GERD  -Aspiration and reflux precautions.  -PPI.     -History of BPH  -Monitor urine output closely.  -Continue Proscar and Flomax.   -So far no issues with urinary retention in the postop setting.      -Essential hypertension  -BP appears well controlled.   -Closely monitor BP per hospital protocol, titrate medications as necessary.     -Arthritis  -Supportive care.      -History of CBD calculus s/p ERCP  -History of E. Coli bacteremia        -----------  -DVT prophylaxis: Aspirin, SCDs.   -GI prophylaxis: PPI  -Activity: WBAT, fall precautions.   -Disposition plans/anticipated needs: IPR denied. Looking at other options such as swing bed versus SNF versus home with HH/PT. CM assisting with discharge plans. Patient is currently medically stable when dispo is confirmed.   -Diet: Regular  -Home supplemental O2: Utilizes 2L at home. Currently stable on baseline requirement.         The patient is high risk due to the following diagnoses/reasons:  Acute left femoral fracture s/p left hip hemiarthroplasty, advanced age.         DAVID Wilks  12/19/22  12:05 EST  Pager #[phone]

## 2022-12-19 NOTE — PLAN OF CARE
Goal Outcome Evaluation:  Pt resting in bed. No acute changes this shift. VSS. Will continue plan of care.

## 2022-12-19 NOTE — PROGRESS NOTES
Orthopedic Surgery Progress Note    Interval History:     No acute events overnight.     Patient reports very little pain in left hip today.  Has been up ambulating with therapy.  No other current complaints at this time.    Vital Signs  Temp:  [97.1 °F (36.2 °C)-98 °F (36.7 °C)] 97.7 °F (36.5 °C)  Heart Rate:  [72-98] 84  Resp:  [18-20] 20  BP: (110-133)/(55-64) 133/64    Physical Exam:  General:  Alert. Oriented. No acute distress.      Dressing: None.  Neurological: Left lower extremity SILT s/s/dp/sp/t, +DF/PF/EHL  Vascular: Left lower extremity +2 dp/pt  Incisions well approximated, no active drainage, ecchymosis noted around the incision and proximally, no drainage with adjective expression of the wound     Labs:    Lab Results   Component Value Date    WBC 8.88 12/19/2022    HGB 10.0 (L) 12/19/2022    HCT 31.7 (L) 12/19/2022    MCV 85.2 12/19/2022     12/19/2022     Lab Results   Component Value Date    GLUCOSE 105 (H) 12/19/2022    CALCIUM 8.2 (L) 12/19/2022     (L) 12/19/2022    K 4.4 12/19/2022    CO2 24.9 12/19/2022    CL 98 12/19/2022    BUN 17 12/19/2022    CREATININE 0.66 (L) 12/19/2022    EGFRIFNONA 70 02/02/2019    BCR 25.8 (H) 12/19/2022    ANIONGAP 8.1 12/19/2022       Images:      None.    Assessment    POD 6 s/p left hip hemiarthroplasty    Plan    May keep incision continue to open to air    Clinically the patient may have a hematoma around the incision, would recommend compressive dressing if continue to drain, incisional wound VAC    Also may transition to aspirin instead of Lovenox for DVT prophylaxis    Pain Control  PT/OT - postoperative mobilization  Weight Bear/Lifting Status-as tolerated, anterior hip precautions, no active abduction  Dressing -as above  DVT Ppx- SCD's and Lovenox 40 mg subcutaneous once daily x 14 days  Follow up- 14 days in the office  Disposition-TBD    Okay to be discharged per orthopedics    Please call for any questions    Adrian Reyez,  MD  12/19/22  07:20 EST

## 2022-12-20 ENCOUNTER — READMISSION MANAGEMENT (OUTPATIENT)
Dept: CALL CENTER | Facility: HOSPITAL | Age: 87
End: 2022-12-20

## 2022-12-20 VITALS
HEIGHT: 66 IN | WEIGHT: 112 LBS | DIASTOLIC BLOOD PRESSURE: 65 MMHG | RESPIRATION RATE: 20 BRPM | SYSTOLIC BLOOD PRESSURE: 119 MMHG | BODY MASS INDEX: 18 KG/M2 | HEART RATE: 91 BPM | OXYGEN SATURATION: 98 % | TEMPERATURE: 98.3 F

## 2022-12-20 LAB
ANION GAP SERPL CALCULATED.3IONS-SCNC: 7.4 MMOL/L (ref 5–15)
BUN SERPL-MCNC: 20 MG/DL (ref 8–23)
BUN/CREAT SERPL: 37.7 (ref 7–25)
CALCIUM SPEC-SCNC: 8.4 MG/DL (ref 8.6–10.5)
CHLORIDE SERPL-SCNC: 102 MMOL/L (ref 98–107)
CO2 SERPL-SCNC: 24.6 MMOL/L (ref 22–29)
CREAT SERPL-MCNC: 0.53 MG/DL (ref 0.76–1.27)
DEPRECATED RDW RBC AUTO: 46.9 FL (ref 37–54)
EGFRCR SERPLBLD CKD-EPI 2021: 98.2 ML/MIN/1.73
ERYTHROCYTE [DISTWIDTH] IN BLOOD BY AUTOMATED COUNT: 15.2 % (ref 12.3–15.4)
GLUCOSE SERPL-MCNC: 120 MG/DL (ref 65–99)
HCT VFR BLD AUTO: 31.9 % (ref 37.5–51)
HGB BLD-MCNC: 10.2 G/DL (ref 13–17.7)
MCH RBC QN AUTO: 27.1 PG (ref 26.6–33)
MCHC RBC AUTO-ENTMCNC: 32 G/DL (ref 31.5–35.7)
MCV RBC AUTO: 84.8 FL (ref 79–97)
PLATELET # BLD AUTO: 174 10*3/MM3 (ref 140–450)
PMV BLD AUTO: 10.5 FL (ref 6–12)
POTASSIUM SERPL-SCNC: 4.2 MMOL/L (ref 3.5–5.2)
RBC # BLD AUTO: 3.76 10*6/MM3 (ref 4.14–5.8)
SODIUM SERPL-SCNC: 134 MMOL/L (ref 136–145)
WBC NRBC COR # BLD: 8.59 10*3/MM3 (ref 3.4–10.8)

## 2022-12-20 PROCEDURE — 99239 HOSP IP/OBS DSCHRG MGMT >30: CPT

## 2022-12-20 PROCEDURE — 97110 THERAPEUTIC EXERCISES: CPT

## 2022-12-20 PROCEDURE — 85027 COMPLETE CBC AUTOMATED: CPT

## 2022-12-20 PROCEDURE — 97116 GAIT TRAINING THERAPY: CPT

## 2022-12-20 PROCEDURE — 94799 UNLISTED PULMONARY SVC/PX: CPT

## 2022-12-20 PROCEDURE — 80048 BASIC METABOLIC PNL TOTAL CA: CPT

## 2022-12-20 RX ORDER — DIPHENOXYLATE HYDROCHLORIDE AND ATROPINE SULFATE 2.5; .025 MG/1; MG/1
1 TABLET ORAL DAILY
Qty: 30 TABLET | Refills: 0 | Status: SHIPPED | OUTPATIENT
Start: 2022-12-20 | End: 2023-01-19

## 2022-12-20 RX ORDER — MEGESTROL ACETATE 40 MG/1
40 TABLET ORAL DAILY
Qty: 30 TABLET | Refills: 0 | Status: SHIPPED | OUTPATIENT
Start: 2022-12-20 | End: 2023-01-19

## 2022-12-20 RX ORDER — ASPIRIN 81 MG/1
81 TABLET ORAL DAILY
Qty: 30 TABLET | Refills: 0 | Status: SHIPPED | OUTPATIENT
Start: 2022-12-20 | End: 2023-01-19

## 2022-12-20 RX ADMIN — FINASTERIDE 5 MG: 5 TABLET, FILM COATED ORAL at 08:17

## 2022-12-20 RX ADMIN — BUDESONIDE AND FORMOTEROL FUMARATE DIHYDRATE 2 PUFF: 160; 4.5 AEROSOL RESPIRATORY (INHALATION) at 07:16

## 2022-12-20 RX ADMIN — GUAIFENESIN 1200 MG: 600 TABLET, EXTENDED RELEASE ORAL at 08:17

## 2022-12-20 RX ADMIN — MEGESTROL ACETATE 40 MG: 40 TABLET ORAL at 08:17

## 2022-12-20 RX ADMIN — Medication 1 TABLET: at 08:17

## 2022-12-20 RX ADMIN — Medication 10 ML: at 08:16

## 2022-12-20 RX ADMIN — LEVOTHYROXINE SODIUM 50 MCG: 0.05 TABLET ORAL at 08:17

## 2022-12-20 RX ADMIN — ACETAMINOPHEN 1000 MG: 500 TABLET ORAL at 08:17

## 2022-12-20 RX ADMIN — ASPIRIN 81 MG: 81 TABLET, COATED ORAL at 08:17

## 2022-12-20 RX ADMIN — Medication 3 ML: at 08:16

## 2022-12-20 RX ADMIN — IPRATROPIUM BROMIDE AND ALBUTEROL SULFATE 3 ML: 2.5; .5 SOLUTION RESPIRATORY (INHALATION) at 07:16

## 2022-12-20 NOTE — CASE MANAGEMENT/SOCIAL WORK
Continued Stay Note  AARON Jean     Patient Name: Sarbjit Martin  MRN: [id number]  Today's Date: 12/20/2022    Admit Date: 12/12/2022       Discharge Plan     Row Name 12/20/22 0818       Plan    Plan Patient is being dc home today. Referral received and faxed to [facility] ([phone]), [place], [place], pt preference for a walker and BSC. Patient prefers DME be delivered to his home @ a time designated by patient. His friend Panda Ba will transport home today. SS will arrange HH, no HH preference.    Patient/Family in Agreement with Plan yes               Discharge Codes    No documentation.               Expected Discharge Date and Time     Expected Discharge Date Expected Discharge Time    Dec 20, 2022             Mariluz Yi RN

## 2022-12-20 NOTE — CASE MANAGEMENT/SOCIAL WORK
Continued Stay Note  AARON Jean     Patient Name: Sarbjit Martin  MRN: [id number]  Today's Date: 12/20/2022    Admit Date: 12/12/2022       Discharge Plan     Row Name 12/20/22 0930       Plan    Plan Per [facility], pt does not utilize their services. Spoke with [facility] Dominguez, pt utilizes their services for home O2. Referral received and faxed to Andrew for walker & BSC. Andrew will contact pt for an appropriate delivery time. Discussed with patient and he verbalized understanding. SS arranging HH. DC home today.    Patient/Family in Agreement with Plan yes    Row Name 12/20/22 0818       Plan    Plan     Patient/Family in Agreement with Plan                Discharge Codes    No documentation.               Expected Discharge Date and Time     Expected Discharge Date Expected Discharge Time    Dec 20, 2022             Mariluz Yi RN

## 2022-12-20 NOTE — DISCHARGE PLACEMENT REQUEST
Sarbjit Lockett (87 y.o. Male)     Date of Birth   1935    Social Security Number       Address   [address] [id number]    Home Phone   [phone]    MRN   [id number]       Anabaptism   Sikhism    Marital Status                               Admission Date   12/12/22    Admission Type   Emergency    Admitting Provider   Rah Pastrana MD    Attending Provider   Eren Hardin DO    Department, Room/Bed   [address], 3328/       Discharge Date       Discharge Disposition   Home-Health Care [facility]    Discharge Destination                               Attending Provider: Eren Hardin DO    Allergies: Penicillins    Isolation: None   Infection: None   Code Status: CPR    Ht: 167.6 cm (66\")   Wt: 50.8 kg (112 lb)    Admission Cmt: None   Principal Problem: Closed fracture of left hip (HCC) [S72.002A]                 Active Insurance as of 12/12/2022     Primary Coverage     Payor Plan Insurance Group Employer/Plan Group    ANTHEM MEDICARE REPLACEMENT ANTHEM MEDICARE ADVANTAGE [id number]     Payor Plan Address Payor Plan Phone Number Payor Plan Fax Number Effective Dates    [address] [phone]  4/1/2022 - None Entered    [facility] [id number]       Subscriber Name Subscriber Birth Date Member ID       SARBJIT LOCKETT 1935 [id number]                 Emergency Contacts      (Rel.) Home Phone Work Phone Mobile Phone    Jesse Reyes (Relative) -- -- [phone]    jaskaran cuello (Significant Other) -- -- [phone]               History & Physical      Jenni Chong APRN at 12/13/22 1347     Attestation signed by Rah aPstrana MD at 12/13/22 1517 (Updated)    I have reviewed this documentation and agree.  I have also independently seen the patient.  I have also reviewed the EKG and the labs with ADELE Arteaga; I have discussed and formulated the assessment and plan with Jenni as well.  At bedside in preop room 7  with the patient was his nephew.  The patient has some substernal heartburn but he denies chest pain, trouble breathing, nausea, vomiting, and diarrhea.  The only extremity pain that he has is in the left hip.  He endorses good sensation in both feet when I perform my exam.  Heart and lungs are unremarkable, except for I do hear right-sided crackles.  Patient is currently low to moderate risk for hip repair.  I have informed Dr. Reyez.  When the patient comes back from the OR, we will treat him for potential right-sided pneumonia.  Upon review of his medical records and chest CT from 2022, the patient has an enlarging left upper lobe nodule, increasing from 15 mm in 2018 to 3.9 x 3.4 x 6.7 cm now.  I have contacted nurse practitioner Irma Hernandez with the lung nodule clinic and she will try to work the patient in soon as possible.  I anticipate that the patient will need postoperative PT; we have ordered PT, OT, and  for help with disposition.                    [facility] Medicine Services  History & Physical    Patient Identification:  Name:  Sarbjit Martin  Age:  87 y.o.  Sex:  male  :  1935  MRN:  [id number]   Visit Number:  [id number]  Admit Date: 2022   Primary Care Physician:  Goldie Steward APRN    Subjective     Chief complaint: Left hip pain s/p mechanical fall at home    History of presenting illness:      Sarbjit Martin is a 87 y.o. male with past medical history significant for BPH, arthritis, bile duct calculus s/p ERCP, COPD, chronic hypoxic respiratory failure, chronic HFpEF, GERD, moderate pulmonary hypertension, and advanced age.  Patient presents to [facility] emergency department on 2022 for further evaluation of left hip pain s/p mechanical fall at home.  Patient states that he was cleaning his home and before he knew it, he was on the ground.  Denies any chest pain, dizziness, palpitations, nausea, visual  disturbances, unilateral weakness, or headache associated with the fall.  Denies loss of consciousness.  Imaging obtained in the ED revealed left femoral fracture.  No abnormal CT head findings.  Patient is alert and oriented on my exam.  Has been n.p.o. in preparation for surgical fixation of the left hip.  Patient states that he currently lives at home alone.  Utilizes 2 L nasal cannula as needed on a regular basis.  Reports former tobacco abuse.  Denies any alcohol abuse.  Denies any left hip pain while lying still.  Does report some acid reflux symptoms.  States that he was unable to take his Prilosec this morning because of the upcoming surgery.  Currently denies any shortness of breath above baseline.  Reports chronic cough with productive sputum at times.  Denies any current chest pain.  EKG reviewed, without ischemic changes.  Have discussed with attending physician, Dr. Pastrana.  Patient may be taken to the OR for surgical fixation of the left hip; considered low risk at this time.  Discussed plan with patient; voiced agreement with no further questions or concerns at this time.      Upon arrival to the ED, vital signs were temperature 98.6, pulse 89, respirations 16, blood pressure 164/88 sitting, SPO2 saturation 90% on 2 L nasal cannula.  ABG with pH 7.344, PCO2 55.4, PO2 78.1, HCO3 30.1, O2 saturation 95.5% on 4 L nasal cannula.  CMP with glucose 106, CO2 21.2, BUN 26, BUN/creatinine ratio 29.9, albumin 3.49, otherwise unremarkable.  PT, INR within normal limits.  CBC with WBCs 14.97, MCHC 30.6, otherwise unremarkable.  Chest x-ray with minimal left basilar consolidation; COPD.  X-ray of the left femur with left femoral neck fracture, no distal femoral fracture seen.  X-ray of the left hip with acute left femoral neck fracture, no dislocation.  CT of the cervical spine without contrast with no acute cervical spine fracture or malalignment; partially visible is a known malignant appearing left upper lobe  mass.  CT of the head without contrast with left temporal scalp injury; no other acute findings.    Known Emergency Department medications received prior to my evaluation included 15 mg IV Toradol, LR at 125 mL an hour, 4 mg IV morphine, 4 mg IV Zofran. Room location at the time of my evaluation was Pre-op, Bed #7. Discussed with attending physician, Rah Harrison MD.      ---------------------------------------------------------------------------------------------------------------------   Review of Systems   Constitutional: Negative for chills and fever.   HENT: Negative for congestion and trouble swallowing.    Eyes: Negative for visual disturbance.   Respiratory: Positive for cough (chronic). Negative for choking, shortness of breath and wheezing.    Gastrointestinal: Negative for abdominal pain, constipation, diarrhea, nausea and vomiting.   Genitourinary: Negative for difficulty urinating, dysuria and flank pain.   Musculoskeletal: Positive for arthralgias (left hip) and gait problem. Negative for neck pain.   Neurological: Negative for dizziness, tremors, seizures, syncope, facial asymmetry, speech difficulty, light-headedness, numbness and headaches.   Psychiatric/Behavioral: Negative for confusion.      ---------------------------------------------------------------------------------------------------------------------   Past Medical History:   Diagnosis Date   • Angiodysplasia of the colon    • Arthritis    • Bacteremia due to Escherichia coli 06/28/2017    Same time as the common bile duct stone   • Benign prostatic hyperplasia without lower urinary tract symptoms 06/30/2021   • Bile duct calculus 06/24/2017   • 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HCC)     Centrilobular   • Diverticulosis    • GERD (gastroesophageal reflux disease)    • Heart failure with preserved ejection fraction (HCC)    • Left upper lobe pulmonary nodule 01/30/2019    15 mm and spiculated in 2018;  FNA was negative   • Moderate pulmonary hypertension (HCC)    • Myocardial infarction (HCC)    • Status post laparoscopic cholecystectomy 06/15/2017     Past Surgical History:   Procedure Laterality Date   • BRONCHOSCOPY N/A 2018    Procedure: BRONCHOSCOPY WITH ENDOBRONCHIAL ULTRASOUND;  Surgeon: Saravanan Méndez MD;  Location: [facility] OR;  Service: Pulmonary   • CHOLECYSTECTOMY N/A 2017    Procedure: CHOLECYSTECTOMY LAPAROSCOPIC;  Surgeon: Jose Velazquez MD;  Location: [facility] OR;  Service:    • EYE SURGERY Bilateral 2013   • INGUINAL HERNIA REPAIR Left 2013    left   • MN ERCP DX COLLECTION SPECIMEN BRUSHING/WASHING N/A 2017    Procedure: ENDOSCOPIC RETROGRADE CHOLANGIOPANCREATOGRAPHY;  Surgeon: Rod Francis MD;  Location: [facility] OR;  Service: Gastroenterology   • MN ERCP DX COLLECTION SPECIMEN BRUSHING/WASHING N/A 10/26/2017    Procedure: ENDOSCOPIC RETROGRADE CHOLANGIOPANCREATOGRAPHY;  Surgeon: Rod Francis MD;  Location: [facility] OR;  Service: Gastroenterology     Family History   Problem Relation Age of Onset   • No Known Problems Mother    • Diabetes Father    • Hypertension Father    • Stroke Father    • Diabetes Sister      Social History     Socioeconomic History   • Marital status:    Tobacco Use   • Smoking status: Former     Packs/day: 3.00     Years: 10.00     Pack years: 30.00     Types: Cigarettes     Quit date:      Years since quittin.9   • Smokeless tobacco: Former     Quit date: 1995   Vaping Use   • Vaping Use: Never used   Substance and Sexual Activity   • Alcohol use: No   • Drug use: No   • Sexual activity: Defer     ---------------------------------------------------------------------------------------------------------------------   Allergies:  Penicillins  ---------------------------------------------------------------------------------------------------------------------   Home medications:    Medications below are reported home medications  pulling from within the system; at this time, these medications have not been reconciled unless otherwise specified and are in the verification process for further verifcation as current home medications.  Medications Prior to Admission   Medication Sig Dispense Refill Last Dose   • Budeson-Glycopyrrol-Formoterol (BREZTRI) 160-9-4.8 MCG/ACT aerosol inhaler Inhale 2 puffs 2 (Two) Times a Day.   12/12/2022   • finasteride (Proscar) 5 MG tablet Take 1 tablet by mouth Daily. 90 tablet 3 12/12/2022   • levothyroxine (SYNTHROID, LEVOTHROID) 50 MCG tablet Take 1 tablet by mouth Daily.   12/12/2022   • amitriptyline (ELAVIL) 25 MG tablet Take 25 mg by mouth Every Night.   12/11/2022   • LORazepam (ATIVAN) 0.5 MG tablet Take 1 tablet by mouth Daily As Needed for Anxiety.   Unknown   • tamsulosin (Flomax) 0.4 MG capsule 24 hr capsule Take 1 capsule by mouth Every Night. 90 capsule 3 12/11/2022   • VENTOLIN  (90 BASE) MCG/ACT inhaler Inhale 2 puffs Every 6 (Six) Hours As Needed for Wheezing or Shortness of Air.   Unknown       Hospital Scheduled Meds:  clindamycin, 900 mg, Intravenous, Once  sodium chloride, 10 mL, Intravenous, Q12H      lactated ringers, 100 mL/hr, Last Rate: 100 mL/hr (12/13/22 1156)        Current listed hospital scheduled medications may not yet reflect those currently placed in orders that are signed and held awaiting patient's arrival to floor.   ---------------------------------------------------------------------------------------------------------------------     Objective     Vital Signs:  Temp:  [98.2 °F (36.8 °C)-98.6 °F (37 °C)] 98.2 °F (36.8 °C)  Heart Rate:  [] 82  Resp:  [16-20] 20  BP: (116-164)/(62-95) 134/69      12/12/22  3328   Weight: 46.7 kg (103 lb)     Body mass index is 16.62 kg/m².  ---------------------------------------------------------------------------------------------------------------------       Physical Exam  Vitals and nursing note reviewed.   Constitutional:        General: He is awake. He is not in acute distress.     Appearance: He is ill-appearing (chronically). He is not diaphoretic.      Interventions: Nasal cannula in place.      Comments: Currently on 4L NC.   HENT:      Head: Normocephalic.      Mouth/Throat:      Mouth: Mucous membranes are moist.      Pharynx: Oropharynx is clear.   Eyes:      Extraocular Movements: Extraocular movements intact.   Cardiovascular:      Pulses: Normal pulses.   Pulmonary:      Effort: Accessory muscle usage present. No respiratory distress or retractions.      Breath sounds: Examination of the left-upper field reveals decreased breath sounds. Examination of the left-lower field reveals decreased breath sounds. Decreased breath sounds, wheezing (HOWIE, expiratory, mild) and rales (RLL) present.   Abdominal:      General: Abdomen is flat. Bowel sounds are normal. There is no distension.      Palpations: Abdomen is soft.      Tenderness: There is no abdominal tenderness. There is no guarding or rebound.   Musculoskeletal:      Cervical back: Neck supple. No rigidity.      Right lower leg: No edema.      Left lower leg: No edema.   Skin:     General: Skin is warm and dry.      Capillary Refill: Capillary refill takes 2 to 3 seconds.      Coloration: Skin is pale.   Neurological:      Mental Status: He is alert and oriented to person, place, and time.      Sensory: Sensation is intact.      Motor: No tremor.      Comments: LLE remaining neurovascularly intact.    Psychiatric:         Attention and Perception: Attention normal.         Mood and Affect: Mood normal.         Behavior: Behavior normal. Behavior is cooperative.       ---------------------------------------------------------------------------------------------------------------------  EKG: Pending cardiology interpretation. Per my review, appears normal sinus rhythm 90s with 1st degree AV block. No evidence of acute ischemia.         ---------------------------------------------------------------------------------------------------------------------   Results from last 7 days   Lab Units 12/13/22  0850 12/13/22  0022   WBC 10*3/mm3  --  14.97*   HEMOGLOBIN g/dL  --  14.9   HEMATOCRIT %  --  48.7   MCV fL  --  87.6   MCHC g/dL  --  30.6*   PLATELETS 10*3/mm3  --  194   INR  1.09 0.97     Results from last 7 days   Lab Units 12/13/22  0747   PH, ARTERIAL pH units 7.344*   PO2 ART mm Hg 78.1*   PCO2, ARTERIAL mm Hg 55.4*   HCO3 ART mmol/L 30.1*     Results from last 7 days   Lab Units 12/13/22  0022   SODIUM mmol/L 137   POTASSIUM mmol/L 5.1   CHLORIDE mmol/L 101   CO2 mmol/L 21.2*   BUN mg/dL 26*   CREATININE mg/dL 0.87   CALCIUM mg/dL 9.2   GLUCOSE mg/dL 106*   ALBUMIN g/dL 3.49*   BILIRUBIN mg/dL 0.5   ALK PHOS U/L 72   AST (SGOT) U/L 21   ALT (SGPT) U/L 15   Estimated Creatinine Clearance: 41 mL/min (by C-G formula based on SCr of 0.87 mg/dL).  No results found for: AMMONIA          Lab Results   Component Value Date    HGBA1C 6.00 (H) 10/31/2022     Lab Results   Component Value Date    TSH 0.664 11/01/2022     No results found for: PREGTESTUR, PREGSERUM, HCG, HCGQUANT  Pain Management Panel    There is no flowsheet data to display.           ---------------------------------------------------------------------------------------------------------------------  Imaging Results (Last 7 Days)     Procedure Component Value Units Date/Time    XR Chest 1 View [[id number]] Collected: 12/13/22 0832     Updated: 12/13/22 0839    Narrative:      XR CHEST 1 VW-     CLINICAL INDICATION: Pre Op; S72.002A-Fracture of unspecified part of  neck of left femur, initial encounter for closed fracture        COMPARISON: 10/31/2022      TECHNIQUE: Single frontal view of the chest.     FINDINGS:      LUNGS: Minimal left basilar consolidation      HEART AND MEDIASTINUM: Heart and mediastinal contours are unremarkable        SKELETON: Bony and soft tissue  structures are unremarkable.             Impression:      Minimal left basilar consolidation. COPD.     This report was finalized on 12/13/2022 8:37 AM by Dr. Barrie Guzman MD.       XR Femur 2 View Left [[id number]] Collected: 12/13/22 0810     Updated: 12/13/22 0812    Narrative:      EXAM:    XR Left Femur, 2 Views     EXAM DATE:    12/13/2022 7:28 AM     CLINICAL HISTORY:    femur fracture; S72.002A-Fracture of unspecified part of neck of left  femur, initial encounter for closed fracture     TECHNIQUE:    Frontal and lateral views of the left femur.     COMPARISON:    12/12/2022     FINDINGS:    Bones/joints:  Left femoral neck fracture again noted.  No distal  femoral fracture is seen.  No dislocation.    Soft tissues:  Unremarkable.       Impression:      1.  Left femoral neck fracture again noted.  2.  No distal femoral fracture is seen.     This report was finalized on 12/13/2022 8:10 AM by Dr. Barrie Guzman MD.       CT Head Without Contrast [[id number]] Collected: 12/13/22 0023     Updated: 12/13/22 0025    Narrative:      CT Head WO    HISTORY:   Head injury. Fell and hit left side of head.    TECHNIQUE:   Axial unenhanced head CT with multiplanar reformats. Radiation dose reduction techniques included automated exposure control or exposure modulation based on body size. Count of known CT and cardiac nuc med studies performed in previous 12 months: 1.     COMPARISON:   None.    FINDINGS:   Ventricular size and configuration are normal. No acute infarct or hemorrhage is identified. There are no masses. There is no skull fracture.  There is atrophy with chronic small vessel ischemic disease in the white matter. Atherosclerotic calcifications  are present in the carotid siphons. There is mild injury to the left temporal scalp.      Impression:      Left temporal scalp injury. No other acute findings.    Signer Name: Rommel Umanzor MD   Signed: 12/13/2022 12:23 AM   Workstation Name: [facility]  [facility]    CT Cervical Spine Without Contrast [[id number]] Collected: 12/13/22 0019     Updated: 12/13/22 0022    Narrative:      CT Spine Cervical WO    INDICATION:   Neck trauma. Fall.    TECHNIQUE:   CT of the cervical spine without IV contrast. Coronal and sagittal reconstructions were obtained.  Radiation dose reduction techniques included automated exposure control or exposure modulation based on body size. Count of known CT and cardiac nuc med  studies performed in previous 12 months: 1.     COMPARISON:  None available.    FINDINGS:  Alignment is normal with no subluxation identified. There is no acute cervical spine fracture. Note is made of carotid atherosclerotic disease. There is mild multilevel degenerative disc disease. No large disc herniations are seen. There is no  significant central canal or foraminal stenosis at any level. COPD and granulomatous calcifications are noted in the lung apices. Partially visible is the known left upper lobe mass that was evaluated with chest CT on 11/1/2022. This remains concerning  for malignancy. PET CT is once again recommended if this has not been performed elsewhere.      Impression:        1. No acute cervical spine fracture or malalignment.  2. 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 Please see chest CT report of 11/1/2022.    Signer Name: Rommel Umanzor MD   Signed: 12/13/2022 12:19 AM   Workstation Name: [email or web address]    [facility]    XR Hip With or Without Pelvis 2 - 3 View Left [[id number]] Collected: 12/13/22 0015     Updated: 12/13/22 0017    Narrative:      CR Hip Uni Comp Min 2 Vws LT    INDICATION:   Hip pain after fall.    COMPARISON:   None available.    FINDINGS:  AP pelvis and AP and frog-leg view(s) of the left hip.  There is an acute left femoral neck fracture. No hip dislocation on either side. There is degenerative disease in both hips. No additional fractures. Note is made  of atherosclerotic disease.        Impression:      Acute left femoral neck fracture. No dislocation.    Signer Name: Rommel Umanzor MD   Signed: 12/13/2022 12:15 AM   Workstation Name: [email or web address]    [facility]            Last echocardiogram:  Results for orders placed during the hospital encounter of 10/31/22    Adult Transthoracic Echo Complete With Contrast if Necessary Per Protocol    Interpretation Summary  •  Normal left ventricular cavity size and wall thickness noted. All left ventricular wall segments contract normally.  •  Left ventricular ejection fraction appears to be 61 - 65%.  •  Estimated right ventricular systolic pressure from tricuspid regurgitation is mildly elevated (35-45 mmHg).  •  The aortic valve exhibits sclerosis. There is mild calcification of the aortic valve. The aortic valve appears trileaflet. Mild aortic valve regurgitation is present. No hemodynamically significant aortic valve stenosis is present.  •  The mitral valve is structurally normal with no significant stenosis present. Mild mitral valve regurgitation is present.  •  Mild tricuspid valve regurgitation is present. Estimated right ventricular systolic pressure from tricuspid regurgitation is mildly elevated (35-45 mmHg).  •  There is no evidence of pericardial effusion. .          I have personally reviewed the above radiology images and read the final radiology report on 12/13/22  ---------------------------------------------------------------------------------------------------------------------  Assessment / Plan     Active Hospital Problems    Diagnosis  POA   • **Closed fracture of left hip (HCC) [S72.002A]  Unknown     Added automatically from request for surgery [id number]         ASSESSMENT/PLAN:  -Acute left femoral neck fracture POA s/p mechanical fall at home  -Radiological images reviewed.   -Orthopedic surgery consulted, input/assistance is much appreciated.   -PRN IV/PO pain medication  available with holding parameters to prevent hypotension, oversedation, and/or respiratory depression.   -Neurovascular checks Q 4 hours.   -NPO pending surgical evaluation/intervention.  -Fall precautions.  -PT/OT consults for postop eval.     -Chronic HFpEF  -Appearing euvolemic on exam.   -Monitor strict I's and O's, daily weights.  -Echo from 11/1/2022 reviewed; normal EF (61-65%).   -Review home meds once reconciled.     -Acute on chronic hypoxic respiratory failure, POA  -COPD and chronic hypoxic respiratory failure on 2L PRN at baseline  -History of HOWIE pulmonary nodule  -Moderate pulmonary hypertension  -SIRS criteria on admission with HR>90 and WBCs>12.   -Chest xray revealed minimal left basilar consolidation.  -ABG obtained on 4L NC; PO2 78.1%, PCO2 55.4, and O2 saturation 95.5%.   -Continue to titrate supplemental O2 to maintain SpO2 saturations > 90%.  -Continuous pulse oximetry.   -Encourage incentive spirometer and turn/cough/deep breathing exercises.  -Will order Symbicort and DuoNebs.   -Obtain sputum culture.  -Obtain full respiratory panel.  -Follows with pulm in the outpatient setting for known pulmonary nodule/mass.   -Obtain UA.   -Repeat CBC in the a.m.  -Add peripheral blood cultures x2.   -Check lactate and C-RP.     -GERD  -Aspiration and reflux precautions.  -PPI.    -History of BPH  -Monitor urine output closely.  -Review home meds once reconciled.    -Essential hypertension  -BP appears well controlled.  -Plan to resume home antihypertensive regimen once reconciled per pharmacy with appropriate holding parameters to prevent hypotension and/or bradycardia.   -Closely monitor BP per hospital protocol, titrate medications as necessary.    -Arthritis  -Supportive care.     -History of CBD calculus s/p ERCP  -History of E. Coli bacteremia      ----------  -DVT prophylaxis: SCDs; will likely be placed on Eliquis postop for VTE prophylaxis.   -Activity: Bedrest  -Expected length of stay:    INPATIENT status due to the need for care which can only be reasonably provided in an hospital setting such as aggressive/expedited ancillary services and/or consultation services, the necessity for IV medications, close physician monitoring and/or the possible need for procedures.  In such, I feel patient's risk for adverse outcomes and need for care warrant INPATIENT evaluation and predict the patient's care encounter to likely last beyond 2 midnights.  -Disposition pending clinical course.    High risk secondary to acute left hip fracture s/p mechanical fall at home.       CODE STATUS: FULL CODE      DAVID Wilks   22  13:48 EST  Pager #[phone]  ---------------------------------------------------------------------------------------------------------------------       Electronically signed by Rah Pastrana MD at 22 1517          Physician Progress Notes (last 24 hours)      Jenni Chong APRN at 22 1205     Attestation signed by Eren Hardin DO at 22 4325    I have reviewed this documentation and agree. Pt seen and examined with RIO Mckeon. Clinically stable. Insurance denied IPR. Pt not interested in short term SNF or swing bed and prefers to return home with family. Plan for possible D/C in the AM.                   Patient Identification:  Name:  Sarbjit Martin  Age:  87 y.o.  Sex:  male  :  1935  MRN:  [id number]  Visit Number:  [id number]  Primary Care Provider:  Goldie Steward APRN    Length of stay:  [address] [place] [id number]  Dept. Phone:  [phone]  Dept. Fax:  [phone] Date Ordered: Dec 20, 2022         Patient:  Sarbjit Martin MRN:  [id number]   [address] [facility] [address] :  1935  SSN:    Phone: [phone] Sex:  M     Weight: 50.8 kg (112 lb)         Ht Readings from Last 1 Encounters:   22 167.6 cm (66\")         Ghassan               (Order ID:  [id number])    Diagnosis:  Closed fracture of left hip, initial encounter (HCC) (S72.002A [ICD-10-CM] 820.8 [ICD-9-CM])   Quantity:  1     Equipment:  Walker Folding with Wheels  Length of Need (99 Months = Lifetime): 99 Months = Lifetime        Authorizing Provider's Phone: [phone]  Authorizing Provider:Jenni Chong APRN  Authorizing Provider's NPI: [id number]  Order Entered By: Jenni Chong APRN 2022  7:48 AM     Electronically signed by: Jenni Chong APRN 2022  7:48 AM     [address] [address]  Dept. Phone:  [phone]  Dept. Fax:  [phone] Date Ordered: Dec 20, 2022         Patient:  Sarbjit Martin MRN:  [id number]   [address] [id number] :  1935  SSN:    Phone: [phone] Sex:  M     Weight: 50.8 kg (112 lb)         Ht Readings from Last 1 Encounters:   22 167.6 cm (66\")         Commode Chair          (Order ID: [id number])    Diagnosis:  Closed fracture of left hip, initial encounter (HCC) (S72.002A [ICD-10-CM] 820.8 [ICD-9-CM])   Quantity:  1     Equipment:  Bedside Commode Chair w/Fixed Arms  Length of Need (99 Months = Lifetime): 99 Months = Lifetime        Authorizing Provider's Phone: [phone]  Authorizing Provider:Jenni Chong APRN  Authorizing Provider's NPI: [id number]  Order Entered By: Jenni Chong APRN 2022  7:48 AM     Electronically signed by: Jenni Chong APRN 2022  7:48 AM         Subjective/Interval History/Consultants/Procedures     Chief complaint: Follow-up, acute left femoral fracture s/p left hemiarthroplasty, POD #6     Subjective/Interval History:  Sarbjit Martin is our 87 y.o. male patient admitted on 2022 for acute left hip fracture s/p mechanical fall at home. He has a pMH significant for BPH, arthritis, bile duct calculus s/p ERCP, COPD, chronic hypoxic respiratory failure, chronic HFpEF, GERD, moderate pulmonary  hypertension, and advanced age. For further and complete admitting details, please see admission H&P.     He was admitted to the [facility] floor for further monitoring, evaluation, and treatment. Patient had presented to the ED with complaints of left hip pain s/p mechanical fall at home from standing height. Imaging obtained in the ED revealed acute left femoral fracture. He ultimately underwent left hip hemiarthroplasty on 12/13 by Dr. Reyez and tolerated procedure well. PT/OT consults placed for postop eval. CM consulted for assistance with discharge placement. On initial exam, patient had increased supplemental O2 requirement from baseline. Usually uses 2L at home; was on 4L. Lungs were coarse/diminished bilaterally with RLL crackles. Chest imaging suggestive of LLL infiltrate. Patient was started on Levaquin for empiric treatment. 5 day course completed. Also receiving scheduled Symbicort and DuoNebs. Incentive spirometer use and turn/cough/deep breathing exercises encouraged. Continuous pulse oximetry in place. Respiratory panel negative. He has known left pulmonary nodule, has increased in size since previous exam. Attending discussed with lung nodule clinic Irma HERNANDEZ to set up appointment for follow-up in the outpatient setting. Patient is now on baseline O2 requirement of 2L and stable. Lung sounds have improved; clear bilaterally on today's exam. No wheezing, rhonchi or rales. Hgb decreased postoperatively, but is stable at this time and trending back up. He has not required any blood transfusions. IPR denied. Patient is medically stable for discharge, pending safe dispo. CM assisting with discharge plans. Other options to be discussed with patient.      Procedures/Imaging:  • Xray of the left pelvis x2  • Xray of the left femur  • Chest xray  • CT head without contrast  • CT cervical spine without contrast  • Left hip hemiarthroplasty on 12/13 by Dr. Reyez.      Consults:  • Orthopedic  Surgery  • PT/OT  • Case management     On today's exam, patient was lying in bed with no s/s acute distress noted. Left hip incision CRISTAL at time of exam. Edges appearing well-approximated. No drainage today. There is a large, purple ecchymotic area noted just above incision site extending posteriorly. Patient denies associated pain. Hgb remaining stable postop. LLE remaining neurovascularly intact. Discussed with Dr. Reyez. Due to evidence of developing postsurgical hematoma, Lovenox has been discontinued and patient placed on daily aspirin for VTE prophylaxis along with SCDs. Patient denies any left hip pain at this time. Also denies chest pain, shortness of breath above baseline, or any other acute complaints. Megace added on 12/18 per nutrition recommendations for appetite stimulation as patient refuses nutritional supplement drinks. BMI currently 17.75 kg/m2. He has been working with PT and doing well. Discussed plan with patient. Voiced agreement with no further questions or concerns at this time.      No acute events reported overnight. Room location at the time of evaluation was 328. Discussed with attending physician, Eren Peterson DO.   ----------------------------------------------------------------------------------------------------------------------  Current Hospital Meds:  acetaminophen, 1,000 mg, Oral, TID  amitriptyline, 25 mg, Oral, Nightly  aspirin, 81 mg, Oral, Daily  budesonide-formoterol, 2 puff, Inhalation, BID - RT  finasteride, 5 mg, Oral, Daily  guaiFENesin, 1,200 mg, Oral, Q12H  ipratropium-albuterol, 3 mL, Nebulization, 4x Daily - RT  levothyroxine, 50 mcg, Oral, Daily  megestrol, 40 mg, Oral, Daily  multivitamin, 1 tablet, Oral, Daily  sodium chloride, 10 mL, Intravenous, Q12H  sodium chloride, 3 mL, Intravenous, Q12H  tamsulosin, 0.4 mg, Oral, Nightly          ----------------------------------------------------------------------------------------------------------------------      Objective     Vital Signs:  Temp:  [97.1 °F (36.2 °C)-98 °F (36.7 °C)] 97.7 °F (36.5 °C)  Heart Rate:  [] 100  Resp:  [18-20] 20  BP: (110-133)/(55-64) 133/64      12/18/22  0500 12/19/22  0300 12/19/22  0524   Weight: 50.3 kg (110 lb 12.8 oz) 49.9 kg (110 lb) 49.9 kg (110 lb)     Body mass index is 17.75 kg/m².    Intake/Output Summary (Last 24 hours) at 12/19/2022 1205  Last data filed at 12/19/2022 0500  Gross per 24 hour   Intake 240 ml   Output 2050 ml   Net -1810 ml     No intake/output data recorded.  Diet: Regular/House Diet; Texture: Regular Texture (IDDSI 7); Fluid Consistency: Thin (IDDSI 0)  ----------------------------------------------------------------------------------------------------------------------    Physical Exam  Vitals and nursing note reviewed.   Constitutional:       General: He is awake. He is not in acute distress.     Appearance: He is not diaphoretic.      Interventions: Nasal cannula in place.      Comments: Remaining on 2L NC.   HENT:      Head: Normocephalic and atraumatic.      Mouth/Throat:      Mouth: Mucous membranes are moist.      Pharynx: Oropharynx is clear.   Eyes:      Extraocular Movements: Extraocular movements intact.      Pupils: Pupils are equal, round, and reactive to light.   Cardiovascular:      Rate and Rhythm: Normal rate and regular rhythm.      Pulses: Normal pulses.           Dorsalis pedis pulses are 2+ on the right side and 2+ on the left side.      Heart sounds: Normal heart sounds. No murmur heard.    No friction rub.   Pulmonary:      Effort: Pulmonary effort is normal. No accessory muscle usage, respiratory distress or retractions.      Breath sounds: No wheezing, rhonchi or rales.   Abdominal:      General: Abdomen is flat. Bowel sounds are normal. There is no distension.      Palpations: Abdomen is soft.      Tenderness:  There is no abdominal tenderness. There is no guarding.   Musculoskeletal:      Cervical back: Neck supple. No rigidity.      Right lower leg: No edema.      Left lower leg: No edema.   Skin:     General: Skin is warm and dry.      Capillary Refill: Capillary refill takes 2 to 3 seconds.      Coloration: Skin is pale.          Neurological:      Mental Status: He is alert and oriented to person, place, and time.      Cranial Nerves: No facial asymmetry.      Sensory: Sensation is intact.      Motor: Weakness (generalized) present. No tremor.   Psychiatric:         Attention and Perception: Attention normal.         Mood and Affect: Mood normal.         Speech: Speech normal.         Behavior: Behavior normal. Behavior is cooperative.         Thought Content: Thought content normal.       ----------------------------------------------------------------------------------------------------------------------      Results from last 7 days   Lab Units 12/19/22  0205 12/18/22  0306 12/17/22  0415 12/16/22  0145 12/14/22  0406 12/13/22  1911 12/13/22  0850 12/13/22  0022   CRP mg/dL  --   --   --  16.03*  --  4.11*  --   --    LACTATE mmol/L  --   --   --   --   --  1.5  --   --    WBC 10*3/mm3 8.88 7.66 7.60 8.30   < >  --   --  14.97*   HEMOGLOBIN g/dL 10.0* 9.9* 9.4* 9.3*   < >  --   --  14.9   HEMATOCRIT % 31.7* 33.0* 30.4* 30.2*   < >  --   --  48.7   MCV fL 85.2 88.0 88.4 87.3   < >  --   --  87.6   MCHC g/dL 31.5 30.0* 30.9* 30.8*   < >  --   --  30.6*   PLATELETS 10*3/mm3 154 131* 125* 103*   < >  --   --  194   INR   --   --   --   --   --   --  1.09 0.97    < > = values in this interval not displayed.     Results from last 7 days   Lab Units 12/13/22  0747   PH, ARTERIAL pH units 7.344*   PO2 ART mm Hg 78.1*   PCO2, ARTERIAL mm Hg 55.4*   HCO3 ART mmol/L 30.1*     Results from last 7 days   Lab Units 12/19/22  0205 12/18/22  0306 12/17/22  0415 12/16/22  0145 12/14/22  0406 12/13/22  0022   SODIUM mmol/L 131* 133*  136 137   < > 137   POTASSIUM mmol/L 4.4 4.5 4.0 4.1   < > 5.1   MAGNESIUM mg/dL  --  2.2 1.7 1.9   < >  --    CHLORIDE mmol/L 98 104 104 104   < > 101   CO2 mmol/L 24.9 21.4* 25.1 26.7   < > 21.2*   BUN mg/dL 17 17 15 19   < > 26*   CREATININE mg/dL 0.66* 0.56* 0.58* 0.52*   < > 0.87   CALCIUM mg/dL 8.2* 8.2* 7.9* 7.7*   < > 9.2   GLUCOSE mg/dL 105* 105* 106* 115*   < > 106*   ALBUMIN g/dL  --   --   --   --   --  3.49*   BILIRUBIN mg/dL  --   --   --   --   --  0.5   ALK PHOS U/L  --   --   --   --   --  72   AST (SGOT) U/L  --   --   --   --   --  21   ALT (SGPT) U/L  --   --   --   --   --  15    < > = values in this interval not displayed.   Estimated Creatinine Clearance: 57.8 mL/min (A) (by C-G formula based on SCr of 0.66 mg/dL (L)).  No results found for: AMMONIA      Blood Culture   Date Value Ref Range Status   12/13/2022 No growth at 4 days  Preliminary   12/13/2022 No growth at 4 days  Preliminary       ----------------------------------------------------------------------------------------------------------------------  Imaging Results (Last 24 Hours)     ** No results found for the last 24 hours. **        ----------------------------------------------------------------------------------------------------------------------   I have reviewed the above laboratory values for 12/19/22    Assessment/Plan     Active Hospital Problems    Diagnosis  POA   • **Closed fracture of left hip ([facility]) [S72.002A]  Unknown     Added automatically from request for surgery [id number]     • Closed fracture of left hip, initial encounter ([facility]) [S72.002A]  Yes       ASSESSMENT/PLAN:  -Acute left femoral neck fracture POA s/p mechanical fall at home  -S/P left hip hemiarthroplasty on 12/13. Currently POD #6.   -Radiological images reviewed on admission.   -Orthopedic surgery following; greatly appreciate their assistance.   -PRN Tylenol for mild pain.   -PRN [place] for moderate pain.   -Neurovascular checks Q 4 hours.   -Fall  precautions.  -WBAT.   -Incision CRISTAL per ortho recommendations.   -Lovenox discontinued on 12/18 due to appearance of developing hematoma superior to left hip incision site. Placed on daily aspirin for VTE prophylaxis per ortho recommendations. Discussed with Dr. Reyez.   -PT/OT consults for postop eval.   -CM for discharge planning.   -IPR denied. CM to discuss other options (SNF versus swing bed versus home with HH/PT).   -Will need outpatient follow-up with ortho on discharge.      -Chronic HFpEF  -Appearing euvolemic on exam.   -Monitor strict I's and O's, daily weights.  -Echo from 11/1/2022 reviewed; normal EF (61-65%).      -Acute on chronic hypoxic respiratory failure POA, resolved  -Moderate pulmonary hypertension  -Evidence of left basilar consolidation on imaging obtained in ED  -SIRS/sepsis criteria on admission with HR>90 and WBCs>12.   -Chest xray revealed minimal left basilar consolidation.  -ABG obtained on 4L NC; PO2 78.1%, PCO2 55.4, and O2 saturation 95.5%.   -Continue to titrate supplemental O2 to maintain SpO2 saturations > 90%.  -Remaining stable on 2L (baseline requirement).   -Continuous pulse oximetry.   -Encourage incentive spirometer and turn/cough/deep breathing exercises.  -Continue Symbicort and DuoNebs.   -Completed 5 days of Levaquin, has shown clinical improvement.   -Respiratory panel negative.   -Continue follow-up with pulmonology in the outpatient setting for known pulmonary nodule/mass.    -Repeat CBC in the a.m.     -GERD  -Aspiration and reflux precautions.  -PPI.     -History of BPH  -Monitor urine output closely.  -Continue Proscar and Flomax.   -So far no issues with urinary retention in the postop setting.      -Essential hypertension  -BP appears well controlled.   -Closely monitor BP per hospital protocol, titrate medications as necessary.     -Arthritis  -Supportive care.      -History of CBD calculus s/p ERCP  -History of E. Coli bacteremia        -----------  -DVT  prophylaxis: Aspirin, SCDs.   -GI prophylaxis: PPI  -Activity: WBAT, fall precautions.   -Disposition plans/anticipated needs: IPR denied. Looking at other options such as swing bed versus SNF versus home with HH/PT. CM assisting with discharge plans. Patient is currently medically stable when dispo is confirmed.   -Diet: Regular  -Home supplemental O2: Utilizes 2L at home. Currently stable on baseline requirement.         The patient is high risk due to the following diagnoses/reasons:  Acute left femoral fracture s/p left hip hemiarthroplasty, advanced age.         DAVID Wilks  12/19/22  12:05 EST  Pager #[phone]    Electronically signed by Eren Hardin DO at 12/19/22 0814

## 2022-12-20 NOTE — DISCHARGE SUMMARY
[facility] Medicine Services  DISCHARGE SUMMARY    Date of Admission: 12/12/2022    Date of Discharge:  12/20/2022    PCP: Goldie Steward APRN    Discharging Provider: DAVID Wilks  Attending Physician on day of DC: Eren Peterson DO    Admission Diagnosis:   Please see admission H&P    Discharge Diagnosis:   · Acute left femoral neck fracture s/p left hip hemiarthroplasty on 12/13/22  · Chronic HFpEF  · Acute on chronic hypoxic respiratory failure, resolved  · Left basilar community-acquired pneumonia  · Moderate pulmonary hypertension  · Known left lung nodule, recently increased in size  · GERD  · BPH  · Essential hypertension  · Arthritis  · History of CBD calculus s/p ERCP   · History of E. Coli bacteremia     Procedures Performed:  • Xray of the left pelvis x2  • Xray of the left femur  • Chest xray  • CT head without contrast  • CT cervical spine without contrast  • Left hip hemiarthroplasty on 12/13 by Dr. Reyez.      Consults:   Consults     Date and Time Order Name Status Description    12/13/2022  3:20 PM Inpatient Orthopedic Surgery Consult            HPI     History of Present Illness:  Sarbjit Martin is our 87 y.o. male patient admitted on 12/13/2022 for acute left hip fracture s/p mechanical fall at home. He has a pMH significant for BPH, arthritis, bile duct calculus s/p ERCP, COPD, chronic hypoxic respiratory failure, chronic HFpEF, GERD, moderate pulmonary hypertension, and advanced age. For further and complete admitting details, please see admission H&P.       Hospital Course     Hospital Course  Sarbjit Martin was admitted as outlined in above HPI.     He was admitted to the [facility] floor for further monitoring, evaluation, and treatment. Patient had presented to the ED with complaints of left hip pain s/p mechanical fall at home from standing height. Imaging obtained in the ED revealed acute left femoral fracture. He ultimately underwent left hip  hemiarthroplasty on 12/13 by Dr. Reyez and tolerated procedure well. PT/OT consults placed for postop eval. CM consulted for assistance with discharge placement. On initial exam, patient had increased supplemental O2 requirement from baseline. Usually uses 2L at home; was on 4L. Lungs were coarse/diminished bilaterally with RLL crackles. Chest imaging suggestive of LLL infiltrate. Patient was started on Levaquin for empiric treatment. 5 day course completed. Also received scheduled Symbicort and DuoNebs. Incentive spirometer use and turn/cough/deep breathing exercises encouraged. Continuous pulse oximetry remained in place. Respiratory panel negative. He has known left pulmonary nodule, has increased in size since previous exam. Discussed with lung nodule clinic Irma HERNANDEZ to set up appointment for follow-up in the outpatient setting. Patient is now on baseline O2 requirement of 2L and stable. Lung sounds have improved; still with some mild inspiratory right basilar crackles. Good air movement. No wheezing, rhonchi or rales. Hgb decreased postoperatively, but is stable at this time and trending back up. He has not required any blood transfusions. IPR denied due to insurance. CM discussed options with patient and he states that he would like to return home with HH and PT. He is adamant that he does not want to go to SNF, although it was recommended. On today's exam, patient has continued to improve. He is ambulating with PT. Will need walker, bedside commode on discharge. DME orders placed. He remains stable on 2L; may resume home supplemental O2. On Breztri and PRN albuterol at home which his preferred therapy. May continue this. Encouraged follow-up with pulmonology after discharge and appointment is to be scheduled. Patient currently denies any chest pain, shortness of breath above baseline, abdominal pain, n/v/d, constipation, and left hip pain. States that pain is mild and he has been tolerating with  use of PRN Tylenol. Will not need script for Norco. Left hip incision is CRISTAL with staples intact and edges appearing well-approximated. Seems to be healing well. No drainage or bleeding. There was evidence of development of hematoma just superior to left hip incision extending posteriorly. Discussed with ortho, Dr. Reyez. Recommended to take patient off Lovenox and place on daily aspirin for VTE prophylaxis. Discussed with patient; he voiced understanding and agreement. Ecchymosis appears to have improved. It is not painful to touch and no longer firm. LLE remaining neurovascularly intact. He will follow-up with ortho in 1 week for staple removal. Have also ordered home health for monitoring of incision site and for assistance with SpO2 monitoring/management of chronic hypoxic respiratory failure. Have ordered home PT/OT for continued gait training, strengthening, and home safety assessment. He will follow-up with PCP Goldie HERNANDEZ in 1 week for post hospital discharge evaluation. Recommend repeat CBC to monitor Hgb and BMP to monitor sodium levels as it has remained borderline low throughout hospitalization. Sodium today is 134 and has improved. Hemoglobin is 10.2 and continues to improve. Patient is underweight with chronic poor intake due to decreased appetite. Nutrition consult was placed; recommended appetite stimulant as patient refuses nutrition replacement shakes with meals. Have added daily Megace. Discussed all changes, monitoring, and follow-up plans with patient. Encouraged use of DME. Patient states that his significant other will also provide assistance at home. He had no further questions or concerns at this time. Discussed with attending physician, Eren Peterson DO.        Discussed with AM RN Ed on day of discharge.     Oxygen saturation on the day of discharge was 98% on 2L NC.      Pertinent Laboratory and Radiology Results     Pertinent Test Results:          Results from last  7 days   Lab Units 12/20/22  0203 12/19/22  0205 12/18/22  0306 12/17/22  0415 12/16/22  0145 12/14/22  0406   WBC 10*3/mm3 8.59 8.88 7.66 7.60 8.30 13.55*   HEMOGLOBIN g/dL 10.2* 10.0* 9.9* 9.4* 9.3* 12.6*   HEMATOCRIT % 31.9* 31.7* 33.0* 30.4* 30.2* 40.7   PLATELETS 10*3/mm3 174 154 131* 125* 103* 155   MCV fL 84.8 85.2 88.0 88.4 87.3 87.9   SODIUM mmol/L 134* 131* 133* 136 137 134*   POTASSIUM mmol/L 4.2 4.4 4.5 4.0 4.1 4.4   CHLORIDE mmol/L 102 98 104 104 104 99   CO2 mmol/L 24.6 24.9 21.4* 25.1 26.7 25.6   BUN mg/dL 20 17 17 15 19 31*   CREATININE mg/dL 0.53* 0.66* 0.56* 0.58* 0.52* 0.85   GLUCOSE mg/dL 120* 105* 105* 106* 115* 94   CALCIUM mg/dL 8.4* 8.2* 8.2* 7.9* 7.7* 8.2*          Results from last 7 days   Lab Units 12/20/22  0203 12/19/22  0205 12/18/22  0306 12/17/22 0415 12/16/22  0145 12/14/22  0406 12/13/22  1911   CRP mg/dL  --   --   --   --  16.03*  --  4.11*   LACTATE mmol/L  --   --   --   --   --   --  1.5   WBC 10*3/mm3 8.59 8.88 7.66 7.60 8.30 13.55*  --      Results from last 7 days   Lab Units 12/13/22  0850   PROTIME Seconds 14.4   INR  1.09   APTT seconds 31.6           Invalid input(s): TG, LDLCALC, LDLREALC      Brief Urine Lab Results  (Last result in the past 365 days)      Color   Clarity   Blood   Leuk Est   Nitrite   Protein   CREAT   Urine HCG        12/13/22 1203 Yellow   Clear   Negative   Negative   Negative   Negative                         Results for orders placed during the hospital encounter of 10/31/22    Adult Transthoracic Echo Complete With Contrast if Necessary Per Protocol    Interpretation Summary  •  Normal left ventricular cavity size and wall thickness noted. All left ventricular wall segments contract normally.  •  Left ventricular ejection fraction appears to be 61 - 65%.  •  Estimated right ventricular systolic pressure from tricuspid regurgitation is mildly elevated (35-45 mmHg).  •  The aortic valve exhibits sclerosis. There is mild calcification of the  aortic valve. The aortic valve appears trileaflet. Mild aortic valve regurgitation is present. No hemodynamically significant aortic valve stenosis is present.  •  The mitral valve is structurally normal with no significant stenosis present. Mild mitral valve regurgitation is present.  •  Mild tricuspid valve regurgitation is present. Estimated right ventricular systolic pressure from tricuspid regurgitation is mildly elevated (35-45 mmHg).  •  There is no evidence of pericardial effusion. .            ----------------------------------------------------------------------------------------------------------------------  XR Femur 2 View Left    Result Date: 12/13/2022  1.  Left femoral neck fracture again noted. 2.  No distal femoral fracture is seen.  This report was finalized on 12/13/2022 8:10 AM by Dr. Barrie Guzman MD.      CT Head Without Contrast    Result Date: 12/13/2022  Left temporal scalp injury. No other acute findings. Signer Name: Rommel Umanzor MD  Signed: 12/13/2022 12:23 AM  Workstation Name: [facility]    CT Cervical Spine Without Contrast    Result Date: 12/13/2022  1. No acute cervical spine fracture or malalignment. 2. 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 Please see chest CT report of 11/1/2022. Signer Name: Rommel Umanzor MD  Signed: 12/13/2022 12:19 AM  Workstation Name: [facility]    XR Chest 1 View    Result Date: 12/13/2022  Minimal left basilar consolidation. COPD.  This report was finalized on 12/13/2022 8:37 AM by Dr. Barrie Guzman MD.      XR Hip With or Without Pelvis 2 - 3 View Left    Result Date: 12/13/2022  Acute left femoral neck fracture. No dislocation. Signer Name: Rommel Umanzor MD  Signed: 12/13/2022 12:15 AM  Workstation Name: [email or web address]        Microbiology Results (last 10 days)     Procedure Component Value - Date/Time    S.  Pneumo Ag Urine or CSF - Urine, Urine, Clean Catch [[id number]]  (Normal) Collected: 12/13/22 2356    Lab Status: Final result Specimen: Urine, Clean Catch Updated: 12/14/22 1341     Strep Pneumo Ag Negative    Blood Culture - Blood, Arm, Right [[id number]]  (Normal) Collected: 12/13/22 1912    Lab Status: Final result Specimen: Blood from Arm, Right Updated: 12/18/22 1931     Blood Culture No growth at 5 days    Blood Culture - Blood, Hand, Right [[id number]]  (Normal) Collected: 12/13/22 1911    Lab Status: Final result Specimen: Blood from Hand, Right Updated: 12/18/22 1931     Blood Culture No growth at 5 days    Respiratory Panel PCR w/COVID-19(SARS-CoV-2) NURY/DOMINGA/KATHRYN/PAD/COR/MAD/ANA In-House, NP Swab in UTM/VTM, 3-4 HR TAT - Swab, Nasopharynx [[id number]]  (Normal) Collected: 12/13/22 1635    Lab Status: Final result Specimen: Swab from Nasopharynx Updated: 12/13/22 1728     ADENOVIRUS, PCR Not Detected     Coronavirus 229E Not Detected     Coronavirus HKU1 Not Detected     Coronavirus NL63 Not Detected     Coronavirus OC43 Not Detected     COVID19 Not Detected     Human Metapneumovirus Not Detected     Human Rhinovirus/Enterovirus Not Detected     Influenza A PCR Not Detected     Influenza B PCR Not Detected     Parainfluenza Virus 1 Not Detected     Parainfluenza Virus 2 Not Detected     Parainfluenza Virus 3 Not Detected     Parainfluenza Virus 4 Not Detected     RSV, PCR Not Detected     Bordetella pertussis pcr Not Detected     Bordetella parapertussis PCR Not Detected     Chlamydophila pneumoniae PCR Not Detected     Mycoplasma pneumo by PCR Not Detected    Narrative:      In the setting of a positive respiratory panel with a viral infection PLUS a negative procalcitonin without other underlying concern for bacterial infection, consider observing off antibiotics or discontinuation of antibiotics and continue supportive care. If the respiratory panel is positive for atypical bacterial infection (Bordetella  pertussis, Chlamydophila pneumoniae, or Mycoplasma pneumoniae), consider antibiotic de-escalation to target atypical bacterial infection.    COVID-19 and FLU A/B PCR - Swab, Nasopharynx [[id number]]  (Normal) Collected: 12/13/22 0021    Lab Status: Final result Specimen: Swab from Nasopharynx Updated: 12/13/22 0046     COVID19 Not Detected     Influenza A PCR Not Detected     Influenza B PCR Not Detected    Narrative:      Fact sheet for providers: https://www.fda.gov/media/689937/download    Fact sheet for patients: https://www.fda.gov/media/369058/download    Test performed by PCR.          Labs above have been reviewed on the day of discharge.  Radiology images from prior 30 days were reviewed prior to discharge as incorporated into this document.     Discharge Vitals and Physical Examination       Vital Signs  Temp:  [97.2 °F (36.2 °C)-98.3 °F (36.8 °C)] 98.3 °F (36.8 °C)  Heart Rate:  [81-96] 81  Resp:  [18-20] 18  BP: (119-134)/(59-65) 119/65     PHYSICAL EXAMINATION:   Physical Exam  Vitals and nursing note reviewed.   Constitutional:       General: He is awake. He is not in acute distress.     Appearance: He is not diaphoretic.      Interventions: Nasal cannula in place.      Comments: Currently on 2L NC.    HENT:      Head: Normocephalic.      Mouth/Throat:      Mouth: Mucous membranes are moist.      Pharynx: Oropharynx is clear.   Eyes:      Extraocular Movements: Extraocular movements intact.   Cardiovascular:      Rate and Rhythm: Normal rate and regular rhythm.      Pulses: Normal pulses.           Dorsalis pedis pulses are 2+ on the right side and 2+ on the left side.      Heart sounds: Normal heart sounds. No murmur heard.    No friction rub.   Pulmonary:      Effort: Pulmonary effort is normal. No accessory muscle usage, respiratory distress or retractions.      Breath sounds: Rales (mild, inspiratory, RLL) present. No decreased breath sounds or wheezing.      Comments: Air movement good. No  wheezing, rhonchi. Mild, inspiratory basilar crackles on the right. Has improved since admission.   Abdominal:      General: Abdomen is flat. Bowel sounds are normal. There is no distension.      Palpations: Abdomen is soft.      Tenderness: There is no abdominal tenderness. There is no guarding.   Genitourinary:     Comments: Approximately 600 mL yellow urine noted in urinal at bedside.   Musculoskeletal:      Cervical back: Neck supple. No rigidity.      Right lower leg: No edema.      Left lower leg: No edema.   Skin:     General: Skin is warm and dry.      Capillary Refill: Capillary refill takes 2 to 3 seconds.      Findings: Bruising present.      Comments: Left hip incision CRISTAL; edges well approximated. Staples intact. No bleeding or drainage. Appears to be healing well. There is still some bruising noted just superior to incision site extending posteriorly. Appears to have improved. Denies pain to touch. No longer firm.    Neurological:      Mental Status: He is alert and oriented to person, place, and time.      Cranial Nerves: No facial asymmetry.      Sensory: Sensation is intact.      Motor: Weakness (generalized) present. No tremor.      Comments: LLE remaining neurovascularly intact.    Psychiatric:         Attention and Perception: Attention normal.         Mood and Affect: Mood normal.         Speech: Speech normal.         Behavior: Behavior normal. Behavior is cooperative.         Thought Content: Thought content normal.           Discharge Disposition, Discharge Medications, and Discharge Appointments     Discharge Disposition: Home with HH/PT    Condition on Discharge: Stable    Discharge Medications:     Discharge Medications      New Medications      Instructions Start Date   aspirin 81 MG EC tablet   81 mg, Oral, Daily      megestrol 40 MG tablet  Commonly known as: MEGACE   40 mg, Oral, Daily      multivitamin tablet tablet   1 tablet, Oral, Daily         Continue These Medications       Instructions Start Date   amitriptyline 25 MG tablet  Commonly known as: ELAVIL   25 mg, Oral, Nightly      Budeson-Glycopyrrol-Formoterol 160-9-4.8 MCG/ACT aerosol inhaler  Commonly known as: BREZTRI   2 puffs, Inhalation, 2 Times Daily      finasteride 5 MG tablet  Commonly known as: Proscar   5 mg, Oral, Daily      levothyroxine 50 MCG tablet  Commonly known as: SYNTHROID, LEVOTHROID   50 mcg, Oral, Daily      LORazepam 0.5 MG tablet  Commonly known as: ATIVAN   0.5 mg, Oral, Daily PRN      tamsulosin 0.4 MG capsule 24 hr capsule  Commonly known as: Flomax   0.4 mg, Oral, Nightly      Ventolin  (90 Base) MCG/ACT inhaler  Generic drug: albuterol sulfate HFA   2 puffs, Inhalation, Every 6 Hours PRN             Discharged medication regimen discussed with attending physician prior to discharge.     Discharge Diet: Regular, Thin    Dietary Orders (From admission, onward)     Start     Ordered    12/16/22 1200  Dietary Nutrition Supplements Other (See Comment); Whole milk  Daily With Breakfast, Lunch & Dinner      Question Answer Comment   Select Supplement Other (See Comment)    Other Whole milk        12/16/22 0901    12/13/22 1619  Diet: Regular/House Diet; Texture: Regular Texture (IDDSI 7); Fluid Consistency: Thin (IDDSI 0)  Diet Effective Now        References:    Diet Order [place]walk   Question Answer Comment   Diets: Regular/House Diet    Texture: Regular Texture (IDDSI 7)    Fluid Consistency: Thin (IDDSI 0)        12/13/22 1618                Activity at Discharge: Up with assistance as tolerated. PT/OT.      Discharge Disposition:    Home-Health Care [facility]    Follow-up Appointments:      Additional Instructions for the Follow-ups that You Need to Schedule     Ambulatory Referral to Home Health   As directed      Face to Face Visit Date: 12/20/2022    Follow-up provider for Plan of Care?: I treated the patient in an acute care facility and will not continue treatment after discharge.    Follow-up  provider: MARILU STEWARD [[id number]]    Reason/Clinical Findings: S/P left hip hemiarthroplasty, debility, pneumonia (completed treatment), chronic hypoxic respiratory failure    Describe mobility limitations that make leaving home difficult: S/P left hip hemiarthroplasty, debility, pneumonia (completed treatment), chronic hypoxic respiratory failure    Nursing/Therapeutic Services Requested: Skilled Nursing Physical Therapy Occupational Therapy    Skilled nursing orders: O2 instruction Wound care dressing/changes Neurovascular assessments    PT orders: Therapeutic exercise Gait Training Transfer training Strengthening Home safety assessment    Weight Bearing Status: As Tolerated    Occupational orders: Activities of daily living Energy conservation Strengthening Fine motor Home safety assessment    Frequency: 1 Week 1         Discharge Follow-up with PCP   As directed       Currently Documented PCP:    Marilu Steward APRN    PCP Phone Number:    [phone]     Follow Up Details: 1 week post hospital discharge eval; recommend collection of BMP and CBC prior to visit (Home Health to collect) to monitor Hgb, sodium levels.         Discharge Follow-up with Specialty: PulmonologyIrma; 2 Weeks   As directed      Specialty: PulmonologyIrma    Follow Up: 2 Weeks    Follow Up Details: Left lung nodule/mass, recently increased in size, recently treated left basilar pneumonia, chronic hypoxic respiratory failure         Discharge Follow-up with Specified Provider: Follow-up with Dr. Reyez (Ortho) in 1 week for staple removal, left hip incision; 1 Week   As directed      To: Follow-up with Dr. Reyez (Ortho) in 1 week for staple removal, left hip incision    Follow Up: 1 Week            Follow-up Information     Marilu Steward APRN .    Specialty: Nurse Practitioner  Why: 1 week post hospital discharge eval; recommend collection of BMP and CBC prior to visit (Home Health to  collect) to monitor Hgb, sodium levels.  Contact information:  [address] [place] [address]  [phone]                         Additional Instructions for the Follow-ups that You Need to Schedule     Ambulatory Referral to Home Health   As directed      Face to Face Visit Date: 12/20/2022    Follow-up provider for Plan of Care?: I treated the patient in an acute care facility and will not continue treatment after discharge.    Follow-up provider: MARILU STEWARD [[id number]]    Reason/Clinical Findings: S/P left hip hemiarthroplasty, debility, pneumonia (completed treatment), chronic hypoxic respiratory failure    Describe mobility limitations that make leaving home difficult: S/P left hip hemiarthroplasty, debility, pneumonia (completed treatment), chronic hypoxic respiratory failure    Nursing/Therapeutic Services Requested: Skilled Nursing Physical Therapy Occupational Therapy    Skilled nursing orders: O2 instruction Wound care dressing/changes Neurovascular assessments    PT orders: Therapeutic exercise Gait Training Transfer training Strengthening Home safety assessment    Weight Bearing Status: As Tolerated    Occupational orders: Activities of daily living Energy conservation Strengthening Fine motor Home safety assessment    Frequency: 1 Week 1         Discharge Follow-up with PCP   As directed       Currently Documented PCP:    Marilu Steward APRN    PCP Phone Number:    [phone]     Follow Up Details: 1 week post hospital discharge eval; recommend collection of BMP and CBC prior to visit (Home Health to collect) to monitor Hgb, sodium levels.         Discharge Follow-up with Specialty: PulmonologyIrma; 2 Weeks   As directed      Specialty: PulmonologyIrma    Follow Up: 2 Weeks    Follow Up Details: Left lung nodule/mass, recently increased in size, recently treated left basilar pneumonia, chronic hypoxic respiratory failure         Discharge  Follow-up with Specified Provider: Follow-up with Dr. Reyez (Ortho) in 1 week for staple removal, left hip incision; 1 Week   As directed      To: Follow-up with Dr. Reyez (Ortho) in 1 week for staple removal, left hip incision    Follow Up: 1 Week                  Jenni Chong [facility] Medicine Team  12/20/22  07:49 EST      Time: Greater than 30 minutes spent on this discharge.  I spent 45 minutes on this discharge activity which included:  Face-to-face encounter with the patient, discussing plan with attending physician, reviewing the data in the system, coordination of the care with the nursing staff as well as consultations, documentation, and entering orders.

## 2022-12-20 NOTE — CASE MANAGEMENT/SOCIAL WORK
Discharge Planning Assessment  [facility]     Patient Name: Sarbjit Martin  MRN: [id number]  Today's Date: 12/20/2022    Admit Date: 12/12/2022    Plan: [facility], pt does not utilize their services. Spoke with Andrew Dominguez, pt utilizes their services for home O2. Referral received and faxed to Andrew for walker & BSC. Andrew will contact pt for an appropriate delivery time. SS arranging HH. MT home today.      Discharge Plan     Row Name 12/20/22 1024       Plan    Final Discharge Disposition Code 06 - home with home health care    Final Note Pt is being discharged home on this date with Home Health Services. Pt has no preference on which Home Health services he wanted. SS faxed clinical to [facility].  provided RN with report number [phone]. No other needs identified.             Continued Care and Services - Admitted Since 12/12/2022     Home Medical Care     Service Provider Request Status Selected Services Address Phone Fax Patient Preferred    [facility] [place] [facility]A [id number] [facility] [id number] [phone] --                TOM Payne

## 2022-12-20 NOTE — PLAN OF CARE
Goal Outcome Evaluation:  Plan of Care Reviewed With: patient        Progress: no change  Outcome Evaluation: Pt has rested in bed overnight. VSS. Pain well controlled with scheduled meds. No acute changes overnight. Will continue to follow plan of care.

## 2022-12-20 NOTE — THERAPY TREATMENT NOTE
Acute Care - Physical Therapy Treatment Note   [place]     Patient Name: Sarbjit Martin  : 1935  MRN: [id number]  Today's Date: 2022   Onset of Illness/Injury or Date of Surgery: 22 (admission date)  Visit Dx:     ICD-10-CM ICD-9-CM   1. Closed fracture of left hip, initial encounter ([facility])  S72.002A 820.8     Patient Active Problem List   Diagnosis   • Status post laparoscopic cholecystectomy   • COPD (chronic obstructive pulmonary disease) ([facility])   • Abnormal CT of the chest   • Pulmonary nodule   • Benign prostatic hyperplasia without lower urinary tract symptoms   • NSTEMI (non-ST elevated myocardial infarction) ([facility])   • COPD exacerbation ([facility])   • Underweight   • Severe malnutrition ([facility])   • Aortic aneurysm ([facility])   • Closed fracture of left hip ([facility])   • Closed fracture of left hip, initial encounter ([facility])     Past Medical History:   Diagnosis Date   • Angiodysplasia of the colon    • Arthritis    • Bacteremia due to Escherichia coli 2017    Same time as the common bile duct stone   • Benign prostatic hyperplasia without lower urinary tract symptoms 2021   • Bile duct calculus 2017   • 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[facility])     Centrilobular   • Diverticulosis    • GERD (gastroesophageal reflux disease)    • Heart failure with preserved ejection fraction ([facility])    • Left upper lobe pulmonary nodule 2019    15 mm and spiculated in 2018; FNA was negative   • Moderate pulmonary hypertension ([facility])    • Myocardial infarction ([facility])    • Status post laparoscopic cholecystectomy 06/15/2017     Past Surgical History:   Procedure Laterality Date   • BRONCHOSCOPY N/A 2018    Procedure: BRONCHOSCOPY WITH ENDOBRONCHIAL ULTRASOUND;  Surgeon: Saravanan Méndez MD;  Location: [facility] OR;  Service: Pulmonary   • CHOLECYSTECTOMY N/A 2017    Procedure: CHOLECYSTECTOMY LAPAROSCOPIC;  Surgeon: Jose Velazquez MD;  Location: [facility] OR;  Service:    • EYE  SURGERY Bilateral 2013   • HIP BIPOLAR REPLACEMENT Left 12/13/2022    Procedure: Left hip hemiarthroplasty, cemented, lateral, biomet (rep aware), request 1p;  Surgeon: Adrian Reyez MD;  Location: [facility];  Service: Orthopedics;  Laterality: Left;   • INGUINAL HERNIA REPAIR Left 2013    left   • MI ERCP DX COLLECTION SPECIMEN BRUSHING/WASHING N/A 6/26/2017    Procedure: ENDOSCOPIC RETROGRADE CHOLANGIOPANCREATOGRAPHY;  Surgeon: Rod Francis MD;  Location: [facility] OR;  Service: Gastroenterology   • MI ERCP DX COLLECTION SPECIMEN BRUSHING/WASHING N/A 10/26/2017    Procedure: ENDOSCOPIC RETROGRADE CHOLANGIOPANCREATOGRAPHY;  Surgeon: Rod Francis MD;  Location: [facility] OR;  Service: Gastroenterology     PT Assessment (last 12 hours)     PT Evaluation and Treatment     Row Name 12/20/22 1128          Physical Therapy Time and Intention    Subjective Information no complaints  -HR     Document Type therapy note (daily note)  -HR     Mode of Treatment individual therapy;physical therapy  -HR     Patient Effort adequate  -HR     Comment Pt and RN in agreement for PT. Pt walked 60' with RW CGA and required 2 standing rest breaks for SOB. EOB exercises until tolerance. Pt voices no concerns with D/cing home  -HR     Row Name 12/20/22 1128          General Information    Existing Precautions/Restrictions hip, anterior;weight bearing;fall  -HR     Row Name 12/20/22 1128          Pain Scale: FACES Pre/Post-Treatment    Pain: FACES Scale, Pretreatment 4-->hurts little more  -HR     Posttreatment Pain Rating 4-->hurts little more  -HR     Row Name 12/20/22 1128          Cognition    Affect/Mental Status (Cognition) WFL  -HR     Follows Commands (Cognition) WFL  -HR     Personal Safety Interventions fall prevention program maintained;gait belt;nonskid shoes/slippers when out of bed;supervised activity  -HR     Row Name 12/20/22 1128          Mobility    Left Lower Extremity (Weight-bearing Status) weight-bearing as  tolerated (WBAT)  -HR     Row Name 12/20/22 1128          Bed Mobility    Supine-Sit [place]ahan (Bed Mobility) minimum assist (75% patient effort)  -HR     Sit-Supine [place] (Bed Mobility) minimum assist (75% patient effort)  -HR     Supine-Sit-Supine [place] (Bed Mobility) verbal cues;nonverbal cues (demo/gesture);minimum assist (75% patient effort)  -HR     Assistive Device (Bed Mobility) bed rails;draw sheet  -HR     Row Name 12/20/22 1128          Sit-Stand Transfer    Sit-Stand [place]ahan (Transfers) minimum assist (75% patient effort)  -HR     Assistive Device (Sit-Stand Transfers) walker, front-wheeled  -HR     Row Name 12/20/22 1128          Stand-Sit Transfer    Stand-Sit [place]ahan (Transfers) minimum assist (75% patient effort)  -HR     Assistive Device (Stand-Sit Transfers) walker, front-wheeled  -HR     Row Name 12/20/22 1128          Stand Pivot/Stand Step Transfer    Stand Pivot/Stand Step [place]ahan (Transfers) minimum assist (75% patient effort);verbal cues;nonverbal cues (demo/gesture)  -HR     Assistive Device (Stand Pivot Stand Step Transfer) walker, front-wheeled  -HR     Row Name 12/20/22 1128          Gait/Stairs (Locomotion)    [place]ahan Level (Gait) verbal cues;nonverbal cues (demo/gesture);contact guard  -HR     Assistive Device (Gait) walker, front-wheeled  -HR     Distance in Feet (Gait) 60'  -HR     Pattern (Gait) step-to  -HR     Deviations/Abnormal Patterns (Gait) antalgic;mickey decreased;gait speed decreased;stride length decreased  -HR     Bilateral Gait Deviations forward flexed posture  -HR     Row Name 12/20/22 1128          Motor Skills    Therapeutic Exercise other (see comments)  EOB: AP, LAQ, March, Pillow sq  -HR     Row Name 12/20/22 1128          Breath Sounds    Breath Sounds All Fields  -HR     All Lung Fields Breath Sounds diminished  -HR     Row Name             Wound 12/12/22 1103 Left upper scalp Skin Tear    Wound - Properties Group Placement  Date: 12/12/22  -KM Placement Time: 2333  -KM Present on Hospital Admission: Y  -KM Side: Left  -KM Orientation: upper  -KM Location: scalp  -KM Primary Wound Type: Skin tear  -KM    Retired Wound - Properties Group Placement Date: 12/12/22  -KM Placement Time: 2333  -KM Present on Hospital Admission: Y  -KM Side: Left  -KM Orientation: upper  -KM Location: scalp  -KM Primary Wound Type: Skin tear  -KM    Retired Wound - Properties Group Date first assessed: 12/12/22  -KM Time first assessed: 2333  -KM Present on Hospital Admission: Y  -KM Side: Left  -KM Location: scalp  -KM Primary Wound Type: Skin tear  -KM    Row Name             Wound 12/13/22 1421 Left greater trochanter Incision    Wound - Properties Group Placement Date: 12/13/22  - Placement Time: 1421  -MC Present on Hospital Admission: N  -MC Side: Left  -MC Location: greater trochanter  -MC Primary Wound Type: Incision  -MC    Retired Wound - Properties Group Placement Date: 12/13/22  - Placement Time: 1421  -MC Present on Hospital Admission: N  -MC Side: Left  -MC Location: greater trochanter  -MC Primary Wound Type: Incision  -MC    Retired Wound - Properties Group Date first assessed: 12/13/22  - Time first assessed: 1421  -MC Present on Hospital Admission: N  -MC Side: Left  -MC Location: greater trochanter  -MC Primary Wound Type: Incision  -MC    Row Name 12/20/22 1128          Positioning and Restraints    Pre-Treatment Position in bed  -HR     Post Treatment Position bed  -HR     In Bed fowlers;call light within reach;encouraged to call for assist;exit alarm on;side rails up x2  -HR     Row Name 12/20/22 1128          Therapy Assessment/Plan (PT)    Rehab Potential (PT) other (see comments)  fair/guarded  -HR     Criteria for Skilled Interventions Met (PT) yes  -HR     Therapy Frequency (PT) 6 times/wk  -HR     Row Name 12/20/22 1128          Physical Therapy Goals    Bed Mobility Goal Selection (PT) bed mobility, PT goal 1  -HR      Transfer Goal Selection (PT) transfer, PT goal 1  -HR     Gait Training Goal Selection (PT) gait training, PT goal 1  -HR     Row Name 12/20/22 1128          Bed Mobility Goal 1 (PT)    Activity/Assistive Device (Bed Mobility Goal 1, PT) sit to supine;supine to sit  -HR     [place] Level/Cues Needed (Bed Mobility Goal 1, PT) contact guard required;minimum assist (75% or more patient effort)  -HR     Time Frame (Bed Mobility Goal 1, PT) long term goal (LTG)  -HR     Row Name 12/20/22 1128          Transfer Goal 1 (PT)    Activity/Assistive Device (Transfer Goal 1, PT) sit-to-stand/stand-to-sit  -HR     [place] Level/Cues Needed (Transfer Goal 1, PT) contact guard required  -HR     Time Frame (Transfer Goal 1, PT) long term goal (LTG)  -HR     Row Name 12/20/22 1128          Gait Training Goal 1 (PT)    Activity/Assistive Device (Gait Training Goal 1, PT) gait (walking locomotion);assistive device use;walker, rolling  -HR     [place] Level (Gait Training Goal 1, PT) contact guard required  -HR     Distance (Gait Training Goal 1, PT) 10'  -HR           User Key  (r) = Recorded By, (t) = Taken By, (c) = Cosigned By    Initials Name Provider Type     Charline Lama, RN Registered Nurse    Kayley Rivera RN Registered Nurse    HR Brooke Ramos PTA Physical Therapist Assistant                Physical Therapy Education     Title: PT OT SLP Therapies (Resolved)     Topic: Physical Therapy (Resolved)     Point: Mobility training (Resolved)     Learning Progress Summary           Patient Acceptance, TB,E, VU,DU by BD at 12/19/2022 0856    Acceptance, E, VU by EB at 12/19/2022 0342    Acceptance, TB,E, VU,DU by LINDSEY at 12/18/2022 0902    Acceptance, TB,E, VU,DU by BD at 12/17/2022 0903    Acceptance, E,D, VU,NR by RG at 12/16/2022 1448    Acceptance, E,D, VU,NR by ESTHER at 12/15/2022 1523                   Point: Home exercise program (Resolved)     Learning Progress Summary           Patient Acceptance,  TB,E, VU,DU by BD at 12/19/2022 0856    Acceptance, E, VU by EB at 12/19/2022 0342    Acceptance, TB,E, VU,DU by BD at 12/18/2022 0902    Acceptance, TB,E, VU,DU by BD at 12/17/2022 0903    Acceptance, E,D, VU,NR by RG at 12/16/2022 1448    Acceptance, E,D, VU,NR by RG at 12/15/2022 1523                   Point: Body mechanics (Resolved)     Learning Progress Summary           Patient Acceptance, TB,E, VU,DU by BD at 12/19/2022 0856    Acceptance, E, VU by EB at 12/19/2022 0342    Acceptance, TB,E, VU,DU by BD at 12/18/2022 0902    Acceptance, TB,E, VU,DU by BD at 12/17/2022 0903    Acceptance, E,D, VU,NR by RG at 12/16/2022 1448    Acceptance, E,D, VU,NR by RG at 12/15/2022 1523                   Point: Precautions (Resolved)     Learning Progress Summary           Patient Acceptance, TB,E, VU,DU by BD at 12/19/2022 0856    Acceptance, E, VU by EB at 12/19/2022 0342    Acceptance, TB,E, VU,DU by BD at 12/18/2022 0902    Acceptance, TB,E, VU,DU by BD at 12/17/2022 0903    Acceptance, E,D, VU,NR by RG at 12/16/2022 1448    Acceptance, E,D, VU,NR by RG at 12/15/2022 1523                               User Key     Initials Effective Dates Name Provider Type Discipline    RG 06/16/21 -  Lexx Michaels PTA Physical Therapist Assistant PT    BD 08/05/21 -  Linda Fontenot, RN Registered Nurse Nurse    EB 09/22/22 -  Saurav Siu, RN Registered Nurse Nurse              PT Recommendation and Plan  Anticipated Discharge Disposition (PT): skilled nursing facility, sub acute care setting, home with 24/7 care, home with assist, home with supervision, home with home health  Therapy Frequency (PT): 6 times/wk          Time Calculation:    PT Charges     Row Name 12/20/22 1133             Time Calculation    Start Time 0827  -HR      PT Received On 12/20/22  -HR         Time Calculation- PT    Total Timed Code Minutes- PT 23 minute(s)  -HR            User Key  (r) = Recorded By, (t) = Taken By, (c) = Cosigned By    Initials  Name Provider Type    HR Brooke Ramos PTA Physical Therapist Assistant              Therapy Charges for Today     Code Description Service Date Service Provider Modifiers Qty    53921111664 HC GAIT TRAINING EA 15 MIN 12/19/2022 Brooke Ramos, ARACELI GP, CQ 1    66345643635 HC PT THER PROC EA 15 MIN 12/19/2022 Brooke Ramos, ARACELI GP, CQ 1    32616645717 HC GAIT TRAINING EA 15 MIN 12/19/2022 Brooke Ramos, ARACELI GP, CQ 1    87226571331 HC GAIT TRAINING EA 15 MIN 12/20/2022 Brooke Ramos, ARACELI GP, CQ 1    89803031997 HC PT THER PROC EA 15 MIN 12/20/2022 Brooke Ramos, ARACELI GP, CQ 1          PT G-Codes  AM-PAC 6 Clicks Score (PT): 13    Brooke Ramos PTA  12/20/2022

## 2022-12-20 NOTE — DISCHARGE PLACEMENT REQUEST
Sarbjit Lockett (87 y.o. Male)     Date of Birth   1935    Social Security Number       Address   [address] [address]    Home Phone   [phone]    MRN   [id number]       Voodoo   Catholic    Marital Status                               Admission Date   12/12/22    Admission Type   Emergency    Admitting Provider   Rah Pastrana MD    Attending Provider   Eren Hardin DO    Department, Room/Bed   [address], [address]/       Discharge Date       Discharge Disposition   Home-Health Care [facility]    Discharge Destination                               Attending Provider: Eren Hardin DO    Allergies: Penicillins    Isolation: None   Infection: None   Code Status: CPR    Ht: 167.6 cm (66\")   Wt: 50.8 kg (112 lb)    Admission Cmt: None   Principal Problem: Closed fracture of left hip (HCC) [S72.002A]                 Active Insurance as of 12/12/2022     Primary Coverage     Payor Plan Insurance Group Employer/Plan Group    ANTHEM MEDICARE REPLACEMENT [employer] MEDICARE ADVANTAGE [id number]     Payor Plan Address Payor Plan Phone Number Payor Plan Fax Number Effective Dates    [address] [phone]  4/1/2022 - None Entered    [facility] [id number]       Subscriber Name Subscriber Birth Date Member ID       SARBJIT LOCKETT 1935 [id number]                 Emergency Contacts      (Rel.) Home Phone Work Phone Mobile Phone    Jesse Reyes (Relative) -- -- [phone]    jaskaran cuello (Significant Other) -- -- [phone]            Insurance Information                ANTHEM MEDICARE REPLACEMENT/ANTHEM MEDICARE ADVANTAGE Phone: [phone]    Subscriber: Sarbjit Lockett Subscriber#: [id number]    Group#: [id number] Precert#: [id number]             History & Physical      Jenni Chong APRN at 12/13/22 1347     Attestation signed by Rah Pastrana MD at 12/13/22 1517 (Updated)    I have reviewed this documentation and  agree.  I have also independently seen the patient.  I have also reviewed the EKG and the labs with ADELE Arteaga; I have discussed and formulated the assessment and plan with Jenni as well.  At bedside in preop room 7 with the patient was his nephew.  The patient has some substernal heartburn but he denies chest pain, trouble breathing, nausea, vomiting, and diarrhea.  The only extremity pain that he has is in the left hip.  He endorses good sensation in both feet when I perform my exam.  Heart and lungs are unremarkable, except for I do hear right-sided crackles.  Patient is currently low to moderate risk for hip repair.  I have informed Dr. Reyez.  When the patient comes back from the OR, we will treat him for potential right-sided pneumonia.  Upon review of his medical records and chest CT from 2022, the patient has an enlarging left upper lobe nodule, increasing from 15 mm in 2018 to 3.9 x 3.4 x 6.7 cm now.  I have contacted nurse practitioner Irma Hernandez with the lung nodule clinic and she will try to work the patient in soon as possible.  I anticipate that the patient will need postoperative PT; we have ordered PT, OT, and  for help with disposition.                    [facility] Medicine Services  History & Physical    Patient Identification:  Name:  Sarbjit Martin  Age:  87 y.o.  Sex:  male  :  1935  MRN:  [id number]   Visit Number:  [id number]  Admit Date: 2022   Primary Care Physician:  Goldie Steward APRN    Subjective     Chief complaint: Left hip pain s/p mechanical fall at home    History of presenting illness:      Sarbjit Martin is a 87 y.o. male with past medical history significant for BPH, arthritis, bile duct calculus s/p ERCP, COPD, chronic hypoxic respiratory failure, chronic HFpEF, GERD, moderate pulmonary hypertension, and advanced age.  Patient presents to [facility] emergency department on 2022 for further evaluation  of left hip pain s/p mechanical fall at home.  Patient states that he was cleaning his home and before he knew it, he was on the ground.  Denies any chest pain, dizziness, palpitations, nausea, visual disturbances, unilateral weakness, or headache associated with the fall.  Denies loss of consciousness.  Imaging obtained in the ED revealed left femoral fracture.  No abnormal CT head findings.  Patient is alert and oriented on my exam.  Has been n.p.o. in preparation for surgical fixation of the left hip.  Patient states that he currently lives at home alone.  Utilizes 2 L nasal cannula as needed on a regular basis.  Reports former tobacco abuse.  Denies any alcohol abuse.  Denies any left hip pain while lying still.  Does report some acid reflux symptoms.  States that he was unable to take his Prilosec this morning because of the upcoming surgery.  Currently denies any shortness of breath above baseline.  Reports chronic cough with productive sputum at times.  Denies any current chest pain.  EKG reviewed, without ischemic changes.  Have discussed with attending physician, Dr. Pastrana.  Patient may be taken to the OR for surgical fixation of the left hip; considered low risk at this time.  Discussed plan with patient; voiced agreement with no further questions or concerns at this time.      Upon arrival to the ED, vital signs were temperature 98.6, pulse 89, respirations 16, blood pressure 164/88 sitting, SPO2 saturation 90% on 2 L nasal cannula.  ABG with pH 7.344, PCO2 55.4, PO2 78.1, HCO3 30.1, O2 saturation 95.5% on 4 L nasal cannula.  CMP with glucose 106, CO2 21.2, BUN 26, BUN/creatinine ratio 29.9, albumin 3.49, otherwise unremarkable.  PT, INR within normal limits.  CBC with WBCs 14.97, MCHC 30.6, otherwise unremarkable.  Chest x-ray with minimal left basilar consolidation; COPD.  X-ray of the left femur with left femoral neck fracture, no distal femoral fracture seen.  X-ray of the left hip with acute left  femoral neck fracture, no dislocation.  CT of the cervical spine without contrast with no acute cervical spine fracture or malalignment; partially visible is a known malignant appearing left upper lobe mass.  CT of the head without contrast with left temporal scalp injury; no other acute findings.    Known Emergency Department medications received prior to my evaluation included 15 mg IV Toradol, LR at 125 mL an hour, 4 mg IV morphine, 4 mg IV Zofran. Room location at the time of my evaluation was Pre-op, Bed #7. Discussed with attending physician, Rah Harrison MD.      ---------------------------------------------------------------------------------------------------------------------   Review of Systems   Constitutional: Negative for chills and fever.   HENT: Negative for congestion and trouble swallowing.    Eyes: Negative for visual disturbance.   Respiratory: Positive for cough (chronic). Negative for choking, shortness of breath and wheezing.    Gastrointestinal: Negative for abdominal pain, constipation, diarrhea, nausea and vomiting.   Genitourinary: Negative for difficulty urinating, dysuria and flank pain.   Musculoskeletal: Positive for arthralgias (left hip) and gait problem. Negative for neck pain.   Neurological: Negative for dizziness, tremors, seizures, syncope, facial asymmetry, speech difficulty, light-headedness, numbness and headaches.   Psychiatric/Behavioral: Negative for confusion.      ---------------------------------------------------------------------------------------------------------------------   Past Medical History:   Diagnosis Date   • Angiodysplasia of the colon    • Arthritis    • Bacteremia due to Escherichia coli 06/28/2017    Same time as the common bile duct stone   • Benign prostatic hyperplasia without lower urinary tract symptoms 06/30/2021   • Bile duct calculus 06/24/2017   • 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HCC)     Centrilobular   •  Diverticulosis    • GERD (gastroesophageal reflux disease)    • Heart failure with preserved ejection fraction (HCC)    • Left upper lobe pulmonary nodule 2019    15 mm and spiculated in 2018; FNA was negative   • Moderate pulmonary hypertension (HCC)    • Myocardial infarction (HCC)    • Status post laparoscopic cholecystectomy 06/15/2017     Past Surgical History:   Procedure Laterality Date   • BRONCHOSCOPY N/A 2018    Procedure: BRONCHOSCOPY WITH ENDOBRONCHIAL ULTRASOUND;  Surgeon: Saravanan Méndez MD;  Location: [facility] OR;  Service: Pulmonary   • CHOLECYSTECTOMY N/A 2017    Procedure: CHOLECYSTECTOMY LAPAROSCOPIC;  Surgeon: Jose Velazquez MD;  Location: [facility];  Service:    • EYE SURGERY Bilateral 2013   • INGUINAL HERNIA REPAIR Left     left   • WA ERCP DX COLLECTION SPECIMEN BRUSHING/WASHING N/A 2017    Procedure: ENDOSCOPIC RETROGRADE CHOLANGIOPANCREATOGRAPHY;  Surgeon: Rod Francis MD;  Location: [facility];  Service: Gastroenterology   • WA ERCP DX COLLECTION SPECIMEN BRUSHING/WASHING N/A 10/26/2017    Procedure: ENDOSCOPIC RETROGRADE CHOLANGIOPANCREATOGRAPHY;  Surgeon: Rod Francis MD;  Location: [facility];  Service: Gastroenterology     Family History   Problem Relation Age of Onset   • No Known Problems Mother    • Diabetes Father    • Hypertension Father    • Stroke Father    • Diabetes Sister      Social History     Socioeconomic History   • Marital status:    Tobacco Use   • Smoking status: Former     Packs/day: 3.00     Years: 10.00     Pack years: 30.00     Types: Cigarettes     Quit date:      Years since quittin.9   • Smokeless tobacco: Former     Quit date: 1995   Vaping Use   • Vaping Use: Never used   Substance and Sexual Activity   • Alcohol use: No   • Drug use: No   • Sexual activity: Defer     ---------------------------------------------------------------------------------------------------------------------   Allergies:   Penicillins  ---------------------------------------------------------------------------------------------------------------------   Home medications:    Medications below are reported home medications pulling from within the system; at this time, these medications have not been reconciled unless otherwise specified and are in the verification process for further verifcation as current home medications.  Medications Prior to Admission   Medication Sig Dispense Refill Last Dose   • Budeson-Glycopyrrol-Formoterol (BREZTRI) 160-9-4.8 MCG/ACT aerosol inhaler Inhale 2 puffs 2 (Two) Times a Day.   12/12/2022   • finasteride (Proscar) 5 MG tablet Take 1 tablet by mouth Daily. 90 tablet 3 12/12/2022   • levothyroxine (SYNTHROID, LEVOTHROID) 50 MCG tablet Take 1 tablet by mouth Daily.   12/12/2022   • amitriptyline (ELAVIL) 25 MG tablet Take 25 mg by mouth Every Night.   12/11/2022   • LORazepam (ATIVAN) 0.5 MG tablet Take 1 tablet by mouth Daily As Needed for Anxiety.   Unknown   • tamsulosin (Flomax) 0.4 MG capsule 24 hr capsule Take 1 capsule by mouth Every Night. 90 capsule 3 12/11/2022   • VENTOLIN  (90 BASE) MCG/ACT inhaler Inhale 2 puffs Every 6 (Six) Hours As Needed for Wheezing or Shortness of Air.   Unknown       Hospital Scheduled Meds:  clindamycin, 900 mg, Intravenous, Once  sodium chloride, 10 mL, Intravenous, Q12H      lactated ringers, 100 mL/hr, Last Rate: 100 mL/hr (12/13/22 1156)        Current listed hospital scheduled medications may not yet reflect those currently placed in orders that are signed and held awaiting patient's arrival to floor.   ---------------------------------------------------------------------------------------------------------------------     Objective     Vital Signs:  Temp:  [98.2 °F (36.8 °C)-98.6 °F (37 °C)] 98.2 °F (36.8 °C)  Heart Rate:  [] 82  Resp:  [16-20] 20  BP: (116-164)/(62-95) 134/69      12/12/22  2688   Weight: 46.7 kg (103 lb)     Body mass index is  16.62 kg/m².  ---------------------------------------------------------------------------------------------------------------------       Physical Exam  Vitals and nursing note reviewed.   Constitutional:       General: He is awake. He is not in acute distress.     Appearance: He is ill-appearing (chronically). He is not diaphoretic.      Interventions: Nasal cannula in place.      Comments: Currently on 4L NC.   HENT:      Head: Normocephalic.      Mouth/Throat:      Mouth: Mucous membranes are moist.      Pharynx: Oropharynx is clear.   Eyes:      Extraocular Movements: Extraocular movements intact.   Cardiovascular:      Pulses: Normal pulses.   Pulmonary:      Effort: Accessory muscle usage present. No respiratory distress or retractions.      Breath sounds: Examination of the left-upper field reveals decreased breath sounds. Examination of the left-lower field reveals decreased breath sounds. Decreased breath sounds, wheezing (HOWIE, expiratory, mild) and rales (RLL) present.   Abdominal:      General: Abdomen is flat. Bowel sounds are normal. There is no distension.      Palpations: Abdomen is soft.      Tenderness: There is no abdominal tenderness. There is no guarding or rebound.   Musculoskeletal:      Cervical back: Neck supple. No rigidity.      Right lower leg: No edema.      Left lower leg: No edema.   Skin:     General: Skin is warm and dry.      Capillary Refill: Capillary refill takes 2 to 3 seconds.      Coloration: Skin is pale.   Neurological:      Mental Status: He is alert and oriented to person, place, and time.      Sensory: Sensation is intact.      Motor: No tremor.      Comments: LLE remaining neurovascularly intact.    Psychiatric:         Attention and Perception: Attention normal.         Mood and Affect: Mood normal.         Behavior: Behavior normal. Behavior is cooperative.        ---------------------------------------------------------------------------------------------------------------------  EKG: Pending cardiology interpretation. Per my review, appears normal sinus rhythm 90s with 1st degree AV block. No evidence of acute ischemia.        ---------------------------------------------------------------------------------------------------------------------   Results from last 7 days   Lab Units 12/13/22  0850 12/13/22  0022   WBC 10*3/mm3  --  14.97*   HEMOGLOBIN g/dL  --  14.9   HEMATOCRIT %  --  48.7   MCV fL  --  87.6   MCHC g/dL  --  30.6*   PLATELETS 10*3/mm3  --  194   INR  1.09 0.97     Results from last 7 days   Lab Units 12/13/22  0747   PH, ARTERIAL pH units 7.344*   PO2 ART mm Hg 78.1*   PCO2, ARTERIAL mm Hg 55.4*   HCO3 ART mmol/L 30.1*     Results from last 7 days   Lab Units 12/13/22  0022   SODIUM mmol/L 137   POTASSIUM mmol/L 5.1   CHLORIDE mmol/L 101   CO2 mmol/L 21.2*   BUN mg/dL 26*   CREATININE mg/dL 0.87   CALCIUM mg/dL 9.2   GLUCOSE mg/dL 106*   ALBUMIN g/dL 3.49*   BILIRUBIN mg/dL 0.5   ALK PHOS U/L 72   AST (SGOT) U/L 21   ALT (SGPT) U/L 15   Estimated Creatinine Clearance: 41 mL/min (by C-G formula based on SCr of 0.87 mg/dL).  No results found for: AMMONIA          Lab Results   Component Value Date    HGBA1C 6.00 (H) 10/31/2022     Lab Results   Component Value Date    TSH 0.664 11/01/2022     No results found for: PREGTESTUR, PREGSERUM, HCG, HCGQUANT  Pain Management Panel    There is no flowsheet data to display.           ---------------------------------------------------------------------------------------------------------------------  Imaging Results (Last 7 Days)     Procedure Component Value Units Date/Time    XR Chest 1 View [[id number]] Collected: 12/13/22 0832     Updated: 12/13/22 0839    Narrative:      XR CHEST 1 VW-     CLINICAL INDICATION: Pre Op; S72.002A-Fracture of unspecified part of  neck of left femur, initial encounter for closed  fracture        COMPARISON: 10/31/2022      TECHNIQUE: Single frontal view of the chest.     FINDINGS:      LUNGS: Minimal left basilar consolidation      HEART AND MEDIASTINUM: Heart and mediastinal contours are unremarkable        SKELETON: Bony and soft tissue structures are unremarkable.             Impression:      Minimal left basilar consolidation. COPD.     This report was finalized on 12/13/2022 8:37 AM by Dr. Barrie Guzman MD.       XR Femur 2 View Left [[id number]] Collected: 12/13/22 0810     Updated: 12/13/22 0812    Narrative:      EXAM:    XR Left Femur, 2 Views     EXAM DATE:    12/13/2022 7:28 AM     CLINICAL HISTORY:    femur fracture; S72.002A-Fracture of unspecified part of neck of left  femur, initial encounter for closed fracture     TECHNIQUE:    Frontal and lateral views of the left femur.     COMPARISON:    12/12/2022     FINDINGS:    Bones/joints:  Left femoral neck fracture again noted.  No distal  femoral fracture is seen.  No dislocation.    Soft tissues:  Unremarkable.       Impression:      1.  Left femoral neck fracture again noted.  2.  No distal femoral fracture is seen.     This report was finalized on 12/13/2022 8:10 AM by Dr. Barrie Guzman MD.       CT Head Without Contrast [[id number]] Collected: 12/13/22 0023     Updated: 12/13/22 0025    Narrative:      CT Head WO    HISTORY:   Head injury. Fell and hit left side of head.    TECHNIQUE:   Axial unenhanced head CT with multiplanar reformats. Radiation dose reduction techniques included automated exposure control or exposure modulation based on body size. Count of known CT and cardiac nuc med studies performed in previous 12 months: 1.     COMPARISON:   None.    FINDINGS:   Ventricular size and configuration are normal. No acute infarct or hemorrhage is identified. There are no masses. There is no skull fracture.  There is atrophy with chronic small vessel ischemic disease in the white matter. Atherosclerotic calcifications  are  present in the carotid siphons. There is mild injury to the left temporal scalp.      Impression:      Left temporal scalp injury. No other acute findings.    Signer Name: Rommel Umanzor MD   Signed: 12/13/2022 12:23 AM   Workstation Name: [facility]    Radiology [facility]    CT Cervical Spine Without Contrast [[id number]] Collected: 12/13/22 0019     Updated: 12/13/22 0022    Narrative:      CT Spine Cervical WO    INDICATION:   Neck trauma. Fall.    TECHNIQUE:   CT of the cervical spine without IV contrast. Coronal and sagittal reconstructions were obtained.  Radiation dose reduction techniques included automated exposure control or exposure modulation based on body size. Count of known CT and cardiac nuc med  studies performed in previous 12 months: 1.     COMPARISON:  None available.    FINDINGS:  Alignment is normal with no subluxation identified. There is no acute cervical spine fracture. Note is made of carotid atherosclerotic disease. There is mild multilevel degenerative disc disease. No large disc herniations are seen. There is no  significant central canal or foraminal stenosis at any level. COPD and granulomatous calcifications are noted in the lung apices. Partially visible is the known left upper lobe mass that was evaluated with chest CT on 11/1/2022. This remains concerning  for malignancy. PET CT is once again recommended if this has not been performed elsewhere.      Impression:        1. No acute cervical spine fracture or malalignment.  2. 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 Please see chest CT report of 11/1/2022.    Signer Name: Rommel Umanzor MD   Signed: 12/13/2022 12:19 AM   Workstation Name: [facility]    [facility]iology [facility]    XR Hip With or Without Pelvis 2 - 3 View Left [[id number]] Collected: 12/13/22 0015     Updated: 12/13/22 0017    Narrative:      CR Hip Uni Comp Min 2 Vws LT    INDICATION:   Hip pain after  fall.    COMPARISON:   None available.    FINDINGS:  AP pelvis and AP and frog-leg view(s) of the left hip.  There is an acute left femoral neck fracture. No hip dislocation on either side. There is degenerative disease in both hips. No additional fractures. Note is made of atherosclerotic disease.        Impression:      Acute left femoral neck fracture. No dislocation.    Signer Name: Rommel Umanzor MD   Signed: 12/13/2022 12:15 AM   Workstation Name: [facility]    [facility]            Last echocardiogram:  Results for orders placed during the hospital encounter of 10/31/22    Adult Transthoracic Echo Complete With Contrast if Necessary Per Protocol    Interpretation Summary  •  Normal left ventricular cavity size and wall thickness noted. All left ventricular wall segments contract normally.  •  Left ventricular ejection fraction appears to be 61 - 65%.  •  Estimated right ventricular systolic pressure from tricuspid regurgitation is mildly elevated (35-45 mmHg).  •  The aortic valve exhibits sclerosis. There is mild calcification of the aortic valve. The aortic valve appears trileaflet. Mild aortic valve regurgitation is present. No hemodynamically significant aortic valve stenosis is present.  •  The mitral valve is structurally normal with no significant stenosis present. Mild mitral valve regurgitation is present.  •  Mild tricuspid valve regurgitation is present. Estimated right ventricular systolic pressure from tricuspid regurgitation is mildly elevated (35-45 mmHg).  •  There is no evidence of pericardial effusion. .          I have personally reviewed the above radiology images and read the final radiology report on 12/13/22  ---------------------------------------------------------------------------------------------------------------------  Assessment / Plan     Active Hospital Problems    Diagnosis  POA   • **Closed fracture of left hip (HCC) [S72.002A]  Unknown     Added  automatically from request for surgery [id number]         ASSESSMENT/PLAN:  -Acute left femoral neck fracture POA s/p mechanical fall at home  -Radiological images reviewed.   -Orthopedic surgery consulted, input/assistance is much appreciated.   -PRN IV/PO pain medication available with holding parameters to prevent hypotension, oversedation, and/or respiratory depression.   -Neurovascular checks Q 4 hours.   -NPO pending surgical evaluation/intervention.  -Fall precautions.  -PT/OT consults for postop eval.     -Chronic HFpEF  -Appearing euvolemic on exam.   -Monitor strict I's and O's, daily weights.  -Echo from 11/1/2022 reviewed; normal EF (61-65%).   -Review home meds once reconciled.     -Acute on chronic hypoxic respiratory failure, POA  -COPD and chronic hypoxic respiratory failure on 2L PRN at baseline  -History of HOWIE pulmonary nodule  -Moderate pulmonary hypertension  -SIRS criteria on admission with HR>90 and WBCs>12.   -Chest xray revealed minimal left basilar consolidation.  -ABG obtained on 4L NC; PO2 78.1%, PCO2 55.4, and O2 saturation 95.5%.   -Continue to titrate supplemental O2 to maintain SpO2 saturations > 90%.  -Continuous pulse oximetry.   -Encourage incentive spirometer and turn/cough/deep breathing exercises.  -Will order Symbicort and DuoNebs.   -Obtain sputum culture.  -Obtain full respiratory panel.  -Follows with pulm in the outpatient setting for known pulmonary nodule/mass.   -Obtain UA.   -Repeat CBC in the a.m.  -Add peripheral blood cultures x2.   -Check lactate and C-RP.     -GERD  -Aspiration and reflux precautions.  -PPI.    -History of BPH  -Monitor urine output closely.  -Review home meds once reconciled.    -Essential hypertension  -BP appears well controlled.  -Plan to resume home antihypertensive regimen once reconciled per pharmacy with appropriate holding parameters to prevent hypotension and/or bradycardia.   -Closely monitor BP per hospital protocol, titrate medications as  necessary.    -Arthritis  -Supportive care.     -History of CBD calculus s/p ERCP  -History of E. Coli bacteremia      ----------  -DVT prophylaxis: SCDs; will likely be placed on Eliquis postop for VTE prophylaxis.   -Activity: Bedrest  -Expected length of stay:   INPATIENT status due to the need for care which can only be reasonably provided in an hospital setting such as aggressive/expedited ancillary services and/or consultation services, the necessity for IV medications, close physician monitoring and/or the possible need for procedures.  In such, I feel patient's risk for adverse outcomes and need for care warrant INPATIENT evaluation and predict the patient's care encounter to likely last beyond 2 midnights.  -Disposition pending clinical course.    High risk secondary to acute left hip fracture s/p mechanical fall at home.       CODE STATUS: FULL CODE      Jenni ChongDAVID   12/13/22  13:48 EST  Pager #[phone]  ---------------------------------------------------------------------------------------------------------------------       Electronically signed by Rah Pastrana MD at 12/13/22 0085       Vital Signs (last day)     Date/Time Temp Temp src Pulse Resp BP Patient Position SpO2    12/20/22 0610 98.3 (36.8) Oral 81 18 119/65 Lying --    12/20/22 0300 97.2 (36.2) Oral 85 18 125/60 Lying 98    12/20/22 0047 -- -- 90 20 -- -- 96    12/19/22 1921 -- -- 90 18 -- -- 95    12/19/22 1900 97.4 (36.3) Oral 86 20 127/63 Lying 96    12/19/22 1200 -- -- 96 20 134/59 Lying --    12/19/22 0719 -- -- 100 20 -- -- 94    12/19/22 0600 97.7 (36.5) Oral 84 20 133/64 Lying --    12/19/22 0300 97.1 (36.2) Oral 72 20 129/64 Lying 96          Intake & Output (last day)       12/19 0701  12/20 0700 12/20 0701  12/21 0700    P.O.      I.V. (mL/kg) 0 (0)     Total Intake(mL/kg) 0 (0)     Urine (mL/kg/hr) 1350 (1.1)     Total Output 1350     Net -1350                 Current Facility-Administered Medications    Medication Dose Route Frequency Provider Last Rate Last Admin   • acetaminophen (TYLENOL) tablet 1,000 mg  1,000 mg Oral TID Adrian Reyez MD   1,000 mg at 12/20/22 0817   • albuterol (PROVENTIL) nebulizer solution 0.083% 2.5 mg/3mL  2.5 mg Nebulization Q6H PRN Rah Pastrana MD       • amitriptyline (ELAVIL) tablet 25 mg  25 mg Oral Nightly Adrian Reyez MD   25 mg at 12/19/22 2053   • aspirin EC tablet 81 mg  81 mg Oral Daily Jenni Chong APRN   81 mg at 12/20/22 0817   • budesonide-formoterol (SYMBICORT) 160-4.5 MCG/ACT inhaler 2 puff  2 puff Inhalation BID - RT Adrian Reyez MD   2 puff at 12/20/22 0716   • finasteride (PROSCAR) tablet 5 mg  5 mg Oral Daily Adrian Reyez MD   5 mg at 12/20/22 0817   • guaiFENesin (MUCINEX) 12 hr tablet 1,200 mg  1,200 mg Oral Q12H Eren Hardin DO   1,200 mg at 12/20/22 0817   • HYDROcodone-acetaminophen (NORCO) 7.5-325 MG per tablet 1 tablet  1 tablet Oral Q6H PRN Rah Pastrana MD   1 tablet at 12/17/22 1611   • ipratropium-albuterol (DUO-NEB) nebulizer solution 3 mL  3 mL Nebulization 4x Daily - RT Adrian Reyez MD   3 mL at 12/20/22 0716   • levothyroxine (SYNTHROID, LEVOTHROID) tablet 50 mcg  50 mcg Oral Daily Adrian Reyez MD   50 mcg at 12/20/22 0817   • LORazepam (ATIVAN) tablet 0.5 mg  0.5 mg Oral Daily PRN Adrian Reyez MD   0.5 mg at 12/17/22 2018   • Magnesium Sulfate 2 gram Bolus, followed by 8 gram infusion (total Mg dose 10 grams)- Mg less than or equal to 1mg/dL  2 g Intravenous PRN Jenni Chong APRN        Or   • Magnesium Sulfate 2 gram / 50mL Infusion (GIVE X 3 BAGS TO EQUAL 6GM TOTAL DOSE) - Mg 1.1 - 1.5 mg/dl  2 g Intravenous PRN Chambers, [place], APRN        Or   • Magnesium Sulfate 4 gram infusion- Mg 1.6-1.9 mg/dL  4 g Intravenous PRN Chambers, Jenni, APRN       • megestrol (MEGACE) tablet 40 mg  40 mg Oral Daily Chambers, [place], APRN   40 mg at 12/20/22 0817   • multivitamin (THERAGRAN) tablet 1 tablet  1 tablet Oral Daily  Rah Pastrana MD   1 tablet at 12/20/22 0817   • nitroglycerin (NITROSTAT) SL tablet 0.4 mg  0.4 mg Sublingual Q5 Min PRN Adrian Reyez MD       • sodium chloride 0.9 % flush 10 mL  10 mL Intravenous Q12H Adrian Reyez MD   10 mL at 12/20/22 0816   • sodium chloride 0.9 % flush 10 mL  10 mL Intravenous PRN Adrian Reyez MD       • sodium chloride 0.9 % flush 3 mL  3 mL Intravenous Q12H Adrian Ryeez MD   3 mL at 12/20/22 0816   • sodium chloride 0.9 % flush 3-10 mL  3-10 mL Intravenous PRN Adrian Reyez MD       • sodium chloride 0.9 % infusion 40 mL  40 mL Intravenous PRN Adrian Reyez MD       • sodium chloride 0.9 % infusion 40 mL  40 mL Intravenous PRN Adrian Reyez MD       • tamsulosin (FLOMAX) 24 hr capsule 0.4 mg  0.4 mg Oral Nightly Adrian Reyez MD   0.4 mg at 12/19/22 2053        Operative/Procedure Notes (most recent note)      Adrian Reyez MD at 12/13/22 1421        Sarbjit Martin  12/13/2022      Operative Note:    Surgeon: KRYSTAL Reyez MD    Assistant: ZAINAB Mensah    Pre-Operative Diagnosis:   Left femoral neck fracture, displaced    Post-Operative Diagnosis:   Same    Procedure(s):    1.  Left hip hemiarthroplasty for fracture, CPT code 45145    Anesthesia: General     Estimated Blood Loss: 100 cc    Specimen Obtained:  None    Complication(s):  None apparent.     Implants: Biomet cemented Echo stem, 9 mm, standard offset, -3 neck, 52 Endo head    Operative Indication:     The patient is a 87-year-old male who fell from standing height sustained the above injury.  He is a community ambulator without any assistive devices.  He does use a cane on occasion however mainly does not use any assistive devices.  Due to the displaced nature of the fracture the patient went under the above operation once hearing the risk benefits alternatives and complications to the procedure stated above.    Operative Details:    The patient was met in the preoperative suite where the correct operative site was  marked. The patient was brought to the operating room where anesthesia was initiated. The patient was positioned appropriately with all bony prominences well padded. The patient was prepped and draped in the usual sterile fashion and prior to incision a timeout was observed to verify the correct operative site, procedure and antibiotics.    A longitudinal incision was taken on the lateral aspect of the proximal femur. This was taken down to the IT band. Hemastasis was achieved with electrocautery. The IT band was incised in line with the incision. The abductors were then released on the anterior two-thirds and tagged for later repair. Mariajose retractors were placed around the capsular and an H capsulatomy was performed. The leg was then externally rotated and the foot was placed on the side of the table.     An oscilating saw was utilized to create a femoral neck osteotomy and the bone was removed. Attention was brought back to the acetabulum where a cork-screw was used to remove the femoral head. The femoral head was then sized.    Attention was brought back to the proximal femur where sequential broaching was performed until an appropriate fit. A trail implant was then placed. No subluxation was noted with passive range of motion and a minimal shuck was noted. Limb lengths appeared equal. Trial implants were removed.     The canal was then prepped and irrigated. The stem was cemented into position and the final implant impacted into position. The final implants were reduced which showed a similar exam compared to the trial implants.     The wound was irrigated thoroughly with saline. The capsule was repaired along with the abductors and IT band.The remainder of the wound was closed in standard layer fashion and a soft dressing was applied. The patient was extubated, transferred to the hospital bed and into PACU in stable condition. The patient tolerated the procedure well without any complications.      Post-operative Plan:    Transfer to the floor   Dressing-maintain dressing for now, okay to reinforce as needed saturation  Weight Bear/Lifting Status-as tolerated, anterior hip precautions  DVT PPx-Lovenox 40 mg daily  Follow up-2 weeks in the office    Electronically Signed by: Adrian Reyez MD              Electronically signed by Adrian Reyez MD at 22 1531          Physician Progress Notes (most recent note)      Jenni Chong APRN at 22 1205     Attestation signed by Eren Hardin DO at 22 9469    I have reviewed this documentation and agree. Pt seen and examined with RIO Mckeon. Clinically stable. Insurance denied IPR. Pt not interested in short term SNF or swing bed and prefers to return home with family. Plan for possible D/C in the AM.                   Patient Identification:  Name:  Sarbjit Martin  Age:  87 y.o.  Sex:  male  :  1935  MRN:  [id number]  Visit Number:  [id number]  Primary Care Provider:  Goldie Steward APRN    Length of stay:  6    Subjective/Interval History/Consultants/Procedures     Chief complaint: Follow-up, acute left femoral fracture s/p left hemiarthroplasty, POD #6     Subjective/Interval History:  Sarbjit Martin is our 87 y.o. male patient admitted on 2022 for acute left hip fracture s/p mechanical fall at home. He has a pMH significant for BPH, arthritis, bile duct calculus s/p ERCP, COPD, chronic hypoxic respiratory failure, chronic HFpEF, GERD, moderate pulmonary hypertension, and advanced age. For further and complete admitting details, please see admission H&P.     He was admitted to the [facility] floor for further monitoring, evaluation, and treatment. Patient had presented to the ED with complaints of left hip pain s/p mechanical fall at home from standing height. Imaging obtained in the ED revealed acute left femoral fracture. He ultimately underwent left hip hemiarthroplasty on  by Dr. Reyez and tolerated procedure  well. PT/OT consults placed for postop eval. CM consulted for assistance with discharge placement. On initial exam, patient had increased supplemental O2 requirement from baseline. Usually uses 2L at home; was on 4L. Lungs were coarse/diminished bilaterally with RLL crackles. Chest imaging suggestive of LLL infiltrate. Patient was started on Levaquin for empiric treatment. 5 day course completed. Also receiving scheduled Symbicort and DuoNebs. Incentive spirometer use and turn/cough/deep breathing exercises encouraged. Continuous pulse oximetry in place. Respiratory panel negative. He has known left pulmonary nodule, has increased in size since previous exam. Attending discussed with lung nodule clinic Irma HERNANDEZ to set up appointment for follow-up in the outpatient setting. Patient is now on baseline O2 requirement of 2L and stable. Lung sounds have improved; clear bilaterally on today's exam. No wheezing, rhonchi or rales. Hgb decreased postoperatively, but is stable at this time and trending back up. He has not required any blood transfusions. IPR denied. Patient is medically stable for discharge, pending safe dispo. CM assisting with discharge plans. Other options to be discussed with patient.      Procedures/Imaging:  • Xray of the left pelvis x2  • Xray of the left femur  • Chest xray  • CT head without contrast  • CT cervical spine without contrast  • Left hip hemiarthroplasty on 12/13 by Dr. eRyez.      Consults:  • Orthopedic Surgery  • PT/OT  • Case management     On today's exam, patient was lying in bed with no s/s acute distress noted. Left hip incision CRISTAL at time of exam. Edges appearing well-approximated. No drainage today. There is a large, purple ecchymotic area noted just above incision site extending posteriorly. Patient denies associated pain. Hgb remaining stable postop. LLE remaining neurovascularly intact. Discussed with Dr. Reyez. Due to evidence of developing postsurgical  hematoma, Lovenox has been discontinued and patient placed on daily aspirin for VTE prophylaxis along with SCDs. Patient denies any left hip pain at this time. Also denies chest pain, shortness of breath above baseline, or any other acute complaints. Megace added on 12/18 per nutrition recommendations for appetite stimulation as patient refuses nutritional supplement drinks. BMI currently 17.75 kg/m2. He has been working with PT and doing well. Discussed plan with patient. Voiced agreement with no further questions or concerns at this time.      No acute events reported overnight. Room location at the time of evaluation was [facility]. Discussed with attending physician, Eren Peterson DO.   ----------------------------------------------------------------------------------------------------------------------  Current Hospital Meds:  acetaminophen, 1,000 mg, Oral, TID  amitriptyline, 25 mg, Oral, Nightly  aspirin, 81 mg, Oral, Daily  budesonide-formoterol, 2 puff, Inhalation, BID - RT  finasteride, 5 mg, Oral, Daily  guaiFENesin, 1,200 mg, Oral, Q12H  ipratropium-albuterol, 3 mL, Nebulization, 4x Daily - RT  levothyroxine, 50 mcg, Oral, Daily  megestrol, 40 mg, Oral, Daily  multivitamin, 1 tablet, Oral, Daily  sodium chloride, 10 mL, Intravenous, Q12H  sodium chloride, 3 mL, Intravenous, Q12H  tamsulosin, 0.4 mg, Oral, Nightly         ----------------------------------------------------------------------------------------------------------------------      Objective     Vital Signs:  Temp:  [97.1 °F (36.2 °C)-98 °F (36.7 °C)] 97.7 °F (36.5 °C)  Heart Rate:  [] 100  Resp:  [18-20] 20  BP: (110-133)/(55-64) 133/64      12/18/22  0500 12/19/22  0300 12/19/22  0524   Weight: 50.3 kg (110 lb 12.8 oz) 49.9 kg (110 lb) 49.9 kg (110 lb)     Body mass index is 17.75 kg/m².    Intake/Output Summary (Last 24 hours) at 12/19/2022 1205  Last data filed at 12/19/2022 0500  Gross per 24 hour   Intake 240 ml   Output  2050 ml   Net -1810 ml     No intake/output data recorded.  Diet: Regular/House Diet; Texture: Regular Texture (IDDSI 7); Fluid Consistency: Thin (IDDSI 0)  ----------------------------------------------------------------------------------------------------------------------    Physical Exam  Vitals and nursing note reviewed.   Constitutional:       General: He is awake. He is not in acute distress.     Appearance: He is not diaphoretic.      Interventions: Nasal cannula in place.      Comments: Remaining on 2L NC.   HENT:      Head: Normocephalic and atraumatic.      Mouth/Throat:      Mouth: Mucous membranes are moist.      Pharynx: Oropharynx is clear.   Eyes:      Extraocular Movements: Extraocular movements intact.      Pupils: Pupils are equal, round, and reactive to light.   Cardiovascular:      Rate and Rhythm: Normal rate and regular rhythm.      Pulses: Normal pulses.           Dorsalis pedis pulses are 2+ on the right side and 2+ on the left side.      Heart sounds: Normal heart sounds. No murmur heard.    No friction rub.   Pulmonary:      Effort: Pulmonary effort is normal. No accessory muscle usage, respiratory distress or retractions.      Breath sounds: No wheezing, rhonchi or rales.   Abdominal:      General: Abdomen is flat. Bowel sounds are normal. There is no distension.      Palpations: Abdomen is soft.      Tenderness: There is no abdominal tenderness. There is no guarding.   Musculoskeletal:      Cervical back: Neck supple. No rigidity.      Right lower leg: No edema.      Left lower leg: No edema.   Skin:     General: Skin is warm and dry.      Capillary Refill: Capillary refill takes 2 to 3 seconds.      Coloration: Skin is pale.          Neurological:      Mental Status: He is alert and oriented to person, place, and time.      Cranial Nerves: No facial asymmetry.      Sensory: Sensation is intact.      Motor: Weakness (generalized) present. No tremor.   Psychiatric:         Attention and  Perception: Attention normal.         Mood and Affect: Mood normal.         Speech: Speech normal.         Behavior: Behavior normal. Behavior is cooperative.         Thought Content: Thought content normal.       ----------------------------------------------------------------------------------------------------------------------      Results from last 7 days   Lab Units 12/19/22  0205 12/18/22  0306 12/17/22  0415 12/16/22  0145 12/14/22  0406 12/13/22  1911 12/13/22  0850 12/13/22  0022   CRP mg/dL  --   --   --  16.03*  --  4.11*  --   --    LACTATE mmol/L  --   --   --   --   --  1.5  --   --    WBC 10*3/mm3 8.88 7.66 7.60 8.30   < >  --   --  14.97*   HEMOGLOBIN g/dL 10.0* 9.9* 9.4* 9.3*   < >  --   --  14.9   HEMATOCRIT % 31.7* 33.0* 30.4* 30.2*   < >  --   --  48.7   MCV fL 85.2 88.0 88.4 87.3   < >  --   --  87.6   MCHC g/dL 31.5 30.0* 30.9* 30.8*   < >  --   --  30.6*   PLATELETS 10*3/mm3 154 131* 125* 103*   < >  --   --  194   INR   --   --   --   --   --   --  1.09 0.97    < > = values in this interval not displayed.     Results from last 7 days   Lab Units 12/13/22  0747   PH, ARTERIAL pH units 7.344*   PO2 ART mm Hg 78.1*   PCO2, ARTERIAL mm Hg 55.4*   HCO3 ART mmol/L 30.1*     Results from last 7 days   Lab Units 12/19/22  0205 12/18/22  0306 12/17/22  0415 12/16/22  0145 12/14/22  0406 12/13/22  0022   SODIUM mmol/L 131* 133* 136 137   < > 137   POTASSIUM mmol/L 4.4 4.5 4.0 4.1   < > 5.1   MAGNESIUM mg/dL  --  2.2 1.7 1.9   < >  --    CHLORIDE mmol/L 98 104 104 104   < > 101   CO2 mmol/L 24.9 21.4* 25.1 26.7   < > 21.2*   BUN mg/dL 17 17 15 19   < > 26*   CREATININE mg/dL 0.66* 0.56* 0.58* 0.52*   < > 0.87   CALCIUM mg/dL 8.2* 8.2* 7.9* 7.7*   < > 9.2   GLUCOSE mg/dL 105* 105* 106* 115*   < > 106*   ALBUMIN g/dL  --   --   --   --   --  3.49*   BILIRUBIN mg/dL  --   --   --   --   --  0.5   ALK PHOS U/L  --   --   --   --   --  72   AST (SGOT) U/L  --   --   --   --   --  21   ALT (SGPT) U/L  --    --   --   --   --  15    < > = values in this interval not displayed.   Estimated Creatinine Clearance: 57.8 mL/min (A) (by C-G formula based on SCr of 0.66 mg/dL (L)).  No results found for: AMMONIA      Blood Culture   Date Value Ref Range Status   12/13/2022 No growth at 4 days  Preliminary   12/13/2022 No growth at 4 days  Preliminary       ----------------------------------------------------------------------------------------------------------------------  Imaging Results (Last 24 Hours)     ** No results found for the last 24 hours. **        ----------------------------------------------------------------------------------------------------------------------   I have reviewed the above laboratory values for 12/19/22    Assessment/Plan     Active Hospital Problems    Diagnosis  POA   • **Closed fracture of left hip ([facility]) [S72.002A]  Unknown     Added automatically from request for surgery [id number]     • Closed fracture of left hip, initial encounter ([facility]) [S72.002A]  Yes       ASSESSMENT/PLAN:  -Acute left femoral neck fracture POA s/p mechanical fall at home  -S/P left hip hemiarthroplasty on 12/13. Currently POD #6.   -Radiological images reviewed on admission.   -Orthopedic surgery following; greatly appreciate their assistance.   -PRN Tylenol for mild pain.   -PRN [place] for moderate pain.   -Neurovascular checks Q 4 hours.   -Fall precautions.  -WBAT.   -Incision CRISTAL per ortho recommendations.   -Lovenox discontinued on 12/18 due to appearance of developing hematoma superior to left hip incision site. Placed on daily aspirin for VTE prophylaxis per ortho recommendations. Discussed with Dr. Reyez.   -PT/OT consults for postop eval.   -CM for discharge planning.   -IPR denied. CM to discuss other options (SNF versus swing bed versus home with HH/PT).   -Will need outpatient follow-up with ortho on discharge.      -Chronic HFpEF  -Appearing euvolemic on exam.   -Monitor strict I's and O's, daily  weights.  -Echo from 11/1/2022 reviewed; normal EF (61-65%).      -Acute on chronic hypoxic respiratory failure POA, resolved  -Moderate pulmonary hypertension  -Evidence of left basilar consolidation on imaging obtained in ED  -SIRS/sepsis criteria on admission with HR>90 and WBCs>12.   -Chest xray revealed minimal left basilar consolidation.  -ABG obtained on 4L NC; PO2 78.1%, PCO2 55.4, and O2 saturation 95.5%.   -Continue to titrate supplemental O2 to maintain SpO2 saturations > 90%.  -Remaining stable on 2L (baseline requirement).   -Continuous pulse oximetry.   -Encourage incentive spirometer and turn/cough/deep breathing exercises.  -Continue Symbicort and DuoNebs.   -Completed 5 days of Levaquin, has shown clinical improvement.   -Respiratory panel negative.   -Continue follow-up with pulmonology in the outpatient setting for known pulmonary nodule/mass.    -Repeat CBC in the a.m.     -GERD  -Aspiration and reflux precautions.  -PPI.     -History of BPH  -Monitor urine output closely.  -Continue Proscar and Flomax.   -So far no issues with urinary retention in the postop setting.      -Essential hypertension  -BP appears well controlled.   -Closely monitor BP per hospital protocol, titrate medications as necessary.     -Arthritis  -Supportive care.      -History of CBD calculus s/p ERCP  -History of E. Coli bacteremia        -----------  -DVT prophylaxis: Aspirin, SCDs.   -GI prophylaxis: PPI  -Activity: WBAT, fall precautions.   -Disposition plans/anticipated needs: IPR denied. Looking at other options such as swing bed versus SNF versus home with HH/PT. CM assisting with discharge plans. Patient is currently medically stable when dispo is confirmed.   -Diet: Regular  -Home supplemental O2: Utilizes 2L at home. Currently stable on baseline requirement.         The patient is high risk due to the following diagnoses/reasons:  Acute left femoral fracture s/p left hip hemiarthroplasty, advanced  age.         DAVID Wilks  22  12:05 EST  Pager #[phone]    Electronically signed by Eren Hardin DO at 22 [phone]          Consult Notes (most recent note)      Adán Ceballos APRN at 22 0815     Attestation signed by Adrian Reyez MD at 22 1231    I have reviewed this documentation and agree.    Adrian Reyez MD             Summary:Left hip femoral neck fracture, DOI: 22                 Consults    Patient Identification:  Name:  Sarbjit Martin  Age:  87 y.o.  Sex:  male  :  1935  MRN:  [id number]  Visit Number:  [id number]  Primary care provider:  Goldie Steward APRN    History of presenting illness:  This is an 87 year old male who presented to the emergency department after sustaining a fall from standing height last night, at home.  The patient notes that he got his feet caught up in grocery bags causing him to slip and fall, landing on his left side.  He notes having pain to the left side of his head and his left hip following the fall, with inability to bear weight to the left side due to pain.  He denies loss of consciousness.  In the emergency department, an X-ray revealed a left femoral neck fracture.  The patient notes that he uses a cane to assist with mobility but was not using it at the time of his fall.  He denies any prior injury or surgery to the left hip.  He denies the use of blood thinners.  He lives at home alone.  He notes a history of COPD and is on oxygen @ 2lpm/nc, intermittently.  He does not work outside the home.      ---------------------------------------------------------------------------------------------------------------------    Review of Systems   Constitutional: Positive for activity change and fatigue.   HENT: Negative.    Eyes: Negative.    Respiratory: Positive for cough, shortness of breath and wheezing.         History of COPD   Cardiovascular: Positive for chest pain.        History of COPD    Gastrointestinal: Negative.    Endocrine: Negative.    Genitourinary: Negative.    Musculoskeletal: Positive for arthralgias, gait problem and joint swelling.        Left hip pain and weakness following the fall.   Skin: Positive for wound.        Left temporal area following the fall.   Allergic/Immunologic: Negative.    Hematological: Negative.    Psychiatric/Behavioral: Negative.       ---------------------------------------------------------------------------------------------------------------------   Past History:  Family History   Problem Relation Age of Onset   • No Known Problems Mother    • Diabetes Father    • Hypertension Father    • Stroke Father    • Diabetes Sister      Past Medical History:   Diagnosis Date   • Angiodysplasia of the colon    • Arthritis    • Bacteremia due to Escherichia coli 06/28/2017    Same time as the common bile duct stone   • Benign prostatic hyperplasia without lower urinary tract symptoms 06/30/2021   • Bile duct calculus 06/24/2017   • 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HCC)     Centrilobular   • Diverticulosis    • GERD (gastroesophageal reflux disease)    • Heart failure with preserved ejection fraction (HCC)    • Left upper lobe pulmonary nodule 01/30/2019    15 mm and spiculated in 2018; FNA was negative   • Moderate pulmonary hypertension (HCC)    • Myocardial infarction (HCC)    • Status post laparoscopic cholecystectomy 06/15/2017     Past Surgical History:   Procedure Laterality Date   • BRONCHOSCOPY N/A 8/23/2018    Procedure: BRONCHOSCOPY WITH ENDOBRONCHIAL ULTRASOUND;  Surgeon: Saravanan Méndez MD;  Location: [facility];  Service: Pulmonary   • CHOLECYSTECTOMY N/A 6/16/2017    Procedure: CHOLECYSTECTOMY LAPAROSCOPIC;  Surgeon: Jose Velazquez MD;  Location: [facility];  Service:    • EYE SURGERY Bilateral 2013   • INGUINAL HERNIA REPAIR Left 2013    left   • RI ERCP DX COLLECTION SPECIMEN BRUSHING/WASHING N/A 6/26/2017    Procedure:  ENDOSCOPIC RETROGRADE CHOLANGIOPANCREATOGRAPHY;  Surgeon: Rod Francis MD;  Location: [facility];  Service: Gastroenterology   • MO ERCP DX COLLECTION SPECIMEN BRUSHING/WASHING N/A 10/26/2017    Procedure: ENDOSCOPIC RETROGRADE CHOLANGIOPANCREATOGRAPHY;  Surgeon: Rod Francis MD;  Location: [facility];  Service: Gastroenterology     Social History     Socioeconomic History   • Marital status:    Tobacco Use   • Smoking status: Former     Packs/day: 3.00     Years: 10.00     Pack years: 30.00     Types: Cigarettes     Quit date:      Years since quittin.9   • Smokeless tobacco: Former     Quit date: 1995   Substance and Sexual Activity   • Alcohol use: No   • Drug use: No   • Sexual activity: Defer     ---------------------------------------------------------------------------------------------------------------------   Allergies:  Penicillins  ---------------------------------------------------------------------------------------------------------------------   Prior to Admission Medications     Prescriptions Last Dose Informant Patient Reported? Taking?    amitriptyline (ELAVIL) 25 MG tablet  Pharmacy Yes No    Take 1 tablet by mouth At Night As Needed for Sleep.    aspirin 325 MG tablet  Self Yes No    Take 325 mg by mouth Daily.    atorvastatin (LIPITOR) 40 MG tablet   No No    Take 1 tablet by mouth Every Night.    budesonide-formoterol (SYMBICORT) 160-4.5 MCG/ACT inhaler   No No    Inhale 2 puffs by mouth 2 (Two) Times a Day.    dicyclomine (BENTYL) 10 MG capsule  Pharmacy Yes No    Take 1 capsule by mouth 4 (Four) Times a Day Before Meals & at Bedtime.    finasteride (Proscar) 5 MG tablet  Pharmacy No No    Take 1 tablet by mouth Daily.    ipratropium-albuterol (DUO-NEB) 0.5-2.5 mg/3 ml nebulizer  Pharmacy Yes No    Take 3 mL by nebulization 4 (Four) Times a Day As Needed for Wheezing.    levothyroxine (SYNTHROID, LEVOTHROID) 50 MCG tablet  Pharmacy Yes No    Take 1 tablet by  mouth Daily.    LORazepam (ATIVAN) 0.5 MG tablet  Pharmacy Yes No    Take 1 tablet by mouth Daily As Needed for Anxiety.    meloxicam (MOBIC) 15 MG tablet  Pharmacy Yes No    Take 1 tablet by mouth Daily.    omeprazole (priLOSEC) 40 MG capsule  Pharmacy Yes No    Take 1 capsule by mouth Daily.    pantoprazole (PROTONIX) 40 MG EC tablet   Yes No    Take 40 mg by mouth Daily.    tamsulosin (Flomax) 0.4 MG capsule 24 hr capsule  Pharmacy No No    Take 1 capsule by mouth Every Night.    VENTOLIN  (90 BASE) MCG/ACT inhaler  Self, Pharmacy Yes No    Inhale 2 puffs Every 6 (Six) Hours As Needed for Wheezing or Shortness of Air.        [facility] Meds:  clindamycin, 900 mg, Intravenous, Once      lactated ringers, 100 mL/hr, Last Rate: 100 mL/hr (12/13/22 0730)      ---------------------------------------------------------------------------------------------------------------------   Vital Signs:  Temp:  [98.6 °F (37 °C)] 98.6 °F (37 °C)  Heart Rate:  [] 97  Resp:  [16] 16  BP: (121-164)/(62-88) 127/76      12/12/22  2064   Weight: 46.7 kg (103 lb)     Body mass index is 16.62 kg/m².  ---------------------------------------------------------------------------------------------------------------------     Physical exam:  Constitutional:  Thin, elderly male.  No acute distress.      HENT:  Head: Normocephalic.  Mouth:  Moist mucous membranes.    Eyes:  Conjunctivae are normal.  Pupils are equal, round, and reactive to light.  No scleral icterus.  Neck:  Neck supple.  Trachea midline.  Cardiovascular:  Normal rate and regular rhythm.  Pulmonary/Chest:  No respiratory distress and good air movement.  02 @ 2lpm/nc in use.  Abdominal:  Soft.  No distension and no tenderness.   Musculoskeletal:  Upon examination of the left hip, there is no edema, no erythema, no ecchymosis, and no obvious deformity.  Tenderness notes to the lateral hip.  Femoral pulse 3/4.  (+) DF/PF at the ankle.  Capillary refill is brisk and light  touch sensation is intact, distally.  (+) DP pulse.    Neurological:  Alert and oriented to person, place, and time.     Skin:  Skin is warm and dry.  No rash noted.  No ecchymosis.  Small abrasion noted to the left temporal area.   Psychiatric:  Normal mood and affect.  Behavior is normal.  Judgment and thought content normal.   Peripheral vascular:  No pitting edema and strong pulses on all 4 extremities.     ---------------------------------------------------------------------------------------------------------------------   .  ---------------------------------------------------------------------------------------------------------------------   Results from last 7 days   Lab Units 12/13/22  0022   WBC 10*3/mm3 14.97*   HEMOGLOBIN g/dL 14.9   HEMATOCRIT % 48.7   MCV fL 87.6   MCHC g/dL 30.6*   PLATELETS 10*3/mm3 194   INR  0.97     Results from last 7 days   Lab Units 12/13/22  0747   PH, ARTERIAL pH units 7.344*   PO2 ART mm Hg 78.1*   PCO2, ARTERIAL mm Hg 55.4*   HCO3 ART mmol/L 30.1*     Results from last 7 days   Lab Units 12/13/22  0022   SODIUM mmol/L 137   POTASSIUM mmol/L 5.1   CHLORIDE mmol/L 101   CO2 mmol/L 21.2*   BUN mg/dL 26*   CREATININE mg/dL 0.87   CALCIUM mg/dL 9.2   GLUCOSE mg/dL 106*   ALBUMIN g/dL 3.49*   BILIRUBIN mg/dL 0.5   ALK PHOS U/L 72   AST (SGOT) U/L 21   ALT (SGPT) U/L 15   Estimated Creatinine Clearance: 41 mL/min (by C-G formula based on SCr of 0.87 mg/dL).  No results found for: AMMONIA          Lab Results   Component Value Date    HGBA1C 6.00 (H) 10/31/2022     Lab Results   Component Value Date    TSH 0.664 11/01/2022     No results found for: PREGTESTUR, PREGSERUM, HCG, HCGQUANT  Pain Management Panel    There is no flowsheet data to display.       No results found for: BLOODCX  No results found for: URINECX  No results found for: WOUNDCX  No results found for: STOOLCX       ---------------------------------------------------------------------------------------------------------------------   Imaging Results (Last 7 Days)     Procedure Component Value Units Date/Time    XR Femur 2 View Left [[id number]] Collected: 12/13/22 0810     Updated: 12/13/22 0812    Narrative:      EXAM:    XR Left Femur, 2 Views     EXAM DATE:    12/13/2022 7:28 AM     CLINICAL HISTORY:    femur fracture; S72.002A-Fracture of unspecified part of neck of left  femur, initial encounter for closed fracture     TECHNIQUE:    Frontal and lateral views of the left femur.     COMPARISON:    12/12/2022     FINDINGS:    Bones/joints:  Left femoral neck fracture again noted.  No distal  femoral fracture is seen.  No dislocation.    Soft tissues:  Unremarkable.       Impression:      1.  Left femoral neck fracture again noted.  2.  No distal femoral fracture is seen.     This report was finalized on 12/13/2022 8:10 AM by Dr. Barrie Guzman MD.       CT Head Without Contrast [[id number]] Collected: 12/13/22 0023     Updated: 12/13/22 0025    Narrative:      CT Head WO    HISTORY:   Head injury. Fell and hit left side of head.    TECHNIQUE:   Axial unenhanced head CT with multiplanar reformats. Radiation dose reduction techniques included automated exposure control or exposure modulation based on body size. Count of known CT and cardiac nuc med studies performed in previous 12 months: 1.     COMPARISON:   None.    FINDINGS:   Ventricular size and configuration are normal. No acute infarct or hemorrhage is identified. There are no masses. There is no skull fracture.  There is atrophy with chronic small vessel ischemic disease in the white matter. Atherosclerotic calcifications  are present in the carotid siphons. There is mild injury to the left temporal scalp.      Impression:      Left temporal scalp injury. No other acute findings.    Signer Name: Rommel Umanzor MD   Signed: 12/13/2022 12:23 AM   Workstation Name:  [facility]    Radiology [facility]    CT Cervical Spine Without Contrast [[id number]] Collected: 12/13/22 0019     Updated: 12/13/22 0022    Narrative:      CT Spine Cervical WO    INDICATION:   Neck trauma. Fall.    TECHNIQUE:   CT of the cervical spine without IV contrast. Coronal and sagittal reconstructions were obtained.  Radiation dose reduction techniques included automated exposure control or exposure modulation based on body size. Count of known CT and cardiac nuc med  studies performed in previous 12 months: 1.     COMPARISON:  None available.    FINDINGS:  Alignment is normal with no subluxation identified. There is no acute cervical spine fracture. Note is made of carotid atherosclerotic disease. There is mild multilevel degenerative disc disease. No large disc herniations are seen. There is no  significant central canal or foraminal stenosis at any level. COPD and granulomatous calcifications are noted in the lung apices. Partially visible is the known left upper lobe mass that was evaluated with chest CT on 11/1/2022. This remains concerning  for malignancy. PET CT is once again recommended if this has not been performed elsewhere.      Impression:        1. No acute cervical spine fracture or malalignment.  2. 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 Please see chest CT report of 11/1/2022.    Signer Name: Rommel Umanzor MD   Signed: 12/13/2022 12:19 AM   Workstation Name: [facility]    [facility]iology [facility]    XR Hip With or Without Pelvis 2 - 3 View Left [[id number]] Collected: 12/13/22 0015     Updated: 12/13/22 0017    Narrative:      CR Hip Uni Comp Min 2 Vws LT    INDICATION:   Hip pain after fall.    COMPARISON:   None available.    FINDINGS:  AP pelvis and AP and frog-leg view(s) of the left hip.  There is an acute left femoral neck fracture. No hip dislocation on either side. There is degenerative disease in both hips. No additional  fractures. Note is made of atherosclerotic disease.        Impression:      Acute left femoral neck fracture. No dislocation.    Signer Name: Rommel Umanzor MD   Signed: 2022 12:15 AM   Workstation Name: [email or web address]    [facility]        ----------------------------------------------------------------------------------------------------------------------  Assessment and Plan:   1. Left femoral neck fracture, DOI: 22.    This patient has been discussed with Dr. Reyez.  He has reviewed the patient's history and imaging and recommends a left hip hemiarthroplasty.  The patient will remain NPO for surgery today.  He will continue non weight bearing to the left lower extremity until time of surgery.  We will obtain pre operative labs, UA, and chest X-ray.  Further treatment planning per Dr. Reyez.    Thank you for the consult.    DAVID Gomez  22  08:15 EST    Electronically signed by Adrian Reyez MD at 22 1231          Nutrition Notes (most recent note)      Blanche Sin RD at 22 1105        Recommend evaluate for appetite stimulant r/t refuses nutritional supplements with continued inadequate intake     Electronically signed by Blanche Sin RD at 22 1107          Physical Therapy Notes (most recent note)      Brooke Ramos PTA at 22 1456  Version 1 of 1         Acute Care - Physical Therapy Treatment Note  AARON Jean     Patient Name: Sarbjit Martin  : 1935  MRN: [id number]  Today's Date: 2022   Onset of Illness/Injury or Date of Surgery: 22 (admission date)  Visit Dx:     ICD-10-CM ICD-9-CM   1. Closed fracture of left hip, initial encounter ([facility])  S72.002A 820.8     Patient Active Problem List   Diagnosis   • Status post laparoscopic cholecystectomy   • COPD (chronic obstructive pulmonary disease) (HCC)   • Abnormal CT of the chest   • Pulmonary nodule   • Benign prostatic hyperplasia without lower urinary tract  symptoms   • NSTEMI (non-ST elevated myocardial infarction) (HCC)   • COPD exacerbation (HCC)   • Underweight   • Severe malnutrition (HCC)   • Aortic aneurysm (HCC)   • Closed fracture of left hip (HCC)   • Closed fracture of left hip, initial encounter ([facility])     Past Medical History:   Diagnosis Date   • Angiodysplasia of the colon    • Arthritis    • Bacteremia due to Escherichia coli 06/28/2017    Same time as the common bile duct stone   • Benign prostatic hyperplasia without lower urinary tract symptoms 06/30/2021   • Bile duct calculus 06/24/2017   • 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HCC)     Centrilobular   • Diverticulosis    • GERD (gastroesophageal reflux disease)    • Heart failure with preserved ejection fraction ([facility])    • Left upper lobe pulmonary nodule 01/30/2019    15 mm and spiculated in 2018; FNA was negative   • Moderate pulmonary hypertension (HCC)    • Myocardial infarction (HCC)    • Status post laparoscopic cholecystectomy 06/15/2017     Past Surgical History:   Procedure Laterality Date   • BRONCHOSCOPY N/A 8/23/2018    Procedure: BRONCHOSCOPY WITH ENDOBRONCHIAL ULTRASOUND;  Surgeon: Saravanan Méndez MD;  Location: [facility];  Service: Pulmonary   • CHOLECYSTECTOMY N/A 6/16/2017    Procedure: CHOLECYSTECTOMY LAPAROSCOPIC;  Surgeon: Jose Velazquez MD;  Location: [facility] OR;  Service:    • EYE SURGERY Bilateral 2013   • INGUINAL HERNIA REPAIR Left 2013    left   • [place] ERCP DX COLLECTION SPECIMEN BRUSHING/WASHING N/A 6/26/2017    Procedure: ENDOSCOPIC RETROGRADE CHOLANGIOPANCREATOGRAPHY;  Surgeon: Rod Francis MD;  Location: [facility] OR;  Service: Gastroenterology   • OK ERCP DX COLLECTION SPECIMEN BRUSHING/WASHING N/A 10/26/2017    Procedure: ENDOSCOPIC RETROGRADE CHOLANGIOPANCREATOGRAPHY;  Surgeon: Rod Francis MD;  Location: [facility] OR;  Service: Gastroenterology     PT Assessment (last 12 hours)     PT Evaluation and Treatment     Row Name 12/19/22  1454 12/19/22 1116       Physical Therapy Time and Intention    Subjective Information no complaints  -HR no complaints  -HR    Document Type therapy note (daily note)  -HR therapy note (daily note)  -HR    Mode of Treatment individual therapy;physical therapy  -HR individual therapy;physical therapy  -HR    Patient Effort adequate  -HR adequate  -HR    Comment PM session, Pt walked 60' with RW CGA, required 2 standing rest breaks secondary to SOB on 2 L.  -HR Pt and RN in agreement for PT. Pt walked 18' with RW CGA/Kay from bed to chair. Bed mobility min A, transfers min A. Pt completed sitting exercises once up in chair.  -HR    Row Name 12/19/22 1454 12/19/22 1116       General Information    Existing Precautions/Restrictions hip, anterior;weight bearing;fall  -HR hip, anterior;weight bearing;fall  -HR    Row Name 12/19/22 1454 12/19/22 1116       Pain Scale: FACES Pre/Post-Treatment    Pain: FACES Scale, Pretreatment 4-->hurts little more  -HR 4-->hurts little more  -HR    Posttreatment Pain Rating 4-->hurts little more  -HR 4-->hurts little more  -HR    Row Name 12/19/22 1454 12/19/22 1116       Cognition    Affect/Mental Status (Cognition) WFL  -HR WFL  -HR    Follows Commands (Cognition) WFL  -HR WFL  -HR    Personal Safety Interventions fall prevention program maintained;gait belt;nonskid shoes/slippers when out of bed;supervised activity  -HR fall prevention program maintained;gait belt;nonskid shoes/slippers when out of bed;supervised activity  -HR    Row Name 12/19/22 1454 12/19/22 1116       Mobility    Left Lower Extremity (Weight-bearing Status) weight-bearing as tolerated (WBAT)  -HR weight-bearing as tolerated (WBAT)  -HR    Row Name 12/19/22 1454 12/19/22 1116       Bed Mobility    Supine-Sit-Supine [place] (Bed Mobility) verbal cues;nonverbal cues (demo/gesture);minimum assist (75% patient effort)  -HR verbal cues;nonverbal cues (demo/gesture);minimum assist (75% patient effort)  -HR     Assistive Device (Bed Mobility) bed rails;draw sheet  -HR bed rails;draw sheet  -HR    Row Name 12/19/22 1454 12/19/22 1116       Sit-Stand Transfer    Sit-Stand [place] (Transfers) minimum assist (75% patient effort)  -HR minimum assist (75% patient effort)  -HR    Assistive Device (Sit-Stand Transfers) walker, front-wheeled  -HR walker, front-wheeled  -HR    Row Name 12/19/22 1454 12/19/22 1116       Stand-Sit Transfer    Stand-Sit [place] (Transfers) minimum assist (75% patient effort)  -HR minimum assist (75% patient effort)  -HR    Assistive Device (Stand-Sit Transfers) walker, front-wheeled  -HR walker, front-wheeled  -HR    Row Name 12/19/22 1454 12/19/22 1116       Stand Pivot/Stand Step Transfer    Stand Pivot/Stand Step [place] (Transfers) minimum assist (75% patient effort);verbal cues;nonverbal cues (demo/gesture)  -HR minimum assist (75% patient effort);verbal cues;nonverbal cues (demo/gesture)  -HR    Assistive Device (Stand Pivot Stand Step Transfer) walker, front-wheeled  -HR walker, front-wheeled  -HR    Row Name 12/19/22 1454 12/19/22 1116       Gait/Stairs (Locomotion)    [place] Level (Gait) minimum assist (75% patient effort);verbal cues;nonverbal cues (demo/gesture);contact guard  -HR minimum assist (75% patient effort);verbal cues;nonverbal cues (demo/gesture);contact guard  -HR    Assistive Device (Gait) walker, front-wheeled  -HR walker, front-wheeled  -HR    Distance in Feet (Gait) 60'  -HR 18'  -HR    Pattern (Gait) step-to  -HR step-to  -HR    Deviations/Abnormal Patterns (Gait) antalgic;mickey decreased;gait speed decreased;stride length decreased  -HR antalgic;mickey decreased;gait speed decreased;stride length decreased  -HR    Bilateral Gait Deviations forward flexed posture  -HR forward flexed posture  -HR    Row Name 12/19/22 1116          Motor Skills    Therapeutic Exercise other (see comments)  Sitting: AP, LAQ, March, pillow sq.  -HR     Row Name 12/19/22  1454 12/19/22 1116       Breath Sounds    Breath Sounds All Fields  -HR All Fields  -HR    All Lung Fields Breath Sounds diminished  -HR diminished  -HR    Row Name             Wound 12/12/22 2333 Left upper scalp Skin Tear    Wound - Properties Group Placement Date: 12/12/22  -KM Placement Time: 2333  -KM Present on Hospital Admission: Y  -KM Side: Left  -KM Orientation: upper  -KM Location: scalp  -KM Primary Wound Type: Skin tear  -KM    Retired Wound - Properties Group Placement Date: 12/12/22  -KM Placement Time: 2333  -KM Present on Hospital Admission: Y  -KM Side: Left  -KM Orientation: upper  -KM Location: scalp  -KM Primary Wound Type: Skin tear  -KM    Retired Wound - Properties Group Date first assessed: 12/12/22  -KM Time first assessed: 2333  -KM Present on Hospital Admission: Y  -KM Side: Left  -KM Location: scalp  -KM Primary Wound Type: Skin tear  -KM    Row Name             Wound 12/13/22 1421 Left greater trochanter Incision    Wound - Properties Group Placement Date: 12/13/22  - Placement Time: 1421  -MC Present on Hospital Admission: N  -MC Side: Left  -MC Location: greater trochanter  -MC Primary Wound Type: Incision  -MC    Retired Wound - Properties Group Placement Date: 12/13/22  - Placement Time: 1421  -MC Present on Hospital Admission: N  -MC Side: Left  -MC Location: greater trochanter  -MC Primary Wound Type: Incision  -MC    Retired Wound - Properties Group Date first assessed: 12/13/22  - Time first assessed: 1421  -MC Present on Hospital Admission: N  -MC Side: Left  -MC Location: greater trochanter  -MC Primary Wound Type: Incision  -MC    Row Name 12/19/22 1454 12/19/22 1116       Positioning and Restraints    Pre-Treatment Position in bed  -HR in bed  -HR    Post Treatment Position bed  -HR chair  -HR    In Bed fowlers;call light within reach;encouraged to call for assist;exit alarm on;with family/caregiver;side rails up x2  -HR --    In Chair -- sitting;encouraged to call for  assist;call light within reach  -HR    Row Name 12/19/22 1454 12/19/22 1116       Therapy Assessment/Plan (PT)    Rehab Potential (PT) other (see comments)  fair/guarded  -HR other (see comments)  fair/guarded  -HR    Criteria for Skilled Interventions Met (PT) yes  -HR yes  -HR    Therapy Frequency (PT) 6 times/wk  -HR 6 times/wk  -HR    Row Name 12/19/22 1454 12/19/22 1116       Physical Therapy Goals    Bed Mobility Goal Selection (PT) bed mobility, PT goal 1  -HR bed mobility, PT goal 1  -HR    Transfer Goal Selection (PT) transfer, PT goal 1  -HR transfer, PT goal 1  -HR    Gait Training Goal Selection (PT) gait training, PT goal 1  -HR gait training, PT goal 1  -HR    Row Name 12/19/22 1454 12/19/22 1116       Bed Mobility Goal 1 (PT)    Activity/Assistive Device (Bed Mobility Goal 1, PT) sit to supine;supine to sit  -HR sit to supine;supine to sit  -HR    [place] Level/Cues Needed (Bed Mobility Goal 1, PT) contact guard required;minimum assist (75% or more patient effort)  -HR contact guard required;minimum assist (75% or more patient effort)  -HR    Time Frame (Bed Mobility Goal 1, PT) long term goal (LTG)  -HR long term goal (LTG)  -HR    Row Name 12/19/22 1454 12/19/22 1116       Transfer Goal 1 (PT)    Activity/Assistive Device (Transfer Goal 1, PT) sit-to-stand/stand-to-sit  -HR sit-to-stand/stand-to-sit  -HR    [place] Level/Cues Needed (Transfer Goal 1, PT) contact guard required  -HR contact guard required  -HR    Time Frame (Transfer Goal 1, PT) long term goal (LTG)  -HR long term goal (LTG)  -HR    Row Name 12/19/22 1454 12/19/22 1116       Gait Training Goal 1 (PT)    Activity/Assistive Device (Gait Training Goal 1, PT) gait (walking locomotion);assistive device use;walker, rolling  -HR gait (walking locomotion);assistive device use;walker, rolling  -HR    [place] Level (Gait Training Goal 1, PT) contact guard required  -HR contact guard required  -HR    Distance (Gait Training Goal  1, PT) 10'  -HR 10'  -HR          User Key  (r) = Recorded By, (t) = Taken By, (c) = Cosigned By    Initials Name Provider Type    Charline Forbes, RN Registered Nurse    Kayley Rivera RN Registered Nurse    Brooke Hicks PTA Physical Therapist Assistant                Physical Therapy Education     Title: PT OT SLP Therapies (Done)     Topic: Physical Therapy (Done)     Point: Mobility training (Done)     Learning Progress Summary           Patient Acceptance, TB,E, VU,DU by BD at 12/19/2022 0856    Acceptance, E, VU by EB at 12/19/2022 0342    Acceptance, TB,E, VU,DU by BD at 12/18/2022 0902    Acceptance, TB,E, VU,DU by BD at 12/17/2022 0903    Acceptance, E,D, VU,NR by RG at 12/16/2022 1448    Acceptance, E,D, VU,NR by RG at 12/15/2022 1523                   Point: Home exercise program (Done)     Learning Progress Summary           Patient Acceptance, TB,E, VU,DU by BD at 12/19/2022 0856    Acceptance, E, VU by EB at 12/19/2022 0342    Acceptance, TB,E, VU,DU by BD at 12/18/2022 0902    Acceptance, TB,E, VU,DU by BD at 12/17/2022 0903    Acceptance, E,D, VU,NR by RG at 12/16/2022 1448    Acceptance, E,D, VU,NR by RG at 12/15/2022 1523                   Point: Body mechanics (Done)     Learning Progress Summary           Patient Acceptance, TB,E, VU,DU by BD at 12/19/2022 0856    Acceptance, E, VU by EB at 12/19/2022 0342    Acceptance, TB,E, VU,DU by BD at 12/18/2022 0902    Acceptance, TB,E, VU,DU by BD at 12/17/2022 0903    Acceptance, E,D, VU,NR by RG at 12/16/2022 1448    Acceptance, E,D, VU,NR by RG at 12/15/2022 1523                   Point: Precautions (Done)     Learning Progress Summary           Patient Acceptance, TB,E, VU,DU by BD at 12/19/2022 0856    Acceptance, E, VU by EB at 12/19/2022 0342    Acceptance, TB,E, VU,DU by BD at 12/18/2022 0902    Acceptance, TB,E, VU,DU by BD at 12/17/2022 0903    Acceptance, E,D, VU,NR by RG at 12/16/2022 1448    Acceptance, E,D, VU,NR by RG at  12/15/2022 1523                               User Key     Initials Effective Dates Name Provider Type Discipline    RG 21 -  Lexx Michaels PTA Physical Therapist Assistant PT    BD 21 -  Linda Fontenot, RN Registered Nurse Nurse    EB 22 -  Saurav Siu, RN Registered Nurse Nurse              PT Recommendation and Plan  Anticipated Discharge Disposition (PT): skilled nursing facility, sub acute care setting, home with 24/7 care, home with assist, home with supervision, home with home health  Therapy Frequency (PT): 6 times/wk          Time Calculation:    PT Charges     Row Name 22 1456 22 1122          Time Calculation    Start Time 1436  -HR 0956  -HR     PT Received On 22  -HR 22  -HR        Time Calculation- PT    Total Timed Code Minutes- PT 10 minute(s)  -HR 23 minute(s)  -HR           User Key  (r) = Recorded By, (t) = Taken By, (c) = Cosigned By    Initials Name Provider Type    HR Brooke Ramos PTA Physical Therapist Assistant              Therapy Charges for Today     Code Description Service Date Service Provider Modifiers Qty    49747687643 HC GAIT TRAINING EA 15 MIN 2022 Brooke Ramos PTA GP, CQ 1    71873793849 HC PT THER PROC EA 15 MIN 2022 Brooke Ramos PTA GP, CQ 1    43758183119 HC GAIT TRAINING EA 15 MIN 2022 Brooke Ramos PTA GP, CQ 1          PT G-Codes  AM-PAC 6 Clicks Score (PT): 7    Brooke Ramos PTA  2022      Electronically signed by Brooke Ramos PTA at 22 1457          Occupational Therapy Notes (most recent note)      Adrian Mensah, OT at 22 1042          Acute Care - Occupational Therapy Initial Evaluation   Ted     Patient Name: Sarbjit Martin  : 1935  MRN: [id number]  Today's Date: 2022             Admit Date: 2022       ICD-10-CM ICD-9-CM   1. Closed fracture of left hip, initial encounter ([facility])  S72.002A 820.8     Patient Active Problem List   Diagnosis   • Status post  laparoscopic cholecystectomy   • COPD (chronic obstructive pulmonary disease) ([facility])   • Abnormal CT of the chest   • Pulmonary nodule   • Benign prostatic hyperplasia without lower urinary tract symptoms   • NSTEMI (non-ST elevated myocardial infarction) ([facility])   • COPD exacerbation ([facility])   • Underweight   • Severe malnutrition (HCC)   • Aortic aneurysm (HCC)   • Closed fracture of left hip (HCC)   • Closed fracture of left hip, initial encounter ([facility])     Past Medical History:   Diagnosis Date   • Angiodysplasia of the colon    • Arthritis    • Bacteremia due to Escherichia coli 06/28/2017    Same time as the common bile duct stone   • Benign prostatic hyperplasia without lower urinary tract symptoms 06/30/2021   • Bile duct calculus 06/24/2017   • 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[facility])     Centrilobular   • Diverticulosis    • GERD (gastroesophageal reflux disease)    • Heart failure with preserved ejection fraction ([facility])    • Left upper lobe pulmonary nodule 01/30/2019    15 mm and spiculated in 2018; FNA was negative   • Moderate pulmonary hypertension ([facility])    • Myocardial infarction ([facility])    • Status post laparoscopic cholecystectomy 06/15/2017     Past Surgical History:   Procedure Laterality Date   • BRONCHOSCOPY N/A 8/23/2018    Procedure: BRONCHOSCOPY WITH ENDOBRONCHIAL ULTRASOUND;  Surgeon: Saravanan Méndez MD;  Location: [facility] OR;  Service: Pulmonary   • CHOLECYSTECTOMY N/A 6/16/2017    Procedure: CHOLECYSTECTOMY LAPAROSCOPIC;  Surgeon: Jose Velazquez MD;  Location: [facility] OR;  Service:    • EYE SURGERY Bilateral 2013   • INGUINAL HERNIA REPAIR Left 2013    left   • SD ERCP DX COLLECTION SPECIMEN BRUSHING/WASHING N/A 6/26/2017    Procedure: ENDOSCOPIC RETROGRADE CHOLANGIOPANCREATOGRAPHY;  Surgeon: Rod Francis MD;  Location: [facility] OR;  Service: Gastroenterology   • SD ERCP DX COLLECTION SPECIMEN BRUSHING/WASHING N/A 10/26/2017    Procedure: ENDOSCOPIC RETROGRADE  CHOLANGIOPANCREATOGRAPHY;  Surgeon: Rod Francis MD;  [facility];  Service: Gastroenterology         OT ASSESSMENT FLOWSHEET (last 12 hours)     OT Evaluation and Treatment     Row Name 12/14/22 1032                   OT Time and Intention    Document Type evaluation  -KR        Mode of Treatment occupational therapy  -KR        Patient Effort good  -KR           General Information    General Observations of Patient alert/cooperative  -KR        Prior Level of Function independent:  -KR           Living Environment    Current Living Arrangements home  -KR        People in Home alone  -KR        Primary Care Provided by self  -KR           Cognition    Affect/Mental Status (Cognition) WFL  -KR        Orientation Status (Cognition) oriented to;person;place;situation  -KR        Follows Commands (Cognition) WFL  -KR           Range of Motion Comprehensive    Comment, General Range of Motion BUE WFL  -KR           Strength Comprehensive (MMT)    Comment, General Manual Muscle Testing (MMT) Assessment BUE 4/5  -KR           Activities of Daily Living    BADL Assessment/Intervention bathing;upper body dressing;lower body dressing;grooming;feeding;toileting  -KR           Bathing Assessment/Intervention    [place] Level (Bathing) bathing skills;maximum assist (25% patient effort)  -KR           Upper Body Dressing Assessment/Training    [place] Level (Upper Body Dressing) upper body dressing skills;moderate assist (50% patient effort)  -KR           Lower Body Dressing Assessment/Training    [place] Level (Lower Body Dressing) lower body dressing skills;dependent (less than 25% patient effort)  -KR           Grooming Assessment/Training    [place] Level (Grooming) grooming skills;minimum assist (75% patient effort)  -KR           Self-Feeding Assessment/Training    [place] Level (Feeding) feeding skills;set up  -KR           Toileting Assessment/Training    [place] Level  (Toileting) toileting skills;dependent (less than 25% patient effort)  -KR           Wound 12/12/22 2333 Left upper scalp Skin Tear    Wound - Properties Group Placement Date: 12/12/22  -KM Placement Time: 2333  -KM Present on Hospital Admission: Y  -KM Side: Left  -KM Orientation: upper  -KM Location: scalp  -KM Primary Wound Type: Skin tear  -KM    Retired Wound - Properties Group Placement Date: 12/12/22  -KM Placement Time: 2333  -KM Present on Hospital Admission: Y  -KM Side: Left  -KM Orientation: upper  -KM Location: scalp  -KM Primary Wound Type: Skin tear  -KM    Retired Wound - Properties Group Date first assessed: 12/12/22  -KM Time first assessed: 2333  -KM Present on Hospital Admission: Y  -KM Side: Left  -KM Location: scalp  -KM Primary Wound Type: Skin tear  -KM       Wound 12/13/22 1421 Left greater trochanter Incision    Wound - Properties Group Placement Date: 12/13/22  -MC Placement Time: 1421  -MC Present on Hospital Admission: N  -MC Side: Left  -MC Location: greater trochanter  -MC Primary Wound Type: Incision  -MC    Retired Wound - Properties Group Placement Date: 12/13/22  -MC Placement Time: 1421  -MC Present on Hospital Admission: N  -MC Side: Left  -MC Location: greater trochanter  -MC Primary Wound Type: Incision  -MC    Retired Wound - Properties Group Date first assessed: 12/13/22  -MC Time first assessed: 1421  -MC Present on Hospital Admission: N  -MC Side: Left  -MC Location: greater trochanter  -MC Primary Wound Type: Incision  -MC       Plan of Care Review    Plan of Care Reviewed With patient  -KR           Therapy Assessment/Plan (OT)    Rehab Potential (OT) good, to achieve stated therapy goals  -KR        Criteria for Skilled Therapeutic Interventions Met (OT) yes;skilled treatment is necessary  -KR        Planned Therapy Interventions (OT) activity tolerance training;adaptive equipment training;BADL retraining;strengthening exercise  -KR           Therapy Plan  Review/Discharge Plan (OT)    Anticipated Discharge Disposition (OT) inpatient rehabilitation facility  -KR           OT Goals    Strength Goal Selection (OT) strength, OT goal 1  -KR           Strength Goal 1 (OT)    Strength Goal 1 (OT) BUE increase x 1 to enhance performance of self care/mobility  -KR        Time Frame (Strength Goal 1, OT) by discharge  -KR           Patient Education Goal (OT)    Activity (Patient Education Goal, OT) AE/DME training to enhance safety/independence in home environment  -KR        [place]/Cues/Accuracy (Memory Goal 2, OT) verbalizes understanding  -KR        Time Frame (Patient Education Goal, OT) by discharge  -KR              User Key  (r) = Recorded By, (t) = Taken By, (c) = Cosigned By    Initials Name Effective Dates    Adrian George OT 06/16/21 -      Daina, Charline Cox RN 06/16/21 -     Kayley Rivera RN 06/06/22 -                        OT Recommendation and Plan  Planned Therapy Interventions (OT): activity tolerance training, adaptive equipment training, BADL retraining, strengthening exercise  Plan of Care Review  Plan of Care Reviewed With: patient  Plan of Care Reviewed With: patient        Time Calculation:     Therapy Charges for Today     Code Description Service Date Service Provider Modifiers Qty    22650685399 HC OT EVAL HIGH COMPLEXITY 4 12/14/2022 Adrian Mensah OT GO 1               Adrian Mensah OT  12/14/2022    Electronically signed by Adrian Mensah OT at 12/14/22 1044       Discharge Summary    No notes of this type exist for this encounter.         Discharge Order (From admission, onward)     Start     Ordered    12/20/22 0747  Discharge patient  Once        Expected Discharge Date: 12/20/22    Expected Discharge Time: Morning    [place] [facility] [place]    Physician of Record for Attribution - Please select from Treatment Team: NATHAN WATERS [[id number]]    Review needed by CMO to determine Physician of Record: No        Question Answer Comment   Physician of Record for Attribution - Please select from Treatment Team NATHAN WATERS    Review needed by CMO to determine Physician of Record No        22 0748              [address] [address]  Phone:  [phone]  Fax:  [phone]        Patient:     Sarbjit Martin MRN:  [id number]   [address] [id number] :  1935  SSN:    Phone: [phone] Sex:  M      INSURANCE PAYOR PLAN GROUP # SUBSCRIBER ID   Primary:    [employer] [id number]   Admitting Diagnosis: Closed fracture of left hip, initial encounter ([facility]) [S72.002A]  Order Date:  Dec 20, 2022        Discharge Dressing / Wound Instructions       (Order ID: [id number])     Diagnosis:         Priority:  Routine Expected Date:   Expiration Date:        Interval:   Count:    Comments: Instructions: May leave left hip incision CRISTAL per ortho recommendations. Will follow-up with ortho for staple removal. Home health to monitor incision.        Specimen Type:   Specimen Source:   Specimen Taken Date:   Specimen Taken Time:                  Authorizing Provider:Geovanni Tapia APRN  Authorizing Provider's NPI: [id number]  Order Entered By: Geovanni Tapia APRN 2022  7:48 AM     Electronically signed by: Geovanni Tapia APRN 2022  7:48 AM     [address] [address]  Phone:  [phone]  Fax:  [phone] Date: Dec 20, 2022      Ambulatory Referral to Home Health     Patient:  Sarbjit Martin MRN:  [id number]   [address] [facility] [address] :  1935  SSN:    Phone: [phone] Sex:  M      INSURANCE PAYOR PLAN GROUP # SUBSCRIBER ID   Primary:    [employer] [id number]      Referring Provider Information:  GEOVANNI TAPIA Phone: [phone] Fax: [phone]       Referral Information:   # Visits:   999 Referral Type: Home Health [42]   Urgency:  Routine Referral Reason: Specialty Services Required   Start Date: Dec 20, 2022 End Date:  To be determined by Insurer   Diagnosis: Closed fracture of left hip, initial encounter ([facility]) (S72.002A [ICD-10-CM] 820.8 [ICD-9-CM])      Refer to Dept:   Refer to Provider:   Refer to Provider Phone:   Refer to Facility:       Face to Face Visit Date: 12/20/2022  Follow-up provider for Plan of Care? I treated the patient in an acute care facility and will not continue treatment after discharge.  Follow-up provider: MARILU TEMPLE [[id number]]  Reason/Clinical Findings: S/P left hip hemiarthroplasty, debility, pneumonia (completed treatment), chronic hypoxic respiratory failure  Describe mobility limitations that make leaving home difficult: S/P left hip hemiarthroplasty, debility, pneumonia (completed treatment), chronic hypoxic respiratory failure  Nursing/Therapeutic Services Requested: Skilled Nursing  Nursing/Therapeutic Services Requested: Physical Therapy  Nursing/Therapeutic Services Requested: Occupational Therapy  Skilled nursing orders: O2 instruction  Skilled nursing orders: Wound care dressing/changes  Skilled nursing orders: Neurovascular assessments  PT orders: Therapeutic exercise  PT orders: Gait Training  PT orders: Transfer training  PT orders: Strengthening  PT orders: Home safety assessment  Weight Bearing Status: As Tolerated  Occupational orders: Activities of daily living  Occupational orders: Energy conservation  Occupational orders: Strengthening  Occupational orders: Fine motor  Occupational orders: Home safety assessment  Frequency: 1 Week 1     This document serves as a request of services and does not constitute Insurance authorization or approval of services.  To determine eligibility, please contact the members Insurance carrier to verify and review coverage.     If you have medical questions regarding this request for services. Please contact  [address] at [phone] during normal business hours.        Authorizing Provider:Jenni Chong APRN  Authorizing Provider's NPI: [id number]  Order Entered By: Jenni Chong APRN 12/20/2022  7:48 AM     Electronically signed by: Jenni Chong APRN 12/20/2022  7:48 AM

## 2022-12-21 NOTE — PAYOR COMM NOTE
CONTACT:   Tanya Self RN  Phone: [phone]  Fax: [phone]    DISCHARGE NOTIFICATION    DISCHARGE TO:home with home health    REF # [id number]  DC DATE: 12/20/22    IF YOU NEED ANYTHING FURTHER PLEASE LET ME KNOW.   THANKS     Sarbjit Lockett (87 y.o. Male)     Date of Birth   1935    Social Security Number       Address   [address] [place] [address]    Home Phone   [phone]    MRN   [id number]       Gnosticist   Moravian    Marital Status                               Admission Date   12/12/22    Admission Type   Emergency    Admitting Provider   Rah Pastrana MD    Attending Provider       Department, Room/Bed   [address], [facility]/       Discharge Date   12/20/2022    Discharge Disposition   Home-Health Care [facility]    Discharge Destination                               Attending Provider: (none)   Allergies: Penicillins    Isolation: None   Infection: None   Code Status: Prior    Ht: 167.6 cm (66\")   Wt: 50.8 kg (112 lb)    Admission Cmt: None   Principal Problem: Closed fracture of left hip (HCC) [S72.002A]                 Active Insurance as of 12/12/2022     Primary Coverage     Payor Plan Insurance Group Employer/Plan Group    ANTHEM MEDICARE REPLACEMENT ANTHEM MEDICARE ADVANTAGE [id number]     Payor Plan Address Payor Plan Phone Number Payor Plan Fax Number Effective Dates    [address] [phone]  4/1/2022 - None Entered    [facility] [id number]       Subscriber Name Subscriber Birth Date Member ID       SARBJIT LOCKETT 1935 [id number]                 Emergency Contacts      (Rel.) Home Phone Work Phone Mobile Phone    Jesse Reyes (Relative) -- -- [phone]    jaskaran cuello (Significant Other) -- -- [phone]               Discharge Summary      Jenni Chong APRN at 12/20/22 0748     Attestation signed by Eren Hardin DO at 12/20/22 1700    I have reviewed this documentation and agree. Pt seen  and examined on the day of discharge.                       [facility] Medicine Services  DISCHARGE SUMMARY    Date of Admission: 12/12/2022    Date of Discharge:  12/20/2022    PCP: Goldie Steward APRN    Discharging Provider: DAVID Wilks  Attending Physician on day of DC: Eren Peterson DO    Admission Diagnosis:   Please see admission H&P    Discharge Diagnosis:   · Acute left femoral neck fracture s/p left hip hemiarthroplasty on 12/13/22  · Chronic HFpEF  · Acute on chronic hypoxic respiratory failure, resolved  · Left basilar community-acquired pneumonia  · Moderate pulmonary hypertension  · Known left lung nodule, recently increased in size  · GERD  · BPH  · Essential hypertension  · Arthritis  · History of CBD calculus s/p ERCP   · History of E. Coli bacteremia     Procedures Performed:  • Xray of the left pelvis x2  • Xray of the left femur  • Chest xray  • CT head without contrast  • CT cervical spine without contrast  • Left hip hemiarthroplasty on 12/13 by Dr. Reyez.      Consults:   Consults     Date and Time Order Name Status Description    12/13/2022  3:20 PM Inpatient Orthopedic Surgery Consult            HPI     History of Present Illness:  Sarbjit Martin is our 87 y.o. male patient admitted on 12/13/2022 for acute left hip fracture s/p mechanical fall at home. He has a pMH significant for BPH, arthritis, bile duct calculus s/p ERCP, COPD, chronic hypoxic respiratory failure, chronic HFpEF, GERD, moderate pulmonary hypertension, and advanced age. For further and complete admitting details, please see admission H&P.       Hospital Course     Hospital Course  Sarbjit Martin was admitted as outlined in above HPI.     [facility] monitoring, evaluation, and treatment. Patient had presented to the ED with complaints of left hip pain s/p mechanical fall at home from standing height. Imaging obtained in the ED revealed acute  left femoral fracture. He ultimately underwent left hip hemiarthroplasty on 12/13 by Dr. Reyez and tolerated procedure well. PT/OT consults placed for postop eval. CM consulted for assistance with discharge placement. On initial exam, patient had increased supplemental O2 requirement from baseline. Usually uses 2L at home; was on 4L. Lungs were coarse/diminished bilaterally with RLL crackles. Chest imaging suggestive of LLL infiltrate. Patient was started on Levaquin for empiric treatment. 5 day course completed. Also received scheduled Symbicort and DuoNebs. Incentive spirometer use and turn/cough/deep breathing exercises encouraged. Continuous pulse oximetry remained in place. Respiratory panel negative. He has known left pulmonary nodule, has increased in size since previous exam. Discussed with lung nodule clinic Irma HERNANDEZ to set up appointment for follow-up in the outpatient setting. Patient is now on baseline O2 requirement of 2L and stable. Lung sounds have improved; still with some mild inspiratory right basilar crackles. Good air movement. No wheezing, rhonchi or rales. Hgb decreased postoperatively, but is stable at this time and trending back up. He has not required any blood transfusions. IPR denied due to insurance. CM discussed options with patient and he states that he would like to return home with HH and PT. He is adamant that he does not want to go to SNF, although it was recommended. On today's exam, patient has continued to improve. He is ambulating with PT. Will need walker, bedside commode on discharge. DME orders placed. He remains stable on 2L; may resume home supplemental O2. On Breztri and PRN albuterol at home which his preferred therapy. May continue this. Encouraged follow-up with pulmonology after discharge and appointment is to be scheduled. Patient currently denies any chest pain, shortness of breath above baseline, abdominal pain, n/v/d, constipation, and left hip pain.  States that pain is mild and he has been tolerating with use of PRN Tylenol. Will not need script for Norco. Left hip incision is CRISTAL with staples intact and edges appearing well-approximated. Seems to be healing well. No drainage or bleeding. There was evidence of development of hematoma just superior to left hip incision extending posteriorly. Discussed with ortho, Dr. Reyez. Recommended to take patient off Lovenox and place on daily aspirin for VTE prophylaxis. Discussed with patient; he voiced understanding and agreement. Ecchymosis appears to have improved. It is not painful to touch and no longer firm. LLE remaining neurovascularly intact. He will follow-up with ortho in 1 week for staple removal. Have also ordered home health for monitoring of incision site and for assistance with SpO2 monitoring/management of chronic hypoxic respiratory failure. Have ordered home PT/OT for continued gait training, strengthening, and home safety assessment. He will follow-up with PCP Goldie HERNANDEZ in 1 week for post hospital discharge evaluation. Recommend repeat CBC to monitor Hgb and BMP to monitor sodium levels as it has remained borderline low throughout hospitalization. Sodium today is 134 and has improved. Hemoglobin is 10.2 and continues to improve. Patient is underweight with chronic poor intake due to decreased appetite. Nutrition consult was placed; recommended appetite stimulant as patient refuses nutrition replacement shakes with meals. Have added daily Megace. Discussed all changes, monitoring, and follow-up plans with patient. Encouraged use of DME. Patient states that his significant other will also provide assistance at home. He had no further questions or concerns at this time. Discussed with attending physician, Eren Peterson DO.        Discussed with AM RN Ed on day of discharge.     Oxygen saturation on the day of discharge was 98% on 2L NC.      Pertinent Laboratory and Radiology  Results     Pertinent Test Results:          Results from last 7 days   Lab Units 12/20/22  0203 12/19/22  0205 12/18/22  0306 12/17/22  0415 12/16/22  0145 12/14/22  0406   WBC 10*3/mm3 8.59 8.88 7.66 7.60 8.30 13.55*   HEMOGLOBIN g/dL 10.2* 10.0* 9.9* 9.4* 9.3* 12.6*   HEMATOCRIT % 31.9* 31.7* 33.0* 30.4* 30.2* 40.7   PLATELETS 10*3/mm3 174 154 131* 125* 103* 155   MCV fL 84.8 85.2 88.0 88.4 87.3 87.9   SODIUM mmol/L 134* 131* 133* 136 137 134*   POTASSIUM mmol/L 4.2 4.4 4.5 4.0 4.1 4.4   CHLORIDE mmol/L 102 98 104 104 104 99   CO2 mmol/L 24.6 24.9 21.4* 25.1 26.7 25.6   BUN mg/dL 20 17 17 15 19 31*   CREATININE mg/dL 0.53* 0.66* 0.56* 0.58* 0.52* 0.85   GLUCOSE mg/dL 120* 105* 105* 106* 115* 94   CALCIUM mg/dL 8.4* 8.2* 8.2* 7.9* 7.7* 8.2*          Results from last 7 days   Lab Units 12/20/22  0203 12/19/22  0205 12/18/22 0306 12/17/22 0415 12/16/22  0145 12/14/22  0406 12/13/22  1911   CRP mg/dL  --   --   --   --  16.03*  --  4.11*   LACTATE mmol/L  --   --   --   --   --   --  1.5   WBC 10*3/mm3 8.59 8.88 7.66 7.60 8.30 13.55*  --      Results from last 7 days   Lab Units 12/13/22  0850   PROTIME Seconds 14.4   INR  1.09   APTT seconds 31.6           Invalid input(s): TG, LDLCALC, LDLREALC      Brief Urine Lab Results  (Last result in the past 365 days)      Color   Clarity   Blood   Leuk Est   Nitrite   Protein   CREAT   Urine HCG        12/13/22 1203 Yellow   Clear   Negative   Negative   Negative   Negative                         Results for orders placed during the hospital encounter of 10/31/22    Adult Transthoracic Echo Complete With Contrast if Necessary Per Protocol    Interpretation Summary  •  Normal left ventricular cavity size and wall thickness noted. All left ventricular wall segments contract normally.  •  Left ventricular ejection fraction appears to be 61 - 65%.  •  Estimated right ventricular systolic pressure from tricuspid regurgitation is mildly elevated (35-45 mmHg).  •  The aortic  valve exhibits sclerosis. There is mild calcification of the aortic valve. The aortic valve appears trileaflet. Mild aortic valve regurgitation is present. No hemodynamically significant aortic valve stenosis is present.  •  The mitral valve is structurally normal with no significant stenosis present. Mild mitral valve regurgitation is present.  •  Mild tricuspid valve regurgitation is present. Estimated right ventricular systolic pressure from tricuspid regurgitation is mildly elevated (35-45 mmHg).  •  There is no evidence of pericardial effusion. .            ----------------------------------------------------------------------------------------------------------------------  XR Femur 2 View Left    Result Date: 12/13/2022  1.  Left femoral neck fracture again noted. 2.  No distal femoral fracture is seen.  This report was finalized on 12/13/2022 8:10 AM by Dr. Barrie Guzman MD.      CT Head Without Contrast    Result Date: 12/13/2022  Left temporal scalp injury. No other acute findings. Signer Name: Rommel Umanzor MD  Signed: 12/13/2022 12:23 AM  Workstation Name: [facility]    CT Cervical Spine Without Contrast    Result Date: 12/13/2022  1. No acute cervical spine fracture or malalignment. 2. 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 Please see chest CT report of 11/1/2022. Signer Name: Rommel Umanzor MD  Signed: 12/13/2022 12:19 AM  Workstation Name: [facility]  Radiology [facility]    XR Chest 1 View    Result Date: 12/13/2022  Minimal left basilar consolidation. COPD.  This report was finalized on 12/13/2022 8:37 AM by Dr. Barrie Guzman MD.      XR Hip With or Without Pelvis 2 - 3 View Left    Result Date: 12/13/2022  Acute left femoral neck fracture. No dislocation. Signer Name: Rommel Umanzor MD  Signed: 12/13/2022 12:15 AM  Workstation Name: [email or web address]  [facility]        Microbiology Results  (last 10 days)     Procedure Component Value - Date/Time    S. Pneumo Ag Urine or CSF - Urine, Urine, Clean Catch [[id number]]  (Normal) Collected: 12/13/22 2356    Lab Status: Final result Specimen: Urine, Clean Catch Updated: 12/14/22 1341     Strep Pneumo Ag Negative    Blood Culture - Blood, Arm, Right [[id number]]  (Normal) Collected: 12/13/22 1912    Lab Status: Final result Specimen: Blood from Arm, Right Updated: 12/18/22 1931     Blood Culture No growth at 5 days    Blood Culture - Blood, Hand, Right [[id number]]  (Normal) Collected: 12/13/22 1911    Lab Status: Final result Specimen: Blood from Hand, Right Updated: 12/18/22 1931     Blood Culture No growth at 5 days    Respiratory Panel PCR w/COVID-19(SARS-CoV-2) NURY/DOMINGA/KATHRYN/PAD/COR/MAD/ANA In-House, NP Swab in UTM/VTM, 3-4 HR TAT - Swab, Nasopharynx [[id number]]  (Normal) Collected: 12/13/22 1635    Lab Status: Final result Specimen: Swab from Nasopharynx Updated: 12/13/22 1728     ADENOVIRUS, PCR Not Detected     Coronavirus 229E Not Detected     Coronavirus HKU1 Not Detected     Coronavirus NL63 Not Detected     Coronavirus OC43 Not Detected     COVID19 Not Detected     Human Metapneumovirus Not Detected     Human Rhinovirus/Enterovirus Not Detected     Influenza A PCR Not Detected     Influenza B PCR Not Detected     Parainfluenza Virus 1 Not Detected     Parainfluenza Virus 2 Not Detected     Parainfluenza Virus 3 Not Detected     Parainfluenza Virus 4 Not Detected     RSV, PCR Not Detected     Bordetella pertussis pcr Not Detected     Bordetella parapertussis PCR Not Detected     Chlamydophila pneumoniae PCR Not Detected     Mycoplasma pneumo by PCR Not Detected    Narrative:      In the setting of a positive respiratory panel with a viral infection PLUS a negative procalcitonin without other underlying concern for bacterial infection, consider observing off antibiotics or discontinuation of antibiotics and continue supportive care. If the respiratory  panel is positive for atypical bacterial infection (Bordetella pertussis, Chlamydophila pneumoniae, or Mycoplasma pneumoniae), consider antibiotic de-escalation to target atypical bacterial infection.    COVID-19 and FLU A/B PCR - Swab, Nasopharynx [[id number]]  (Normal) Collected: 12/13/22 0021    Lab Status: Final result Specimen: Swab from Nasopharynx Updated: 12/13/22 0046     COVID19 Not Detected     Influenza A PCR Not Detected     Influenza B PCR Not Detected    Narrative:      Fact sheet for providers: https://www.fda.gov/media/648355/download    Fact sheet for patients: https://www.fda.gov/media/811648/download    Test performed by PCR.          Labs above have been reviewed on the day of discharge.  Radiology images from prior 30 days were reviewed prior to discharge as incorporated into this document.     Discharge Vitals and Physical Examination       Vital Signs  Temp:  [97.2 °F (36.2 °C)-98.3 °F (36.8 °C)] 98.3 °F (36.8 °C)  Heart Rate:  [81-96] 81  Resp:  [18-20] 18  BP: (119-134)/(59-65) 119/65     PHYSICAL EXAMINATION:   Physical Exam  Vitals and nursing note reviewed.   Constitutional:       General: He is awake. He is not in acute distress.     Appearance: He is not diaphoretic.      Interventions: Nasal cannula in place.      Comments: Currently on 2L NC.    HENT:      Head: Normocephalic.      Mouth/Throat:      Mouth: Mucous membranes are moist.      Pharynx: Oropharynx is clear.   Eyes:      Extraocular Movements: Extraocular movements intact.   Cardiovascular:      Rate and Rhythm: Normal rate and regular rhythm.      Pulses: Normal pulses.           Dorsalis pedis pulses are 2+ on the right side and 2+ on the left side.      Heart sounds: Normal heart sounds. No murmur heard.    No friction rub.   Pulmonary:      Effort: Pulmonary effort is normal. No accessory muscle usage, respiratory distress or retractions.      Breath sounds: Rales (mild, inspiratory, RLL) present. No decreased breath  sounds or wheezing.      Comments: Air movement good. No wheezing, rhonchi. Mild, inspiratory basilar crackles on the right. Has improved since admission.   Abdominal:      General: Abdomen is flat. Bowel sounds are normal. There is no distension.      Palpations: Abdomen is soft.      Tenderness: There is no abdominal tenderness. There is no guarding.   Genitourinary:     Comments: Approximately 600 mL yellow urine noted in urinal at bedside.   Musculoskeletal:      Cervical back: Neck supple. No rigidity.      Right lower leg: No edema.      Left lower leg: No edema.   Skin:     General: Skin is warm and dry.      Capillary Refill: Capillary refill takes 2 to 3 seconds.      Findings: Bruising present.      Comments: Left hip incision CRISTAL; edges well approximated. Staples intact. No bleeding or drainage. Appears to be healing well. There is still some bruising noted just superior to incision site extending posteriorly. Appears to have improved. Denies pain to touch. No longer firm.    Neurological:      Mental Status: He is alert and oriented to person, place, and time.      Cranial Nerves: No facial asymmetry.      Sensory: Sensation is intact.      Motor: Weakness (generalized) present. No tremor.      Comments: LLE remaining neurovascularly intact.    Psychiatric:         Attention and Perception: Attention normal.         Mood and Affect: Mood normal.         Speech: Speech normal.         Behavior: Behavior normal. Behavior is cooperative.         Thought Content: Thought content normal.           Discharge Disposition, Discharge Medications, and Discharge Appointments     Discharge Disposition: Home with HH/PT    Condition on Discharge: Stable    Discharge Medications:     Discharge Medications      New Medications      Instructions Start Date   aspirin 81 MG EC tablet   81 mg, Oral, Daily      megestrol 40 MG tablet  Commonly known as: MEGACE   40 mg, Oral, Daily      multivitamin tablet tablet   1 tablet,  Oral, Daily         Continue These Medications      Instructions Start Date   amitriptyline 25 MG tablet  Commonly known as: ELAVIL   25 mg, Oral, Nightly      Budeson-Glycopyrrol-Formoterol 160-9-4.8 MCG/ACT aerosol inhaler  Commonly known as: BREZTRI   2 puffs, Inhalation, 2 Times Daily      finasteride 5 MG tablet  Commonly known as: Proscar   5 mg, Oral, Daily      levothyroxine 50 MCG tablet  Commonly known as: SYNTHROID, LEVOTHROID   50 mcg, Oral, Daily      LORazepam 0.5 MG tablet  Commonly known as: ATIVAN   0.5 mg, Oral, Daily PRN      tamsulosin 0.4 MG capsule 24 hr capsule  Commonly known as: Flomax   0.4 mg, Oral, Nightly      Ventolin  (90 Base) MCG/ACT inhaler  Generic drug: albuterol sulfate HFA   2 puffs, Inhalation, Every 6 Hours PRN             Discharged medication regimen discussed with attending physician prior to discharge.     Discharge Diet: Regular, Thin    Dietary Orders (From admission, onward)     Start     Ordered    12/16/22 1200  Dietary Nutrition Supplements Other (See Comment); Whole milk  Daily With Breakfast, Lunch & Dinner      Question Answer Comment   Select Supplement Other (See Comment)    Other Whole milk        12/16/22 0901    12/13/22 1619  Diet: Regular/House Diet; Texture: Regular Texture (IDDSI 7); Fluid Consistency: Thin (IDDSI 0)  Diet Effective Now        References:    Diet Order [place]   Question Answer Comment   Diets: Regular/House Diet    Texture: Regular Texture (IDDSI 7)    Fluid Consistency: Thin (IDDSI 0)        12/13/22 1618                Activity at Discharge: Up with assistance as tolerated. PT/OT.      Discharge Disposition:    Home-Health Care [facility]    Follow-up Appointments:      Additional Instructions for the Follow-ups that You Need to Schedule     Ambulatory Referral to Home Health   As directed      Face to Face Visit Date: 12/20/2022    Follow-up provider for Plan of Care?: I treated the patient in an acute care facility and will not  continue treatment after discharge.    Follow-up provider: MARILU STEWARD [[id number]]    Reason/Clinical Findings: S/P left hip hemiarthroplasty, debility, pneumonia (completed treatment), chronic hypoxic respiratory failure    Describe mobility limitations that make leaving home difficult: S/P left hip hemiarthroplasty, debility, pneumonia (completed treatment), chronic hypoxic respiratory failure    Nursing/Therapeutic Services Requested: Skilled Nursing Physical Therapy Occupational Therapy    Skilled nursing orders: O2 instruction Wound care dressing/changes Neurovascular assessments    PT orders: Therapeutic exercise Gait Training Transfer training Strengthening Home safety assessment    Weight Bearing Status: As Tolerated    Occupational orders: Activities of daily living Energy conservation Strengthening Fine motor Home safety assessment    Frequency: 1 Week 1         Discharge Follow-up with PCP   As directed       Currently Documented PCP:    Marilu Steward APRN    PCP Phone Number:    [phone]     Follow Up Details: 1 week post hospital discharge eval; recommend collection of BMP and CBC prior to visit (Home Health to collect) to monitor Hgb, sodium levels.         Discharge Follow-up with Specialty: PulmonologyIrma; 2 Weeks   As directed      Specialty: PulmonologyIrma    Follow Up: 2 Weeks    Follow Up Details: Left lung nodule/mass, recently increased in size, recently treated left basilar pneumonia, chronic hypoxic respiratory failure         Discharge Follow-up with Specified Provider: Follow-up with Dr. Reyez (Ortho) in 1 week for staple removal, left hip incision; 1 Week   As directed      To: Follow-up with Dr. Reyez (Ortho) in 1 week for staple removal, left hip incision    Follow Up: 1 Week            Follow-up Information     Marilu Steward APRN .    Specialty: Nurse Practitioner  Why: 1 week post hospital discharge eval; recommend collection  of BMP and CBC prior to visit (Home Health to collect) to monitor Hgb, sodium levels.  Contact information:  [address] [place] [address] [phone]                         Additional Instructions for the Follow-ups that You Need to Schedule     Ambulatory Referral to Home Health   As directed      Face to Face Visit Date: 12/20/2022    Follow-up provider for Plan of Care?: I treated the patient in an acute care facility and will not continue treatment after discharge.    Follow-up provider: GOLDIE STEWARD [[id number]]    Reason/Clinical Findings: S/P left hip hemiarthroplasty, debility, pneumonia (completed treatment), chronic hypoxic respiratory failure    Describe mobility limitations that make leaving home difficult: S/P left hip hemiarthroplasty, debility, pneumonia (completed treatment), chronic hypoxic respiratory failure    Nursing/Therapeutic Services Requested: Skilled Nursing Physical Therapy Occupational Therapy    Skilled nursing orders: O2 instruction Wound care dressing/changes Neurovascular assessments    PT orders: Therapeutic exercise Gait Training Transfer training Strengthening Home safety assessment    Weight Bearing Status: As Tolerated    Occupational orders: Activities of daily living Energy conservation Strengthening Fine motor Home safety assessment    Frequency: 1 Week 1         Discharge Follow-up with PCP   As directed       Currently Documented PCP:    Goldie Steward APRN    PCP Phone Number:    [phone]     Follow Up Details: 1 week post hospital discharge eval; recommend collection of BMP and CBC prior to visit (Home Health to collect) to monitor Hgb, sodium levels.         Discharge Follow-up with Specialty: PulmonologyIrma; 2 Weeks   As directed      Specialty: PulmonIrma caal    Follow Up: 2 Weeks    Follow Up Details: Left lung nodule/mass, recently increased in size, recently treated left basilar pneumonia, chronic  hypoxic respiratory failure         Discharge Follow-up with Specified Provider: Follow-up with Dr. Reyez (Ortho) in 1 week for staple removal, left hip incision; 1 Week   As directed      To: Follow-up with Dr. Reyez (Ortho) in 1 week for staple removal, left hip incision    Follow Up: 1 Week                  [facility]  12/20/22  07:49 EST      Time: Greater than 30 minutes spent on this discharge.  I spent 45 minutes on this discharge activity which included:  Face-to-face encounter with the patient, discussing plan with attending physician, reviewing the data in the system, coordination of the care with the nursing staff as well as consultations, documentation, and entering orders.     Electronically signed by Eren Hardin DO at 12/20/22 0910

## 2022-12-21 NOTE — OUTREACH NOTE
Prep Survey    Flowsheet Row Responses   [facility] facility patient discharged from? [place]   Is LACE score < 7 ? No   Eligibility Readm Mgmt   Discharge diagnosis Chronic HFpEF   Does the patient have one of the following disease processes/diagnoses(primary or secondary)? CHF   Does the patient have Home health ordered? Yes   What is the Home health agency?  [facility].    Is there a DME ordered? Yes   What DME was ordered? [employer] for walker & [facility]SC   Prep survey completed? Yes          COLTON HOOKS - Registered Nurse

## 2022-12-29 ENCOUNTER — READMISSION MANAGEMENT (OUTPATIENT)
Dept: CALL CENTER | Facility: HOSPITAL | Age: 87
End: 2022-12-29

## 2022-12-29 NOTE — OUTREACH NOTE
CHF Week 2 Survey    Flowsheet Row Responses   [facility] patient discharged from? [place]   Does the patient have one of the following disease processes/diagnoses(primary or secondary)? CHF   Week 2 attempt successful? Yes   Call start time 1124   Call end time 1130   Discharge diagnosis Chronic HFpEF   Meds reviewed with patient/caregiver? Yes   Is the patient having any side effects they believe may be caused by any medication additions or changes? No   Is the patient taking all medications as directed (includes completed medication regime)? Yes   Does the patient have a primary care provider?  Yes   Does the patient have an appointment with their PCP within 7 days of discharge? Yes   Has the patient kept scheduled appointments due by today? Yes   What is the Home health agency?  [facility].    Has home health visited the patient within 72 hours of discharge? Yes   Has all DME been delivered? Yes   Pulse Ox monitoring None   Psychosocial issues? No   Did the patient receive a copy of their discharge instructions? Yes   Nursing interventions Reviewed instructions with patient   What is the patient's perception of their health status since discharge? Improving   Is the patient/caregiver able to teach back the hierarchy of who to call/visit for symptoms/problems? PCP, Specialist, Home health nurse, Urgent Care, ED, 911 Yes   CHF Zone this Call Green Zone   Green Zone Patient reports doing well, No new or worsening shortness of breath, No new swelling -  feet, ankles and legs look normal for you   CHF Week 2 call completed? Yes   Revoked No further contact(revokes)-requires comment   Is the patient interested in additional calls from an ambulatory ?  NOTE:  applies to high risk patients requiring additional follow-up. No   Graduated/Revoked comments Pt reports he is doing well, has HH and has had his f/u appts. No needs at this time   Call end time 1130          DOROTHY HOOKS - Registered Nurse

## 2023-01-01 ENCOUNTER — HOSPITAL ENCOUNTER (EMERGENCY)
Facility: HOSPITAL | Age: 88
End: 2023-05-10
Attending: EMERGENCY MEDICINE | Admitting: EMERGENCY MEDICINE
Payer: MEDICARE

## 2023-01-01 DIAGNOSIS — I46.9 CARDIAC ARREST: Primary | ICD-10-CM

## 2023-01-01 PROCEDURE — 92950 HEART/LUNG RESUSCITATION CPR: CPT

## 2023-01-01 PROCEDURE — 99283 EMERGENCY DEPT VISIT LOW MDM: CPT

## 2023-01-01 PROCEDURE — 94799 UNLISTED PULMONARY SVC/PX: CPT

## 2023-01-01 PROCEDURE — 25010000002 AMIODARONE PER 30 MG: Performed by: EMERGENCY MEDICINE

## 2023-01-01 PROCEDURE — 25010000002 ATROPINE SULFATE 1 MG/10ML SOLUTION PREFILLED SYRINGE: Performed by: EMERGENCY MEDICINE

## 2023-01-01 PROCEDURE — 25010000002 MAGNESIUM SULFATE PER 500 MG OF MAGNESIUM: Performed by: EMERGENCY MEDICINE

## 2023-01-01 PROCEDURE — 25010000002 EPINEPHRINE 1 MG/10ML SOLUTION PREFILLED SYRINGE: Performed by: EMERGENCY MEDICINE

## 2023-01-01 RX ORDER — ATROPINE SULFATE 0.1 MG/ML
INJECTION INTRAVENOUS
Status: COMPLETED | OUTPATIENT
Start: 2023-01-01 | End: 2023-01-01

## 2023-01-01 RX ORDER — MAGNESIUM SULFATE HEPTAHYDRATE 500 MG/ML
INJECTION, SOLUTION INTRAMUSCULAR; INTRAVENOUS
Status: COMPLETED | OUTPATIENT
Start: 2023-01-01 | End: 2023-01-01

## 2023-01-01 RX ORDER — EPINEPHRINE 0.1 MG/ML
SYRINGE (ML) INJECTION
Status: COMPLETED | OUTPATIENT
Start: 2023-01-01 | End: 2023-01-01

## 2023-01-01 RX ORDER — AMIODARONE HYDROCHLORIDE 50 MG/ML
INJECTION, SOLUTION INTRAVENOUS
Status: COMPLETED | OUTPATIENT
Start: 2023-01-01 | End: 2023-01-01

## 2023-01-01 RX ADMIN — SODIUM BICARBONATE 50 MEQ: 84 INJECTION INTRAVENOUS at 08:42

## 2023-01-01 RX ADMIN — Medication 1 MG: at 08:53

## 2023-01-01 RX ADMIN — SODIUM BICARBONATE 50 MEQ: 84 INJECTION INTRAVENOUS at 08:40

## 2023-01-01 RX ADMIN — Medication 1 MG: at 08:48

## 2023-01-01 RX ADMIN — ATROPINE SULFATE 1 MG: 0.1 INJECTION, SOLUTION INTRAVENOUS at 08:48

## 2023-01-01 RX ADMIN — Medication 1 MG: at 08:46

## 2023-01-01 RX ADMIN — Medication 1 MG: at 08:51

## 2023-01-01 RX ADMIN — MAGNESIUM SULFATE HEPTAHYDRATE 2 G: 500 INJECTION, SOLUTION INTRAMUSCULAR; INTRAVENOUS at 08:45

## 2023-01-01 RX ADMIN — Medication 1 MG: at 08:41

## 2023-01-01 RX ADMIN — Medication 1 MG: at 08:44

## 2023-01-01 RX ADMIN — Medication 1 MG: at 08:39

## 2023-01-01 RX ADMIN — ATROPINE SULFATE 1 MG: 0.1 INJECTION, SOLUTION INTRAVENOUS at 08:49

## 2023-01-01 RX ADMIN — AMIODARONE HYDROCHLORIDE 300 MG: 50 INJECTION, SOLUTION INTRAVENOUS at 08:44

## 2023-01-01 RX ADMIN — Medication 1 MG: at 08:49

## 2023-02-05 NOTE — NURSING NOTE
Pt refused to return to room for discharge paperwork, also would not wait for oxygen to be delivered. Discharge paperwork reviewed with patients nephew at nurses station. Informed nephew that patient has meds to be picked up in pharmacy. [facility] by hospital staff.     3-calculated by average/Not able to assess (calculate score using [facility] averaging method)

## 2023-05-10 NOTE — ED NOTES
Spoke with Lucille Suárez from [facility]. Pt is good to be released to  home.

## 2023-05-10 NOTE — ED PROVIDER NOTES
Subjective     History provided by:  Spouse   used: No    Cardiac Arrest  Witnessed by:  Family member  Incident location:  En route to the ED  Time before BLS initiated:  > 5 minutes  Time before ALS initiated:  5-8 minutes  Condition upon EMS arrival:  Unresponsive  Pulse:  Absent  Initial cardiac rhythm per EMS:  Asystole  Airway:  Intubation in ED  Rhythm on admission to ED:  Asystole  Associated symptoms: difficulty breathing and shortness of breath    Associated symptoms: no chest pain, no dizziness, no fatigue, no heartburn, no loss of consciousness, no palpitations, no syncope, no vomiting and no weakness    Risk factors: COPD    Risk factors: no diabetes mellitus, no drug overdose, no head injury, no heart problem, no hyperlipidemia and no trauma        Review of Systems   Constitutional: Negative for activity change, appetite change, chills, diaphoresis, fatigue and fever.   HENT: Negative for congestion, ear pain and sore throat.    Eyes: Negative for redness.   Respiratory: Positive for shortness of breath. Negative for cough, chest tightness and wheezing.    Cardiovascular: Negative for chest pain, palpitations, leg swelling and syncope.   Gastrointestinal: Negative for abdominal pain, diarrhea, heartburn, nausea and vomiting.   Genitourinary: Negative for dysuria and urgency.   Musculoskeletal: Negative for arthralgias, back pain, myalgias and neck pain.   Skin: Negative for pallor, rash and wound.   Neurological: Negative for dizziness, loss of consciousness, speech difficulty, weakness and headaches.   Psychiatric/Behavioral: Negative for agitation, behavioral problems, confusion and decreased concentration.   All other systems reviewed and are negative.      Past Medical History:   Diagnosis Date   • Angiodysplasia of the colon    • Arthritis    • Bacteremia due to Escherichia coli 06/28/2017    Same time as the common bile duct stone   • Benign prostatic hyperplasia without  lower urinary tract symptoms 06/30/2021   • Bile duct calculus 06/24/2017   • COPD (chronic obstructive pulmonary disease) with emphysema     Centrilobular   • Diverticulosis    • GERD (gastroesophageal reflux disease)    • Heart failure with preserved ejection fraction    • Left upper lobe pulmonary nodule 01/30/2019    15 mm and spiculated in 2018; FNA was negative   • Moderate pulmonary hypertension    • Myocardial infarction    • Status post laparoscopic cholecystectomy 06/15/2017       Allergies   Allergen Reactions   • Penicillins Anaphylaxis       Past Surgical History:   Procedure Laterality Date   • BRONCHOSCOPY N/A 8/23/2018    Procedure: BRONCHOSCOPY WITH ENDOBRONCHIAL ULTRASOUND;  Surgeon: Saravanan Méndez MD;  Location: [facility] OR;  Service: Pulmonary   • CHOLECYSTECTOMY N/A 6/16/2017    Procedure: CHOLECYSTECTOMY LAPAROSCOPIC;  Surgeon: Jose Velazquez MD;  Location: [facility] OR;  Service:    • EYE SURGERY Bilateral 2013   • HIP BIPOLAR REPLACEMENT Left 12/13/2022    Procedure: Left hip hemiarthroplasty, cemented, lateral, biomet (rep aware), request 1p;  Surgeon: Adrian Reyez MD;  Location: [facility];  Service: Orthopedics;  Laterality: Left;   • INGUINAL HERNIA REPAIR Left 2013    left   • CO ERCP DX COLLECTION SPECIMEN BRUSHING/WASHING N/A 6/26/2017    Procedure: ENDOSCOPIC RETROGRADE CHOLANGIOPANCREATOGRAPHY;  Surgeon: Rod Francis MD;  Location: [facility] OR;  Service: Gastroenterology   • CO ERCP DX COLLECTION SPECIMEN BRUSHING/WASHING N/A 10/26/2017    Procedure: ENDOSCOPIC RETROGRADE CHOLANGIOPANCREATOGRAPHY;  Surgeon: Rod Francis MD;  Location: [facility] OR;  Service: Gastroenterology       Family History   Problem Relation Age of Onset   • No Known Problems Mother    • Diabetes Father    • Hypertension Father    • Stroke Father    • Diabetes Sister        Social History     Socioeconomic History   • Marital status:    Tobacco Use   • Smoking status: Former     Packs/day: 3.00      Years: 10.00     Pack years: 30.00     Types: Cigarettes     Quit date:      Years since quittin.3   • Smokeless tobacco: Former     Quit date: 1995   Vaping Use   • Vaping Use: Never used   Substance and Sexual Activity   • Alcohol use: No   • Drug use: No   • Sexual activity: Defer           Objective   Physical Exam  Constitutional:       Appearance: He is ill-appearing.   HENT:      Head: Normocephalic and atraumatic.      Right Ear: Tympanic membrane, ear canal and external ear normal.      Left Ear: Tympanic membrane, ear canal and external ear normal.      Nose: Nose normal. No congestion or rhinorrhea.      Mouth/Throat:      Mouth: Mucous membranes are dry.   Eyes:      Pupils: Pupils are equal, round, and reactive to light.   Neck:      Vascular: No carotid bruit.   Cardiovascular:      Comments: No pulse and no heart sounds.  Pulmonary:      Comments: No breath sounds and no chest rise.  Abdominal:      General: There is no distension.   Musculoskeletal:         General: No swelling.      Cervical back: Normal range of motion and neck supple. No rigidity or tenderness.      Right lower leg: No edema.      Left lower leg: No edema.   Lymphadenopathy:      Cervical: No cervical adenopathy.   Skin:     General: Skin is warm and dry.      Capillary Refill: Capillary refill takes 2 to 3 seconds.   Neurological:      Mental Status: He is unresponsive.      GCS: GCS eye subscore is 1. GCS verbal subscore is 1. GCS motor subscore is 1.         Procedures           ED Course  ED Course as of 05/10/23 1730   Wed May 10, 2023   1728 Car pulled up to the emergency department and there was an unresponsive man in the passenger seat.  His spouse reports that he became unresponsive in route to the emergency department with increased shortness of breath.  He was immediately brought into the emergency department from the emergency room surgical and ACLS protocol was initiated.  He had maximum aggressive  medical management in the emergency department with no signs of life.  No ROSC.  Patient pronounced after several minutes of aggressive medical management and ACLS protocol. [ES]      ED Course User Index  [ES] Vic Steward MD                                           Medical Decision Making    History provided by:  Spouse   used: No    Cardiac Arrest  Witnessed by:  Family member  Incident location:  En route to the ED  Time before BLS initiated:  > 5 minutes  Time before ALS initiated:  5-8 minutes  Condition upon EMS arrival:  Unresponsive  Pulse:  Absent  Initial cardiac rhythm per EMS:  Asystole  Airway:  Intubation in ED  Rhythm on admission to ED:  Asystole  Associated symptoms: difficulty breathing and shortness of breath    Associated symptoms: no chest pain, no dizziness, no fatigue, no heartburn, no loss of consciousness, no palpitations, no syncope, no vomiting and no weakness    Risk factors: COPD    Risk factors: no diabetes mellitus, no drug overdose, no head injury, no heart problem, no hyperlipidemia and no trauma        Amount and/or Complexity of Data Reviewed  External Data Reviewed: notes.      Risk  Prescription drug management.          Final diagnoses:   Cardiac arrest       ED Disposition  ED Disposition     ED Disposition       Condition   --    Comment   --             No follow-up provider specified.       Medication List      No changes were made to your prescriptions during this visit.          Vic Steward MD  05/10/23 [id number]

## 2023-05-10 NOTE — ED NOTES
Patient leaving at this time with [facility]  home. Patients shoes placed in a belongings bag and sent with  home

## 2023-07-13 NOTE — OP NOTE
Bronchoscopy Procedure Note    Date of Operation: 8/23/2018    Pre-op Diagnosis: Abnormal CT chest    Post-op Diagnosis: Abnormal CT chest    Surgeon: Saravanan Ménedz MD    Assistants: None    Anesthesia: Please see anesthesia report for details    Operation: Flexible fiberoptic bronchoscopy, diagnostic     Findings: Left hilar lymph node was enlarged-on the endobronchial ultrasound    On bronchoscopy no endobronchial mass lesions were seen    Specimen: Bronchioloalveolar lavage of left upper lobe    Bronchoalveolar lavage of left lower lobe    Bronchial brushings of left upper lobe    Endobronchial ultrasound guided transbronchial needle aspiration of station l-4    Bronchial washings    Estimated Blood Loss: Minimal    Complications: None    Indications and History:  The patient is a 82 y.o. male with abnormal CT chest.  The risks, benefits, complications, treatment options and expected outcomes were discussed with the patient.  The possibilities of reaction to medication, pulmonary aspiration, perforation of a viscus, bleeding, failure to diagnose a condition and creating a complication requiring transfusion or operation were discussed with the patient who freely signed the consent.      Description of Procedure:  The patient was seen in the Holding Room and the site of surgery properly noted/marked.  The patient was taken to endoscopy suite, identified as Sarbjit Martin and the procedure verified as Flexible Fiberoptic Bronchoscopy.  A Time Out was held and the above information confirmed.     The procedure was done under LMA.  The bronchoscope was passed through the LMA . The vocal cords were visualized and  2 ml 1 % lidocaine was topically placed onto the cords. The cords were normal. The scope was then passed into the trachea.      2 ml 1 % lidocaine was used topically on the karlos.  Careful inspection of the tracheal lumen was accomplished. The scope was sequentially passed into the left main and then left  Over the counter medications:  -Take Ibuprofen 400-600 mg every 4-6 hours or Aleve/naproxen every 12 hours as needed for fever, pain, and inflammation.  Take with food.   -Take Tylenol 650 mg every 4 hours as needed for pain relief.  -yogurt or probiotic while on the antibiotic     Other things to try:   -wash daily with soap and water.  Pat dry  -May dress with Nonstick/Telfa dressing with Tape or coban  -Epsom salt soak       Ordered from Pharmacy  -Augmentin 2x per x 10 days.       -Bactroban 3 times daily    -Tizanidine    upper and lower bronchi and segmental bronchi.       The scope was then withdrawn and advanced into the right main bronchus and then into the RUL, RML, and RLL bronchi and segmental bronchi.   No endobronchial mass or lesions were seen.    Samples-    Bronchial washings      Then the bronchoscope was wedged into the left upper lobe.  Bronchial alveolar lavage and close to 60 cc of saline was given 17 close to 14 cc was aspirated back.    Bronchial brushings of left upper lobe were done    Then the bronchoscope was wedged into the left lower lobe for a bronchioloalveolar lavage and close to 60 cc of saline was given once and close to 20 cc was aspirated back.    In the endobronchial ultrasound was introduced and mediastinal survey was done.  Station L-4  Lymph node was enlarged.  Endobronchial ultrasound-guided transbronchial and respiration of l-4 lymph node station was done once.    Samples were sent for cytology and microbiology.  Please follow up the results  The Patient was taken to the Endoscopy Recovery area in satisfactory condition.        Saravanan Méndez MD

## (undated) DEVICE — SUT VIC 2/0 UR6 27IN J602H

## (undated) DEVICE — 3 BONE CEMENT MIXER: Brand: MIXEVAC

## (undated) DEVICE — GLV SURG TRIUMPH MICRO PF LTX 8 STRL

## (undated) DEVICE — Device: Brand: DISPOSABLE ELECTROSURGICAL SNARE

## (undated) DEVICE — ST EXT IV SMARTSITE 2PC M LL 2.8ML 20IN

## (undated) DEVICE — COR LAP CHOLE: Brand: MEDLINE INDUSTRIES, INC.

## (undated) DEVICE — DEV NDL ASP TRNSBRNCH EXCELON 19G 15MM 130CM

## (undated) DEVICE — Device

## (undated) DEVICE — ANTIBACTERIAL VIOLET BRAIDED (POLYGLACTIN 910), SYNTHETIC ABSORBABLE SURGICAL SUTURE: Brand: COATED VICRYL

## (undated) DEVICE — DRSNG PAD ABD 8X10IN STRL

## (undated) DEVICE — THE BITE BLOCK MAXI, LATEX FREE STRAP IS USED TO PROTECT THE ENDOSCOPE INSERTION TUBE FROM BEING BITTEN BY THE PATIENT.

## (undated) DEVICE — BRUSH CYTO MULTI APPL

## (undated) DEVICE — SUCTION CANISTER, 1500CC, RIGID: Brand: DEROYAL

## (undated) DEVICE — TUBING, SUCTION, 1/4" X 20', STRAIGHT: Brand: MEDLINE INDUSTRIES, INC.

## (undated) DEVICE — WIREGUIDED RETRIEVAL BASKET: Brand: TRAPEZOID RX

## (undated) DEVICE — RETRIEVAL BALLOON CATHETER: Brand: EXTRACTOR™ PRO RX

## (undated) DEVICE — TRIPLE LUMEN ERCP CANNULA: Brand: TANDEM XL

## (undated) DEVICE — DRSNG WND STRIP OPTIFOAM AG  A/MIC LTX  3.5X10IN STRL

## (undated) DEVICE — SUT MNCRYL PLS ANTIB UD 2/0 CP1 27IN

## (undated) DEVICE — GW JAG STR .025IN 450CM

## (undated) DEVICE — SYR LUER SLPTP 50ML

## (undated) DEVICE — SINGLE PORT MANIFOLD: Brand: NEPTUNE 2

## (undated) DEVICE — TRAP,MUCUS SPECIMEN,40CC: Brand: MEDLINE

## (undated) DEVICE — TUBING, SUCTION, 3/16" X 6', STRAIGHT: Brand: MEDLINE

## (undated) DEVICE — SUT VIC 0 CP1 27IN J267H

## (undated) DEVICE — GOWN,REINF,POLY,ECL,PP SLV,XL: Brand: MEDLINE

## (undated) DEVICE — INTENDED FOR TISSUE SEPARATION, AND OTHER PROCEDURES THAT REQUIRE A SHARP SURGICAL BLADE TO PUNCTURE OR CUT.: Brand: BARD-PARKER ® STAINLESS STEEL BLADES

## (undated) DEVICE — ENCORE® LATEX MICRO SIZE 7.5, STERILE LATEX POWDER-FREE SURGICAL GLOVE: Brand: ENCORE

## (undated) DEVICE — GW JAG STR .035IN 450CM

## (undated) DEVICE — NDL MAYO CATGUT 1/2 CIR 18244D PK/2

## (undated) DEVICE — SUT VIC 2/0 TIES 18IN J111T

## (undated) DEVICE — SPHINCTEROTOME: Brand: HYDRATOME RX 44

## (undated) DEVICE — CYSTO/BLADDER IRRIGATION SET, REGULATING CLAMP

## (undated) DEVICE — Device: Brand: DEFENDO AIR/WATER/SUCTION AND BIOPSY VALVE

## (undated) DEVICE — PK PRSTH HIP 70

## (undated) DEVICE — Device: Brand: MEDEX

## (undated) DEVICE — 2108 SERIES SAGITTAL BLADE (18.6 X 0.8 X 73.8MM)

## (undated) DEVICE — 4-PORT MANIFOLD: Brand: NEPTUNE 2

## (undated) DEVICE — HOLDER: Brand: DEROYAL

## (undated) DEVICE — 3M™ IOBAN™ 2 ANTIMICROBIAL INCISE DRAPE 6650EZ: Brand: IOBAN™ 2

## (undated) DEVICE — PILLW ABD MD

## (undated) DEVICE — GLV SURG SENSICARE SLT PF LF 8.5 STRL

## (undated) DEVICE — SYR LL TP 10ML STRL

## (undated) DEVICE — SINGLE USE BIOPSY VALVE MAJ-210: Brand: SINGLE USE BIOPSY VALVE (STERILE)

## (undated) DEVICE — TROCAR: Brand: KII® SLEEVE

## (undated) DEVICE — DRP C/ARM W/BAND W/CLIPS 41X74IN

## (undated) DEVICE — HANDPIECE SET WITH COAXIAL HIGH FLOW TIP AND SUCTION TUBE: Brand: INTERPULSE

## (undated) DEVICE — TBG PENCL TELESCP MEGADYNE SMOKE EVAC 10FT

## (undated) DEVICE — ADAPT SWVL FIBROPTIC BRONCH

## (undated) DEVICE — ANTIBACTERIAL UNDYED BRAIDED (POLYGLACTIN 910), SYNTHETIC ABSORBABLE SUTURE: Brand: COATED VICRYL

## (undated) DEVICE — TROCAR: Brand: KII FIOS FIRST ENTRY

## (undated) DEVICE — SINGLE USE SUCTION VALVE MAJ-209: Brand: SINGLE USE SUCTION VALVE (STERILE)

## (undated) DEVICE — BNDG ELAS CO-FLEX SLF ADHR 4IN5YD LF STRL

## (undated) DEVICE — TOTAL JOINT KIT: Brand: ACM BIOPREP

## (undated) DEVICE — FRCP BX RADJAW4 PULM WO NDL STD1.8X2 100

## (undated) DEVICE — GLV SURG SENSICARE W/ALOE PF LF 8.5 STRL

## (undated) DEVICE — ENDOPATH ELECTROSURGERY PROBE PLUS II 5MM RIGHT ANGLE MONOPOLAR ELECTROSURGERY SUCTION AND IRRRIGATION SHAFTS WITH RIGHT ANGLE ELECTRODE - USE ONLY WITH PROBE PLUS II HANDLES: Brand: ENDOPATH

## (undated) DEVICE — ENDOCUT SCISSOR TIP, DISPOSABLE: Brand: RENEW

## (undated) DEVICE — [HIGH FLOW INSUFFLATOR,  DO NOT USE IF PACKAGE IS DAMAGED,  KEEP DRY,  KEEP AWAY FROM SUNLIGHT,  PROTECT FROM HEAT AND RADIOACTIVE SOURCES.]: Brand: PNEUMOSURE

## (undated) DEVICE — PROXIMATE RH ROTATING HEAD SKIN STAPLERS (35 WIDE) CONTAINS 35 STAINLESS STEEL STAPLES: Brand: PROXIMATE

## (undated) DEVICE — ENDOPATH XCEL BLADELESS TROCARS WITH STABILITY SLEEVES: Brand: ENDOPATH XCEL

## (undated) DEVICE — GOWN,NON-REINFORCED,SIRUS,SET IN SLV,XXL: Brand: MEDLINE

## (undated) DEVICE — ENDOPATH ELECTROSURGERY PROBE PLUS II PISTOL HAND CONTROL PISTOL GRIP HANDLES WITH SUCTION AND IRRRIGATION FOR HAND CONTROL MONOPOLAR ELCTROSURGERY USE ONLY WITH PROBE PLUS II SHAFTS: Brand: ENDOPATH